# Patient Record
Sex: FEMALE | Race: WHITE | NOT HISPANIC OR LATINO | Employment: OTHER | URBAN - METROPOLITAN AREA
[De-identification: names, ages, dates, MRNs, and addresses within clinical notes are randomized per-mention and may not be internally consistent; named-entity substitution may affect disease eponyms.]

---

## 2017-04-21 ENCOUNTER — HOSPITAL ENCOUNTER (EMERGENCY)
Facility: CLINIC | Age: 77
Discharge: HOME | End: 2017-04-21
Attending: EMERGENCY MEDICINE | Admitting: EMERGENCY MEDICINE
Payer: MEDICARE

## 2017-04-21 VITALS
HEART RATE: 89 BPM | OXYGEN SATURATION: 97 % | TEMPERATURE: 98.2 F | RESPIRATION RATE: 16 BRPM | SYSTOLIC BLOOD PRESSURE: 135 MMHG | DIASTOLIC BLOOD PRESSURE: 95 MMHG

## 2017-04-21 DIAGNOSIS — S81.802A AVULSION OF SKIN OF LOWER LEG, LEFT, INITIAL ENCOUNTER: ICD-10-CM
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
## 2017-04-21 PROCEDURE — 99283 EMERGENCY DEPT VISIT LOW MDM: CPT | Mod: 25

## 2017-04-21 PROCEDURE — 12005 RPR S/N/A/GEN/TRK12.6-20.0CM: CPT

## 2017-04-21 PROCEDURE — 93005 ELECTROCARDIOGRAM TRACING: CPT

## 2017-04-21 RX ORDER — GINSENG 100 MG
CAPSULE ORAL
Status: DISCONTINUED
Start: 2017-04-21 | End: 2017-04-22 | Stop reason: HOSPADM

## 2017-04-21 RX ORDER — SULFAMETHOXAZOLE/TRIMETHOPRIM 800-160 MG
1 TABLET ORAL 2 TIMES DAILY
Qty: 14 TABLET | Refills: 0 | Status: SHIPPED | OUTPATIENT
Start: 2017-04-21 | End: 2017-04-28

## 2017-04-21 RX ORDER — LIDOCAINE HYDROCHLORIDE AND EPINEPHRINE 10; 10 MG/ML; UG/ML
INJECTION, SOLUTION INFILTRATION; PERINEURAL
Status: DISCONTINUED
Start: 2017-04-21 | End: 2017-04-22 | Stop reason: HOSPADM

## 2017-04-21 ASSESSMENT — ENCOUNTER SYMPTOMS
WOUND: 1
HEADACHES: 0
NAUSEA: 0
WEAKNESS: 0
VOMITING: 0
DIARRHEA: 0
NUMBNESS: 0
FEVER: 0
DIZZINESS: 1

## 2017-04-21 NOTE — ED AVS SNAPSHOT
[facility] Emergency Department    [address]    [facility] [address]    Phone:  [phone]    Fax:  [phone]                                       Madhavi Castellanos   MRN: [id number]    Department:  [facility] Emergency Department   Date of Visit:  4/21/2017           After Visit Summary Signature Page     I have received my discharge instructions, and my questions have been answered. I have discussed any challenges I see with this plan with the nurse or doctor.    ..........................................................................................................................................  Patient/Patient Representative Signature      ..........................................................................................................................................  Patient Representative Print Name and Relationship to Patient    ..................................................               ................................................  Date                                            Time    ..........................................................................................................................................  Reviewed by Signature/Title    ...................................................              ..............................................  Date                                                            Time

## 2017-04-21 NOTE — ED AVS SNAPSHOT
[facility] Emergency Department    [address] [place] [address]    Phone:  [phone]    Fax:  [phone]                                       Madhavi Castellanos   MRN: [id number]    Department:  [facility] Emergency Department   Date of Visit:  4/21/2017           Patient Information     Date Of Birth          1940        Your diagnoses for this visit were:     Fall, initial encounter     Avulsion of skin of lower leg, left, initial encounter        You were seen by Peewee Strickland MD.      Follow-up Information     Follow up with Cecy Edmondson MD.    Specialty:  Internal Medicine    Why:  next week for wound check.     Contact information:    [facility]  [address]  [place] [address] [phone]        Discharge References/Attachments     SKIN AVULSION (ENGLISH)      24 Hour Appointment Hotline       To make an appointment at any [facility] clinic, call [phone]). If you don't have a family doctor or clinic, we will help you find one. [facility] clinics are conveniently located to serve the needs of you and your family.             Review of your medicines      START taking        Dose / Directions Last dose taken    sulfamethoxazole-trimethoprim 800-160 MG per tablet   Commonly known as:  BACTRIM DS   Dose:  1 tablet   Quantity:  14 tablet        Take 1 tablet by mouth 2 times daily for 7 days   Refills:  0          Our records show that you are taking the medicines listed below. If these are incorrect, please call your family doctor or clinic.        Dose / Directions Last dose taken    alum & mag hydroxide-simethicone 200-200-20 MG/5ML Susp suspension   Commonly known as:  MYLANTA/MAALOX   Dose:  30 mL   Quantity:  300 mL        Take 30 mLs by mouth every 4 hours as needed   Refills:  0        BETAPACE PO   Dose:  120 mg        Take 120 mg by mouth 2 times daily   Refills:  0        docusate sodium 100 MG tablet    Commonly known as:  COLACE   Dose:  100 mg   Quantity:  60 tablet        Take 100 mg by mouth daily   Refills:  1        fentaNYL 25 mcg/hr 72 hr patch   Commonly known as:  DURAGESIC   Dose:  1 patch        Place 1 patch onto the skin every 72 hours   Refills:  0        GABAPENTIN PO   Dose:  900 mg        Take 900 mg by mouth 3 times daily   Refills:  0        HYDROCHLOROTHIAZIDE PO   Dose:  12.5 mg        Take 12.5 mg by mouth daily   Refills:  0        HYDROcodone-acetaminophen 5-500 MG per tablet   Commonly known as:  VICODIN   Dose:  1-2 tablet        Take 1-2 tablets by mouth every 4 hours as needed   Refills:  0        LIPITOR PO   Dose:  10 mg        Take 10 mg by mouth   Refills:  0        LISINOPRIL PO   Dose:  10 mg        Take 10 mg by mouth daily   Refills:  0        METHOCARBAMOL PO   Dose:  750 mg        Take 750 mg by mouth 4 times daily   Refills:  0        metoclopramide 5 MG tablet   Commonly known as:  REGLAN   Dose:  5-10 mg   Quantity:  10 tablet        Take 1-2 tablets (5-10 mg) by mouth 3 times daily as needed   Refills:  0        polyethylene glycol Packet   Commonly known as:  MIRALAX   Dose:  1 packet   Quantity:  30 packet        Take 17 g by mouth 2 times daily   Refills:  0        PRILOSEC PO   Dose:  20 mg        Take 20 mg by mouth every morning   Refills:  0        promethazine 25 MG tablet   Commonly known as:  PHENERGAN   Dose:  25 mg   Quantity:  15 tablet        Take 1 tablet (25 mg) by mouth every 6 hours as needed for nausea   Refills:  0        SIMVASTATIN PO        Take  by mouth.   Refills:  0        TAPAZOLE PO   Dose:  5 mg        Take 5 mg by mouth daily   Refills:  0        WELLBUTRIN XL PO   Dose:  150 mg        Take 150 mg by mouth daily   Refills:  0                Prescriptions were sent or printed at these locations (1 Prescription)                   Other Prescriptions                Printed at Department/Unit printer (1 of 1)          sulfamethoxazole-trimethoprim (BACTRIM DS) 800-160 MG per tablet                Procedures and tests performed during your visit     EKG 12 lead      Orders Needing Specimen Collection     None      Pending Results     Date and Time Order Name Status Description    4/21/2017 2143 EKG 12 lead Preliminary             Pending Culture Results     No orders found from 4/19/2017 to 4/22/2017.            Test Results From Your Hospital Stay               Clinical Quality Measure: Blood Pressure Screening     Your blood pressure was checked while you were in the emergency department today. The last reading we obtained was  BP: 136/84 . Please read the guidelines below about what these numbers mean and what you should do about them.  If your systolic blood pressure (the top number) is less than 120 and your diastolic blood pressure (the bottom number) is less than 80, then your blood pressure is normal. There is nothing more that you need to do about it.  If your systolic blood pressure (the top number) is 120-139 or your diastolic blood pressure (the bottom number) is 80-89, your blood pressure may be higher than it should be. You should have your blood pressure rechecked within a year by a primary care provider.  If your systolic blood pressure (the top number) is 140 or greater or your diastolic blood pressure (the bottom number) is 90 or greater, you may have high blood pressure. High blood pressure is treatable, but if left untreated over time it can put you at risk for heart attack, stroke, or kidney failure. You should have your blood pressure rechecked by a primary care provider within the next 4 weeks.  If your provider in the emergency department today gave you specific instructions to follow-up with your doctor or provider even sooner than that, you should follow that instruction and not wait for up to 4 weeks for your follow-up visit.        Thank you for choosing [facility]       Thank you for choosing [facility]  for your care. Our goal is always to provide you with excellent care. Hearing back from our patients is one way we can continue to improve our services. Please take a few minutes to complete the written survey that you may receive in the mail after you visit with us. Thank you!        [employer] Information     [employer] gives you secure access to your electronic health record. If you see a primary care provider, you can also send messages to your care team and make appointments. If you have questions, please call your primary care clinic.  If you do not have a primary care provider, please call [phone] and they will assist you.        Care EveryWhere ID     This is your Care EveryWhere ID. This could be used by other organizations to access your [facility] medical records  [id number]        After Visit Summary       This is your record. Keep this with you and show to your community pharmacist(s) and doctor(s) at your next visit.

## 2017-04-22 LAB — INTERPRETATION ECG - MUSE: NORMAL

## 2017-04-22 NOTE — ED NOTES
"Pt presents to ED c/o a fall and a laceration. Pt states she felt dizzy earlier this evening she was getting out of her bed when she \"felt dizzy for a moment\" and fell to her knees. Pt reports a laceration to left knee. Denies dizziness currently. Pt is A&O x4, ABCs intact.   "

## 2017-04-22 NOTE — ED PROVIDER NOTES
History     Chief Complaint:  Fall and Laceration    HPI:    Madhavi Castellanos is a 76 year old female with a history of atrial fibrillation, CAD, osteoarthrosis, amongst others who presents for evaluation after a fall. The patient reports that she felt dizzy earlier tonight as she was getting out of her bed. When she took a few steps to move to a chair, she fell to both knees, subsequently causing a laceration to her left knee; she did not hit her head or lose consciousness. She denies numbness, weakness, fever, nausea, vomiting, or diarrhea.    Allergies:  Codeine Sulfate  Cymbalta  Percocet [Oxycodone-Acetaminophen]      Medications:    Phenergan  Miralax  Colace  Fentanyl  Reglan  Omeprazole  Lipitor  Wellbutrin  Hydrochlorothiazide  Tapazole  Betapace  Mylanta  Gabapentin  Vicodin  Methocarbamol  Lisinopril  Simvastatin     Past Medical History:    Asthma  Atrial fibrillation  CAD  Depression  Esophageal reflux  Hypertension  Liver lesion  Lumbosacral spondylosis  Obesity  Obstructive sleep apnea  Osteoarthritis  Prolonged QT interval  Hypercholesterolemia  Spinal stenosis of lumbar region  Left bundle branch block    Past Surgical History:    Cholecystectomy    Family History:    History reviewed. No pertinent family history.      Social History:  Smoking status: Never Smoker  Alcohol use: No   Marital Status:     Presents with family at bedside.     Review of Systems   Constitutional: Negative for fever.   Gastrointestinal: Negative for diarrhea, nausea and vomiting.   Skin: Positive for wound.   Neurological: Positive for dizziness. Negative for syncope, weakness, numbness and headaches.   All other systems reviewed and are negative.      Physical Exam     Patient Vitals for the past 24 hrs:   BP Temp Temp src Pulse Resp SpO2   04/21/17 2123 136/84 98.2  F (36.8  C) Oral 89 16 97 %      Physical Exam:  Nursing note and vitals reviewed.  Constitutional: Cooperative. Ambulatory.   HENT:    Mouth/Throat: Mucous membranes are normal.   Cardiovascular: Normal rate, regular rhythm and normal heart sounds.  No murmur.  Pulmonary/Chest: Effort normal and breath sounds normal. No respiratory distress. No wheezes. No rales.   Abdominal: Soft. Normal appearance. There is no tenderness. There is no rigidity and no guarding.   Musculoskeletal: Normal range of motion of LLE, normal straight leg raise.   Neurological: Alert. GCS 15.Strength and sensation in LLE  Skin: Skin is warm and dry. No rash noted. Large skin avulsion to the anterior left knee.  Psychiatric: Normal mood and affect.      Emergency Department Course     ECG (21:49:22):  Rate 81 bpm. NM interval 196. QRS duration 150. QT/QTc 414/480. P-R-T axes 16- -36-89. Normal sinus rhythm. Left axis deviation. Left bundle branch block. Interpreted at 2200 by Peewee Strickland MD.     Procedures:    Narrative: Procedure: Skin Tear Repair        SKIN TEAR:  A large, complex, flapped 15.5 cm (border length) skin tear.      LOCATION:  Anterior left knee      FUNCTION:  Distally sensation, circulation, motor and tendon function are intact.      ANESTHESIA:  LET - Topical, injected with 1% lidocaine with epi.       PREPARATION:  Irrigation with Normal Saline and Shur Clens      DEBRIDEMENT:  no debridement      CLOSURE:  Superior and lateral borders were closed with sutures. Each of those   lengths were 3.5 cm. Each of the wounds edges were closed with One Layer.  Each    with 4 x 4.0 Ethylon using interrupted sutures (total 8). The superior lateral border had   debridement of non viable tissue and was left open.    Emergency Department Course:  An ECG was obtained.    Past medical records, nursing notes, and vitals reviewed.  2204: I performed an exam of the patient and obtained history, as documented above.    2315: A laceration repair procedure was performed as noted above.    2341: I rechecked the patient. Findings and plan explained to the Patient. Patient  discharged home with instructions regarding supportive care, medications, and reasons to return. The importance of close follow-up was reviewed.      Impression & Plan      Medical Decision Making:    Madhavi Castellanos is a 76 year old female who presents after a mechanical fall. She states that she does fall often and there is no concern clinically for an acute cardiogenic or neurologic ideology. Her vital signs are stable and reassuring. She is able to weight bear and has full range of motion of the affected knee. There is no indication for imaging at this time. Her tetanus immunization is up-to-date and her skin tear was repaired in the primary fashion per the procedure note. The majority of this will need to heal by secondary intention due to loss of skin tissue. She will follow up next week with her primary physician for wound check.    Diagnosis:    ICD-10-CM   1. Fall, initial encounter W19.XXXA   2. Avulsion of skin of lower leg, left, initial encounter S81.802A     Disposition:  Discharged to home.    Alda Cha  4/21/2017   [facility] EMERGENCY DEPARTMENT    I, Alda Cha, bharath serving as a scribe at 10:04 PM on 4/21/2017 to document services personally performed by Peewee Strickland MD based on my observations and the provider's statements to me.       Peewee Strickland MD  04/22/17 0539

## 2017-04-28 ENCOUNTER — HOSPITAL ENCOUNTER (EMERGENCY)
Facility: CLINIC | Age: 77
Discharge: HOME | End: 2017-04-28
Attending: EMERGENCY MEDICINE | Admitting: EMERGENCY MEDICINE
Payer: MEDICARE

## 2017-04-28 VITALS
HEART RATE: 71 BPM | RESPIRATION RATE: 16 BRPM | TEMPERATURE: 97.4 F | DIASTOLIC BLOOD PRESSURE: 75 MMHG | OXYGEN SATURATION: 97 % | SYSTOLIC BLOOD PRESSURE: 118 MMHG

## 2017-04-28 DIAGNOSIS — T50.901A ACCIDENTAL OVERDOSE, INITIAL ENCOUNTER: ICD-10-CM

## 2017-04-28 PROCEDURE — 99282 EMERGENCY DEPT VISIT SF MDM: CPT

## 2017-04-28 ASSESSMENT — ENCOUNTER SYMPTOMS
LIGHT-HEADEDNESS: 0
DIZZINESS: 0
BACK PAIN: 1
FATIGUE: 0

## 2017-04-28 NOTE — ED NOTES
Pt again asking when she will be reevaluated and discharged. MD aware. Pt unwilling to wait any longer. States that MD had said he would reevaluate her by 1700 and it is past that. Pt leaving. Pt states she has come up with a new plan to implement at home; will carefully read each bottle of meds, will closely examine each pill before taking to ensure it is what it is supposed to be. She feels certain with this plan she can avoid medication errors in the future. Pt left ED, ambulatory, with use of cane.

## 2017-04-28 NOTE — ED NOTES
Pt was ambulatory upon arrival with use of cane; wheelchair to room and transferred independently to bed; no apparent distress. Patient's airway, breathing and circulation are all intact without need for intervention at this time. Respiration regular and easy; lungs clear. Patient is alert and oriented x4.

## 2017-04-28 NOTE — ED AVS SNAPSHOT
[facility] Emergency Department    [address] [place] [address]    Phone:  [phone]    Fax:  [phone]                                       Madhavi Castellanos   MRN: [id number]    Department:  [facility] Emergency Department   Date of Visit:  4/28/2017           Patient Information     Date Of Birth          1940        Your diagnoses for this visit were:     Accidental overdose, initial encounter        You were seen by Herbert Soria MD.      Follow-up Information     Follow up with Cecy Edmondson MD In 3 days.    Specialty:  Internal Medicine    Contact information:    [facility]  [address]  [place] [address]  [phone]          Discharge Instructions         Accidental Ingestion: Nontoxic (Adult)  You have been evaluated and treated for accidentally taking too much of a medicine or swallowing a chemical product. There is no sign of toxic effect at this time. It is not likely that any new symptoms will appear. To be safe, watch for symptoms during the next 24 hours (see below). The symptom will depend on what was swallowed.  Home care    If liquid charcoal was given to neutralize what was swallowed, it will give a black color to the stools for 1 to 2 days. Usually, a laxative is given with charcoal. This speeds the removal of any toxins from the intestines. This may cause diarrhea for up to 24 hours.    If you have been given charcoal but no laxative, you may become constipated. If this occurs, you may take an over-the-counter laxative.  Prevention    Keep medicines, pesticides, and other household chemicals in their original containers.    Clearly cristian all harmful products if a different bottle is used.  Note: In the future, if you or someone you know takes something possibly harmful, and you are not sure what to do, call the American Association of Poison Control Centers. The phone number is 1-654.307.4795. The phone line is  staffed 24 hours a day. If you call, you will be connected to the poison control center closest to you.   Follow-up care  Follow up with your health care provider, or as advised.  Call 911  Contact your local emergency services right away if any of these occur.    Trouble breathing or swallowing, wheezing    Severe confusion    Extreme drowsiness or trouble awakening    Fainting or loss of consciousness    Rapid heart rate     Very slow heart rate    Very low or very high blood pressure    Vomiting blood, or large amounts of blood in stool    Seizure  When to seek medical advice  Call your health care provider right away if any of these occur.    Shakiness    Fast breathing (over 25 breaths per minute) or slow breathing (less than 8 breaths per minute)    Shortness of breath    Fever of 100.4 F (38 C) or higher, or as directed by your healthcare provider    Vomiting or diarrhea for more than 24 hours    Abdominal pain    Dizziness or weakness    2350-9962 [employer]. [address] [place] [address]. All rights reserved. This information is not intended as a substitute for professional medical care. Always follow your healthcare professional's instructions.          24 Hour Appointment Hotline       To make an appointment at any [facility], call [phone] ([phone]). If you don't have a family doctor or clinic, we will help you find one. [facility] clinics are conveniently located to serve the needs of you and your family.             Review of your medicines      Our records show that you are taking the medicines listed below. If these are incorrect, please call your family doctor or clinic.        Dose / Directions Last dose taken    alum & mag hydroxide-simethicone 200-200-20 MG/5ML Susp suspension   Commonly known as:  MYLANTA/MAALOX   Dose:  30 mL   Quantity:  300 mL        Take 30 mLs by mouth every 4 hours as needed   Refills:  0        BETAPACE PO   Dose:  120 mg         Take 120 mg by mouth 2 times daily   Refills:  0        docusate sodium 100 MG tablet   Commonly known as:  COLACE   Dose:  100 mg   Quantity:  60 tablet        Take 100 mg by mouth daily   Refills:  1        fentaNYL 25 mcg/hr 72 hr patch   Commonly known as:  DURAGESIC   Dose:  1 patch        Place 1 patch onto the skin every 72 hours   Refills:  0        GABAPENTIN PO   Dose:  900 mg        Take 900 mg by mouth 3 times daily   Refills:  0        HYDROCHLOROTHIAZIDE PO   Dose:  12.5 mg        Take 12.5 mg by mouth daily   Refills:  0        HYDROcodone-acetaminophen 5-500 MG per tablet   Commonly known as:  VICODIN   Dose:  1-2 tablet        Take 1-2 tablets by mouth every 4 hours as needed   Refills:  0        LIPITOR PO   Dose:  10 mg        Take 10 mg by mouth   Refills:  0        LISINOPRIL PO   Dose:  10 mg        Take 10 mg by mouth daily   Refills:  0        METHOCARBAMOL PO   Dose:  750 mg        Take 750 mg by mouth 4 times daily   Refills:  0        metoclopramide 5 MG tablet   Commonly known as:  REGLAN   Dose:  5-10 mg   Quantity:  10 tablet        Take 1-2 tablets (5-10 mg) by mouth 3 times daily as needed   Refills:  0        polyethylene glycol Packet   Commonly known as:  MIRALAX   Dose:  1 packet   Quantity:  30 packet        Take 17 g by mouth 2 times daily   Refills:  0        PRILOSEC PO   Dose:  20 mg        Take 20 mg by mouth every morning   Refills:  0        promethazine 25 MG tablet   Commonly known as:  PHENERGAN   Dose:  25 mg   Quantity:  15 tablet        Take 1 tablet (25 mg) by mouth every 6 hours as needed for nausea   Refills:  0        SIMVASTATIN PO        Take  by mouth.   Refills:  0        sulfamethoxazole-trimethoprim 800-160 MG per tablet   Commonly known as:  BACTRIM DS   Dose:  1 tablet   Quantity:  14 tablet        Take 1 tablet by mouth 2 times daily for 7 days   Refills:  0        TAPAZOLE PO   Dose:  5 mg        Take 5 mg by mouth daily   Refills:  0        WELLBUTRIN  XL PO   Dose:  150 mg        Take 150 mg by mouth daily   Refills:  0                Orders Needing Specimen Collection     None      Pending Results     No orders found from 4/26/2017 to 4/29/2017.            Pending Culture Results     No orders found from 4/26/2017 to 4/29/2017.            Test Results From Your Hospital Stay               Clinical Quality Measure: Blood Pressure Screening     Your blood pressure was checked while you were in the emergency department today. The last reading we obtained was  BP: 118/75 . Please read the guidelines below about what these numbers mean and what you should do about them.  If your systolic blood pressure (the top number) is less than 120 and your diastolic blood pressure (the bottom number) is less than 80, then your blood pressure is normal. There is nothing more that you need to do about it.  If your systolic blood pressure (the top number) is 120-139 or your diastolic blood pressure (the bottom number) is 80-89, your blood pressure may be higher than it should be. You should have your blood pressure rechecked within a year by a primary care provider.  If your systolic blood pressure (the top number) is 140 or greater or your diastolic blood pressure (the bottom number) is 90 or greater, you may have high blood pressure. High blood pressure is treatable, but if left untreated over time it can put you at risk for heart attack, stroke, or kidney failure. You should have your blood pressure rechecked by a primary care provider within the next 4 weeks.  If your provider in the emergency department today gave you specific instructions to follow-up with your doctor or provider even sooner than that, you should follow that instruction and not wait for up to 4 weeks for your follow-up visit.        Thank you for choosing [facility]       Thank you for choosing [facility] for your care. Our goal is always to provide you with excellent care. Hearing back from our patients is one  way we can continue to improve our services. Please take a few minutes to complete the written survey that you may receive in the mail after you visit with us. Thank you!        [employer] Information     [employer] gives you secure access to your electronic health record. If you see a primary care provider, you can also send messages to your care team and make appointments. If you have questions, please call your primary care clinic.  If you do not have a primary care provider, please call [phone] and they will assist you.        Care EveryWhere ID     This is your Care EveryWhere ID. This could be used by other organizations to access your [facility] medical records  [id number]        After Visit Summary       This is your record. Keep this with you and show to your community pharmacist(s) and doctor(s) at your next visit.

## 2017-04-28 NOTE — ED AVS SNAPSHOT
[facility] Emergency Department    [address]    [facility] [address]    Phone:  [phone]    Fax:  [phone]                                       Madhavi Castellanos   MRN: [id number]    Department:  [facility] Emergency Department   Date of Visit:  4/28/2017           After Visit Summary Signature Page     I have received my discharge instructions, and my questions have been answered. I have discussed any challenges I see with this plan with the nurse or doctor.    ..........................................................................................................................................  Patient/Patient Representative Signature      ..........................................................................................................................................  Patient Representative Print Name and Relationship to Patient    ..................................................               ................................................  Date                                            Time    ..........................................................................................................................................  Reviewed by Signature/Title    ...................................................              ..............................................  Date                                                            Time

## 2017-04-28 NOTE — DISCHARGE INSTRUCTIONS
Accidental Ingestion: Nontoxic (Adult)  You have been evaluated and treated for accidentally taking too much of a medicine or swallowing a chemical product. There is no sign of toxic effect at this time. It is not likely that any new symptoms will appear. To be safe, watch for symptoms during the next 24 hours (see below). The symptom will depend on what was swallowed.  Home care    If liquid charcoal was given to neutralize what was swallowed, it will give a black color to the stools for 1 to 2 days. Usually, a laxative is given with charcoal. This speeds the removal of any toxins from the intestines. This may cause diarrhea for up to 24 hours.    If you have been given charcoal but no laxative, you may become constipated. If this occurs, you may take an over-the-counter laxative.  Prevention    Keep medicines, pesticides, and other household chemicals in their original containers.    Clearly cristian all harmful products if a different bottle is used.  Note: In the future, if you or someone you know takes something possibly harmful, and you are not sure what to do, call the [employer] of Poison Control Centers. The phone number is 1-347.769.2933. The phone line is staffed 24 hours a day. If you call, you will be connected to the poison control center closest to you.   Follow-up care  Follow up with your health care provider, or as advised.  Call [phone]  Contact your local emergency services right away if any of these occur.    Trouble breathing or swallowing, wheezing    Severe confusion    Extreme drowsiness or trouble awakening    Fainting or loss of consciousness    Rapid heart rate     Very slow heart rate    Very low or very high blood pressure    Vomiting blood, or large amounts of blood in stool    Seizure  When to seek medical advice  Call your health care provider right away if any of these occur.    Shakiness    Fast breathing (over 25 breaths per minute) or slow breathing (less than 8 breaths per  minute)    Shortness of breath    Fever of 100.4 F (38 C) or higher, or as directed by your healthcare provider    Vomiting or diarrhea for more than 24 hours    Abdominal pain    Dizziness or weakness    5098-1456 [employer]. [address] [place] [address], [place] [address]. All rights reserved. This information is not intended as a substitute for professional medical care. Always follow your healthcare professional's instructions.

## 2017-04-28 NOTE — ED NOTES
"Pt has chronic back pain. Uses a fentanyl patch for pain. Took what she thought were three of her gabapentin, but then realized they were hydrocodone 7.5 mg. Worried about this possible overdose. States, \"so far my breathing seems to be unaffected.\"    Airway, breathing and circulation intact without need for intervention. Alert and interacting appropriately for age and situation.    HOME MEDICATIONS:  List in EPIC is correct.  "

## 2017-04-28 NOTE — ED NOTES
Pt resting in bed. No complaints. Oximeter sticker placed on her finger instead of clip which keeps falling off. Call light within reach.

## 2017-04-28 NOTE — ED PROVIDER NOTES
History     Chief Complaint:  Drug Overdose    HPI   Madhavi Castellanos is a 76 year old female with a history pertinent for atrial fibrillation, CAD, chronic back pain, and osteoarthrosis who presents to the emergency department today for evaluation of a drug overdose. The patient reports that she has chronic back pains for which she is on Fentanyl patches and takes Vicodin (7.5 mg Hydrocodone + 325 mg Acetaminophen) PRN. She states that she thought she was taking her Methocarbamol and may have accidentally taken three of her Vicodin tablets. She states that because she is on her Fentanyl 25 mcg patch she became worried that she took too much medication. She denies any fatigue, dizziness, lightheadedness, loss of consciousness, or other symptoms at this time. She reports that she did drive into the ER herself. She does continue to have some lumbar back pain, but reports no new injuries to the back or other concerns.      Allergies:  Codeine Sulfate  Cymbalta  Percocet [Oxycodone-Acetaminophen]       Medications:    Phenergan  Miralax  Colace  Fentanyl  Reglan  Omeprazole  Lipitor  Wellbutrin  Hydrochlorothiazide  Tapazole  Betapace  Mylanta  Gabapentin  Vicodin  Methocarbamol  Lisinopril  Simvastatin      Past Medical History:    Asthma  Atrial fibrillation  CAD  Depression  Esophageal reflux  Hypertension  Liver lesion  Lumbosacral spondylosis  Obesity  Obstructive sleep apnea  Osteoarthritis  Prolonged QT interval  Hypercholesterolemia  Spinal stenosis of lumbar region  Left bundle branch block     Past Surgical History:   Cholecystectomy     Family History:   History reviewed. No pertinent family history.      Social History:  Smoking status: Never Smoker  Alcohol use: No   Marital Status:    Presents with family at bedside.      Review of Systems   Constitutional: Negative for fatigue.   Musculoskeletal: Positive for back pain.   Neurological: Negative for dizziness, syncope and light-headedness.   All  other systems reviewed and are negative.    Physical Exam   Vitals:  Patient Vitals for the past 24 hrs:   BP Temp Temp src Pulse Resp SpO2   04/28/17 1700 - - - - - 97 %   04/28/17 1645 - - - - - 96 %   04/28/17 1630 - - - - - 94 %   04/28/17 1600 - - - - - 95 %   04/28/17 1550 - - - - - 98 %   04/28/17 1535 - - - - - 95 %   04/28/17 1526 - - - - - 96 %   04/28/17 1515 - - - - - 97 %   04/28/17 1500 - - - - - 93 %   04/28/17 1453 - - - - 16 94 %   04/28/17 1415 - - - - - 93 %   04/28/17 1400 - - - - - 96 %   04/28/17 1347 118/75 97.4  F (36.3  C) Oral 71 18 97 %      Physical Exam  General: Alert and awake  HEENT:   The scalp and head appear normal    The pupils are equal, round, and reactive to light    Extraocular muscles are intact.    The nose is normal.    The oropharynx is normal.      Uvula is in the midline.  There is no peritonsillar abscess.  Neck:  Normal range of motion.    Lungs:  Clear.      No rales, no wheezing.      There is no tachypnea.  Non-labored.  Cardiac: Regular rate.      Normal S1 and S2.      No S3 or S4.      No pathological murmur.      No pericardial rub.  Abdomen: Soft. No distension. No tympani. No rebound. Non-tender.  Lymph: No anterior or posterior cervical lymphadenopathy noted.  MS:  Normal tone.      Normal movement of all extremities.    Neuro:  Normal mentation.  No focal motor or sensory changes.      Speech normal.  Psych:  Awake.     Alert.      Normal affect.      Appropriate interactions.  Skin:  No rash.      No lesions.      Emergency Department Course     ECG  Emergency Department Course:  Nursing notes and vitals reviewed.  I performed an exam of the patient as documented above.   At 1735 the patient was rechecked and was updated.  I discussed the treatment plan with the patient. She expressed understanding of this plan and consented to discharge. She will be discharged home with instructions for care and follow up. In addition, the patient will return to the  emergency department if their symptoms persist, worsen, if new symptoms arise or if there is any concern.  All questions were answered.    Impression & Plan      Medical Decision Making:  Madhavi Castellanos is a 76 year old female who presents to the emergency department today for evaluation of an involuntary overdose. She mistook her Neurontin and took her Hydrocodone instead. She takes 7.5 mg tablets probably once a day and accidentally took three of them for a total of 22.5 mg. She also has a 25 mcg hour patch of Fentanyl that she placed this morning.    She showed no clinical signs of deterioration here and remained alert, appropriate, and interactive. She has normal vital signs and oxygenation. She has figured out a system to place her pills in the pill box from now on so she does not get them mixed up. I think the patient has decision making capacity and ability to keep this straight in the future. She states that she was in a hurry and really was not paying attention to what she was doing; hence the pill box she can fill weekly would be of great benefit.     I spoke with poison control also and they felt that 4-hour monitoring would be reasonable. She was here for 4 hours out since the time of the ingestion and remained clinically intact as per above.     Diagnosis:    ICD-10-CM    1. Accidental overdose, initial encounter T50.901A    2. Chronic pain      Scribe Disclosure:  I, Cruz Rockwell, am serving as a scribe at 2:16 PM on 4/28/2017 to document services personally performed by Herbert Soria MD, based on my observations and the provider's statements to me.    4/28/2017   [facility] EMERGENCY DEPARTMENT       Herbert Soria MD  04/28/17 181

## 2017-04-28 NOTE — ED NOTES
Pt states she is tired and would like to go home. Aware that plan of care includes staying until 1745 for observation. Asking to move that time up to around 1700 instead. MD updated and agreed with this plan: he will re-evaluate her around 1700. Pt pleased with this updated plan of care. Call light within reach.

## 2017-05-19 ENCOUNTER — TRANSFERRED RECORDS (OUTPATIENT)
Dept: HEALTH INFORMATION MANAGEMENT | Facility: CLINIC | Age: 77
End: 2017-05-19

## 2017-05-30 ENCOUNTER — HOSPITAL ENCOUNTER (OUTPATIENT)
Dept: WOUND CARE | Facility: CLINIC | Age: 77
Discharge: HOME | End: 2017-05-30
Attending: SURGERY | Admitting: SURGERY
Payer: MEDICARE

## 2017-05-30 DIAGNOSIS — S81.802A OPEN WOUND OF KNEE, LEG, AND ANKLE, LEFT, INITIAL ENCOUNTER: Primary | ICD-10-CM

## 2017-05-30 DIAGNOSIS — S81.002A OPEN WOUND OF KNEE, LEG, AND ANKLE, LEFT, INITIAL ENCOUNTER: Primary | ICD-10-CM

## 2017-05-30 DIAGNOSIS — S91.002A OPEN WOUND OF KNEE, LEG, AND ANKLE, LEFT, INITIAL ENCOUNTER: Primary | ICD-10-CM

## 2017-05-30 PROCEDURE — 99202 OFFICE O/P NEW SF 15 MIN: CPT | Mod: 25 | Performed by: SURGERY

## 2017-05-30 PROCEDURE — 97597 DBRDMT OPN WND 1ST 20 CM/<: CPT

## 2017-05-30 PROCEDURE — A6212 FOAM DRG <=16 SQ IN W/BORDER: HCPCS

## 2017-05-30 PROCEDURE — 99214 OFFICE O/P EST MOD 30 MIN: CPT | Mod: 25

## 2017-05-30 PROCEDURE — A6022 COLLAGEN DRSG>16<=48 SQ IN: HCPCS

## 2017-05-30 PROCEDURE — 97597 DBRDMT OPN WND 1ST 20 CM/<: CPT | Performed by: SURGERY

## 2017-05-30 RX ORDER — HYDROCODONE BITARTRATE AND ACETAMINOPHEN 7.5; 325 MG/1; MG/1
1-2 TABLET ORAL 2 TIMES DAILY PRN
COMMUNITY
End: 2020-03-17

## 2017-05-31 NOTE — PROGRESS NOTES
[facility]      DATE OF VISIT:  05/30/2017.        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  She recently fell and incurred a flap laceration to her left knee and subsequently the wound did not heal because the skin edges broke down and she comes here to our [facility] for ongoing therapy.  She has good distal pulses and no significant edema in her leg.  She needs to have a good program of wound care.      Today after timeout was taken and permission obtained, we sharply debrided the wound base of its bioburden out to the edge of the subcutaneous and we encountered good bleeding from the wound base itself.  The center portion of the wound is actually intact healing skin but the peripheral portion of the wound which was somewhat triangular in shape and the entire area is open and it exhibits a good granulation tissue after we debrided it.  There was bleeding from the wound surface.  This was controlled with pressure dressing.  We will institute a program of Kiki dressings to her wound on a Monday, Wednesday, Friday basis and she will cover with a Mepilex border and we will plan on seeing her again in a couple weeks for followup in clinic.  I think her main problem is proper medication for her back pain in that she is having so many falls and has a history of a number of admissions to the emergency room for this.      Her vital signs today are blood pressure 139/80, pulse is 86, respirations 16, temperature is 97.6.  She stands 5 feet 2, weight is 179.  She is mentally alert and physically seems to be quite functional, but has a history of an arrhythmia and it may be that that is playing a part in her falls as well but I  certainly am concerned about the level of pain medication she is taking and perhaps that needs to be addressed so that these falls and possible further injury are not continued.         LIZ TORO MD             D: 2017 11:44   T: 2017 21:12   MT: GITA      Name:     AC MCKOY   MRN:      [id number]        Account:      [id number]   :      1940           Visit Date:   2017      Document: [id number]       cc: Cecy Edmondson MD

## 2017-06-13 ENCOUNTER — HOSPITAL ENCOUNTER (OUTPATIENT)
Dept: WOUND CARE | Facility: CLINIC | Age: 77
Discharge: HOME | End: 2017-06-13
Attending: SURGERY | Admitting: SURGERY
Payer: MEDICARE

## 2017-06-13 PROCEDURE — 97597 DBRDMT OPN WND 1ST 20 CM/<: CPT | Performed by: SURGERY

## 2017-06-13 PROCEDURE — 97597 DBRDMT OPN WND 1ST 20 CM/<: CPT

## 2017-06-13 PROCEDURE — A6248 HYDROGEL DRSG GEL FILLER: HCPCS

## 2017-06-14 NOTE — PROGRESS NOTES
[facility]        Ac Castellanos is a 77-year-old white female with a history of trauma to her left leg from falls.  She has a residual wound on her anterior left knee which we had seen a few weeks ago.  There is a small area that measures 0.7 x 0.5 and she has a more recent wound on her anterior left tibial area measuring 1.1 x 0.7.  Neither of these has any depth.      VITAL SIGNS:  Today are blood pressure 134/83, pulse rate 77, respirations 16, temperature is 97.4.      After timeout was taken and permission obtained, we debrided both wounds, removing burden from significant portion of the lower wound and from the superficial aspect of the upper wound.  The upper leg wound was treated with some silver nitrate and the lower leg wound will be treated with Woun'Dres gel and a Band-Aid.  She will continue to use Woun'Dres gel and a Band-Aid to both wounds and will return to clinic here in a few weeks if she is not resolved.         LIZ TORO MD             D: 2017 11:03   T: 2017 21:51   MT: CD      Name:     AC CASTELLANOS   MRN:      [id number]        Account:      [id number]   :      1940           Visit Date:   2017      Document: [id number]       cc: Cecy Edmondson MD

## 2017-08-24 ENCOUNTER — TRANSFERRED RECORDS (OUTPATIENT)
Dept: HEALTH INFORMATION MANAGEMENT | Facility: CLINIC | Age: 77
End: 2017-08-24

## 2017-08-29 ENCOUNTER — APPOINTMENT (OUTPATIENT)
Dept: GENERAL RADIOLOGY | Facility: CLINIC | Age: 77
End: 2017-08-29
Attending: EMERGENCY MEDICINE
Payer: MEDICARE

## 2017-08-29 ENCOUNTER — HOSPITAL ENCOUNTER (EMERGENCY)
Facility: CLINIC | Age: 77
Discharge: HOME | End: 2017-08-29
Attending: EMERGENCY MEDICINE | Admitting: EMERGENCY MEDICINE
Payer: MEDICARE

## 2017-08-29 VITALS
DIASTOLIC BLOOD PRESSURE: 92 MMHG | HEART RATE: 86 BPM | OXYGEN SATURATION: 93 % | RESPIRATION RATE: 16 BRPM | SYSTOLIC BLOOD PRESSURE: 127 MMHG | TEMPERATURE: 97.5 F

## 2017-08-29 DIAGNOSIS — R06.00 DYSPNEA, UNSPECIFIED TYPE: ICD-10-CM

## 2017-08-29 LAB
ANION GAP SERPL CALCULATED.3IONS-SCNC: 7 MMOL/L (ref 3–14)
BASOPHILS # BLD AUTO: 0 10E9/L (ref 0–0.2)
BASOPHILS NFR BLD AUTO: 0.3 %
BUN SERPL-MCNC: 23 MG/DL (ref 7–30)
CALCIUM SERPL-MCNC: 9.1 MG/DL (ref 8.5–10.1)
CHLORIDE SERPL-SCNC: 103 MMOL/L (ref 94–109)
CO2 SERPL-SCNC: 29 MMOL/L (ref 20–32)
CREAT SERPL-MCNC: 0.68 MG/DL (ref 0.52–1.04)
DIFFERENTIAL METHOD BLD: ABNORMAL
EOSINOPHIL # BLD AUTO: 0.1 10E9/L (ref 0–0.7)
EOSINOPHIL NFR BLD AUTO: 1.1 %
ERYTHROCYTE [DISTWIDTH] IN BLOOD BY AUTOMATED COUNT: 13.2 % (ref 10–15)
GFR SERPL CREATININE-BSD FRML MDRD: 84 ML/MIN/1.7M2
GLUCOSE SERPL-MCNC: 120 MG/DL (ref 70–99)
HCT VFR BLD AUTO: 43.8 % (ref 35–47)
HGB BLD-MCNC: 14.3 G/DL (ref 11.7–15.7)
IMM GRANULOCYTES # BLD: 0.1 10E9/L (ref 0–0.4)
IMM GRANULOCYTES NFR BLD: 0.5 %
INTERPRETATION ECG - MUSE: NORMAL
LYMPHOCYTES # BLD AUTO: 2.8 10E9/L (ref 0.8–5.3)
LYMPHOCYTES NFR BLD AUTO: 24.5 %
MCH RBC QN AUTO: 30.6 PG (ref 26.5–33)
MCHC RBC AUTO-ENTMCNC: 32.6 G/DL (ref 31.5–36.5)
MCV RBC AUTO: 94 FL (ref 78–100)
MONOCYTES # BLD AUTO: 1.1 10E9/L (ref 0–1.3)
MONOCYTES NFR BLD AUTO: 9.2 %
NEUTROPHILS # BLD AUTO: 7.4 10E9/L (ref 1.6–8.3)
NEUTROPHILS NFR BLD AUTO: 64.4 %
NRBC # BLD AUTO: 0 10*3/UL
NRBC BLD AUTO-RTO: 0 /100
NT-PROBNP SERPL-MCNC: 291 PG/ML (ref 0–1800)
PLATELET # BLD AUTO: 377 10E9/L (ref 150–450)
POTASSIUM SERPL-SCNC: 3.6 MMOL/L (ref 3.4–5.3)
RBC # BLD AUTO: 4.68 10E12/L (ref 3.8–5.2)
SODIUM SERPL-SCNC: 139 MMOL/L (ref 133–144)
TROPONIN I SERPL-MCNC: <0.015 UG/L (ref 0–0.04)
WBC # BLD AUTO: 11.5 10E9/L (ref 4–11)

## 2017-08-29 PROCEDURE — 85025 COMPLETE CBC W/AUTO DIFF WBC: CPT | Performed by: EMERGENCY MEDICINE

## 2017-08-29 PROCEDURE — 71020 XR CHEST 2 VW: CPT

## 2017-08-29 PROCEDURE — 84484 ASSAY OF TROPONIN QUANT: CPT | Performed by: EMERGENCY MEDICINE

## 2017-08-29 PROCEDURE — 99284 EMERGENCY DEPT VISIT MOD MDM: CPT | Mod: 25

## 2017-08-29 PROCEDURE — 83880 ASSAY OF NATRIURETIC PEPTIDE: CPT | Performed by: EMERGENCY MEDICINE

## 2017-08-29 PROCEDURE — 80048 BASIC METABOLIC PNL TOTAL CA: CPT | Performed by: EMERGENCY MEDICINE

## 2017-08-29 ASSESSMENT — ENCOUNTER SYMPTOMS
SHORTNESS OF BREATH: 1
WHEEZING: 0
COUGH: 0
FEVER: 0

## 2017-08-29 NOTE — DISCHARGE INSTRUCTIONS
Shortness of Breath (Dyspnea)  Shortness of breath is the feeling that you can't catch your breath or get enough air. It is also known as dyspnea.  Dyspnea can be caused by many different conditions. They include:    Acute asthma attack.    Worsening of chronic lung diseases such as chronic bronchitis and emphysema.    Heart failure. This is when weak heart muscle allows extra fluid to collect in the lungs.    Panic attacks or anxiety. Fear can cause rapid breathing (hyperventilation).    Pneumonia, or an infection in the lung tissue.    Exposure to toxic substances, fumes, smoke, or certain medicines.    Blood clot in the lung (pulmonary embolism). This is often from a piece of blood clot in a deep vein of the leg (deep vein thrombosis) that breaks off and travels to the lungs.    Heart attack or heart-related chest pain (angina).    Anemia.    Collapsed lung (pneumothorax).    Dehydration.    Pregnancy.  Based on your visit today, the exact cause of your shortness of breath is not certain. Your tests don t show any of the serious causes of dyspnea. You may need other tests to find out if you have a serious problem. It s important to watch for any new symptoms or symptoms that get worse. Follow up with your healthcare provider as directed.  Home care  Follow these tips to take care of yourself at home:    When your symptoms are better, go back to your usual activities.    If you smoke, you should stop. Join a quit-smoking program or ask your healthcare provider for help.    Eat a healthy diet and get plenty of sleep.    Get regular exercise. Talk with your healthcare provider before starting to exercise, especially if you have other medical problems.    Cut down on the amount of caffeine and stimulants you consume.  Follow-up care  Follow up with your healthcare provider, or as advised.  If tests were done, you will be told if your treatment needs to be changed. You can call as directed for the results.  (Note:  If an X-ray was taken, a specialist will review it. You will be notified of any new findings that may affect your care.)  Call 911 or get immediate medical care  Shortness of breath may be a sign of a serious medical problem. For example, it may be a problem with your heart or lungs. Call 911 if you have worsening shortness of breath or trouble breathing, especially with any of the symptoms below:    You are confused or it s difficult to wake you.    You faint or lose consciousness.    You have a fast heartbeat, or your heartbeat is irregular.    You are coughing up blood.    You have pain in your chest, arm, shoulder, neck, or upper back.    You break out in a sweat.  When to seek medical advice  Call your healthcare provider right away if any of these occur:    Slight shortness of breath or wheezing    Redness, pain or swelling in your leg, arm, or other body area    Swelling in both legs or ankles    Fast weight gain    Dizziness or weakness    Fever of 100.4 F (38 C) or higher, or as directed by your healthcare provider  Date Last Reviewed: 9/13/2015 2000-2017 [employer]. [address] [place] [address]. All rights reserved. This information is not intended as a substitute for professional medical care. Always follow your healthcare professional's instructions.

## 2017-08-29 NOTE — ED PROVIDER NOTES
History     Chief Complaint:  Shortness of breath    HPI   Madhavi Castellanos is a 77 year old female with a history of atrial fibrillation and well controled asthma who presents with shortness of breath. The patient states that she started taking metoprolol today and she has had some shortness of breath subsequently. She reports taking the medication approximately 2 hours ago with the onset of the shortness of breath approximately 30 minutes ago. Here in the ED she now feels improved. Of note, she had a surgery on August 11th of this year and reports having a tachycardic heart rate since. Her doctor thus placed her on metoprolol to slow down her heart rate. She does have paroxysmal a-fib as well. The patient notes that she has asthma but very rarely uses an inhaler for this. She denies experiencing any fever, chest pain, cough, wheezing, or leg swelling. She states that she has never experienced symptoms like this before.    Allergies:  Augmentin  Codeine sulfate  Cymbalta  Percocet    Medications:    Norco  Phenergan  Miralax  Colace  Fentanyl 25 mcg/hr  Lipitor  Prilosec  Gabapentin  Hydrochlorothiazide  Tapazole  Betapace  Mylanta  Methocarbamol  Simvastatin    Past Medical History:    Asthma  Atrial fibrillation  CAD  Depression  GERD  Hypertension  LBBB  Liver lesion  Lumbosacral spondylosis  Obesity  TALI  Prolonged QT interval  Osteoarthritis  Pure hypercholesterolemia  Spinal stenosis of lumbar region    Past Surgical History:    Cholecystectomy  EGD    Family History:    The patient denies any relevant family medical history.     Social History:  Smoking Status: No  Smokeless Tobacco: No  Alcohol Use: No   Marital Status:   [4]    Review of Systems   Constitutional: Negative for fever.   Respiratory: Positive for shortness of breath. Negative for cough and wheezing.    Cardiovascular: Negative for chest pain and leg swelling.   All other systems reviewed and are negative.    Physical Exam    Vitals:  Patient Vitals for the past 24 hrs:   BP Temp Temp src Pulse Heart Rate Resp SpO2   08/29/17 1100 - - - - 86 - 93 %   08/29/17 1045 - - - - 85 - 98 %   08/29/17 1034 (!) 127/92 97.5  F (36.4  C) Oral 86 86 16 98 %         Physical Exam  Nursing note and vitals reviewed.  Constitutional: Cooperative.   HENT:   Mouth/Throat: Moist mucous membranes.   Eyes: EOMI, nonicteric sclera  Cardiovascular: Normal rate, regular rhythm, no murmurs, rubs, or gallops  Pulmonary/Chest: Effort normal and breath sounds normal. No respiratory distress. No wheezes. No rales.   Abdominal: Soft. Nontender, nondistended, no guarding or rigidity. BS present.   Musculoskeletal: Normal range of motion.   Neurological: Alert. Moves all extremities spontaneously.   Skin: Skin is warm and dry. No rash noted.   Psychiatric: Normal mood and affect.     Emergency Department Course     ECG:  ECG taken at 1040, ECG read at 1042  Normal sinus rhythm. Left axis deviation. Left bundle branch block. Abnormal ECG  Rate 85 bpm. GA interval 196. QRS duration 146. QT/QTc 422/502. P-R-T axes 3, -35, 74.     Imaging:  Radiology findings were communicated with the patient who voiced understanding of the findings.  Chest XR, PA & LAT:  IMPRESSION: Cardiac silhouette and pulmonary vasculature are within  normal limits. No focal airspace disease, pleural effusion or  pneumothorax. Implanted cardiac monitor again seen.  Reading per radiology.     Laboratory:  Laboratory findings were communicated with the patient who voiced understanding of the findings.  CBC: WBC: 11.5(H), o/w WNL (HGB 14.3, )   BMP: Glucose: 120(H), o/w WNL (Creatinine 0.68)   BNP: 291  Troponin (Collected 1047): <0.015     Emergency Department Course:  Nursing notes and vitals reviewed.  I performed an exam of the patient as documented above.   IV was inserted and blood was drawn for laboratory testing, results above.  The patient was sent for a XR while in the emergency  department, results above.      I discussed the treatment plan with the patient. They expressed understanding of this plan and consented to discharge. They will be discharged home with instructions for care and follow up. In addition, the patient will return to the emergency department if their symptoms persist, worsen, if new symptoms arise or if there is any concern.  All questions were answered.     I personally reviewed the laboratory results with the Patient and answered all related questions prior to discharge.    Impression & Plan      Medical Decision Making:  Madhavi Castellanos is a 77 year old female who presents to the emergency department today with a complaint of dyspnea. This is mostly resolved by the time of arrival. The patient associates it with taking metoprolol for the first time this morning. This is a possibility given the small beta 2 activity of metoprolol. Patient didn't have any wheezing on exam. She declined any breathing treatments. Labs, ECG, and x ray are all normal. I don't believe that the patient is suffering from any emergent medical condition such as ACS, PE, asthma exacerbation, CHF exacerbation, or any other emergent medical condition at this time. I instructed to contact her cardiologist and discuss whether she should continue the medication or consider doing something else for rate control. Patient agrees. She is in stable condition at the time of discharge, indications for return to the ED were discussed as well as follow up. All questions were answered and she is in agreement with the plan.     Diagnosis:    ICD-10-CM    1. Dyspnea, unspecified type R06.00     resolved upon arrival        Disposition:   Discharged      Scribe Disclosure:  Jeffery AVILA, am serving as a scribe at 10:42 AM on 8/29/2017 to document services personally performed by Ross Warner MD, based on my observations and the provider's statements to me.   [facility] EMERGENCY  DEPARTMENT       [place], Ross Wallace MD  08/30/17 [id number]

## 2017-08-29 NOTE — ED NOTES
"Pt came out of room looking for a bathroom. Pt directed to bathroom and stated \"I think I'm ready to head on home.\" MD advised  "

## 2017-08-29 NOTE — ED AVS SNAPSHOT
[facility] Emergency Department    [address]    [facility] [address]    Phone:  [phone]    Fax:  [phone]                                       Madhavi Castellanos   MRN: [id number]    Department:  [facility] Emergency Department   Date of Visit:  8/29/2017           After Visit Summary Signature Page     I have received my discharge instructions, and my questions have been answered. I have discussed any challenges I see with this plan with the nurse or doctor.    ..........................................................................................................................................  Patient/Patient Representative Signature      ..........................................................................................................................................  Patient Representative Print Name and Relationship to Patient    ..................................................               ................................................  Date                                            Time    ..........................................................................................................................................  Reviewed by Signature/Title    ...................................................              ..............................................  Date                                                            Time

## 2017-08-29 NOTE — ED AVS SNAPSHOT
[facility] Emergency Department    [address] [facility] [address]    Phone:  [phone]    Fax:  [phone]                                       Madhavi Castellanos   MRN: [id number]    Department:  [facility] Emergency Department   Date of Visit:  8/29/2017           Patient Information     Date Of Birth          1940        Your diagnoses for this visit were:     Dyspnea, unspecified type resolved upon arrival       You were seen by Ross Warner MD.      Follow-up Information     Follow up with Yousuf Beckwith today.    Specialty:  Cardiology    Why:  call today to discuss your medication and whether or not you need to make changes.     Contact information:    [facility]  [address]     [facility] [address]  [phone]          Follow up with [facility] Emergency Department.    Specialty:  EMERGENCY MEDICINE    Why:  As needed, If symptoms worsen    Contact information:    [address] [facility] [id number] [phone]        Discharge Instructions         Shortness of Breath (Dyspnea)  Shortness of breath is the feeling that you can't catch your breath or get enough air. It is also known as dyspnea.  Dyspnea can be caused by many different conditions. They include:    Acute asthma attack.    Worsening of chronic lung diseases such as chronic bronchitis and emphysema.    Heart failure. This is when weak heart muscle allows extra fluid to collect in the lungs.    Panic attacks or anxiety. Fear can cause rapid breathing (hyperventilation).    Pneumonia, or an infection in the lung tissue.    Exposure to toxic substances, fumes, smoke, or certain medicines.    Blood clot in the lung (pulmonary embolism). This is often from a piece of blood clot in a deep vein of the leg (deep vein thrombosis) that breaks off and travels to the lungs.    Heart attack or heart-related chest pain  (angina).    Anemia.    Collapsed lung (pneumothorax).    Dehydration.    Pregnancy.  Based on your visit today, the exact cause of your shortness of breath is not certain. Your tests don t show any of the serious causes of dyspnea. You may need other tests to find out if you have a serious problem. It s important to watch for any new symptoms or symptoms that get worse. Follow up with your healthcare provider as directed.  Home care  Follow these tips to take care of yourself at home:    When your symptoms are better, go back to your usual activities.    If you smoke, you should stop. Join a quit-smoking program or ask your healthcare provider for help.    Eat a healthy diet and get plenty of sleep.    Get regular exercise. Talk with your healthcare provider before starting to exercise, especially if you have other medical problems.    Cut down on the amount of caffeine and stimulants you consume.  Follow-up care  Follow up with your healthcare provider, or as advised.  If tests were done, you will be told if your treatment needs to be changed. You can call as directed for the results.  (Note: If an X-ray was taken, a specialist will review it. You will be notified of any new findings that may affect your care.)  Call 911 or get immediate medical care  Shortness of breath may be a sign of a serious medical problem. For example, it may be a problem with your heart or lungs. Call 911 if you have worsening shortness of breath or trouble breathing, especially with any of the symptoms below:    You are confused or it s difficult to wake you.    You faint or lose consciousness.    You have a fast heartbeat, or your heartbeat is irregular.    You are coughing up blood.    You have pain in your chest, arm, shoulder, neck, or upper back.    You break out in a sweat.  When to seek medical advice  Call your healthcare provider right away if any of these occur:    Slight shortness of breath or wheezing    Redness, pain or  swelling in your leg, arm, or other body area    Swelling in both legs or ankles    Fast weight gain    Dizziness or weakness    Fever of 100.4 F (38 C) or higher, or as directed by your healthcare provider  Date Last Reviewed: 9/13/2015 2000-2017 [employer]. [address] [place] [address]. All rights reserved. This information is not intended as a substitute for professional medical care. Always follow your healthcare professional's instructions.          24 Hour Appointment Hotline       To make an appointment at any [facility], call [phone] ([phone]). If you don't have a family doctor or clinic, we will help you find one. [facility] are conveniently located to serve the needs of you and your family.             Review of your medicines      Our records show that you are taking the medicines listed below. If these are incorrect, please call your family doctor or clinic.        Dose / Directions Last dose taken    alum & mag hydroxide-simethicone 200-200-20 MG/5ML Susp suspension   Commonly known as:  MYLANTA/MAALOX   Dose:  30 mL   Quantity:  300 mL        Take 30 mLs by mouth every 4 hours as needed   Refills:  0        ASPIRIN PO   Dose:  81 mg        Take 81 mg by mouth daily   Refills:  0        BETAPACE PO   Dose:  40 mg        Take 40 mg by mouth 2 times daily   Refills:  0        docusate sodium 100 MG tablet   Commonly known as:  COLACE   Dose:  100 mg   Quantity:  60 tablet        Take 100 mg by mouth daily   Refills:  1        fentaNYL 25 mcg/hr 72 hr patch   Commonly known as:  DURAGESIC   Dose:  1 patch        Place 1 patch onto the skin every 72 hours   Refills:  0        GABAPENTIN PO   Dose:  900 mg        Take 900 mg by mouth 3 times daily   Refills:  0        HYDROCHLOROTHIAZIDE PO   Dose:  12.5 mg        Take 12.5 mg by mouth daily   Refills:  0        HYDROcodone-acetaminophen 7.5-325 MG per tablet   Commonly known as:  NORCO   Dose:  1-2  tablet        Take 1-2 tablets by mouth every 6 hours as needed for moderate to severe pain   Refills:  0        LIPITOR PO   Dose:  10 mg        Take 10 mg by mouth daily   Refills:  0        LISINOPRIL PO   Dose:  10 mg        Take 10 mg by mouth daily   Refills:  0        METHOCARBAMOL PO   Dose:  750 mg        Take 750 mg by mouth 6 times daily   Refills:  0        polyethylene glycol Packet   Commonly known as:  MIRALAX   Dose:  1 packet   Quantity:  30 packet        Take 17 g by mouth 2 times daily   Refills:  0        PRILOSEC PO   Dose:  20 mg        Take 20 mg by mouth every morning   Refills:  0        promethazine 25 MG tablet   Commonly known as:  PHENERGAN   Dose:  25 mg   Quantity:  15 tablet        Take 1 tablet (25 mg) by mouth every 6 hours as needed for nausea   Refills:  0        SIMVASTATIN PO        Take  by mouth.   Refills:  0        TAPAZOLE PO   Dose:  5 mg        Take 5 mg by mouth daily   Refills:  0        WELLBUTRIN XL PO   Dose:  150 mg        Take 150 mg by mouth daily   Refills:  0                Procedures and tests performed during your visit     BNP    Basic metabolic panel    CBC with platelets differential    Chest XR,  PA & LAT    Peripheral IV catheter    Troponin I      Orders Needing Specimen Collection     None      Pending Results     No orders found from 8/27/2017 to 8/30/2017.            Pending Culture Results     No orders found from 8/27/2017 to 8/30/2017.            Pending Results Instructions     If you had any lab results that were not finalized at the time of your Discharge, you can call the ED Lab Result RN at [phone]. You will be contacted by this team for any positive Lab results or changes in treatment. The nurses are available 7 days a week from 10A to 6:30P.  You can leave a message 24 hours per day and they will return your call.        Test Results From Your Hospital Stay        8/29/2017 11:02 AM      Component Results     Component Value Ref Range &  Units Status    WBC 11.5 (H) 4.0 - 11.0 10e9/L Final    RBC Count 4.68 3.8 - 5.2 10e12/L Final    Hemoglobin 14.3 11.7 - 15.7 g/dL Final    Hematocrit 43.8 35.0 - 47.0 % Final    MCV 94 78 - 100 fl Final    MCH 30.6 26.5 - 33.0 pg Final    MCHC 32.6 31.5 - 36.5 g/dL Final    RDW 13.2 10.0 - 15.0 % Final    Platelet Count 377 150 - 450 10e9/L Final    Diff Method Automated Method  Final    % Neutrophils 64.4 % Final    % Lymphocytes 24.5 % Final    % Monocytes 9.2 % Final    % Eosinophils 1.1 % Final    % Basophils 0.3 % Final    % Immature Granulocytes 0.5 % Final    Nucleated RBCs 0 0 /100 Final    Absolute Neutrophil 7.4 1.6 - 8.3 10e9/L Final    Absolute Lymphocytes 2.8 0.8 - 5.3 10e9/L Final    Absolute Monocytes 1.1 0.0 - 1.3 10e9/L Final    Absolute Eosinophils 0.1 0.0 - 0.7 10e9/L Final    Absolute Basophils 0.0 0.0 - 0.2 10e9/L Final    Abs Immature Granulocytes 0.1 0 - 0.4 10e9/L Final    Absolute Nucleated RBC 0.0  Final         8/29/2017 11:21 AM      Component Results     Component Value Ref Range & Units Status    Sodium 139 133 - 144 mmol/L Final    Potassium 3.6 3.4 - 5.3 mmol/L Final    Chloride 103 94 - 109 mmol/L Final    Carbon Dioxide 29 20 - 32 mmol/L Final    Anion Gap 7 3 - 14 mmol/L Final    Glucose 120 (H) 70 - 99 mg/dL Final    Urea Nitrogen 23 7 - 30 mg/dL Final    Creatinine 0.68 0.52 - 1.04 mg/dL Final    GFR Estimate 84 >60 mL/min/1.7m2 Final    Non  GFR Calc    GFR Estimate If Black >90 >60 mL/min/1.7m2 Final    African American GFR Calc    Calcium 9.1 8.5 - 10.1 mg/dL Final         8/29/2017 11:21 AM      Component Results     Component Value Ref Range & Units Status    N-Terminal Pro BNP Inpatient 291 0 - 1800 pg/mL Final       Reference range shown and results flagged as abnormal are suggested inpatient   cut points for confirming diagnosis if CHF in an acute setting. Establishing a   baseline value for each individual patient is useful for follow-up. An   inpatient  or emergency department NT-proPBNP <300 pg/mL effectively rules out   acute CHF, with 99% negative predictive value.  The outpatient non-acute reference range for ruling out CHF is:   0-125 pg/mL (age 18 to less than 75)   0-450 pg/mL (age 75 yrs and older)           8/29/2017 11:39 AM      Narrative     XR CHEST 2 VW 8/29/2017 11:14 AM    COMPARISON: 11/8/2016    HISTORY: Dyspnea        Impression     IMPRESSION: Cardiac silhouette and pulmonary vasculature are within  normal limits. No focal airspace disease, pleural effusion or  pneumothorax. Implanted cardiac monitor again seen.    TIM COREA MD         8/29/2017 11:21 AM      Component Results     Component Value Ref Range & Units Status    Troponin I ES <0.015 0.000 - 0.045 ug/L Final    The 99th percentile for upper reference range is 0.045 ug/L.  Troponin values   in the range of 0.045 - 0.120 ug/L may be associated with risks of adverse   clinical events.                  Clinical Quality Measure: Blood Pressure Screening     Your blood pressure was checked while you were in the emergency department today. The last reading we obtained was  BP: (!) 127/92 . Please read the guidelines below about what these numbers mean and what you should do about them.  If your systolic blood pressure (the top number) is less than 120 and your diastolic blood pressure (the bottom number) is less than 80, then your blood pressure is normal. There is nothing more that you need to do about it.  If your systolic blood pressure (the top number) is 120-139 or your diastolic blood pressure (the bottom number) is 80-89, your blood pressure may be higher than it should be. You should have your blood pressure rechecked within a year by a primary care provider.  If your systolic blood pressure (the top number) is 140 or greater or your diastolic blood pressure (the bottom number) is 90 or greater, you may have high blood pressure. High blood pressure is treatable, but if left  untreated over time it can put you at risk for heart attack, stroke, or kidney failure. You should have your blood pressure rechecked by a primary care provider within the next 4 weeks.  If your provider in the emergency department today gave you specific instructions to follow-up with your doctor or provider even sooner than that, you should follow that instruction and not wait for up to 4 weeks for your follow-up visit.        Thank you for choosing [facility]       Thank you for choosing [facility] for your care. Our goal is always to provide you with excellent care. Hearing back from our patients is one way we can continue to improve our services. Please take a few minutes to complete the written survey that you may receive in the mail after you visit with us. Thank you!        [employer] Information     [employer] gives you secure access to your electronic health record. If you see a primary care provider, you can also send messages to your care team and make appointments. If you have questions, please call your primary care clinic.  If you do not have a primary care provider, please call [phone] and they will assist you.        Care EveryWhere ID     This is your Care EveryWhere ID. This could be used by other organizations to access your [facility] medical records  [id number]        Equal Access to Services     CANDY PISANO : Hadii lili Jiménez, camryn horn, kyle avalos, jerilyn alvarado. So [facility] [phone].    ATENCIÓN: Si habla español, tiene a starks disposición servicios gratuitos de asistencia lingüística. Llame al [phone].    We comply with applicable federal civil rights laws and [place] laws. We do not discriminate on the basis of race, color, national origin, age, disability sex, sexual orientation or gender identity.            After Visit Summary       This is your record. Keep this with you and show to your community pharmacist(s) and doctor(s)  at your next visit.

## 2017-08-29 NOTE — ED NOTES
Pt presents with SOB, states she just started metoprolol today and believes it is related this this. Pt denies chest pain and c/o only of SOB. Pt alert, oriented x3. ABCs intact     Pt states she recently had an ablation in the beginning of August and the metoprolol was to help lower her HR.

## 2017-11-09 ENCOUNTER — TRANSFERRED RECORDS (OUTPATIENT)
Dept: HEALTH INFORMATION MANAGEMENT | Facility: CLINIC | Age: 77
End: 2017-11-09

## 2017-11-28 RX ORDER — DILTIAZEM HYDROCHLORIDE 120 MG/1
120 CAPSULE, COATED, EXTENDED RELEASE ORAL DAILY
Status: ON HOLD | COMMUNITY
End: 2020-11-30

## 2017-11-28 RX ORDER — CLOBETASOL PROPIONATE 0.5 MG/G
OINTMENT TOPICAL 3 TIMES DAILY
Status: ON HOLD | COMMUNITY
End: 2019-08-03

## 2017-11-28 RX ORDER — ALBUTEROL SULFATE 90 UG/1
2 AEROSOL, METERED RESPIRATORY (INHALATION)
Status: ON HOLD | COMMUNITY
End: 2019-08-03

## 2017-11-28 NOTE — PLAN OF CARE
Patient does not want to be contacted by preadmitting department, History and physical, labs reviewed by RN.

## 2017-12-01 ENCOUNTER — ANESTHESIA (OUTPATIENT)
Dept: SURGERY | Facility: CLINIC | Age: 77
End: 2017-12-01
Payer: MEDICARE

## 2017-12-01 ENCOUNTER — SURGERY (OUTPATIENT)
Age: 77
End: 2017-12-01

## 2017-12-01 ENCOUNTER — ANESTHESIA EVENT (OUTPATIENT)
Dept: SURGERY | Facility: CLINIC | Age: 77
End: 2017-12-01
Payer: MEDICARE

## 2017-12-01 ENCOUNTER — HOSPITAL ENCOUNTER (OUTPATIENT)
Facility: CLINIC | Age: 77
Discharge: HOME | End: 2017-12-01
Attending: OBSTETRICS & GYNECOLOGY | Admitting: OBSTETRICS & GYNECOLOGY
Payer: MEDICARE

## 2017-12-01 VITALS
SYSTOLIC BLOOD PRESSURE: 144 MMHG | RESPIRATION RATE: 12 BRPM | BODY MASS INDEX: 34.75 KG/M2 | OXYGEN SATURATION: 96 % | DIASTOLIC BLOOD PRESSURE: 89 MMHG | WEIGHT: 190 LBS | TEMPERATURE: 98.6 F

## 2017-12-01 LAB
CREAT SERPL-MCNC: 0.55 MG/DL (ref 0.52–1.04)
GFR SERPL CREATININE-BSD FRML MDRD: >90 ML/MIN/1.7M2

## 2017-12-01 PROCEDURE — 88305 TISSUE EXAM BY PATHOLOGIST: CPT | Performed by: OBSTETRICS & GYNECOLOGY

## 2017-12-01 PROCEDURE — 36415 COLL VENOUS BLD VENIPUNCTURE: CPT | Performed by: ANESTHESIOLOGY

## 2017-12-01 PROCEDURE — 36000050 ZZH SURGERY LEVEL 2 1ST 30 MIN: Performed by: OBSTETRICS & GYNECOLOGY

## 2017-12-01 PROCEDURE — 27210794 ZZH OR GENERAL SUPPLY STERILE: Performed by: OBSTETRICS & GYNECOLOGY

## 2017-12-01 PROCEDURE — 40000306 ZZH STATISTIC PRE PROC ASSESS II: Performed by: OBSTETRICS & GYNECOLOGY

## 2017-12-01 PROCEDURE — 71000027 ZZH RECOVERY PHASE 2 EACH 15 MINS: Performed by: OBSTETRICS & GYNECOLOGY

## 2017-12-01 PROCEDURE — 25000125 ZZHC RX 250: Performed by: OBSTETRICS & GYNECOLOGY

## 2017-12-01 PROCEDURE — 88305 TISSUE EXAM BY PATHOLOGIST: CPT | Mod: 26 | Performed by: OBSTETRICS & GYNECOLOGY

## 2017-12-01 PROCEDURE — 36000052 ZZH SURGERY LEVEL 2 EA 15 ADDTL MIN: Performed by: OBSTETRICS & GYNECOLOGY

## 2017-12-01 PROCEDURE — 82565 ASSAY OF CREATININE: CPT | Performed by: ANESTHESIOLOGY

## 2017-12-01 RX ORDER — IODINE AND POTASSIUM IODIDE 50; 100 MG/ML; MG/ML
LIQUID ORAL PRN
Status: DISCONTINUED | OUTPATIENT
Start: 2017-12-01 | End: 2017-12-01 | Stop reason: HOSPADM

## 2017-12-01 RX ORDER — BUPIVACAINE HYDROCHLORIDE AND EPINEPHRINE 5; 5 MG/ML; UG/ML
INJECTION, SOLUTION PERINEURAL PRN
Status: DISCONTINUED | OUTPATIENT
Start: 2017-12-01 | End: 2017-12-01 | Stop reason: HOSPADM

## 2017-12-01 RX ORDER — SODIUM CHLORIDE, SODIUM LACTATE, POTASSIUM CHLORIDE, CALCIUM CHLORIDE 600; 310; 30; 20 MG/100ML; MG/100ML; MG/100ML; MG/100ML
INJECTION, SOLUTION INTRAVENOUS CONTINUOUS
Status: DISCONTINUED | OUTPATIENT
Start: 2017-12-01 | End: 2017-12-01 | Stop reason: HOSPADM

## 2017-12-01 RX ADMIN — IODINE SOLUTION STRONG 5% (LUGOL'S) 1 ML: 5 SOLUTION at 14:17

## 2017-12-01 RX ADMIN — BUPIVACAINE HYDROCHLORIDE AND EPINEPHRINE BITARTRATE 30 ML: 5; .005 INJECTION, SOLUTION EPIDURAL; INTRACAUDAL; PERINEURAL at 14:17

## 2017-12-01 NOTE — IP AVS SNAPSHOT
[facility] PreOP/PostOP    [address]    [facility] [address]    Phone:  [phone]    Fax:  [phone]                                       After Visit Summary   12/1/2017    Madhavi Castellanos    MRN: [id number]           After Visit Summary Signature Page     I have received my discharge instructions, and my questions have been answered. I have discussed any challenges I see with this plan with the nurse or doctor.    ..........................................................................................................................................  Patient/Patient Representative Signature      ..........................................................................................................................................  Patient Representative Print Name and Relationship to Patient    ..................................................               ................................................  Date                                            Time    ..........................................................................................................................................  Reviewed by Signature/Title    ...................................................              ..............................................  Date                                                            Time

## 2017-12-01 NOTE — DISCHARGE INSTRUCTIONS
HOME CARE FOLLOWING MINOR SURGERY        DRESSING  Keep dressing dry and in place until your doctor instructs you to remove the dressing.    NOTIFY YOUR PHYSICIAN IF YOU HAVE ANY OF THE FOLLOWING SYMPTOMS:    1. Fever  2. Excessive bleeding or drainage  3. Disruption of the skin closure  4. Swelling, redness or excessive tenderness at the site  5. Severe pain  6. Drainage that is green, yellow, thick white or has a bad odor

## 2017-12-01 NOTE — BRIEF OP NOTE
[facility] Brief Operative Note    Pre-operative diagnosis: Lesion    Post-operative diagnosis Left vulva lesion   Procedure: Procedure(s):  Wide Local Excision of Vulvar Lesion    - Wound Class: II-Clean Contaminated   Surgeon(s): Surgeon(s) and Role:     * Nazario Tirado MD - Primary   Estimated blood loss: * No values recorded between 12/1/2017  2:03 PM and 12/1/2017  2:28 PM *    Specimens:   ID Type Source Tests Collected by Time Destination   A : Vulvar Biopsy Tissue Vulva SURGICAL PATHOLOGY EXAM Nazario Tirado MD 12/1/2017  2:12 PM       Findings: Lugols applied no further abnl tissue noted. Lesion removed in entirety ..

## 2017-12-01 NOTE — IP AVS SNAPSHOT
MRN:[id number]                      After Visit Summary   12/1/2017    Madhavi Castellanos    MRN: [id number]           Thank you!     Thank you for choosing [facility] for your care. Our goal is always to provide you with excellent care. Hearing back from our patients is one way we can continue to improve our services. Please take a few minutes to complete the written survey that you may receive in the mail after you visit. If you would like to speak to someone directly about your visit please contact Patient Relations at [phone]. Thank you!          Patient Information     Date Of Birth          1940        About your hospital stay     You were admitted on:  December 1, 2017 You last received care in the:  [facility] PreOP/PostOP    You were discharged on:  December 1, 2017       Who to Call     For medical emergencies, please call 911.  For non-urgent questions about your medical care, please call your primary care provider or clinic, [phone]  For questions related to your surgery, please call your surgery clinic        Attending Provider     Provider Nazario Fong MD OB/Gyn       Primary Care Provider Office Phone # Fax #    Cecy Edmondson [phone]      After Care Instructions     Discharge Instructions       Resume pre procedure diet            Discharge Instructions       She should use estrogen cream daily for 2 weeks  Motrin and Tylenol for pain            No alcohol       NO ALCOHOL for 24 hours post procedure            No driving or operating machinery       No driving or operating machinery until day after procedure                  Further instructions from your care team       HOME CARE FOLLOWING MINOR SURGERY        DRESSING  Keep dressing dry and in place until your doctor instructs you to remove the dressing.    NOTIFY YOUR PHYSICIAN IF YOU HAVE ANY OF THE FOLLOWING SYMPTOMS:    1. Fever  2. Excessive bleeding  or drainage  3. Disruption of the skin closure  4. Swelling, redness or excessive tenderness at the site  5. Severe pain  6. Drainage that is green, yellow, thick white or has a bad odor      Pending Results     Date and Time Order Name Status Description    12/1/2017 1413 Surgical pathology exam In process             Admission Information     Date & Time Provider Department Dept. Phone    12/1/2017 Nazario Tirado MD [facility] PreOP/PostOP [phone]      Your Vitals Were     Blood Pressure Temperature Respirations Weight Pulse Oximetry BMI (Body Mass Index)    144/89 98.6  F (37  C) (Temporal) 12 86.2 kg (190 lb) 96% 34.75 kg/m2      MyChart Information     my[facility]ola gives you secure access to your electronic health record. If you see a primary care provider, you can also send messages to your care team and make appointments. If you have questions, please call your primary care clinic.  If you do not have a primary care provider, please call [phone] and they will assist you.        Care EveryWhere ID     This is your Care EveryWhere ID. This could be used by other organizations to access your [facility] medical records  [id number]        Equal Access to Services     CANDY PISANO : Hadii lili Jiménez, wajulianda luari, qaybta kaalmada robbie, jerilyn alvarado. So [facility] [phone].    ATENCIÓN: Si habla español, tiene a starks disposición servicios gratuitos de asistencia lingüística. Rowena al [phone].    We comply with applicable federal civil rights laws and [place] laws. We do not discriminate on the basis of race, color, national origin, age, disability, sex, sexual orientation, or gender identity.               Review of your medicines      CONTINUE these medicines which may have CHANGED, or have new prescriptions. If we are uncertain of the size of tablets/capsules you have at home, strength may be listed as something that might have changed.         Dose / Directions    polyethylene glycol Packet   Commonly known as:  MIRALAX   This may have changed:  when to take this        Dose:  1 packet   Take 17 g by mouth 2 times daily   Quantity:  30 packet   Refills:  0         CONTINUE these medicines which have NOT CHANGED        Dose / Directions    ADVIL PM PO        Refills:  0       albuterol 108 (90 BASE) MCG/ACT Inhaler   Commonly known as:  PROAIR HFA/PROVENTIL HFA/VENTOLIN HFA        Dose:  2 puff   Inhale 2 puffs into the lungs   Refills:  0       alum & mag hydroxide-simethicone 200-200-20 MG/5ML Susp suspension   Commonly known as:  MYLANTA/MAALOX        Dose:  30 mL   Take 30 mLs by mouth every 4 hours as needed   Quantity:  300 mL   Refills:  0       BETAPACE PO        Dose:  40 mg   Take 40 mg by mouth 2 times daily   Refills:  0       CARTIA  MG 24 hr capsule   Generic drug:  diltiazem        Dose:  120 mg   Take 120 mg by mouth daily   Refills:  0       clobetasol 0.05 % ointment   Commonly known as:  TEMOVATE        Apply topically 3 times daily   Refills:  0       COZAAR PO        Dose:  25 mg   Take 25 mg by mouth daily   Refills:  0       ELIQUIS PO        Dose:  5 mg   Take 5 mg by mouth 2 times daily   Refills:  0       fentaNYL 25 mcg/hr 72 hr patch   Commonly known as:  DURAGESIC        Dose:  1 patch   Place 1 patch onto the skin every 72 hours   Refills:  0       GABAPENTIN PO        Dose:  900 mg   Take 900 mg by mouth 3 times daily   Refills:  0       HYDROCHLOROTHIAZIDE PO        Dose:  12.5 mg   Take 12.5 mg by mouth daily   Refills:  0       HYDROcodone-acetaminophen 7.5-325 MG per tablet   Commonly known as:  NORCO        Dose:  1-2 tablet   Take 1-2 tablets by mouth 2 times daily as needed for moderate to severe pain   Refills:  0       LIPITOR PO        Dose:  10 mg   Take 10 mg by mouth daily   Refills:  0       LISINOPRIL PO        Dose:  10 mg   Take 10 mg by mouth daily   Refills:  0       METHOCARBAMOL PO        Dose:  750  mg   Take 750 mg by mouth 6 times daily   Refills:  0       PRILOSEC PO        Dose:  20 mg   Take 20 mg by mouth every morning   Refills:  0       promethazine 25 MG tablet   Commonly known as:  PHENERGAN        Dose:  25 mg   Take 1 tablet (25 mg) by mouth every 6 hours as needed for nausea   Quantity:  15 tablet   Refills:  0       SIMVASTATIN PO        Take  by mouth.   Refills:  0       TAPAZOLE PO        Dose:  5 mg   Take 5 mg by mouth daily   Refills:  0       ZANTAC PO        Dose:  150 mg   Take 150 mg by mouth   Refills:  0       ZOFRAN ODT PO        Dose:  8 mg   Take 8 mg by mouth every 8 hours as needed for nausea   Refills:  0                Protect others around you: Learn how to safely use, store and throw away your medicines at www.disposemymeds.org.             Medication List: This is a list of all your medications and when to take them. Check marks below indicate your daily home schedule. Keep this list as a reference.      Medications           Morning Afternoon Evening Bedtime As Needed    ADVIL PM PO                                albuterol 108 (90 BASE) MCG/ACT Inhaler   Commonly known as:  PROAIR HFA/PROVENTIL HFA/VENTOLIN HFA   Inhale 2 puffs into the lungs                                alum & mag hydroxide-simethicone 200-200-20 MG/5ML Susp suspension   Commonly known as:  MYLANTA/MAALOX   Take 30 mLs by mouth every 4 hours as needed                                BETAPACE PO   Take 40 mg by mouth 2 times daily                                CARTIA  MG 24 hr capsule   Take 120 mg by mouth daily   Generic drug:  diltiazem                                clobetasol 0.05 % ointment   Commonly known as:  TEMOVATE   Apply topically 3 times daily                                COZAAR PO   Take 25 mg by mouth daily                                ELIQUIS PO   Take 5 mg by mouth 2 times daily                                fentaNYL 25 mcg/hr 72 hr patch   Commonly known as:  DURAGESIC    Place 1 patch onto the skin every 72 hours                                GABAPENTIN PO   Take 900 mg by mouth 3 times daily                                HYDROCHLOROTHIAZIDE PO   Take 12.5 mg by mouth daily                                HYDROcodone-acetaminophen 7.5-325 MG per tablet   Commonly known as:  NORCO   Take 1-2 tablets by mouth 2 times daily as needed for moderate to severe pain                                LIPITOR PO   Take 10 mg by mouth daily                                LISINOPRIL PO   Take 10 mg by mouth daily                                METHOCARBAMOL PO   Take 750 mg by mouth 6 times daily                                polyethylene glycol Packet   Commonly known as:  MIRALAX   Take 17 g by mouth 2 times daily                                PRILOSEC PO   Take 20 mg by mouth every morning                                promethazine 25 MG tablet   Commonly known as:  PHENERGAN   Take 1 tablet (25 mg) by mouth every 6 hours as needed for nausea                                SIMVASTATIN PO   Take  by mouth.                                TAPAZOLE PO   Take 5 mg by mouth daily                                ZANTAC PO   Take 150 mg by mouth                                ZOFRAN ODT PO   Take 8 mg by mouth every 8 hours as needed for nausea

## 2017-12-01 NOTE — ANESTHESIA PREPROCEDURE EVALUATION
Anesthesia Evaluation     . Pt has had prior anesthetic. Type: General    No history of anesthetic complications          ROS/MED HX    ENT/Pulmonary:     (+)sleep apnea, Intermittent asthma uses CPAP , . .    Neurologic:  - neg neurologic ROS     Cardiovascular:     (+) hypertension--CAD, --. : . . . :. .       METS/Exercise Tolerance:     Hematologic: Comments: Lab Test        08/29/17 11/08/16 07/23/16      --          02/01/15      --          11/16/14      --          10/11/13                       1047          0943          0837           --           0934           --           0923           --           0837          WBC          11.5*        15.5*        11.2*          < >        12.3*          < >        13.4*          < >        9.4           HGB          14.3         13.2         14.0           < >        14.0           < >        14.7           < >        14.8          MCV          94           95           93             < >        93             < >        97             < >        94            PLT          377          384          361            < >        346            < >        364            < >        357           INR           --           --           --           --          0.97          --          0.93          --          1.00           < > = values in this interval not displayed.                  Lab Test        12/01/17 08/29/17 11/08/16 07/23/16                       1125          1047          0943          0837          NA            --          139          141          138           POTASSIUM     --          3.6          3.7          3.7           CHLORIDE      --          103          103          104           CO2           --          29           30           25            BUN           --          23           20           13            CR           0.55         0.68         0.72         0.75          ANIONGAP      --          7            8             9             LUIS ANTONIO           --          9.1          9.0          9.3           GLC           --          120*         112*         108*                  Musculoskeletal:   (+) arthritis, , , -       GI/Hepatic:     (+) GERD Asymptomatic on medication,       Renal/Genitourinary:  - ROS Renal section negative       Endo:     (+) Obesity, .      Psychiatric:  - neg psychiatric ROS       Infectious Disease:  - neg infectious disease ROS       Malignancy:         Other:    - neg other ROS                 Physical Exam  Normal systems: cardiovascular and pulmonary    Airway   Mallampati: II  TM distance: >3 FB  Neck ROM: full    Dental   (+) missing    Cardiovascular       Pulmonary                     Anesthesia Plan      History & Physical Review  History and physical reviewed and following examination; no interval change.    ASA Status:  3 .    NPO Status:  > 8 hours    Plan for MAC with Intravenous induction. Reason for MAC:  Deep or markedly invasive procedure (G8)  PONV prophylaxis:  Ondansetron (or other 5HT-3) and Dexamethasone or Solumedrol       Postoperative Care  Postoperative pain management:  IV analgesics and Oral pain medications.      Consents                          .

## 2017-12-05 LAB — COPATH REPORT: NORMAL

## 2017-12-05 NOTE — OP NOTE
Post-operative Note      Patient name: Madhavi Castellanos  Patient MRN: [id number]  Date of procedure: December 1, 2017  Pre op diagnosis: ELIESER  Post op diagnosis: same  Procedure: Wide local excision  Anaesthesia: sedation/local  Surgeon: Nazario Tirado    EBL: 5 mL     Findings: 4.3 cm lesion on the left vulva, lugol's applied to ensure margins  Complications: none   Dispo: D/C home   Specimens to pathology: Vulvar wide local excision    The pt was brought to the operating room where a sedative was given.  The pt was prepped and draped in the normal sterile fashion.  A time out was performed.  20 cc of Local anesthesia was injected.   An elliptical incision was made around the lesion after lugol's solution was applied. The entire left vulvar lesion was removed.  The defect was closed with interrupted sutures of 4-0 vicryl.  Hemostasis was assured and the procedure was complete.      Sponge lap and needle count correct times 2.         Nazario Tirado DO  December1, 2017   [phone]

## 2017-12-27 ENCOUNTER — HOSPITAL ENCOUNTER (EMERGENCY)
Facility: CLINIC | Age: 77
Discharge: HOME | End: 2017-12-27
Attending: EMERGENCY MEDICINE | Admitting: EMERGENCY MEDICINE
Payer: MEDICARE

## 2017-12-27 ENCOUNTER — APPOINTMENT (OUTPATIENT)
Dept: GENERAL RADIOLOGY | Facility: CLINIC | Age: 77
End: 2017-12-27
Attending: EMERGENCY MEDICINE
Payer: MEDICARE

## 2017-12-27 VITALS
OXYGEN SATURATION: 97 % | HEART RATE: 86 BPM | SYSTOLIC BLOOD PRESSURE: 130 MMHG | TEMPERATURE: 97.5 F | DIASTOLIC BLOOD PRESSURE: 100 MMHG | RESPIRATION RATE: 20 BRPM

## 2017-12-27 DIAGNOSIS — T14.8XXA SKIN ABRASION: ICD-10-CM

## 2017-12-27 DIAGNOSIS — S81.012A LACERATION OF LEFT KNEE, INITIAL ENCOUNTER: ICD-10-CM

## 2017-12-27 DIAGNOSIS — M25.562 ACUTE PAIN OF BOTH KNEES: ICD-10-CM
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
## 2017-12-27 DIAGNOSIS — M25.561 ACUTE PAIN OF BOTH KNEES: ICD-10-CM

## 2017-12-27 DIAGNOSIS — Z79.01 CHRONIC ANTICOAGULATION: ICD-10-CM

## 2017-12-27 PROCEDURE — 71020 XR CHEST 2 VW: CPT

## 2017-12-27 PROCEDURE — 12034 INTMD RPR S/TR/EXT 7.6-12.5: CPT

## 2017-12-27 PROCEDURE — 73562 X-RAY EXAM OF KNEE 3: CPT | Mod: 50

## 2017-12-27 PROCEDURE — 99284 EMERGENCY DEPT VISIT MOD MDM: CPT | Mod: 25

## 2017-12-27 RX ORDER — BUPIVACAINE HYDROCHLORIDE 5 MG/ML
INJECTION, SOLUTION PERINEURAL
Status: DISCONTINUED
Start: 2017-12-27 | End: 2017-12-27 | Stop reason: HOSPADM

## 2017-12-27 RX ORDER — GINSENG 100 MG
CAPSULE ORAL
Status: DISCONTINUED
Start: 2017-12-27 | End: 2017-12-27 | Stop reason: HOSPADM

## 2017-12-27 ASSESSMENT — ENCOUNTER SYMPTOMS
HEADACHES: 0
SHORTNESS OF BREATH: 0
FEVER: 0
ARTHRALGIAS: 1
COUGH: 1
WEAKNESS: 0
WOUND: 1
NUMBNESS: 0
BACK PAIN: 0

## 2017-12-27 NOTE — DISCHARGE INSTRUCTIONS
Please keep your wound dry for the first 48 hours.  After that time, you may use gentle soap and water to keep clean the surrounding area, although avoid soaking the wound in water.      Return to the ER if you develop signs of wound infection (redness, swelling, worsening pain, drainage of pus, fevers) OR if you have any other concerns regarding your health.    It is recommended to have your wound re-evaluated in 3-5 days to ensure proper wound healing.    Please make an appointment with your primary care clinic, urgent care, or return to the ER for suture removal.  Guidelines for timing of removal below:    Lower extremity: 10-14 days            Discharge Instructions  Laceration (Cut)    You were seen today for a laceration (cut).  Your doctor examined your laceration for any problems such a buried foreign body (like glass, a splinter, or gravel), or injury to blood vessels, tendons, and nerves.  Your doctor may have also rinsed and/or scrubbed your laceration to help prevent an infection.  Your laceration may have been closed with glue, staples or sutures (stitches).      It may not be possible to find all problems with your laceration on the first visit, and we can't always prevent infections.  Antibiotics are only given when the benefit is more than the risk, and don't prevent all infections. Some lacerations are too high risk to close, and are left open to heal.  All lacerations, no matter how expertly repaired, will cause scarring.    Return to the Emergency Department right away if:    You have more redness, swelling, pain, drainage (pus), a bad smell, or red streaking from your laceration.      You have a fever of 101oF or more.    You have bleeding that you can t stop at home. If your cut starts to bleed, hold pressure on the bleeding area with a clean cloth or put pressure over the bandage.  If the bleeding doesn t stop after using constant pressure for 30 minutes, you should return to the Emergency  Department for further treatment.    An area past the laceration is cool, pale, or blue compared with the other side, or has a slower return of color when squeezed.    Your dressing seems too tight or starts to get uncomfortable or painful.    You have loss of normal function or use of an area, such as being unable to straighten or bend a finger normally.    You have a numb area past the laceration.    Return to the Emergency Department or see your regular doctor if:    The laceration starts to come open.     You have something coming out of the cut or a feeling that there is something in the laceration.    Your wound will not heal, or keeps breaking open. There can always be glass, wood, dirt or other things in any wound.  They won t always show up even on x-rays.  If a wound doesn t heal, this may be why, and it is important to follow-up with your regular doctor    Home Care:    Take your dressing off in 12 hours, or as instructed by your doctor, to check your laceration. Remove the dressing sooner if it seems too tight or painful, or if it is getting numb, tingly, or pale past the dressing.    Gently wash your laceration 2 times a day with clean cloth and soap.     It is okay to shower, but do not let the laceration soak in water.      If your laceration was closed with wound adhesive or strips: pat it dry and leave it open to the air.     For all other repairs: after you wash your laceration, or at least 2 times a day, apply bacitracin or other antibiotic ointment to the laceration, then cover it with a band-aid or gauze.    Keep the laceration clean. Wear gloves or other protective clothing if you are around dirt.      Scars:  To help minimize scarring:    Wear sunscreen over the healed laceration when out in the sun.    Massage the area regularly.    You may use Vitamin E Oil.    Wait a year.  Most scars will start to fade within a year.      Remember that you can always come back to the Emergency Department  if you are not able to see your normal doctor in the amount of time listed above, if you get any new symptoms, or if there is anything that worries you.

## 2017-12-27 NOTE — ED PROVIDER NOTES
History     Chief Complaint:  Fall and Knee Injury     HPI   Madhavi Castellanos is a very pleasant 77 year old female anticoagulated on Eliquis for atrial fibrillation who presents for evaluation of bilateral knee injuries after a mechanical fall shortly before arrival. The patient reports she was walking out of [employer] this afternoon and was wearing her snow boots when she tripped on the rug and fell directly onto her bilateral knees. She had her arms out in front of her but denies any arm pain or injuries. She did not hit her head or lose consciousness. She sustained two lacerations to her bilateral knees and friend brought her to the ED for evaluation. She was able to get up and ambulate after the fall. On arrival, the patient notes a superficial abrasion to her right knee and a deeper cut to her left knee. She notes some associated discomfort as well. The patient denies any headache, numbness, weakness, or other injuries. She notes she has tripped like this multiple times before while wearing her winter boots.     Patient also states she has had a semi-productive cough for the last 3-4 days. She states it is worse when she lays down and notes postnasal drainage and congestion as well. No fevers. No shortness of breath or chest pain.     Tetanus status: 2014    Allergies:  Augmentin  Codeine   Cymbalta  Percocet     Medications:    Ibuprofen-Diphenhydramine Cit (ADVIL PM PO)  albuterol (PROAIR HFA/PROVENTIL HFA/VENTOLIN HFA) 108 (90 BASE) MCG/ACT Inhaler  diltiazem (CARTIA XT) 120 MG 24 hr capsule  Losartan Potassium (COZAAR PO)  Ondansetron (ZOFRAN ODT PO)  RaNITidine HCl (ZANTAC PO)  Apixaban (ELIQUIS PO)  HYDROcodone-acetaminophen (NORCO) 7.5-325 MG per tablet  polyethylene glycol (MIRALAX) packet  fentaNYL (DURAGESIC) 25 mcg/hr patch 72 hr  Omeprazole (PRILOSEC PO)  Atorvastatin Calcium (LIPITOR PO)  HYDROCHLOROTHIAZIDE PO  Methimazole (TAPAZOLE PO)  Sotalol HCl (BETAPACE PO)  GABAPENTIN  PO  METHOCARBAMOL PO  LISINOPRIL PO  SIMVASTATIN PO    Past Medical History:    Atrial fibrillation  Asthma  CAD  Depression  GERD  Hypertension  Left bundle branch block  Liver lesion  Lumbosacral spondylosis  TALI  Obesity  Osteoarthritis  Prolonged QT interval  Hypercholesterolemia  Lumbar spinal stenosis    Past Surgical History:    Cholecystectomy  Ablation for a fib  Excise vulvar lesion     Family History:    History reviewed. No pertinent family history.     Social History:  Smoking status: Never smoker  Alcohol use: No  Presents to the ED with her friend   Marital Status:   [4]     Review of Systems   Constitutional: Negative for fever.   Respiratory: Positive for cough. Negative for shortness of breath.    Cardiovascular: Negative for chest pain.   Musculoskeletal: Positive for arthralgias. Negative for back pain.   Skin: Positive for wound.   Neurological: Negative for syncope, weakness, numbness and headaches.   All other systems reviewed and are negative.      Physical Exam   Patient Vitals for the past 24 hrs:   BP Temp Temp src Pulse Resp SpO2   12/27/17 1500 120/65 - - 92 - 96 %   12/27/17 1445 (!) 134/96 - - 92 - 98 %   12/27/17 1429 143/66 97.5  F (36.4  C) Oral 96 20 96 %       Physical Exam  General:                        Well-nourished                        Speaking in full sentences  Eyes:                        Conjunctiva without injection or scleral icterus                        PERRL  ENT:                        Moist mucous membranes                        Posterior oropharynx clear without erythema or exudate                        Nares patent                        Pinnae normal  Neck:                        Full ROM                        No stiffness appreciated  Resp:                        Lungs CTAB                        No crackles, wheezing or audible rubs                        Good air movement  CV:                                        Irregularly irregular rate  and rhythm                        S1 and S2 present                        No murmur, gallop or rub  GI:                        BS present                        Abdomen soft without distention                        Non-tender to light and deep palpation                        No guarding or rebound tenderness  Skin:                        R knee:                        Abrasion noted over anterior aspect of R knee                        No FB noted                        Does not penetrate beneath SC tissue                        L knee:                        Avulsion injury and separation of adipose tissue over anterior aspect of R knee (10 cm in size)                        No visualized bone, nor joint capsule                        No active bleeding                        No FB noted  MSK:                        Moves all extremities                        No focal deformities or swelling  Neuro:                        Alert                        Answers questions appropriately                        Moves all extremities equally  Psych:                        Normal affect, normal mood    Emergency Department Course   Imaging:  Radiographic findings were communicated with the patient who voiced understanding of the findings.    XR Knee Bilateral 3 views  No acute osseous abnormality.  As read by Radiology.    Chest XR 2 views  No acute abnormality.  As read by Radiology.    Procedures:     Laceration Repair Procedure Note:    Performed by: Josr Tejeda MD  Consent given by: Patient who states understanding of the procedure being performed after discussing the risks, benefits and alternatives.    Preparation: Patient was prepped and draped in usual sterile fashion.  Irrigation solution: saline    Body area: Left knee  Laceration length: 10 cm  Contamination: The wound is not contaminated.  Foreign bodies: none  Tendon involvement: none  Anesthesia: Local  Local anesthetic: Bupivacaine  0.5%  Anesthetic total: 4ml    Debridement: none  Skin closure: Closed with 8 x 3.0 Ethilon  Technique: 4 x horizontal mattress and 4 x simple interrupted.   Approximation: close  Approximation difficulty: complex (debridment, scar revision or flaps)    Keep wound clean and dry for the next 24-48 hours  Sutures out in 10-14 days  Signs of infection discussed today  May return to work as long as wound is kept clean and dry  Discussed the probability of scarring  Active range of motion exercises encouraged    Patient tolerance: Patient tolerated the procedure well with no immediate complications.    Emergency Department Course:  Past medical records, nursing notes, and vitals reviewed.  1440: I performed an exam of the patient and obtained history, as documented above.  The patient was sent for a bilateral knee x-ray and chest x-ray while in the emergency department, findings above.    1620: I performed a laceration repair per the above procedure note.     I rechecked the patient.  Findings and plan explained to the Patient. Patient discharged home with instructions regarding supportive care, medications, and reasons to return. The importance of close follow-up was reviewed.     Impression & Plan      Medical Decision Making:  Madhavi Castellanos is a very pleasant 77 year old female with a history of atrial fibrillation status post ablation on Eliquis presenting to the ER for evaluation of a mechanical fall and bilateral knee pain. Vital signs on presentation reveal elevated blood pressure which improved during her ED course though otherwise are unremarkable. Examination is notable for a superficial abrasion over the anterior aspect of her right patella, as well as a complex stellate laceration over her left kneecap. Radiographs were obtained of the patient's bilateral knees which are negative for acute fracture nor evidence of gas within the joint space. She does report a 3 day history of cough for which chest x-ray  was also obtained though this reveals no findings of acute infiltrate or other acute pathology. Pulmonary exam is clear. No other focal evidence of acute upper respiratory bacterial infection. Patient denies head injury, LOC, has no objective evidence of head trauma, and denies headache.  She is with normal mental status, and at this time, I feel HCT can be deferred safely.  With regards to patient's lower extremities, these were anesthetized and thoroughly cleansed. She does have a complex laceration overlying her left knee. This does not appear to penetrate the joint space and I see no evidence of underlying tendon structures nor bony abnormalities. I do not feel this is consistent with an open joint wound. After adequate anesthesia was obtained, this was approximated using horizontal and simple interrupted sutures. There was a soft tissue defect at the lateral aspect of the laceration that was unable to be covered and I suspect was secondary to soft tissue loss. The importance of close follow up with wound care was discussed with the patient. She has followed with wound care team previously and has their contact information at home. I have otherwise recommended follow up with PCP in 2-3 days for repeat evaluation of her wound. Warning signs were discussed which would be suggestive of infection. Recommended weight bearing as tolerated, avoidance of bending to minimize suture trauma, and sutures out in 10-14 days. Patient felt comfortable with this plan of care. All questions answered prior to discharge.     Diagnosis:    ICD-10-CM   1. Fall, initial encounter W19.XXXA   2. Acute pain of both knees M25.561    M25.562   3. Skin abrasion T14.8XXA   4. Laceration of left knee, initial encounter S81.012A   5. Chronic anticoagulation Z79.01     Disposition: Discharged to home    Fabiola Gifford  12/27/2017   [facility] EMERGENCY DEPARTMENT    I, Fabiola Gifford, am serving as a scribe at 2:40 PM on 12/27/2017  to document services personally performed by Josr Tejeda MD based on my observations and the provider's statements to me.        Josr Tejeda MD  12/28/17 0043

## 2017-12-27 NOTE — PROGRESS NOTES
"SPIRITUAL HEALTH SERVICES Progress Note  [facility] ED     visit per spontaneous visit with pt's friend, Swetha, as she was trying to find her way to the ED.   Swetha shared that pt, Madhavi, had fallen and hurt her knee and was \"going to need stitches.\"   Swetha notes that they attend [facility] together.  Escorted pt's friend to the ED so she could be with her.   Oriented Swetha how to request chaplaincy support if needed. No formal plan to follow.     DARREN Olvera.  Staff    Pager #[phone]     "

## 2017-12-27 NOTE — ED NOTES
Patient arrives with injuries to bilateral knees after tripping on a rug and falling. She has skin tear to her right anterior knee, and a large laceration/avulsion injury to her right anterior knee. She is alert and oriented, ABCs intact.

## 2017-12-27 NOTE — ED AVS SNAPSHOT
[facility] Emergency Department    [address]    [facility] [address]    Phone:  [phone]    Fax:  [phone]                                       Madhavi Castellanos   MRN: [id number]    Department:  [facility] Emergency Department   Date of Visit:  12/27/2017           Patient Information     Date Of Birth          1940        Your diagnoses for this visit were:     Fall, initial encounter     Acute pain of both knees     Skin abrasion     Laceration of left knee, initial encounter     Chronic anticoagulation        You were seen by Josr Tejeda MD.      Follow-up Information     Follow up with Cecy Edmondson MD.    Specialty:  Internal Medicine    Contact information:    [facility]  [address]  [facility] [id number] [phone]          Follow up with [facility] Emergency Department.    Specialty:  EMERGENCY MEDICINE    Why:  If symptoms worsen    Contact information:    [address] [facility] [address] [phone]        Discharge Instructions       Please keep your wound dry for the first 48 hours.  After that time, you may use gentle soap and water to keep clean the surrounding area, although avoid soaking the wound in water.      Return to the ER if you develop signs of wound infection (redness, swelling, worsening pain, drainage of pus, fevers) OR if you have any other concerns regarding your health.    It is recommended to have your wound re-evaluated in 3-5 days to ensure proper wound healing.    Please make an appointment with your primary care clinic, urgent care, or return to the ER for suture removal.  Guidelines for timing of removal below:    Lower extremity: 10-14 days            Discharge Instructions  Laceration (Cut)    You were seen today for a laceration (cut).  Your doctor examined your laceration for any problems such a buried foreign body (like glass, a splinter, or gravel), or  injury to blood vessels, tendons, and nerves.  Your doctor may have also rinsed and/or scrubbed your laceration to help prevent an infection.  Your laceration may have been closed with glue, staples or sutures (stitches).      It may not be possible to find all problems with your laceration on the first visit, and we can't always prevent infections.  Antibiotics are only given when the benefit is more than the risk, and don't prevent all infections. Some lacerations are too high risk to close, and are left open to heal.  All lacerations, no matter how expertly repaired, will cause scarring.    Return to the Emergency Department right away if:    You have more redness, swelling, pain, drainage (pus), a bad smell, or red streaking from your laceration.      You have a fever of 101oF or more.    You have bleeding that you can t stop at home. If your cut starts to bleed, hold pressure on the bleeding area with a clean cloth or put pressure over the bandage.  If the bleeding doesn t stop after using constant pressure for 30 minutes, you should return to the Emergency Department for further treatment.    An area past the laceration is cool, pale, or blue compared with the other side, or has a slower return of color when squeezed.    Your dressing seems too tight or starts to get uncomfortable or painful.    You have loss of normal function or use of an area, such as being unable to straighten or bend a finger normally.    You have a numb area past the laceration.    Return to the Emergency Department or see your regular doctor if:    The laceration starts to come open.     You have something coming out of the cut or a feeling that there is something in the laceration.    Your wound will not heal, or keeps breaking open. There can always be glass, wood, dirt or other things in any wound.  They won t always show up even on x-rays.  If a wound doesn t heal, this may be why, and it is important to follow-up with your regular  doctor    Home Care:    Take your dressing off in 12 hours, or as instructed by your doctor, to check your laceration. Remove the dressing sooner if it seems too tight or painful, or if it is getting numb, tingly, or pale past the dressing.    Gently wash your laceration 2 times a day with clean cloth and soap.     It is okay to shower, but do not let the laceration soak in water.      If your laceration was closed with wound adhesive or strips: pat it dry and leave it open to the air.     For all other repairs: after you wash your laceration, or at least 2 times a day, apply bacitracin or other antibiotic ointment to the laceration, then cover it with a band-aid or gauze.    Keep the laceration clean. Wear gloves or other protective clothing if you are around dirt.      Scars:  To help minimize scarring:    Wear sunscreen over the healed laceration when out in the sun.    Massage the area regularly.    You may use Vitamin E Oil.    Wait a year.  Most scars will start to fade within a year.      Remember that you can always come back to the Emergency Department if you are not able to see your normal doctor in the amount of time listed above, if you get any new symptoms, or if there is anything that worries you.        24 Hour Appointment Hotline       To make an appointment at any [facility] clinic, call [phone] ([phone]). If you don't have a family doctor or clinic, we will help you find one. [facility] clinics are conveniently located to serve the needs of you and your family.             Review of your medicines      Our records show that you are taking the medicines listed below. If these are incorrect, please call your family doctor or clinic.        Dose / Directions Last dose taken    ADVIL PM PO        Refills:  0        albuterol 108 (90 BASE) MCG/ACT Inhaler   Commonly known as:  PROAIR HFA/PROVENTIL HFA/VENTOLIN HFA   Dose:  2 puff        Inhale 2 puffs into the lungs   Refills:  0        alum &  mag hydroxide-simethicone 200-200-20 MG/5ML Susp suspension   Commonly known as:  MYLANTA/MAALOX   Dose:  30 mL   Quantity:  300 mL        Take 30 mLs by mouth every 4 hours as needed   Refills:  0        BETAPACE PO   Dose:  40 mg        Take 40 mg by mouth 2 times daily   Refills:  0        CARTIA  MG 24 hr capsule   Dose:  120 mg   Generic drug:  diltiazem        Take 120 mg by mouth daily   Refills:  0        clobetasol 0.05 % ointment   Commonly known as:  TEMOVATE        Apply topically 3 times daily   Refills:  0        COZAAR PO   Dose:  25 mg        Take 25 mg by mouth daily   Refills:  0        ELIQUIS PO   Dose:  5 mg        Take 5 mg by mouth 2 times daily   Refills:  0        fentaNYL 25 mcg/hr 72 hr patch   Commonly known as:  DURAGESIC   Dose:  1 patch        Place 1 patch onto the skin every 72 hours   Refills:  0        GABAPENTIN PO   Dose:  900 mg        Take 900 mg by mouth 3 times daily   Refills:  0        HYDROCHLOROTHIAZIDE PO   Dose:  12.5 mg        Take 12.5 mg by mouth daily   Refills:  0        HYDROcodone-acetaminophen 7.5-325 MG per tablet   Commonly known as:  NORCO   Dose:  1-2 tablet        Take 1-2 tablets by mouth 2 times daily as needed for moderate to severe pain   Refills:  0        LIPITOR PO   Dose:  10 mg        Take 10 mg by mouth daily   Refills:  0        LISINOPRIL PO   Dose:  10 mg        Take 10 mg by mouth daily   Refills:  0        METHOCARBAMOL PO   Dose:  750 mg        Take 750 mg by mouth 6 times daily   Refills:  0        polyethylene glycol Packet   Commonly known as:  MIRALAX   Dose:  1 packet   Quantity:  30 packet        Take 17 g by mouth 2 times daily   Refills:  0        PRILOSEC PO   Dose:  20 mg        Take 20 mg by mouth every morning   Refills:  0        promethazine 25 MG tablet   Commonly known as:  PHENERGAN   Dose:  25 mg   Quantity:  15 tablet        Take 1 tablet (25 mg) by mouth every 6 hours as needed for nausea   Refills:  0         SIMVASTATIN PO        Take  by mouth.   Refills:  0        TAPAZOLE PO   Dose:  5 mg        Take 5 mg by mouth daily   Refills:  0        ZANTAC PO   Dose:  150 mg        Take 150 mg by mouth   Refills:  0        ZOFRAN ODT PO   Dose:  8 mg        Take 8 mg by mouth every 8 hours as needed for nausea   Refills:  0                Procedures and tests performed during your visit     Chest XR,  PA & LAT    XR Knee Bilateral 3 Views      Orders Needing Specimen Collection     None      Pending Results     No orders found from 12/25/2017 to 12/28/2017.            Pending Culture Results     No orders found from 12/25/2017 to 12/28/2017.            Pending Results Instructions     If you had any lab results that were not finalized at the time of your Discharge, you can call the ED Lab Result RN at [phone]. You will be contacted by this team for any positive Lab results or changes in treatment. The nurses are available 7 days a week from 10A to 6:30P.  You can leave a message 24 hours per day and they will return your call.        Test Results From Your Hospital Stay        12/27/2017  3:45 PM      Narrative     XR KNEE BILATERAL 3 VW 12/27/2017 3:17 PM    HISTORY: Knee pain after fall.    COMPARISON: 11/4/2015    FINDINGS: 3 views of the left knee and 3 views of the right knee. No  fracture or malalignment. Moderate right and mild left knee  osteoarthritis. No joint effusion at either knee.        Impression     IMPRESSION: No acute osseous abnormality.    VEDA OREILLY MD         12/27/2017  3:50 PM      Narrative     XR CHEST 2 VW 12/27/2017 3:17 PM    HISTORY: Fall.    COMPARISON: 8/29/2017    FINDINGS: No airspace consolidation, pleural effusion or pneumothorax.  Normal heart size. Thoracolumbar scoliosis. Ribs appear intact.        Impression     IMPRESSION: No acute abnormality.    VEDA OREILLY MD                Clinical Quality Measure: Blood Pressure Screening     Your blood pressure was checked while you  were in the emergency department today. The last reading we obtained was  BP: 120/65 . Please read the guidelines below about what these numbers mean and what you should do about them.  If your systolic blood pressure (the top number) is less than 120 and your diastolic blood pressure (the bottom number) is less than 80, then your blood pressure is normal. There is nothing more that you need to do about it.  If your systolic blood pressure (the top number) is 120-139 or your diastolic blood pressure (the bottom number) is 80-89, your blood pressure may be higher than it should be. You should have your blood pressure rechecked within a year by a primary care provider.  If your systolic blood pressure (the top number) is 140 or greater or your diastolic blood pressure (the bottom number) is 90 or greater, you may have high blood pressure. High blood pressure is treatable, but if left untreated over time it can put you at risk for heart attack, stroke, or kidney failure. You should have your blood pressure rechecked by a primary care provider within the next 4 weeks.  If your provider in the emergency department today gave you specific instructions to follow-up with your doctor or provider even sooner than that, you should follow that instruction and not wait for up to 4 weeks for your follow-up visit.        Thank you for choosing [facility]       Thank you for choosing [facility] for your care. Our goal is always to provide you with excellent care. Hearing back from our patients is one way we can continue to improve our services. Please take a few minutes to complete the written survey that you may receive in the mail after you visit with us. Thank you!        [employer] Information     [employer] gives you secure access to your electronic health record. If you see a primary care provider, you can also send messages to your care team and make appointments. If you have questions, please call your primary care clinic.  If you do  not have a primary care provider, please call [phone] and they will assist you.        Care EveryWhere ID     This is your Care EveryWhere ID. This could be used by other organizations to access your [facility] medical records  [id number]        Equal Access to Services     CANDY PISANO : Nisha Jiménez, camryn horn, kyle avalos, jerilyn alvarado. So [facility] [phone].    ATENCIÓN: Si habla español, tiene a starks disposición servicios gratuitos de asistencia lingüística. Llame al [phone].    We comply with applicable federal civil rights laws and [place] laws. We do not discriminate on the basis of race, color, national origin, age, disability, sex, sexual orientation, or gender identity.            After Visit Summary       This is your record. Keep this with you and show to your community pharmacist(s) and doctor(s) at your next visit.

## 2017-12-27 NOTE — ED AVS SNAPSHOT
[facility] Emergency Department    [address]    [facility] [address]    Phone:  [phone]    Fax:  [phone]                                       Madhavi Castellanos   MRN: [id number]    Department:  [facility] Emergency Department   Date of Visit:  12/27/2017           After Visit Summary Signature Page     I have received my discharge instructions, and my questions have been answered. I have discussed any challenges I see with this plan with the nurse or doctor.    ..........................................................................................................................................  Patient/Patient Representative Signature      ..........................................................................................................................................  Patient Representative Print Name and Relationship to Patient    ..................................................               ................................................  Date                                            Time    ..........................................................................................................................................  Reviewed by Signature/Title    ...................................................              ..............................................  Date                                                            Time

## 2018-01-05 ENCOUNTER — HOSPITAL ENCOUNTER (EMERGENCY)
Facility: CLINIC | Age: 78
Discharge: HOME | End: 2018-01-05
Attending: EMERGENCY MEDICINE | Admitting: EMERGENCY MEDICINE
Payer: MEDICARE

## 2018-01-05 ENCOUNTER — TRANSFERRED RECORDS (OUTPATIENT)
Dept: HEALTH INFORMATION MANAGEMENT | Facility: CLINIC | Age: 78
End: 2018-01-05

## 2018-01-05 VITALS
RESPIRATION RATE: 20 BRPM | TEMPERATURE: 97.6 F | OXYGEN SATURATION: 95 % | DIASTOLIC BLOOD PRESSURE: 106 MMHG | HEART RATE: 92 BPM | SYSTOLIC BLOOD PRESSURE: 179 MMHG

## 2018-01-05 DIAGNOSIS — R09.89 CHOKING EPISODE: ICD-10-CM

## 2018-01-05 DIAGNOSIS — R06.02 SHORTNESS OF BREATH: ICD-10-CM

## 2018-01-05 LAB
ANION GAP SERPL CALCULATED.3IONS-SCNC: 8 MMOL/L (ref 3–14)
BASOPHILS # BLD AUTO: 0.1 10E9/L (ref 0–0.2)
BASOPHILS NFR BLD AUTO: 0.7 %
BUN SERPL-MCNC: 16 MG/DL (ref 7–30)
CALCIUM SERPL-MCNC: 9.4 MG/DL (ref 8.5–10.1)
CHLORIDE SERPL-SCNC: 103 MMOL/L (ref 94–109)
CO2 SERPL-SCNC: 28 MMOL/L (ref 20–32)
CREAT SERPL-MCNC: 0.6 MG/DL (ref 0.52–1.04)
DIFFERENTIAL METHOD BLD: ABNORMAL
EOSINOPHIL # BLD AUTO: 0.3 10E9/L (ref 0–0.7)
EOSINOPHIL NFR BLD AUTO: 2.5 %
ERYTHROCYTE [DISTWIDTH] IN BLOOD BY AUTOMATED COUNT: 13.6 % (ref 10–15)
GFR SERPL CREATININE-BSD FRML MDRD: >90 ML/MIN/1.7M2
GLUCOSE SERPL-MCNC: 92 MG/DL (ref 70–99)
HCT VFR BLD AUTO: 44.6 % (ref 35–47)
HGB BLD-MCNC: 14.2 G/DL (ref 11.7–15.7)
IMM GRANULOCYTES # BLD: 0.1 10E9/L (ref 0–0.4)
IMM GRANULOCYTES NFR BLD: 0.5 %
LYMPHOCYTES # BLD AUTO: 3.3 10E9/L (ref 0.8–5.3)
LYMPHOCYTES NFR BLD AUTO: 27.6 %
MCH RBC QN AUTO: 30.5 PG (ref 26.5–33)
MCHC RBC AUTO-ENTMCNC: 31.8 G/DL (ref 31.5–36.5)
MCV RBC AUTO: 96 FL (ref 78–100)
MONOCYTES # BLD AUTO: 1.1 10E9/L (ref 0–1.3)
MONOCYTES NFR BLD AUTO: 9.6 %
NEUTROPHILS # BLD AUTO: 7.1 10E9/L (ref 1.6–8.3)
NEUTROPHILS NFR BLD AUTO: 59.1 %
NRBC # BLD AUTO: 0 10*3/UL
NRBC BLD AUTO-RTO: 0 /100
PLATELET # BLD AUTO: 397 10E9/L (ref 150–450)
POTASSIUM SERPL-SCNC: 3.6 MMOL/L (ref 3.4–5.3)
RBC # BLD AUTO: 4.66 10E12/L (ref 3.8–5.2)
SODIUM SERPL-SCNC: 139 MMOL/L (ref 133–144)
TROPONIN I SERPL-MCNC: <0.015 UG/L (ref 0–0.04)
WBC # BLD AUTO: 11.9 10E9/L (ref 4–11)

## 2018-01-05 PROCEDURE — 85025 COMPLETE CBC W/AUTO DIFF WBC: CPT | Performed by: EMERGENCY MEDICINE

## 2018-01-05 PROCEDURE — 80048 BASIC METABOLIC PNL TOTAL CA: CPT | Performed by: EMERGENCY MEDICINE

## 2018-01-05 PROCEDURE — 84484 ASSAY OF TROPONIN QUANT: CPT | Performed by: EMERGENCY MEDICINE

## 2018-01-05 PROCEDURE — 99284 EMERGENCY DEPT VISIT MOD MDM: CPT

## 2018-01-05 PROCEDURE — 93005 ELECTROCARDIOGRAM TRACING: CPT

## 2018-01-05 ASSESSMENT — ENCOUNTER SYMPTOMS
APPETITE CHANGE: 1
COUGH: 1
VOMITING: 0
RHINORRHEA: 0
DIARRHEA: 0
SHORTNESS OF BREATH: 1
CHOKING: 1
FEVER: 0
NAUSEA: 1

## 2018-01-05 NOTE — ED AVS SNAPSHOT
[facility] [place] Emergency Department    [address]    [facility] [address]    Phone:  [phone]    Fax:  [phone]                                       Madhavi Castellanos   MRN: [id number]    Department:  [facility] [place] Emergency Department   Date of Visit:  1/5/2018           Patient Information     Date Of Birth          1940        Your diagnoses for this visit were:     Choking episode     Shortness of breath        You were seen by Hamzah Ortez MD.      Follow-up Information     Please follow up.    Why:  your cardiologist next week, return to emergency room earlier if your chest pain or dyspnea returns.         Discharge Instructions       Discharge Instructions  Chest Pain, shortness of breath    You have been seen today for chest pain or discomfort, shortness of breath.  At this time, your provider has found no signs that your chest pain is due to a serious or life-threatening condition, (or you have declined more testing and/or admission to the hospital). However, sometimes there is a serious problem that does not show up right away. Your evaluation today may not be complete and you may need further testing and evaluation.     Generally, every Emergency Department visit should have a follow-up clinic visit with either a primary or a specialty clinic/provider. Please follow-up as instructed by your emergency provider today.  Return to the Emergency Department if:    Your chest pain changes, gets worse, starts to happen more often, or comes with less activity.    You are newly short of breath.    You get very weak or tired.    You pass out or faint.    You have any new symptoms, like fever, cough, numb legs, or you cough up blood.    You have anything else that worries you.    Until you follow-up with your regular provider, please do the following:    Take one aspirin daily unless you have an allergy or are told not to by your provider.    If a stress test  appointment has been made, go to the appointment.    If you have questions, contact your regular provider.    Follow-up with your regular provider/clinic as directed; this is very important.    If you were given a prescription for medicine here today, be sure to read all of the information (including the package insert) that comes with your prescription.  This will include important information about the medicine, its side effects, and any warnings that you need to know about.  The pharmacist who fills the prescription can provide more information and answer questions you may have about the medicine.  If you have questions or concerns that the pharmacist cannot address, please call or return to the Emergency Department.       Remember that you can always come back to the Emergency Department if you are not able to see your regular provider in the amount of time listed above, if you get any new symptoms, or if there is anything that worries you.      24 Hour Appointment Hotline       To make an appointment at any [facility], call [phone] ([phone]). If you don't have a family doctor or clinic, we will help you find one. [place] clinics are conveniently located to serve the needs of you and your family.             Review of your medicines      Our records show that you are taking the medicines listed below. If these are incorrect, please call your family doctor or clinic.        Dose / Directions Last dose taken    albuterol 108 (90 BASE) MCG/ACT Inhaler   Commonly known as:  PROAIR HFA/PROVENTIL HFA/VENTOLIN HFA   Dose:  2 puff        Inhale 2 puffs into the lungs   Refills:  0        alum & mag hydroxide-simethicone 200-200-20 MG/5ML Susp suspension   Commonly known as:  MYLANTA/MAALOX   Dose:  30 mL   Quantity:  300 mL        Take 30 mLs by mouth every 4 hours as needed   Refills:  0        BETAPACE PO   Dose:  40 mg        Take 40 mg by mouth 2 times daily   Refills:  0        CARTIA  MG  24 hr capsule   Dose:  120 mg   Generic drug:  diltiazem        Take 120 mg by mouth daily   Refills:  0        clobetasol 0.05 % ointment   Commonly known as:  TEMOVATE        Apply topically 3 times daily   Refills:  0        COZAAR PO   Dose:  25 mg        Take 25 mg by mouth daily   Refills:  0        ELIQUIS PO   Dose:  5 mg        Take 5 mg by mouth 2 times daily   Refills:  0        fentaNYL 25 mcg/hr 72 hr patch   Commonly known as:  DURAGESIC   Dose:  1 patch        Place 1 patch onto the skin every 72 hours   Refills:  0        GABAPENTIN PO   Dose:  900 mg        Take 900 mg by mouth 3 times daily   Refills:  0        HYDROCHLOROTHIAZIDE PO   Dose:  12.5 mg        Take 12.5 mg by mouth daily   Refills:  0        HYDROcodone-acetaminophen 7.5-325 MG per tablet   Commonly known as:  NORCO   Dose:  1-2 tablet        Take 1-2 tablets by mouth 2 times daily as needed for moderate to severe pain   Refills:  0        LIPITOR PO   Dose:  10 mg        Take 10 mg by mouth daily   Refills:  0        METHOCARBAMOL PO   Dose:  750 mg        Take 750 mg by mouth 6 times daily   Refills:  0        polyethylene glycol Packet   Commonly known as:  MIRALAX   Dose:  1 packet   Quantity:  30 packet        Take 17 g by mouth 2 times daily   Refills:  0        PRILOSEC PO   Dose:  20 mg        Take 20 mg by mouth every morning   Refills:  0        promethazine 25 MG tablet   Commonly known as:  PHENERGAN   Dose:  25 mg   Quantity:  15 tablet        Take 1 tablet (25 mg) by mouth every 6 hours as needed for nausea   Refills:  0        SIMVASTATIN PO        Take  by mouth.   Refills:  0        TAPAZOLE PO   Dose:  5 mg        Take 5 mg by mouth daily   Refills:  0        ZANTAC PO   Dose:  150 mg        Take 150 mg by mouth   Refills:  0        ZOFRAN ODT PO   Dose:  8 mg        Take 8 mg by mouth every 8 hours as needed for nausea   Refills:  0                Procedures and tests performed during your visit     Basic metabolic  panel    CBC with platelets differential    EKG 12 lead    Peripheral IV catheter    Troponin I      Orders Needing Specimen Collection     None      Pending Results     No orders found from 1/3/2018 to 1/6/2018.            Pending Culture Results     No orders found from 1/3/2018 to 1/6/2018.            Pending Results Instructions     If you had any lab results that were not finalized at the time of your Discharge, you can call the ED Lab Result RN at [phone]. You will be contacted by this team for any positive Lab results or changes in treatment. The nurses are available 7 days a week from 10A to 6:30P.  You can leave a message 24 hours per day and they will return your call.        Test Results From Your Hospital Stay        1/5/2018  9:02 PM      Component Results     Component Value Ref Range & Units Status    WBC 11.9 (H) 4.0 - 11.0 10e9/L Final    RBC Count 4.66 3.8 - 5.2 10e12/L Final    Hemoglobin 14.2 11.7 - 15.7 g/dL Final    Hematocrit 44.6 35.0 - 47.0 % Final    MCV 96 78 - 100 fl Final    MCH 30.5 26.5 - 33.0 pg Final    MCHC 31.8 31.5 - 36.5 g/dL Final    RDW 13.6 10.0 - 15.0 % Final    Platelet Count 397 150 - 450 10e9/L Final    Diff Method Automated Method  Final    % Neutrophils 59.1 % Final    % Lymphocytes 27.6 % Final    % Monocytes 9.6 % Final    % Eosinophils 2.5 % Final    % Basophils 0.7 % Final    % Immature Granulocytes 0.5 % Final    Nucleated RBCs 0 0 /100 Final    Absolute Neutrophil 7.1 1.6 - 8.3 10e9/L Final    Absolute Lymphocytes 3.3 0.8 - 5.3 10e9/L Final    Absolute Monocytes 1.1 0.0 - 1.3 10e9/L Final    Absolute Eosinophils 0.3 0.0 - 0.7 10e9/L Final    Absolute Basophils 0.1 0.0 - 0.2 10e9/L Final    Abs Immature Granulocytes 0.1 0 - 0.4 10e9/L Final    Absolute Nucleated RBC 0.0  Final         1/5/2018  9:22 PM      Component Results     Component Value Ref Range & Units Status    Sodium 139 133 - 144 mmol/L Final    Potassium 3.6 3.4 - 5.3 mmol/L Final    Chloride 103  94 - 109 mmol/L Final    Carbon Dioxide 28 20 - 32 mmol/L Final    Anion Gap 8 3 - 14 mmol/L Final    Glucose 92 70 - 99 mg/dL Final    Urea Nitrogen 16 7 - 30 mg/dL Final    Creatinine 0.60 0.52 - 1.04 mg/dL Final    GFR Estimate >90 >60 mL/min/1.7m2 Final    Non  GFR Calc    GFR Estimate If Black >90 >60 mL/min/1.7m2 Final    African American GFR Calc    Calcium 9.4 8.5 - 10.1 mg/dL Final         1/5/2018  9:22 PM      Component Results     Component Value Ref Range & Units Status    Troponin I ES <0.015 0.000 - 0.045 ug/L Final    The 99th percentile for upper reference range is 0.045 ug/L.  Troponin values   in the range of 0.045 - 0.120 ug/L may be associated with risks of adverse   clinical events.                  Clinical Quality Measure: Blood Pressure Screening     Your blood pressure was checked while you were in the emergency department today. The last reading we obtained was  BP: (!) 179/106 . Please read the guidelines below about what these numbers mean and what you should do about them.  If your systolic blood pressure (the top number) is less than 120 and your diastolic blood pressure (the bottom number) is less than 80, then your blood pressure is normal. There is nothing more that you need to do about it.  If your systolic blood pressure (the top number) is 120-139 or your diastolic blood pressure (the bottom number) is 80-89, your blood pressure may be higher than it should be. You should have your blood pressure rechecked within a year by a primary care provider.  If your systolic blood pressure (the top number) is 140 or greater or your diastolic blood pressure (the bottom number) is 90 or greater, you may have high blood pressure. High blood pressure is treatable, but if left untreated over time it can put you at risk for heart attack, stroke, or kidney failure. You should have your blood pressure rechecked by a primary care provider within the next 4 weeks.  If your provider  in the emergency department today gave you specific instructions to follow-up with your doctor or provider even sooner than that, you should follow that instruction and not wait for up to 4 weeks for your follow-up visit.        Thank you for choosing [facility]       Thank you for choosing [facility] for your care. Our goal is always to provide you with excellent care. Hearing back from our patients is one way we can continue to improve our services. Please take a few minutes to complete the written survey that you may receive in the mail after you visit with us. Thank you!        [employer] Information     [employer] gives you secure access to your electronic health record. If you see a primary care provider, you can also send messages to your care team and make appointments. If you have questions, please call your primary care clinic.  If you do not have a primary care provider, please call [phone] and they will assist you.        Care EveryWhere ID     This is your Care EveryWhere ID. This could be used by other organizations to access your [facility] medical records  [id number]        Equal Access to Services     CANDY PISANO : Hadii lili Jiménez, wajulianda kory, qaybta kaalmamissael avalos, jerilyn fernandes . So [facility] [phone].    ATENCIÓN: Si habla español, tiene a starks disposición servicios gratuitos de asistencia lingüística. Llame al [phone].    We comply with applicable federal civil rights laws and [place] laws. We do not discriminate on the basis of race, color, national origin, age, disability, sex, sexual orientation, or gender identity.            After Visit Summary       This is your record. Keep this with you and show to your community pharmacist(s) and doctor(s) at your next visit.

## 2018-01-05 NOTE — ED AVS SNAPSHOT
[facility] Emergency Department    [address]    [facility] [address]    Phone:  [phone]    Fax:  [phone]                                       Madhavi Castellanos   MRN: [id number]    Department:  [facility] Emergency Department   Date of Visit:  1/5/2018           After Visit Summary Signature Page     I have received my discharge instructions, and my questions have been answered. I have discussed any challenges I see with this plan with the nurse or doctor.    ..........................................................................................................................................  Patient/Patient Representative Signature      ..........................................................................................................................................  Patient Representative Print Name and Relationship to Patient    ..................................................               ................................................  Date                                            Time    ..........................................................................................................................................  Reviewed by Signature/Title    ...................................................              ..............................................  Date                                                            Time

## 2018-01-06 LAB — INTERPRETATION ECG - MUSE: NORMAL

## 2018-01-06 NOTE — ED NOTES
"[facility]prox 0800 pt awoke from sleep with denies pain. Pt reports \"reflux\" and had \"an obstructed airway\" Pt reports unable to breath. Denies hx. Pt had ablasion for BBB last year, and was SR after. Pt seen at urgent care and now showing BBB again. Denies CP, SOB at this time.ABC in tact. A/OX4  "

## 2018-01-06 NOTE — ED PROVIDER NOTES
History     Chief Complaint:  Choking episode    HPI   Madhavi Castellanos is a 77 year old female with a history of ablations who presents to the emergency department today for evaluation of choking episode. The patient reports she was suddenly woken up in bed because she was choking after a significant episode of reflux then noted shortness of breath that would not settle for an hour after she slowed down her breathing. She reports the event scared her and she is afraid to sleep at night. She took one Zantac to help with the reflux and is waiting until tonight to take another one. She reports she takes Zantac irregularly for occasional acid reflux. She reports she has never had this sensation before to this degree. She reports since the episode, she has felt weaker throughout the day. She hasn't felt well in the last 4 days with decreased appetite, nausea, and intermittent cough. She was going to call [facility] tomorrow about the episode, but decided to come in. The patient has a history of 2 ablations. Her second ablation was on 8/11/17 at [facility] and was uncomplicated. She had the ablation for her atrial fibrillation and she reports it also resolved her left bundle branch block. Dr. Beckwith, the head of electrical at the cath lab, did the ablation and reported to the patient that her wide rhythm resolved. The patient is on Apixaban. She denies history of heart failure, but does take hctz and losartan. She denies history of blood clot. She reports she fell last week and had stitches placed in her right leg. She denies shortness of breath now, chest pain, vomiting, diarrhea, fevers, or runny nose.    On chart review, Dr. Beckwith noted bundle branch block resolved with low dose of sotalol. The branch block was new in 2017 when they increased the sotalol to 120 mg, but did not improve when they reduced it to 80 mg. It resolved after sotalol was decreased to 40  mg.    Allergies:  Metoprolol  Augmentin  Cephalexin  Codeine Sulfate  Cymbalta  Lisinopril  Omnicef [Cefdinir]  Percocet [Oxycodone-Acetaminophen]     Medications:    Diltiazem   Cozaar  Zofran   Zantac   Apixaban   Norco   Fentanyl   Omeprazole  Hydrochlorothiazide  Methimazole  Sotalol  Mylanta  Gabapentin   Methocarbamol  Simvastatin   Albuterol   Clobetasol  Phenergan   Miralax      Past Medical History:    Asthma   Atrial fibrillation    CAD (coronary artery disease)   Depression   Esophageal reflux   Hypertension   LBBB (left bundle branch block)   Liver lesion   Lumbosacral spondylosis   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 No pertinent family history.     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 Negative for fever.   HENT: Negative for rhinorrhea.    Respiratory: Positive for cough (intermittent), choking (episode) and shortness of breath.    Cardiovascular: Negative for chest pain.   Gastrointestinal: Positive for nausea. Negative for diarrhea and vomiting.   All other systems reviewed and are negative.    Physical Exam     Patient Vitals for the past 24 hrs:   BP Temp Temp src Pulse Heart Rate Resp SpO2   01/05/18 2153 - - - 92 - - -   01/05/18 2150 - - - - - - 95 %   01/05/18 2149 - - - - - - 97 %   01/05/18 2015 - - - - - - 95 %   01/05/18 2006 - - - - - - 98 %   01/05/18 1912 (!) 179/106 - - - - - -   01/05/18 1908 - - - - 95 - 95 %   01/05/18 1905 - 97.6  F (36.4  C) Oral - - 20 -      Physical Exam  General: Patient is alert and interactive when I enter the room  Head:  The scalp, face, and head appear normal  Eyes:  The pupils are equal, round, and reactive to light    Conjunctivae and sclerae are normal  ENT:    External acoustic canals are normal    The oropharynx is normal without erythema.     Uvula is in the midline  Neck:  Normal range of motion  CV:  Regular rate. S1/S2. No murmurs.   Resp:  Lungs are clear without wheezes or rales. No distress  GI:  Abdomen is soft, no rigidity, guarding, or rebound    No distension. No tenderness to palpation in any quadrant.     MS:  Normal tone. Joints grossly normal without effusions.     No asymmetric leg swelling, calf or thigh tenderness.      Normal motor assessment of all extremities.  Skin:  No rash or lesions noted. Normal capillary refill noted  Neuro: Speech is normal and fluent. Face is symmetric.     Moving all extremities well.   Psych:  Awake. Alert.  Normal affect.  Appropriate interactions.  Lymph: No anterior cervical lymphadenopathy noted    Emergency Department Course     ECG:  ECG taken at 1926, ECG read at 1927  Normal sinus rhythm  Left bundle branch block   Abnormal ECG  Rate 93 bpm. MN interval 174 ms. QRS duration 142 ms. QT/QTc 398/494 ms. P-R-T axes 58 -29 102.    Laboratory:  Laboratory findings were communicated with the patient who voiced understanding of the findings.    CBC: WBC 11.9 (H), HGB 14.2,   BMP: WNL (Creatinine 0.60)  Troponin (Collected 2037): <0.015    Emergency Department Course:    2000 Nursing notes and vitals reviewed.    2026 I performed an exam of the patient as documented above.     2037 IV was inserted and blood was drawn for laboratory testing, results above.    2126 I personally reviewed the laboratory and ECG results with the patient and answered all related questions prior to discharge. I discussed the treatment plan with the patient. They expressed understanding of this plan and  consented to discharge. They will be discharged home with instructions for care and follow up. In addition, the patient will return to the emergency department if their symptoms persist, worsen, if new symptoms arise or if there is any concern.  All questions were answered.    Impression & Plan      Medical Decision Making:  Madhavi Castellanos is a 77 year old female who presents to the emergency department today for evaluation of a choking episode earlier in the day upon awakening, when she awakened with her normal reflux and felt to get caught in the back of her throat and then became short of breath.  This seems like a very clear history she provides a my suspicion of a PE or acute coronary syndrome is very low.  Since this episode resolved she has not had chest pain or shortness of breath, has not had any change with exercise or moving around, and otherwise feels fine.  Her workup as above is as noted and is negative.  My suspicion of unstable angina is so low that I would not hospitalize nor heparinize, and she is already on a blood thinner further making acute coronary syndrome and PE lower likelihood.    Diagnosis:    ICD-10-CM    1. Choking episode R09.89    2. Shortness of breath R06.02      Disposition:   The patient is discharged to home.    Scribe Disclosure:  I, Immanuel Keane, am serving as a scribe at 8:20 PM on 1/5/2018 to document services personally performed by Hamzah Ortez MD based on my observations and the provider's statements to me.    [facility] EMERGENCY DEPARTMENT       Hamzah Ortez MD  01/05/18 5515

## 2018-01-06 NOTE — DISCHARGE INSTRUCTIONS
Discharge Instructions  Chest Pain, shortness of breath    You have been seen today for chest pain or discomfort, shortness of breath.  At this time, your provider has found no signs that your chest pain is due to a serious or life-threatening condition, (or you have declined more testing and/or admission to the hospital). However, sometimes there is a serious problem that does not show up right away. Your evaluation today may not be complete and you may need further testing and evaluation.     Generally, every Emergency Department visit should have a follow-up clinic visit with either a primary or a specialty clinic/provider. Please follow-up as instructed by your emergency provider today.  Return to the Emergency Department if:    Your chest pain changes, gets worse, starts to happen more often, or comes with less activity.    You are newly short of breath.    You get very weak or tired.    You pass out or faint.    You have any new symptoms, like fever, cough, numb legs, or you cough up blood.    You have anything else that worries you.    Until you follow-up with your regular provider, please do the following:    Take one aspirin daily unless you have an allergy or are told not to by your provider.    If a stress test appointment has been made, go to the appointment.    If you have questions, contact your regular provider.    Follow-up with your regular provider/clinic as directed; this is very important.    If you were given a prescription for medicine here today, be sure to read all of the information (including the package insert) that comes with your prescription.  This will include important information about the medicine, its side effects, and any warnings that you need to know about.  The pharmacist who fills the prescription can provide more information and answer questions you may have about the medicine.  If you have questions or concerns that the pharmacist cannot address, please call or return to the  Emergency Department.       Remember that you can always come back to the Emergency Department if you are not able to see your regular provider in the amount of time listed above, if you get any new symptoms, or if there is anything that worries you.

## 2018-01-06 NOTE — ED NOTES
Pt c/o long wait. Pt advised of long ED times. Pt very agitated, folding arms, and c/o a child crying in triage.

## 2018-01-09 ENCOUNTER — HOSPITAL ENCOUNTER (EMERGENCY)
Facility: CLINIC | Age: 78
Discharge: HOME | End: 2018-01-09
Attending: EMERGENCY MEDICINE | Admitting: EMERGENCY MEDICINE
Payer: MEDICARE

## 2018-01-09 VITALS
WEIGHT: 185 LBS | SYSTOLIC BLOOD PRESSURE: 134 MMHG | BODY MASS INDEX: 33.84 KG/M2 | TEMPERATURE: 98.1 F | DIASTOLIC BLOOD PRESSURE: 87 MMHG | HEART RATE: 94 BPM | OXYGEN SATURATION: 97 % | RESPIRATION RATE: 22 BRPM

## 2018-01-09 DIAGNOSIS — L03.116 LEFT LEG CELLULITIS: ICD-10-CM

## 2018-01-09 LAB
ALBUMIN SERPL-MCNC: 3.6 G/DL (ref 3.4–5)
ALBUMIN UR-MCNC: NEGATIVE MG/DL
ALP SERPL-CCNC: 102 U/L (ref 40–150)
ALT SERPL W P-5'-P-CCNC: 24 U/L (ref 0–50)
ANION GAP SERPL CALCULATED.3IONS-SCNC: 10 MMOL/L (ref 3–14)
APPEARANCE UR: ABNORMAL
AST SERPL W P-5'-P-CCNC: 19 U/L (ref 0–45)
BACTERIA #/AREA URNS HPF: ABNORMAL /HPF
BASOPHILS # BLD AUTO: 0.1 10E9/L (ref 0–0.2)
BASOPHILS NFR BLD AUTO: 0.5 %
BILIRUB SERPL-MCNC: 0.7 MG/DL (ref 0.2–1.3)
BILIRUB UR QL STRIP: NEGATIVE
BUN SERPL-MCNC: 17 MG/DL (ref 7–30)
CALCIUM SERPL-MCNC: 9.3 MG/DL (ref 8.5–10.1)
CHLORIDE SERPL-SCNC: 103 MMOL/L (ref 94–109)
CO2 SERPL-SCNC: 26 MMOL/L (ref 20–32)
COLOR UR AUTO: YELLOW
CREAT SERPL-MCNC: 0.64 MG/DL (ref 0.52–1.04)
DIFFERENTIAL METHOD BLD: ABNORMAL
EOSINOPHIL # BLD AUTO: 0.2 10E9/L (ref 0–0.7)
EOSINOPHIL NFR BLD AUTO: 1.8 %
ERYTHROCYTE [DISTWIDTH] IN BLOOD BY AUTOMATED COUNT: 13.3 % (ref 10–15)
GFR SERPL CREATININE-BSD FRML MDRD: >90 ML/MIN/1.7M2
GLUCOSE SERPL-MCNC: 139 MG/DL (ref 70–99)
GLUCOSE UR STRIP-MCNC: NEGATIVE MG/DL
HCT VFR BLD AUTO: 41.1 % (ref 35–47)
HGB BLD-MCNC: 13.4 G/DL (ref 11.7–15.7)
HGB UR QL STRIP: ABNORMAL
IMM GRANULOCYTES # BLD: 0.1 10E9/L (ref 0–0.4)
IMM GRANULOCYTES NFR BLD: 0.4 %
KETONES UR STRIP-MCNC: 5 MG/DL
LACTATE BLD-SCNC: 1.3 MMOL/L (ref 0.7–2)
LEUKOCYTE ESTERASE UR QL STRIP: ABNORMAL
LYMPHOCYTES # BLD AUTO: 2.1 10E9/L (ref 0.8–5.3)
LYMPHOCYTES NFR BLD AUTO: 17.2 %
MCH RBC QN AUTO: 30.9 PG (ref 26.5–33)
MCHC RBC AUTO-ENTMCNC: 32.6 G/DL (ref 31.5–36.5)
MCV RBC AUTO: 95 FL (ref 78–100)
MONOCYTES # BLD AUTO: 1.1 10E9/L (ref 0–1.3)
MONOCYTES NFR BLD AUTO: 8.8 %
MUCOUS THREADS #/AREA URNS LPF: PRESENT /LPF
NEUTROPHILS # BLD AUTO: 8.6 10E9/L (ref 1.6–8.3)
NEUTROPHILS NFR BLD AUTO: 71.3 %
NITRATE UR QL: NEGATIVE
NRBC # BLD AUTO: 0 10*3/UL
NRBC BLD AUTO-RTO: 0 /100
PH UR STRIP: 6 PH (ref 5–7)
PLATELET # BLD AUTO: 363 10E9/L (ref 150–450)
POTASSIUM SERPL-SCNC: 3.3 MMOL/L (ref 3.4–5.3)
PROT SERPL-MCNC: 7 G/DL (ref 6.8–8.8)
RBC # BLD AUTO: 4.33 10E12/L (ref 3.8–5.2)
RBC #/AREA URNS AUTO: 82 /HPF (ref 0–2)
SODIUM SERPL-SCNC: 139 MMOL/L (ref 133–144)
SOURCE: ABNORMAL
SP GR UR STRIP: 1.01 (ref 1–1.03)
SQUAMOUS #/AREA URNS AUTO: 1 /HPF (ref 0–1)
UROBILINOGEN UR STRIP-MCNC: 0 MG/DL (ref 0–2)
WBC # BLD AUTO: 12.1 10E9/L (ref 4–11)
WBC #/AREA URNS AUTO: 4 /HPF (ref 0–2)

## 2018-01-09 PROCEDURE — 25000125 ZZHC RX 250: Performed by: EMERGENCY MEDICINE

## 2018-01-09 PROCEDURE — 80053 COMPREHEN METABOLIC PANEL: CPT | Performed by: EMERGENCY MEDICINE

## 2018-01-09 PROCEDURE — 81001 URINALYSIS AUTO W/SCOPE: CPT | Performed by: EMERGENCY MEDICINE

## 2018-01-09 PROCEDURE — 83605 ASSAY OF LACTIC ACID: CPT | Performed by: EMERGENCY MEDICINE

## 2018-01-09 PROCEDURE — 25000128 H RX IP 250 OP 636: Performed by: EMERGENCY MEDICINE

## 2018-01-09 PROCEDURE — 96361 HYDRATE IV INFUSION ADD-ON: CPT

## 2018-01-09 PROCEDURE — 85025 COMPLETE CBC W/AUTO DIFF WBC: CPT | Performed by: EMERGENCY MEDICINE

## 2018-01-09 PROCEDURE — 96365 THER/PROPH/DIAG IV INF INIT: CPT

## 2018-01-09 PROCEDURE — 99284 EMERGENCY DEPT VISIT MOD MDM: CPT | Mod: 25

## 2018-01-09 RX ORDER — DOXYCYCLINE 100 MG/1
100 CAPSULE ORAL 2 TIMES DAILY
Qty: 14 CAPSULE | Refills: 0 | Status: SHIPPED | OUTPATIENT
Start: 2018-01-09 | End: 2018-01-16

## 2018-01-09 RX ORDER — CEFTRIAXONE SODIUM 1 G/50ML
1 INJECTION, SOLUTION INTRAVENOUS ONCE
Status: COMPLETED | OUTPATIENT
Start: 2018-01-09 | End: 2018-01-09

## 2018-01-09 RX ORDER — SODIUM CHLORIDE 9 MG/ML
1000 INJECTION, SOLUTION INTRAVENOUS CONTINUOUS
Status: DISCONTINUED | OUTPATIENT
Start: 2018-01-09 | End: 2018-01-09 | Stop reason: HOSPADM

## 2018-01-09 RX ORDER — ONDANSETRON 4 MG/1
4 TABLET, ORALLY DISINTEGRATING ORAL ONCE
Status: COMPLETED | OUTPATIENT
Start: 2018-01-09 | End: 2018-01-09

## 2018-01-09 RX ADMIN — ONDANSETRON 4 MG: 4 TABLET, ORALLY DISINTEGRATING ORAL at 06:29

## 2018-01-09 RX ADMIN — SODIUM CHLORIDE 1000 ML: 9 INJECTION, SOLUTION INTRAVENOUS at 06:29

## 2018-01-09 RX ADMIN — CEFTRIAXONE SODIUM 1 G: 1 INJECTION, SOLUTION INTRAVENOUS at 09:20

## 2018-01-09 ASSESSMENT — ENCOUNTER SYMPTOMS
WOUND: 1
DIARRHEA: 1

## 2018-01-09 NOTE — ED AVS SNAPSHOT
[facility]    [address] [place] [address]    Phone:  [phone]    Fax:  [phone]                                       Madhavi Castellanos   MRN: [id number]    Department:  [facility]   Date of Visit:  1/9/2018           Patient Information     Date Of Birth          1940        Your diagnoses for this visit were:     Left leg cellulitis        You were seen by Peewee Ward MD.      Follow-up Information     Follow up with Cecy Edmondson MD In 2 days.    Specialty:  Internal Medicine    Contact information:    [facility]  [address] [facility] [address]  [place] [address]  [phone]          Discharge Instructions       Discharge Instructions  Cellulitis    Cellulitis is an infection of the skin that occurs when bacteria enter the skin.   Symptoms are generally redness, swelling, warmth and pain.  Your infection appeared to be appropriate to treat at home with antibiotics.  However, sometimes your infection may be worse than it seemed at first, or may worsen with time. If you have new or worse symptoms, you may need to be seen again in the Emergency Department or by your primary provider.    Generally, every Emergency Department visit should have a follow-up clinic visit with either a primary or a specialty clinic/provider. Please follow-up as instructed by your emergency provider today.    Return to the Emergency Department if:    The redness, pain, or swelling gets a lot worse.  If the red area was marked, return if it is red significantly beyond the marked area.    You are unable to get your antibiotics, or are vomiting (throwing up) these pills, or you cannot take them.    You are feeling more ill, weak or lightheaded.    You start to run a new fever (temperature >101 F).    Anything else about the infection worries or concerns you.  Treatment:    Start your antibiotics right away, and take them as  prescribed. Be sure to finish the whole prescription, even if you are better.    Apply a heating pad, warm packs, or warm water soaks to the infected area for 15 minutes at a time, at least 3 times a day. Do not use a heating pad on your feet or legs if you have diabetes. Do not sleep with a heating pad on, since this can cause burns or skin injury.    Rest your injured area for at least 1-2 days. After that you may start using your extremity again as long as there is not too much pain.     Raise the injured area above the level of your heart as much as possible in the first 1-2 days.    Tylenol  (acetaminophen), Motrin  (ibuprofen), or Advil  (ibuprofen) may help may help reduce pain and fever and may help you feel more comfortable. Be sure to read and follow the package directions, and ask your provider if you have questions.    If you were given a prescription for medicine here today, be sure to read all of the information (including the package insert) that comes with your prescription.  This will include important information about the medicine, its side effects, and any warnings that you need to know about.  The pharmacist who fills the prescription can provide more information and answer questions you may have about the medicine.  If you have questions or concerns that the pharmacist cannot address, please call or return to the Emergency Department.     Remember that you can always come back to the Emergency Department if you are not able to see your regular provider in the amount of time listed above, if you get any new symptoms, or if there is anything that worries you.      24 Hour Appointment Hotline       To make an appointment at any [facility], call [phone] ([phone]). If you don't have a family doctor or clinic, we will help you find one. [facility] clinics are conveniently located to serve the needs of you and your family.             Review of your medicines      START taking         Dose / Directions Last dose taken    doxycycline 100 MG capsule   Commonly known as:  VIBRAMYCIN   Dose:  100 mg   Quantity:  14 capsule        Take 1 capsule (100 mg) by mouth 2 times daily for 7 days   Refills:  0          Our records show that you are taking the medicines listed below. If these are incorrect, please call your family doctor or clinic.        Dose / Directions Last dose taken    albuterol 108 (90 BASE) MCG/ACT Inhaler   Commonly known as:  PROAIR HFA/PROVENTIL HFA/VENTOLIN HFA   Dose:  2 puff        Inhale 2 puffs into the lungs   Refills:  0        alum & mag hydroxide-simethicone 200-200-20 MG/5ML Susp suspension   Commonly known as:  MYLANTA/MAALOX   Dose:  30 mL   Quantity:  300 mL        Take 30 mLs by mouth every 4 hours as needed   Refills:  0        BETAPACE PO   Dose:  40 mg        Take 40 mg by mouth 2 times daily   Refills:  0        CARTIA  MG 24 hr capsule   Dose:  120 mg   Generic drug:  diltiazem        Take 120 mg by mouth daily   Refills:  0        clobetasol 0.05 % ointment   Commonly known as:  TEMOVATE        Apply topically 3 times daily   Refills:  0        COZAAR PO   Dose:  25 mg        Take 25 mg by mouth daily   Refills:  0        ELIQUIS PO   Dose:  5 mg        Take 5 mg by mouth 2 times daily   Refills:  0        fentaNYL 25 mcg/hr 72 hr patch   Commonly known as:  DURAGESIC   Dose:  1 patch        Place 1 patch onto the skin every 72 hours   Refills:  0        GABAPENTIN PO   Dose:  900 mg        Take 900 mg by mouth 3 times daily   Refills:  0        HYDROCHLOROTHIAZIDE PO   Dose:  12.5 mg        Take 12.5 mg by mouth daily   Refills:  0        HYDROcodone-acetaminophen 7.5-325 MG per tablet   Commonly known as:  NORCO   Dose:  1-2 tablet        Take 1-2 tablets by mouth 2 times daily as needed for moderate to severe pain   Refills:  0        LIPITOR PO   Dose:  10 mg        Take 10 mg by mouth daily   Refills:  0        METHOCARBAMOL PO   Dose:  750 mg         Take 750 mg by mouth 6 times daily   Refills:  0        polyethylene glycol Packet   Commonly known as:  MIRALAX   Dose:  1 packet   Quantity:  30 packet        Take 17 g by mouth 2 times daily   Refills:  0        PRILOSEC PO   Dose:  20 mg        Take 20 mg by mouth every morning   Refills:  0        promethazine 25 MG tablet   Commonly known as:  PHENERGAN   Dose:  25 mg   Quantity:  15 tablet        Take 1 tablet (25 mg) by mouth every 6 hours as needed for nausea   Refills:  0        SIMVASTATIN PO        Take  by mouth.   Refills:  0        TAPAZOLE PO   Dose:  5 mg        Take 5 mg by mouth daily   Refills:  0        ZANTAC PO   Dose:  150 mg        Take 150 mg by mouth   Refills:  0        ZOFRAN ODT PO   Dose:  8 mg        Take 8 mg by mouth every 8 hours as needed for nausea   Refills:  0                Prescriptions were sent or printed at these locations (1 Prescription)                   Other Prescriptions                Printed at Department/Unit printer (1 of 1)         doxycycline (VIBRAMYCIN) 100 MG capsule                Procedures and tests performed during your visit     CBC with platelets differential    Comprehensive metabolic panel    Lactic acid whole blood    UA with Microscopic      Orders Needing Specimen Collection     None      Pending Results     No orders found from 1/7/2018 to 1/10/2018.            Pending Culture Results     No orders found from 1/7/2018 to 1/10/2018.            Pending Results Instructions     If you had any lab results that were not finalized at the time of your Discharge, you can call the ED Lab Result RN at [phone]. You will be contacted by this team for any positive Lab results or changes in treatment. The nurses are available 7 days a week from 10A to 6:30P.  You can leave a message 24 hours per day and they will return your call.        Test Results From Your Hospital Stay        1/9/2018  7:25 AM      Component Results     Component Value Ref Range &  Units Status    WBC 12.1 (H) 4.0 - 11.0 10e9/L Final    RBC Count 4.33 3.8 - 5.2 10e12/L Final    Hemoglobin 13.4 11.7 - 15.7 g/dL Final    Hematocrit 41.1 35.0 - 47.0 % Final    MCV 95 78 - 100 fl Final    MCH 30.9 26.5 - 33.0 pg Final    MCHC 32.6 31.5 - 36.5 g/dL Final    RDW 13.3 10.0 - 15.0 % Final    Platelet Count 363 150 - 450 10e9/L Final    Diff Method Automated Method  Final    % Neutrophils 71.3 % Final    % Lymphocytes 17.2 % Final    % Monocytes 8.8 % Final    % Eosinophils 1.8 % Final    % Basophils 0.5 % Final    % Immature Granulocytes 0.4 % Final    Nucleated RBCs 0 0 /100 Final    Absolute Neutrophil 8.6 (H) 1.6 - 8.3 10e9/L Final    Absolute Lymphocytes 2.1 0.8 - 5.3 10e9/L Final    Absolute Monocytes 1.1 0.0 - 1.3 10e9/L Final    Absolute Eosinophils 0.2 0.0 - 0.7 10e9/L Final    Absolute Basophils 0.1 0.0 - 0.2 10e9/L Final    Abs Immature Granulocytes 0.1 0 - 0.4 10e9/L Final    Absolute Nucleated RBC 0.0  Final         1/9/2018  7:32 AM      Component Results     Component Value Ref Range & Units Status    Sodium 139 133 - 144 mmol/L Final    Potassium 3.3 (L) 3.4 - 5.3 mmol/L Final    Chloride 103 94 - 109 mmol/L Final    Carbon Dioxide 26 20 - 32 mmol/L Final    Anion Gap 10 3 - 14 mmol/L Final    Glucose 139 (H) 70 - 99 mg/dL Final    Urea Nitrogen 17 7 - 30 mg/dL Final    Creatinine 0.64 0.52 - 1.04 mg/dL Final    GFR Estimate >90 >60 mL/min/1.7m2 Final    Non  GFR Calc    GFR Estimate If Black >90 >60 mL/min/1.7m2 Final    African American GFR Calc    Calcium 9.3 8.5 - 10.1 mg/dL Final    Bilirubin Total 0.7 0.2 - 1.3 mg/dL Final    Albumin 3.6 3.4 - 5.0 g/dL Final    Protein Total 7.0 6.8 - 8.8 g/dL Final    Alkaline Phosphatase 102 40 - 150 U/L Final    ALT 24 0 - 50 U/L Final    AST 19 0 - 45 U/L Final         1/9/2018  7:26 AM      Component Results     Component Value Ref Range & Units Status    Lactic Acid 1.3 0.7 - 2.0 mmol/L Final         1/9/2018  9:16 AM       Component Results     Component Value Ref Range & Units Status    Color Urine Yellow  Final    Appearance Urine Slightly Cloudy  Final    Glucose Urine Negative NEG^Negative mg/dL Final    Bilirubin Urine Negative NEG^Negative Final    Ketones Urine 5 (A) NEG^Negative mg/dL Final    Specific Gravity Urine 1.015 1.003 - 1.035 Final    Blood Urine Moderate (A) NEG^Negative Final    pH Urine 6.0 5.0 - 7.0 pH Final    Protein Albumin Urine Negative NEG^Negative mg/dL Final    Urobilinogen mg/dL 0.0 0.0 - 2.0 mg/dL Final    Nitrite Urine Negative NEG^Negative Final    Leukocyte Esterase Urine Trace (A) NEG^Negative Final    Source Midstream Urine  Final    WBC Urine 4 (H) 0 - 2 /HPF Final    RBC Urine 82 (H) 0 - 2 /HPF Final    Bacteria Urine Few (A) NEG^Negative /HPF Final    Squamous Epithelial /HPF Urine 1 0 - 1 /HPF Final    Mucous Urine Present (A) NEG^Negative /LPF Final                Clinical Quality Measure: Blood Pressure Screening     Your blood pressure was checked while you were in the emergency department today. The last reading we obtained was  BP: (!) 155/100 . Please read the guidelines below about what these numbers mean and what you should do about them.  If your systolic blood pressure (the top number) is less than 120 and your diastolic blood pressure (the bottom number) is less than 80, then your blood pressure is normal. There is nothing more that you need to do about it.  If your systolic blood pressure (the top number) is 120-139 or your diastolic blood pressure (the bottom number) is 80-89, your blood pressure may be higher than it should be. You should have your blood pressure rechecked within a year by a primary care provider.  If your systolic blood pressure (the top number) is 140 or greater or your diastolic blood pressure (the bottom number) is 90 or greater, you may have high blood pressure. High blood pressure is treatable, but if left untreated over time it can put you at risk for  heart attack, stroke, or kidney failure. You should have your blood pressure rechecked by a primary care provider within the next 4 weeks.  If your provider in the emergency department today gave you specific instructions to follow-up with your doctor or provider even sooner than that, you should follow that instruction and not wait for up to 4 weeks for your follow-up visit.        Thank you for choosing [facility]       Thank you for choosing [facility] for your care. Our goal is always to provide you with excellent care. Hearing back from our patients is one way we can continue to improve our services. Please take a few minutes to complete the written survey that you may receive in the mail after you visit with us. Thank you!        [employer] Information     [employer] gives you secure access to your electronic health record. If you see a primary care provider, you can also send messages to your care team and make appointments. If you have questions, please call your primary care clinic.  If you do not have a primary care provider, please call [phone] and they will assist you.        Care EveryWhere ID     This is your Care EveryWhere ID. This could be used by other organizations to access your [facility] medical records  [id number]        Equal Access to Services     CANDY PISANO : Nisha Jiménez, camryn horn, jerilyn orr. So [facility] [phone].    ATENCIÓN: Si habla español, tiene a starks disposición servicios gratuitos de asistencia lingüística. [facility] [phone].    We comply with applicable federal civil rights laws and [place] laws. We do not discriminate on the basis of race, color, national origin, age, disability, sex, sexual orientation, or gender identity.            After Visit Summary       This is your record. Keep this with you and show to your community pharmacist(s) and doctor(s) at your next visit.

## 2018-01-09 NOTE — ED AVS SNAPSHOT
[facility]    [address]    [facility] [address]    Phone:  [phone]    Fax:  [phone]                                       Madhavi Castellanos   MRN: [id number]    Department:  [facility]   Date of Visit:  1/9/2018           After Visit Summary Signature Page     I have received my discharge instructions, and my questions have been answered. I have discussed any challenges I see with this plan with the nurse or doctor.    ..........................................................................................................................................  Patient/Patient Representative Signature      ..........................................................................................................................................  Patient Representative Print Name and Relationship to Patient    ..................................................               ................................................  Date                                            Time    ..........................................................................................................................................  Reviewed by Signature/Title    ...................................................              ..............................................  Date                                                            Time

## 2018-01-09 NOTE — ED PROVIDER NOTES
History     Chief Complaint:  Leg infection      HPI   Madhavi Castellanos is a 77 year old female on Eliquis for a history of atrial fibrillation who presents to the emergency department today for evaluation of a leg infection. The patient reports that she cut her left lower leg with her fingernail and has had progressively worsening redness and warmth in that area that began yesterday. She also has stitches in her left knee from a laceration 3 days ago but denies any redness in that area. She also notes recent episodes of loose stool that have been resolving. She denies any history of cellulitis.    Allergies:  Metoprolol  Augmentin  Cephalexin  Codeine Sulfate  Cymbalta  Lisinopril  Omnicef [Cefdinir]  Percocet [Oxycodone-Acetaminophen]      Medications:    albuterol (PROAIR HFA/PROVENTIL HFA/VENTOLIN HFA) 108 (90 BASE) MCG/ACT Inhaler  diltiazem (CARTIA XT) 120 MG 24 hr capsule  Losartan Potassium (COZAAR PO)  RaNITidine HCl (ZANTAC PO)  Apixaban (ELIQUIS PO)  promethazine (PHENERGAN) 25 MG tablet  polyethylene glycol (MIRALAX) packet  fentaNYL (DURAGESIC) 25 mcg/hr patch 72 hr  Omeprazole (PRILOSEC PO)  Atorvastatin Calcium (LIPITOR PO)  HYDROCHLOROTHIAZIDE PO  Methimazole (TAPAZOLE PO)  Sotalol HCl (BETAPACE PO)  alum & mag hydroxide-simethicone (MYLANTA/MAALOX) 200-200-20 MG/5ML SUSP  GABAPENTIN PO  METHOCARBAMOL PO  SIMVASTATIN PO    Past Medical History:    Asthma   Atrial fibrillation (H)   CAD (coronary artery disease)   Depression   Esophageal reflux   Hypertension   LBBB (left bundle branch block)   Liver lesion   Lumbosacral spondylosis   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 No pertinent family history.     Social History:  The patient was alone.  Smoking Status: Never Smoker  Smokeless Tobacco: Never Used  Alcohol Use: No   Marital Status:        Review of Systems   Gastrointestinal: Positive for diarrhea.   Skin: Positive for wound.   All other systems reviewed and are negative.    Physical Exam   First Vitals:  BP: (!) 155/100  Pulse: 94  Temp: 98.1  F (36.7  C)  Resp: 22  Weight: 83.9 kg (185 lb)  SpO2: 96 %     Physical Exam  Constitutional: Alert, attentive  HENT:    Nose: Nose normal.    Mouth/Throat: Oropharynx is clear, mucous membranes are moist  Eyes: EOM are normal. Pupils are equal, round, and reactive to light.   CV: regular rate and rhythm; no murmurs, rubs or gallups; 2+ DP and PT pulses, brisk distal cap refill  Chest: Effort normal and breath sounds normal.   GI:  There is no tenderness. No distension. Normal bowel sounds  MSK: Normal range of motion. Faint erythema which is warm and tender to the left lower anterior leg; no pain out of proportion to exam, no crepitus  Neurological: Alert, attentive  5/5 strength to the DF, PF, EHL and FHL motor functions; sensation intact to the DP, SP, T, S and S distributions  Skin: Skin is warm and dry.    Emergency Department Course     Laboratory:  Laboratory findings were communicated with the patient who voiced understanding of the findings.  CBC: WBC 12.1 (H) o/w WNL. (HGB 13.4, )   CMP: Potassium 3.3 (L), Glucose 139 (H) o/w WNL (Creatinine 0.64)  Lactic Acid: 1.3    UA with Microscopic: slightly cloudy yellow urine, urine ketone 5, moderate blood, trace leukocyte esterase, WBC/HPF 4 (H), RBC/HPF 82 (H), few bacteria, mucous present o/w WNL     Interventions:  0629: Zofran 4mg PO  0629: NS Bolus 1,000mL IV   0920: Ceftriaxone 1g IV     Emergency Department Course:  Nursing notes and vitals reviewed.  0836: I performed an exam of the patient as documented above.   IV was inserted and blood  was drawn for laboratory testing, results above.   The patient provided a urine sample here in the emergency department. This was sent for laboratory testing, findings above.   0947: Patient rechecked and updated.    Findings and plan explained to the Patient. Patient discharged home with instructions regarding supportive care, medications, and reasons to return. The importance of close follow-up was reviewed. The patient was prescribed doxycyline.   I personally reviewed the laboratory results with the Patient and answered all related questions prior to discharge.     Impression & Plan      Medical Decision Making:  This is a well-appearing 77-year-old female who presents for evaluation of possible infection to the left lower leg.  She was here recently and had a laceration repaired just below the left knee.  Distinct from this area and over the anterior left ankle, she does have an area consistent with cellulitis.  She has no signs of significant underlying illness.  Incidentally, the patient did notice one episode of soft stool but denies any active nausea, vomiting or diarrhea.  She would like to return home and I believe this is reasonable.  She has several allergies reportedly to various antibiotics, most of which the side effects are diarrhea or nausea.  She is certain that she has tolerated ceftriaxone in the past.  She will be given a dose of this here and discharged on doxycycline.  She should follow-up with primary care in 2-3 days and return immediately for spreading redness, worse pain, fever, or any other concerns.    Diagnosis:     ICD-10-CM    1. Left leg cellulitis L03.116      Disposition:  discharged to home with below prescription  Discharge Medications:   New Prescriptions    DOXYCYCLINE (VIBRAMYCIN) 100 MG CAPSULE    Take 1 capsule (100 mg) by mouth 2 times daily for 7 days     Scribe Disclosure:  Kaley AVILA, am serving as a scribe at 8:22 AM on 1/9/2018 to document services personally  performed by Peewee Ward MD based on my observations and the provider's statements to me.    1/9/2018   [facility] EMERGENCY DEPARTMENT       Peewee Ward MD  01/09/18 3797

## 2018-03-12 ENCOUNTER — TRANSFERRED RECORDS (OUTPATIENT)
Dept: HEALTH INFORMATION MANAGEMENT | Facility: CLINIC | Age: 78
End: 2018-03-12

## 2018-03-28 ENCOUNTER — HOSPITAL ENCOUNTER (EMERGENCY)
Facility: CLINIC | Age: 78
Discharge: HOME | End: 2018-03-28
Attending: EMERGENCY MEDICINE | Admitting: EMERGENCY MEDICINE
Payer: MEDICARE

## 2018-03-28 VITALS
TEMPERATURE: 98.3 F | SYSTOLIC BLOOD PRESSURE: 146 MMHG | DIASTOLIC BLOOD PRESSURE: 88 MMHG | OXYGEN SATURATION: 95 % | RESPIRATION RATE: 18 BRPM

## 2018-03-28 DIAGNOSIS — K62.5 RECTAL BLEEDING: ICD-10-CM

## 2018-03-28 LAB
ABO + RH BLD: NORMAL
ABO + RH BLD: NORMAL
ALBUMIN SERPL-MCNC: 3.4 G/DL (ref 3.4–5)
ALP SERPL-CCNC: 120 U/L (ref 40–150)
ALT SERPL W P-5'-P-CCNC: 37 U/L (ref 0–50)
ANION GAP SERPL CALCULATED.3IONS-SCNC: 9 MMOL/L (ref 3–14)
AST SERPL W P-5'-P-CCNC: 33 U/L (ref 0–45)
BASOPHILS # BLD AUTO: 0.1 10E9/L (ref 0–0.2)
BASOPHILS NFR BLD AUTO: 0.5 %
BILIRUB SERPL-MCNC: 0.4 MG/DL (ref 0.2–1.3)
BLD GP AB SCN SERPL QL: NORMAL
BLOOD BANK CMNT PATIENT-IMP: NORMAL
BUN SERPL-MCNC: 16 MG/DL (ref 7–30)
CALCIUM SERPL-MCNC: 9.1 MG/DL (ref 8.5–10.1)
CHLORIDE SERPL-SCNC: 105 MMOL/L (ref 94–109)
CO2 SERPL-SCNC: 26 MMOL/L (ref 20–32)
CREAT SERPL-MCNC: 0.7 MG/DL (ref 0.52–1.04)
DIFFERENTIAL METHOD BLD: NORMAL
EOSINOPHIL # BLD AUTO: 0.3 10E9/L (ref 0–0.7)
EOSINOPHIL NFR BLD AUTO: 2.4 %
ERYTHROCYTE [DISTWIDTH] IN BLOOD BY AUTOMATED COUNT: 13.1 % (ref 10–15)
GFR SERPL CREATININE-BSD FRML MDRD: 81 ML/MIN/1.7M2
GLUCOSE SERPL-MCNC: 107 MG/DL (ref 70–99)
HCT VFR BLD AUTO: 44 % (ref 35–47)
HEMOCCULT STL QL: POSITIVE
HGB BLD-MCNC: 14.2 G/DL (ref 11.7–15.7)
IMM GRANULOCYTES # BLD: 0.1 10E9/L (ref 0–0.4)
IMM GRANULOCYTES NFR BLD: 0.5 %
LYMPHOCYTES # BLD AUTO: 2.4 10E9/L (ref 0.8–5.3)
LYMPHOCYTES NFR BLD AUTO: 22.2 %
MCH RBC QN AUTO: 30.9 PG (ref 26.5–33)
MCHC RBC AUTO-ENTMCNC: 32.3 G/DL (ref 31.5–36.5)
MCV RBC AUTO: 96 FL (ref 78–100)
MONOCYTES # BLD AUTO: 0.9 10E9/L (ref 0–1.3)
MONOCYTES NFR BLD AUTO: 7.8 %
NEUTROPHILS # BLD AUTO: 7.3 10E9/L (ref 1.6–8.3)
NEUTROPHILS NFR BLD AUTO: 66.6 %
NRBC # BLD AUTO: 0 10*3/UL
NRBC BLD AUTO-RTO: 0 /100
PLATELET # BLD AUTO: 361 10E9/L (ref 150–450)
POTASSIUM SERPL-SCNC: 3.8 MMOL/L (ref 3.4–5.3)
PROT SERPL-MCNC: 7.3 G/DL (ref 6.8–8.8)
RBC # BLD AUTO: 4.6 10E12/L (ref 3.8–5.2)
SODIUM SERPL-SCNC: 140 MMOL/L (ref 133–144)
SPECIMEN EXP DATE BLD: NORMAL
WBC # BLD AUTO: 10.9 10E9/L (ref 4–11)

## 2018-03-28 PROCEDURE — 86850 RBC ANTIBODY SCREEN: CPT | Performed by: EMERGENCY MEDICINE

## 2018-03-28 PROCEDURE — 86900 BLOOD TYPING SEROLOGIC ABO: CPT | Performed by: EMERGENCY MEDICINE

## 2018-03-28 PROCEDURE — 82272 OCCULT BLD FECES 1-3 TESTS: CPT | Performed by: EMERGENCY MEDICINE

## 2018-03-28 PROCEDURE — 99283 EMERGENCY DEPT VISIT LOW MDM: CPT

## 2018-03-28 PROCEDURE — 85025 COMPLETE CBC W/AUTO DIFF WBC: CPT | Performed by: EMERGENCY MEDICINE

## 2018-03-28 PROCEDURE — 80053 COMPREHEN METABOLIC PANEL: CPT | Performed by: EMERGENCY MEDICINE

## 2018-03-28 PROCEDURE — 86901 BLOOD TYPING SEROLOGIC RH(D): CPT | Performed by: EMERGENCY MEDICINE

## 2018-03-28 ASSESSMENT — ENCOUNTER SYMPTOMS
DIZZINESS: 0
LIGHT-HEADEDNESS: 0
WEAKNESS: 0
DIARRHEA: 1
VOMITING: 0
SHORTNESS OF BREATH: 0
FEVER: 0
ABDOMINAL PAIN: 1
RECTAL PAIN: 0
BLOOD IN STOOL: 1

## 2018-03-28 NOTE — ED AVS SNAPSHOT
[facility] Emergency Department    [address]    [facility] [address]    Phone:  [phone]    Fax:  [phone]                                       Madhavi Castellanos   MRN: [id number]    Department:  [facility] Emergency Department   Date of Visit:  3/28/2018           Patient Information     Date Of Birth          1940        Your diagnoses for this visit were:     Rectal bleeding        You were seen by Taryn Rodney MD.      Follow-up Information     Follow up with Cecy Edmondson MD In 2 days.    Specialty:  Internal Medicine    Why:  for recheck     Contact information:    [facility]  [address]  [place] [address] [phone]          Go to [facility] Emergency Department.    Specialty:  EMERGENCY MEDICINE    Why:  for recurring bleeding, feeling fatigued or lightheaded, fainting, severe abdominal pain or other concerning symptoms    Contact information:    [address]  [facility] [address] [phone]        Discharge Instructions         Understanding Rectal Bleeding    Rectal bleeding is when blood passes through your rectum and anus. It can happen with or without a bowel movement. Rectal bleeding may be a sign of a serious problem in your rectum, colon, or upper GI tract. Call your healthcare provider right away if you have any rectal bleeding.  The GI Tract  The gastrointestinal (GI) tract includes the mouth, esophagus, stomach, small intestine, large intestine (colon), rectum, and anus. The food you eat is digested as it passes through the GI tract. Solid waste leaves the body through the rectum.   Rectal bleeding and GI problems  The cause of rectal bleeding may be found in any region of the GI tract. The colon or rectum may be the site of your bleeding problem. Or, bleeding may be due to problems farther up the GI tract, such as in the small intestine, duodenum, or stomach.  Causes of  rectal bleeding  Rectal bleeding causes include the following:    Hemorrhoids (swollen veins in the rectum and anus)    Fissures (tears in or near the anus)    Diverticulosis (inflamed pockets in the colon wall)    Infection    Ischemia (low blood flow)    Radiation damage    Inflammatory bowel disease (Crohn's disease or ulcerative colitis)    Ulcers in the upper GI tract and inflammation of the large intestine    Abnormal tissue growths (tumors or polyps) in the GI tract    A bulging rectum (also called a rectal prolapse)    Abnormal blood vessels in the small intestine or in the colon  Common symptoms  Common symptoms include the following:    Rectal pain, itching, or soreness    Belly pain or epigastric pain    Minor occasional drops of blood that appear on the stool or toilet paper, to greater amounts of stool that appear black or tarry   Rectal bleeding can also happen without pain.  Date Last Reviewed: 7/1/2016 2000-2017 [employer]. [address] [place] [address]. All rights reserved. This information is not intended as a substitute for professional medical care. Always follow your healthcare professional's instructions.          24 Hour Appointment Hotline       To make an appointment at any [facility], call [phone] ([phone]). If you don't have a family doctor or clinic, we will help you find one. [place] clinics are conveniently located to serve the needs of you and your family.             Review of your medicines      Our records show that you are taking the medicines listed below. If these are incorrect, please call your family doctor or clinic.        Dose / Directions Last dose taken    albuterol 108 (90 BASE) MCG/ACT Inhaler   Commonly known as:  PROAIR HFA/PROVENTIL HFA/VENTOLIN HFA   Dose:  2 puff        Inhale 2 puffs into the lungs   Refills:  0        alum & mag hydroxide-simethicone 200-200-20 MG/5ML Susp suspension   Commonly known as:   MYLANTA/MAALOX   Dose:  30 mL   Quantity:  300 mL        Take 30 mLs by mouth every 4 hours as needed   Refills:  0        BETAPACE PO   Dose:  40 mg        Take 40 mg by mouth 2 times daily   Refills:  0        CARTIA  MG 24 hr capsule   Dose:  120 mg   Generic drug:  diltiazem        Take 120 mg by mouth daily   Refills:  0        clobetasol 0.05 % ointment   Commonly known as:  TEMOVATE        Apply topically 3 times daily   Refills:  0        COZAAR PO   Dose:  25 mg        Take 25 mg by mouth daily   Refills:  0        ELIQUIS PO   Dose:  5 mg        Take 5 mg by mouth 2 times daily   Refills:  0        fentaNYL 25 mcg/hr 72 hr patch   Commonly known as:  DURAGESIC   Dose:  1 patch        Place 1 patch onto the skin every 72 hours   Refills:  0        GABAPENTIN PO   Dose:  900 mg        Take 900 mg by mouth 3 times daily   Refills:  0        HYDROCHLOROTHIAZIDE PO   Dose:  12.5 mg        Take 12.5 mg by mouth daily   Refills:  0        HYDROcodone-acetaminophen 7.5-325 MG per tablet   Commonly known as:  NORCO   Dose:  1-2 tablet        Take 1-2 tablets by mouth 2 times daily as needed for moderate to severe pain   Refills:  0        LIPITOR PO   Dose:  10 mg        Take 10 mg by mouth daily   Refills:  0        METHOCARBAMOL PO   Dose:  750 mg        Take 750 mg by mouth 6 times daily   Refills:  0        polyethylene glycol Packet   Commonly known as:  MIRALAX   Dose:  1 packet   Quantity:  30 packet        Take 17 g by mouth 2 times daily   Refills:  0        PRILOSEC PO   Dose:  20 mg        Take 20 mg by mouth every morning   Refills:  0        promethazine 25 MG tablet   Commonly known as:  PHENERGAN   Dose:  25 mg   Quantity:  15 tablet        Take 1 tablet (25 mg) by mouth every 6 hours as needed for nausea   Refills:  0        SIMVASTATIN PO        Take  by mouth.   Refills:  0        TAPAZOLE PO   Dose:  5 mg        Take 5 mg by mouth daily   Refills:  0        WELLBUTRIN PO        Refills:  0         ZANTAC PO   Dose:  150 mg        Take 150 mg by mouth   Refills:  0        ZOFRAN ODT PO   Dose:  8 mg        Take 8 mg by mouth every 8 hours as needed for nausea   Refills:  0                Procedures and tests performed during your visit     ABO/Rh type and screen    CBC with platelets differential    Comprehensive metabolic panel    Orthostatic blood pressure and pulse    Stool: occult blood      Orders Needing Specimen Collection     None      Pending Results     No orders found from 3/26/2018 to 3/29/2018.            Pending Culture Results     No orders found from 3/26/2018 to 3/29/2018.            Pending Results Instructions     If you had any lab results that were not finalized at the time of your Discharge, you can call the ED Lab Result RN at [phone]. You will be contacted by this team for any positive Lab results or changes in treatment. The nurses are available 7 days a week from 10A to 6:30P.  You can leave a message 24 hours per day and they will return your call.        Test Results From Your Hospital Stay        3/28/2018 10:06 AM      Component Results     Component Value Ref Range & Units Status    WBC 10.9 4.0 - 11.0 10e9/L Final    RBC Count 4.60 3.8 - 5.2 10e12/L Final    Hemoglobin 14.2 11.7 - 15.7 g/dL Final    Hematocrit 44.0 35.0 - 47.0 % Final    MCV 96 78 - 100 fl Final    MCH 30.9 26.5 - 33.0 pg Final    MCHC 32.3 31.5 - 36.5 g/dL Final    RDW 13.1 10.0 - 15.0 % Final    Platelet Count 361 150 - 450 10e9/L Final    Diff Method Automated Method  Final    % Neutrophils 66.6 % Final    % Lymphocytes 22.2 % Final    % Monocytes 7.8 % Final    % Eosinophils 2.4 % Final    % Basophils 0.5 % Final    % Immature Granulocytes 0.5 % Final    Nucleated RBCs 0 0 /100 Final    Absolute Neutrophil 7.3 1.6 - 8.3 10e9/L Final    Absolute Lymphocytes 2.4 0.8 - 5.3 10e9/L Final    Absolute Monocytes 0.9 0.0 - 1.3 10e9/L Final    Absolute Eosinophils 0.3 0.0 - 0.7 10e9/L Final    Absolute  Basophils 0.1 0.0 - 0.2 10e9/L Final    Abs Immature Granulocytes 0.1 0 - 0.4 10e9/L Final    Absolute Nucleated RBC 0.0  Final         3/28/2018 10:36 AM      Component Results     Component Value Ref Range & Units Status    Sodium 140 133 - 144 mmol/L Final    Potassium 3.8 3.4 - 5.3 mmol/L Final    Chloride 105 94 - 109 mmol/L Final    Carbon Dioxide 26 20 - 32 mmol/L Final    Anion Gap 9 3 - 14 mmol/L Final    Glucose 107 (H) 70 - 99 mg/dL Final    Urea Nitrogen 16 7 - 30 mg/dL Final    Creatinine 0.70 0.52 - 1.04 mg/dL Final    GFR Estimate 81 >60 mL/min/1.7m2 Final    Non  GFR Calc    GFR Estimate If Black >90 >60 mL/min/1.7m2 Final    African American GFR Calc    Calcium 9.1 8.5 - 10.1 mg/dL Final    Bilirubin Total 0.4 0.2 - 1.3 mg/dL Final    Albumin 3.4 3.4 - 5.0 g/dL Final    Protein Total 7.3 6.8 - 8.8 g/dL Final    Alkaline Phosphatase 120 40 - 150 U/L Final    ALT 37 0 - 50 U/L Final    AST 33 0 - 45 U/L Final         3/28/2018 10:40 AM      Component Results     Component Value Ref Range & Units Status    ABO O  Final    RH(D) Pos  Final    Antibody Screen Neg  Final    Test Valid Only At [facility]     Final    Specimen Expires 03/31/2018  Final         3/28/2018 10:02 AM      Component Results     Component Value Ref Range & Units Status    Occult Blood Positive (A) NEG^Negative Final                Clinical Quality Measure: Blood Pressure Screening     Your blood pressure was checked while you were in the emergency department today. The last reading we obtained was  BP: 146/88 . Please read the guidelines below about what these numbers mean and what you should do about them.  If your systolic blood pressure (the top number) is less than 120 and your diastolic blood pressure (the bottom number) is less than 80, then your blood pressure is normal. There is nothing more that you need to do about it.  If your systolic blood pressure (the top number) is 120-139 or your  diastolic blood pressure (the bottom number) is 80-89, your blood pressure may be higher than it should be. You should have your blood pressure rechecked within a year by a primary care provider.  If your systolic blood pressure (the top number) is 140 or greater or your diastolic blood pressure (the bottom number) is 90 or greater, you may have high blood pressure. High blood pressure is treatable, but if left untreated over time it can put you at risk for heart attack, stroke, or kidney failure. You should have your blood pressure rechecked by a primary care provider within the next 4 weeks.  If your provider in the emergency department today gave you specific instructions to follow-up with your doctor or provider even sooner than that, you should follow that instruction and not wait for up to 4 weeks for your follow-up visit.        Thank you for choosing [facility]       Thank you for choosing [facility] for your care. Our goal is always to provide you with excellent care. Hearing back from our patients is one way we can continue to improve our services. Please take a few minutes to complete the written survey that you may receive in the mail after you visit with us. Thank you!        [employer] Information     [employer] gives you secure access to your electronic health record. If you see a primary care provider, you can also send messages to your care team and make appointments. If you have questions, please call your primary care clinic.  If you do not have a primary care provider, please call [phone] and they will assist you.        Care EveryWhere ID     This is your Care EveryWhere ID. This could be used by other organizations to access your [facility] medical records  [id number]        Equal Access to Services     CANDY PISANO : Hadii lili arevaloo Sobeverly, waaxda luqadaha, qaybta kaalmada robbie, jerilyn alvarado. So [facility] [phone].    ATENCIÓN: black Kee  disposición servicios gratuitos de asistencia lingüística. Rowena al [phone].    We comply with applicable federal civil rights laws and [place] laws. We do not discriminate on the basis of race, color, national origin, age, disability, sex, sexual orientation, or gender identity.            After Visit Summary       This is your record. Keep this with you and show to your community pharmacist(s) and doctor(s) at your next visit.

## 2018-03-28 NOTE — ED NOTES
Pt c/o of bright red blood coming from rectum this morning, pt had gas pain at 2am. Pt had diarrhea no blood present    ABC intact, pt alert.

## 2018-03-28 NOTE — ED PROVIDER NOTES
"  History     Chief Complaint:  Rectal Bleeding    HPI   Madhavi Castellanos is a 77 year old female, currently on Eliquis with history of atrial fibrillation, hemorrhoids, CAD, hypertension amongst others as noted below, who presents with a friend to the emergency department for evaluation of rectal bleeding that began this morning at 0800. Patient reports that she woke up at 0200 with gas pain in her lower abdomen, had an episode of diarrhea with no blood present and took 2 GasX before going to bed. Patient reports she woke up this morning and \"felt it run out of me\" and noted she was wearing a sanitary napkin at the time and noticed bright red blood mixed with stool. The patient endorses that it is not vaginal bleeding and denies any black stools, lightheadedness or dizziness.  She has not had any nausea, vomiting, or hematemesis.  There was one isolated episode of blood stool this morning, and otherwise has not noticed any over the last week.      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     Social History:  The " patient was accompanied to the ED by friend.  Smoking Status: No  Smokeless Tobacco: No  Alcohol Use: No   Marital Status:   [4]     Review of Systems   Constitutional: Negative for fever.   Respiratory: Negative for shortness of breath.    Cardiovascular: Negative for chest pain.   Gastrointestinal: Positive for abdominal pain, blood in stool and diarrhea. Negative for rectal pain and vomiting.   Neurological: Negative for dizziness, syncope, weakness and light-headedness.   All other systems reviewed and are negative.    Physical Exam   Vitals:  Patient Vitals for the past 24 hrs:   BP Temp Temp src Heart Rate Resp SpO2   03/28/18 1145 146/88 - - - - 94 %   03/28/18 1130 137/84 - - - - 97 %   03/28/18 1115 138/65 - - - - 97 %   03/28/18 1100 (!) 121/106 - - - - 96 %   03/28/18 1045 (!) 141/116 - - - - 96 %   03/28/18 1044 - - - - - 97 %   03/28/18 1030 141/79 - - - - 97 %   03/28/18 1015 (!) 136/104 - - - - 96 %   03/28/18 1011 - - - - - 96 %   03/28/18 0945 139/89 - - - - 97 %   03/28/18 0936 (!) 119/94 98.3  F (36.8  C) Oral 73 18 98 %     Physical Exam  Constitutional: Alert, attentive, GCS 15, middle aged woman laying in bed in no acute distress  HENT: normocephalic  Eyes: Normal conjunctiva. Pupils are equal, round, and reactive to light.   CV: regular rate and rhythm; no murmurs, rubs or gallups  Chest: Effort normal and breath sounds normal.   GI:  There is no tenderness to deep palpation, no rebound or guarding. No distension. Normal bowel sounds  Rectal exam: 3 external hemorrhoids (nonthrombosed, non bleeding), light brown stool with thin area of blood streak on hemoccult card, no bright red blood, no anal fissure  MSK: Normal range of motion.   Neurological: Alert, attentive, oriented x4  Skin: Skin is warm and dry.   Emergency Department Course   Laboratory:  Laboratory findings were communicated with the patient and friend who voiced understanding of the findings.  CBC: AWNL (WBC 10.9, HGB  14.2, )  CMP: Glucose 107 (H) o/w WNL (Creatinine 0.70)  ABO/Rh Type and Screen: ABO O, RhD Pos Antibody Screen Neg     Stool: Occult Blood: Positive (A)    Emergency Department Course:  Nursing notes and vitals reviewed.  A stool sample was collected and underwent laboratory testing, findings above.  IV was inserted and blood was drawn for laboratory testing, results above.     9:34 AM: I performed an exam of the patient as documented above. History was obtained from patient.  9:54 AM: Lying Orthostatic BP - Lying Orthostatic BP: 128/112 ; Lying Orthostatic Pulse: 89 bpm   Sitting Orthostatic BP - Sitting Orthostatic BP: 126/98 ; Sitting Orthostatic Pulse: 92 bpm   Standing Orthostatic BP - Standing Orthostatic BP: 125/88 ; Standing Orthostatic Pulse: 102 bpm  10:48 AM: Patient denies experiencing any bloody stools while being in the ED.  11:07 AM: Was updated by a nurse that patient admits she has been taking 1000 mg of Tylenol daily and began to take this around the same time the rectal bleeding began.   12:00 PM: Updated patient and discussed plan of care. Treatment plan was discussed and patient is agreeable with this plan. Patient will be discharged home.     I discussed the treatment plan with the patient. They expressed understanding of this plan and consented to discharge. They will be discharged home with instructions for care and follow up. In addition, the patient will return to the emergency department if their symptoms persist, worsen, if new symptoms arise or if there is any concern.  All questions were answered.     I personally reviewed the laboratory results with the Patient and friend and answered all related questions prior to discharge.    Impression & Plan      Medical Decision Making:  Madhavi Castellanos is a 77 year old female who presents with blood in stool.  I considered a broad differential diagnosis for this patient including upper GIB (ulcer, gastritis, avm, tumor, etc) vs lower  GIB (tumor, diverticulosis, avm, meckels, etc), colitis, aortoenteric fistula, hemorrhoids, fissures,  Ischemic colitis, bacterial stool infection (salmonella, shigella, e. Coli, campylobacter). The workup in the ED is consistent with small amount of rectal bleeding.  There are external hemorrhoids on physical exam and small streak of blood in loose stool, that is hemoccult positive.  No bleeding hemorhoids were seen. She is at higher risk for more severe bleeding as she is on Eliquis. Her hemoglobin is normal, and vital signs stable.  I discussed possibility of overnight admission for observation, but patient declined.  This appears reasonable as bleeding is small and vitals/hgb are stable, outpatient management is indicated. Will have see primary ASAP and get colonoscopy as first study.  Return if massive bleeding, more than 2 more bloody stools, lightheaded when stands, worsening or new abdominal pain.       Diagnosis:    ICD-10-CM    1. Rectal bleeding K62.5         Disposition:   Discharged home.     Scribe Disclosure:  I, Trudy Flower, am serving as a scribe at 9:34 AM on 3/28/2018 to document services personally performed by Taryn Rodney MD, based on my observations and the provider's statements to me.  3/28/2018   [facility] EMERGENCY DEPARTMENT       Taryn Rodney MD  03/28/18 1123       Taryn Rodney MD  03/28/18 9491

## 2018-03-28 NOTE — DISCHARGE INSTRUCTIONS
Understanding Rectal Bleeding    Rectal bleeding is when blood passes through your rectum and anus. It can happen with or without a bowel movement. Rectal bleeding may be a sign of a serious problem in your rectum, colon, or upper GI tract. Call your healthcare provider right away if you have any rectal bleeding.  The GI Tract  The gastrointestinal (GI) tract includes the mouth, esophagus, stomach, small intestine, large intestine (colon), rectum, and anus. The food you eat is digested as it passes through the GI tract. Solid waste leaves the body through the rectum.   Rectal bleeding and GI problems  The cause of rectal bleeding may be found in any region of the GI tract. The colon or rectum may be the site of your bleeding problem. Or, bleeding may be due to problems farther up the GI tract, such as in the small intestine, duodenum, or stomach.  Causes of rectal bleeding  Rectal bleeding causes include the following:    Hemorrhoids (swollen veins in the rectum and anus)    Fissures (tears in or near the anus)    Diverticulosis (inflamed pockets in the colon wall)    Infection    Ischemia (low blood flow)    Radiation damage    Inflammatory bowel disease (Crohn's disease or ulcerative colitis)    Ulcers in the upper GI tract and inflammation of the large intestine    Abnormal tissue growths (tumors or polyps) in the GI tract    A bulging rectum (also called a rectal prolapse)    Abnormal blood vessels in the small intestine or in the colon  Common symptoms  Common symptoms include the following:    Rectal pain, itching, or soreness    Belly pain or epigastric pain    Minor occasional drops of blood that appear on the stool or toilet paper, to greater amounts of stool that appear black or tarry   Rectal bleeding can also happen without pain.  Date Last Reviewed: 7/1/2016 2000-2017 [employer] [address] [place] [address]. All rights reserved. This information is not intended as a  substitute for professional medical care. Always follow your healthcare professional's instructions.

## 2018-03-28 NOTE — ED AVS SNAPSHOT
[facility] Emergency Department    [address]    [facility] [address]    Phone:  [phone]    Fax:  [phone]                                       Madhavi Castellanos   MRN: [id number]    Department:  [facility] Emergency Department   Date of Visit:  3/28/2018           After Visit Summary Signature Page     I have received my discharge instructions, and my questions have been answered. I have discussed any challenges I see with this plan with the nurse or doctor.    ..........................................................................................................................................  Patient/Patient Representative Signature      ..........................................................................................................................................  Patient Representative Print Name and Relationship to Patient    ..................................................               ................................................  Date                                            Time    ..........................................................................................................................................  Reviewed by Signature/Title    ...................................................              ..............................................  Date                                                            Time

## 2018-05-29 ENCOUNTER — OFFICE VISIT (OUTPATIENT)
Dept: UROLOGY | Facility: CLINIC | Age: 78
End: 2018-05-29
Payer: COMMERCIAL

## 2018-05-29 DIAGNOSIS — N20.0 CALCULUS OF KIDNEY: ICD-10-CM

## 2018-05-29 DIAGNOSIS — R31.0 GROSS HEMATURIA: Primary | ICD-10-CM

## 2018-05-29 LAB
ALBUMIN UR-MCNC: 30 MG/DL
APPEARANCE UR: CLEAR
BILIRUB UR QL STRIP: ABNORMAL
COLOR UR AUTO: YELLOW
GLUCOSE UR STRIP-MCNC: NEGATIVE MG/DL
HGB UR QL STRIP: ABNORMAL
KETONES UR STRIP-MCNC: NEGATIVE MG/DL
LEUKOCYTE ESTERASE UR QL STRIP: NEGATIVE
NITRATE UR QL: NEGATIVE
PH UR STRIP: 5 PH (ref 5–7)
SOURCE: ABNORMAL
SP GR UR STRIP: >1.03 (ref 1–1.03)
UROBILINOGEN UR STRIP-ACNC: 0.2 EU/DL (ref 0.2–1)

## 2018-05-29 PROCEDURE — 99203 OFFICE O/P NEW LOW 30 MIN: CPT | Performed by: PHYSICIAN ASSISTANT

## 2018-05-29 PROCEDURE — 81003 URINALYSIS AUTO W/O SCOPE: CPT | Performed by: PHYSICIAN ASSISTANT

## 2018-05-29 PROCEDURE — 88112 CYTOPATH CELL ENHANCE TECH: CPT | Performed by: PHYSICIAN ASSISTANT

## 2018-05-29 NOTE — MR AVS SNAPSHOT
After Visit Summary   5/29/2018    Madhavi Castellanos    MRN: [id number]           Patient Information     Date Of Birth          1940        Visit Information        Provider Department      5/29/2018 3:00 PM Patricia Pena PA-C [facility]        Today's Diagnoses     Gross hematuria    -  1    Calculus of kidney          Care Instructions     -Urinalysis and micro analysis.   - Urine cytology to look for abnormal cells.  -Xray on return to evaluate for passage of stones  - Cystoscopy with the  urologist to evaluate the interior of the bladder. Follow up as recommended by the urologist.                Follow-ups after your visit        Your next 10 appointments already scheduled     May 29, 2018  3:00 PM CDT   New Patient Visit with Patricia Pena PA-C   [facility] ([facility])    [address]  [address]  [facility] [place] [id number] [phone]            Jun 25, 2018 12:30 PM CDT   XR KUB with [id number]   [facility] ([facility])    [address]  [facility] [address] [phone]           Please bring a list of your current medicines to your exam. (Include vitamins, minerals and over-thecounter medicines.) Leave your valuables at home.  Tell your doctor if there is a chance you may be pregnant.  You do not need to do anything special for this exam.            Jun 25, 2018  1:30 PM CDT   Cystoscopy with Zoltan Marcelo MD   [facility] ([facility])    [address]  [address]  [facility] [address] [phone]              Future tests that were ordered for you today     Open Future Orders        Priority Expected Expires Ordered    XR KUB Routine 5/29/2018 5/29/2019 5/29/2018            Who to contact     If you have questions or need follow up  information about today's clinic visit or your schedule please contact [facility] [place] directly at [phone].  Normal or non-critical lab and imaging results will be communicated to you by StyleJamhart, letter or phone within 4 business days after the clinic has received the results. If you do not hear from us within 7 days, please contact the clinic through StyleJamhart or phone. If you have a critical or abnormal lab result, we will notify you by phone as soon as possible.  Submit refill requests through BeneStream or call your pharmacy and they will forward the refill request to us. Please allow 3 business days for your refill to be completed.          Additional Information About Your Visit        StyleJamharCap That Information     BeneStream gives you secure access to your electronic health record. If you see a primary care provider, you can also send messages to your care team and make appointments. If you have questions, please call your primary care clinic.  If you do not have a primary care provider, please call [phone] and they will assist you.        Care EveryWhere ID     This is your Care EveryWhere ID. This could be used by other organizations to access your [place] medical records  [id number]         Blood Pressure from Last 3 Encounters:   03/28/18 146/88   01/09/18 134/87   01/05/18 (!) 179/106    Weight from Last 3 Encounters:   01/09/18 83.9 kg (185 lb)   12/01/17 86.2 kg (190 lb)   11/08/16 78.5 kg (173 lb)              We Performed the Following     Cytology non gyn     UA without Microscopic          Today's Medication Changes          These changes are accurate as of 5/29/18  2:41 PM.  If you have any questions, ask your nurse or doctor.               These medicines have changed or have updated prescriptions.        Dose/Directions    polyethylene glycol Packet   Commonly known as:  MIRALAX   This may have changed:  when to take this        Dose:  1 packet   Take 17 g by  mouth 2 times daily   Quantity:  30 packet   Refills:  0                Primary Care Provider Office Phone # Fax #    Cecy Edmondson [phone]       KISHOR COLBERT [address] [facility] [address]  [facility] [address]        Equal Access to Services     ADRIANROYCE ALIYA AH: Hadii aad ku hadasho Soomaali, waaxda luqadaha, qaybta kaalmada adeegyada, waxay idiin hayaan adeeg khscoutsh laafian ah. So [facility] [phone].    ATENCIÓN: Si habla español, tiene a starks disposición servicios gratuitos de asistencia lingüística. Llame al [phone].    We comply with applicable federal civil rights laws and [place] laws. We do not discriminate on the basis of race, color, national origin, age, disability, sex, sexual orientation, or gender identity.            Thank you!     Thank you for choosing [facility] [place]  for your care. Our goal is always to provide you with excellent care. Hearing back from our patients is one way we can continue to improve our services. Please take a few minutes to complete the written survey that you may receive in the mail after your visit with us. Thank you!             Your Updated Medication List - Protect others around you: Learn how to safely use, store and throw away your medicines at www.disposemymeds.org.          This list is accurate as of 5/29/18  2:41 PM.  Always use your most recent med list.                   Brand Name Dispense Instructions for use Diagnosis    albuterol 108 (90 Base) MCG/ACT Inhaler    PROAIR HFA/PROVENTIL HFA/VENTOLIN HFA     Inhale 2 puffs into the lungs        alum & mag hydroxide-simethicone 200-200-20 MG/5ML Susp suspension    MYLANTA/MAALOX    300 mL    Take 30 mLs by mouth every 4 hours as needed        BETAPACE PO      Take 40 mg by mouth 2 times daily        CARTIA  MG 24 hr capsule   Generic drug:  diltiazem      Take 120 mg by mouth daily        clobetasol 0.05 % ointment    TEMOVATE     Apply topically 3  times daily        COZAAR PO      Take 25 mg by mouth daily        ELIQUIS PO      Take 5 mg by mouth 2 times daily        fentaNYL 25 mcg/hr 72 hr patch    DURAGESIC     Place 1 patch onto the skin every 72 hours        GABAPENTIN PO      Take 900 mg by mouth 3 times daily        HYDROCHLOROTHIAZIDE PO      Take 12.5 mg by mouth daily        HYDROcodone-acetaminophen 7.5-325 MG per tablet    NORCO     Take 1-2 tablets by mouth 2 times daily as needed for moderate to severe pain        LIPITOR PO      Take 10 mg by mouth daily        METHOCARBAMOL PO      Take 750 mg by mouth 6 times daily        polyethylene glycol Packet    MIRALAX    30 packet    Take 17 g by mouth 2 times daily        PRILOSEC PO      Take 20 mg by mouth every morning        promethazine 25 MG tablet    PHENERGAN    15 tablet    Take 1 tablet (25 mg) by mouth every 6 hours as needed for nausea        SIMVASTATIN PO      Take  by mouth.        TAPAZOLE PO      Take 5 mg by mouth daily        WELLBUTRIN PO           ZANTAC PO      Take 150 mg by mouth        ZOFRAN ODT PO      Take 8 mg by mouth every 8 hours as needed for nausea

## 2018-05-29 NOTE — LETTER
"5/29/2018     RE: Madhavi Castellanos  [address] [facility] [address]     Dear Colleague,    Thank you for referring your patient, Madhavi Castellanos, to the [facility] [place] [facility]. Please see a copy of my visit note below.    CC: Hematuria.    HPI: It is a pleasure to see Ms. Madhavi Castellanos, a 77 year old female, asked to be seen in consultation by Dr. Pires for evaluation of gross hematuria. This was first detected 2 weeks ago and lasted a few days.  Former smoker.     The patient denies any further gross hematuria.  Ms. Castellanos voids without difficulty, and reports nocturia of 0-1.  She currently denies any dysuria, pyuria, hesitancy, intermittency, feelings of incomplete emptying, or any recent history of urinary tract infections. Hx chronic back pain on narcotics.    CT notes two small ureteral stones.     Hematuria Risk Factors:  Age >40: Yes     Smoking history: Former smoker  Occupational exposure to chemicals or dyes (ie, benzenes, aromatic amines): no  History of urologic disorder or disease: no  History of irritative voiding symptoms: no  History of urinary tract infection: no  Analgesic abuse: no  History of pelvic irradiation: no    Past Medical History:   Diagnosis Date     Asthma      Atrial fibrillation (H)     had EP ablation 8/11/2017, pt reports being \"afib free\" now     CAD (coronary artery disease)      Depression      Esophageal reflux      Hypertension      LBBB (left bundle branch block)      Liver lesion     stable, seen on multiple CT scans     Lumbosacral spondylosis      Nonspecific reaction to 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 Pylori negative ([facility])     EXCISE VULVA WIDE LOCAL N/A 12/1/2017    Procedure: EXCISE VULVA WIDE LOCAL;  Wide Local Excision of Vulvar Lesion   ;  Surgeon: Nazario Tirado MD;  Location: [facility] OR     Current Outpatient Prescriptions   Medication Sig Dispense Refill     albuterol (PROAIR HFA/PROVENTIL HFA/VENTOLIN HFA) 108 (90 BASE) MCG/ACT Inhaler Inhale 2 puffs into the lungs       alum & mag hydroxide-simethicone (MYLANTA/MAALOX) 200-200-20 MG/5ML SUSP Take 30 mLs by mouth every 4 hours as needed 300 mL 0     Apixaban (ELIQUIS PO) Take 5 mg by mouth 2 times daily       Atorvastatin Calcium (LIPITOR PO) Take 10 mg by mouth daily        BuPROPion HCl (WELLBUTRIN PO)        clobetasol (TEMOVATE) 0.05 % ointment Apply topically 3 times daily       diltiazem (CARTIA XT) 120 MG 24 hr capsule Take 120 mg by mouth daily       fentaNYL (DURAGESIC) 25 mcg/hr patch 72 hr Place 1 patch onto the skin every 72 hours       GABAPENTIN PO Take 900 mg by mouth 3 times daily        HYDROCHLOROTHIAZIDE PO Take 12.5 mg by mouth daily        HYDROcodone-acetaminophen (NORCO) 7.5-325 MG per tablet Take 1-2 tablets by mouth 2 times daily as needed for moderate to severe pain        Losartan Potassium (COZAAR PO) Take 25 mg by mouth daily       Methimazole (TAPAZOLE PO) Take 5 mg by mouth daily        METHOCARBAMOL PO Take 750 mg by mouth 6 times daily        Omeprazole (PRILOSEC PO) Take 20 mg by mouth every morning        Ondansetron (ZOFRAN ODT PO) Take 8 mg by mouth every 8 hours as needed for nausea       polyethylene glycol (MIRALAX) packet Take 17 g by mouth 2 times daily (Patient taking differently: Take 1 packet by mouth daily ) 30 packet 0     promethazine (PHENERGAN) 25 MG tablet Take 1 tablet (25 mg) by mouth every 6 hours as needed for nausea 15 tablet 0     RaNITidine HCl  "(ZANTAC PO) Take 150 mg by mouth       SIMVASTATIN PO Take  by mouth.         Sotalol HCl (BETAPACE PO) Take 40 mg by mouth 2 times daily        Allergies   Allergen Reactions     Metoprolol Shortness Of Breath     2 hours after tasking for the first time SOB, Pt evaluated and pt WDL       Augmentin Nausea     Cephalexin      Codeine Sulfate      \"i dont't know, nothing, maybe sick to my stomach\"     Cymbalta      agitated     Lisinopril Cough     Omnicef [Cefdinir] Diarrhea     Percocet [Oxycodone-Acetaminophen]      nauseated     FAMILY HISTORY: There is no reported history of genitourinary carcinoma.  There is no history of urolithiasis.      SOCIAL HISTORY: The patient does not smoke cigarettes. Denies EtOH and illicit drug abuse.      ROS: A comprehensive 14 point ROS was obtained and negative except for that outlined above in the HPI.    PHYSICAL EXAM:   RR: 16, HR 92  GENERAL: Well groomed, well developed, well nourished female in NAD.  HEENT: AT, NC, EOMI bilaterally.  SKIN: Warm to touch, dry.  No visible rashes or lesions on examined areas.  RESP: No increased respiratory effort.  MS: Full ROM in extremities.  PELVIC: Deferred for now.   NEURO: Alert and oriented x 3.  PSYCH: Normal mood and affect, pleasant and agreeable during interview and exam.      Admission on 03/28/2018, Discharged on 03/28/2018   Component Date Value Ref Range Status     WBC 03/28/2018 10.9  4.0 - 11.0 10e9/L Final     RBC Count 03/28/2018 4.60  3.8 - 5.2 10e12/L Final     Hemoglobin 03/28/2018 14.2  11.7 - 15.7 g/dL Final     Hematocrit 03/28/2018 44.0  35.0 - 47.0 % Final     MCV 03/28/2018 96  78 - 100 fl Final     MCH 03/28/2018 30.9  26.5 - 33.0 pg Final     MCHC 03/28/2018 32.3  31.5 - 36.5 g/dL Final     RDW 03/28/2018 13.1  10.0 - 15.0 % Final     Platelet Count 03/28/2018 361  150 - 450 10e9/L Final     Diff Method 03/28/2018 Automated Method   Final     % Neutrophils 03/28/2018 66.6  % Final     % Lymphocytes 03/28/2018 " 22.2  % Final     % Monocytes 03/28/2018 7.8  % Final     % Eosinophils 03/28/2018 2.4  % Final     % Basophils 03/28/2018 0.5  % Final     % Immature Granulocytes 03/28/2018 0.5  % Final     Nucleated RBCs 03/28/2018 0  0 /100 Final     Absolute Neutrophil 03/28/2018 7.3  1.6 - 8.3 10e9/L Final     Absolute Lymphocytes 03/28/2018 2.4  0.8 - 5.3 10e9/L Final     Absolute Monocytes 03/28/2018 0.9  0.0 - 1.3 10e9/L Final     Absolute Eosinophils 03/28/2018 0.3  0.0 - 0.7 10e9/L Final     Absolute Basophils 03/28/2018 0.1  0.0 - 0.2 10e9/L Final     Abs Immature Granulocytes 03/28/2018 0.1  0 - 0.4 10e9/L Final     Absolute Nucleated RBC 03/28/2018 0.0   Final     Sodium 03/28/2018 140  133 - 144 mmol/L Final     Potassium 03/28/2018 3.8  3.4 - 5.3 mmol/L Final     Chloride 03/28/2018 105  94 - 109 mmol/L Final     Carbon Dioxide 03/28/2018 26  20 - 32 mmol/L Final     Anion Gap 03/28/2018 9  3 - 14 mmol/L Final     Glucose 03/28/2018 107* 70 - 99 mg/dL Final     Urea Nitrogen 03/28/2018 16  7 - 30 mg/dL Final     Creatinine 03/28/2018 0.70  0.52 - 1.04 mg/dL Final     GFR Estimate 03/28/2018 81  >60 mL/min/1.7m2 Final    Non  GFR Calc     GFR Estimate If Black 03/28/2018 >90  >60 mL/min/1.7m2 Final    African American GFR Calc     Calcium 03/28/2018 9.1  8.5 - 10.1 mg/dL Final     Bilirubin Total 03/28/2018 0.4  0.2 - 1.3 mg/dL Final     Albumin 03/28/2018 3.4  3.4 - 5.0 g/dL Final     Protein Total 03/28/2018 7.3  6.8 - 8.8 g/dL Final     Alkaline Phosphatase 03/28/2018 120  40 - 150 U/L Final     ALT 03/28/2018 37  0 - 50 U/L Final     AST 03/28/2018 33  0 - 45 U/L Final     ABO 03/28/2018 O   Final     RH(D) 03/28/2018 Pos   Final     Antibody Screen 03/28/2018 Neg   Final     Test Valid Only At 03/28/2018 [facility]      Final     Specimen Expires 03/28/2018 03/31/2018   Final     Occult Blood 03/28/2018 Positive* NEG^Negative Final       IMAGING: CT A/P w/ and w/o at SI  5/23/18  IMPRESSION:   1.  Nonobstructing right and left renal collecting system stones.  Two nonobstructing right ureteral stones.  These measure up to 0.3 cm in size in the proximal and mid right ureter.  No left ureteral calculi or bladder calculi.  2.  Prior cholecystectomy changes.      ASSESSMENT and PLAN:    77 year old female with gross  Hematuria, ureteral stones, former smoker.  The differential diagnosis at this point includes stone disease, infection, vaginal contaminant, urothelial malignancy, renal disorder versus another yet unknown diagnosis.     At this time, recommend proceeding with comprehensive hematuria evaluation to include:  - Urinalysis, Micro   - Urine cytology to look for cells concerning for malignancy.  - Cystoscopy with the first available urologist to evaluate the interior of the bladder. Follow up for hematuria as recommended by urologist performing cystoscopic evaluation.  -Not straining her urine, strainer provided. KUB on return visit.     Thank you for allowing me to participate in Ms. Castellanos's care. I will keep you updated of her progress, but please do not hesitate to contact me with any questions.     Patricia Pena PA-C  [facility]  30 minutes were spent with the patient today, > 50% in counseling and coordination of care of hematuria.

## 2018-05-29 NOTE — PATIENT INSTRUCTIONS
-Urinalysis and micro analysis.   - Urine cytology to look for abnormal cells.  -Xray on return to evaluate for passage of stones  - Cystoscopy with the  urologist to evaluate the interior of the bladder. Follow up as recommended by the urologist.

## 2018-05-30 NOTE — PROGRESS NOTES
"CC: Hematuria.    HPI: It is a pleasure to see Ms. Madhavi Castellanos, a 77 year old female, asked to be seen in consultation by Dr. Pires for evaluation of gross hematuria. This was first detected 2 weeks ago and lasted a few days.  Former smoker.     The patient denies any further gross hematuria.  Ms. Castellanos voids without difficulty, and reports nocturia of 0-1.  She currently denies any dysuria, pyuria, hesitancy, intermittency, feelings of incomplete emptying, or any recent history of urinary tract infections. Hx chronic back pain on narcotics.    CT notes two small ureteral stones.     Hematuria Risk Factors:  Age >40: Yes     Smoking history: Former smoker  Occupational exposure to chemicals or dyes (ie, benzenes, aromatic amines): no  History of urologic disorder or disease: no  History of irritative voiding symptoms: no  History of urinary tract infection: no  Analgesic abuse: no  History of pelvic irradiation: no    Past Medical History:   Diagnosis Date     Asthma      Atrial fibrillation (H)     had EP ablation 8/11/2017, pt reports being \"afib free\" now     CAD (coronary artery disease)      Depression      Esophageal reflux      Hypertension      LBBB (left bundle branch block)      Liver lesion     stable, seen on multiple CT scans     Lumbosacral spondylosis      Nonspecific reaction to tuberculin skin test without active tuberculosis(795.51)      Obesity      TALI (obstructive sleep apnea)      Osteoarthritis      Prolonged QT interval      Pure hypercholesterolemia      Spinal stenosis of lumbar region      Past Surgical History:   Procedure Laterality Date     CHOLECYSTECTOMY       EP ABLATION / EP STUDIES  08/11/2017    for hx a fib     ESOPHAGOSCOPY, GASTROSCOPY, DUODENOSCOPY (EGD), COMBINED  7/2014    reactive gastropathy, H. Pylori negative ([facility])     EXCISE VULVA WIDE LOCAL N/A 12/1/2017    Procedure: EXCISE VULVA WIDE LOCAL;  Wide Local Excision of Vulvar Lesion   ;  Surgeon: Nazario Tirado " MD [place];  Location:  OR     Current Outpatient Prescriptions   Medication Sig Dispense Refill     albuterol (PROAIR HFA/PROVENTIL HFA/VENTOLIN HFA) 108 (90 BASE) MCG/ACT Inhaler Inhale 2 puffs into the lungs       alum & mag hydroxide-simethicone (MYLANTA/MAALOX) 200-200-20 MG/5ML SUSP Take 30 mLs by mouth every 4 hours as needed 300 mL 0     Apixaban (ELIQUIS PO) Take 5 mg by mouth 2 times daily       Atorvastatin Calcium (LIPITOR PO) Take 10 mg by mouth daily        BuPROPion HCl (WELLBUTRIN PO)        clobetasol (TEMOVATE) 0.05 % ointment Apply topically 3 times daily       diltiazem (CARTIA XT) 120 MG 24 hr capsule Take 120 mg by mouth daily       fentaNYL (DURAGESIC) 25 mcg/hr patch 72 hr Place 1 patch onto the skin every 72 hours       GABAPENTIN PO Take 900 mg by mouth 3 times daily        HYDROCHLOROTHIAZIDE PO Take 12.5 mg by mouth daily        HYDROcodone-acetaminophen (NORCO) 7.5-325 MG per tablet Take 1-2 tablets by mouth 2 times daily as needed for moderate to severe pain        Losartan Potassium (COZAAR PO) Take 25 mg by mouth daily       Methimazole (TAPAZOLE PO) Take 5 mg by mouth daily        METHOCARBAMOL PO Take 750 mg by mouth 6 times daily        Omeprazole (PRILOSEC PO) Take 20 mg by mouth every morning        Ondansetron (ZOFRAN ODT PO) Take 8 mg by mouth every 8 hours as needed for nausea       polyethylene glycol (MIRALAX) packet Take 17 g by mouth 2 times daily (Patient taking differently: Take 1 packet by mouth daily ) 30 packet 0     promethazine (PHENERGAN) 25 MG tablet Take 1 tablet (25 mg) by mouth every 6 hours as needed for nausea 15 tablet 0     RaNITidine HCl (ZANTAC PO) Take 150 mg by mouth       SIMVASTATIN PO Take  by mouth.         Sotalol HCl (BETAPACE PO) Take 40 mg by mouth 2 times daily        Allergies   Allergen Reactions     Metoprolol Shortness Of Breath     2 hours after tasking for the first time SOB, Pt evaluated and pt WDL       Augmentin Nausea     Cephalexin   "    Codeine Sulfate      \"i dont't know, nothing, maybe sick to my stomach\"     Cymbalta      agitated     Lisinopril Cough     Omnicef [Cefdinir] Diarrhea     Percocet [Oxycodone-Acetaminophen]      nauseated     FAMILY HISTORY: There is no reported history of genitourinary carcinoma.  There is no history of urolithiasis.      SOCIAL HISTORY: The patient does not smoke cigarettes. Denies EtOH and illicit drug abuse.      ROS: A comprehensive 14 point ROS was obtained and negative except for that outlined above in the HPI.    PHYSICAL EXAM:   RR: 16, HR 92  GENERAL: Well groomed, well developed, well nourished female in NAD.  HEENT: AT, NC, EOMI bilaterally.  SKIN: Warm to touch, dry.  No visible rashes or lesions on examined areas.  RESP: No increased respiratory effort.  MS: Full ROM in extremities.  PELVIC: Deferred for now.   NEURO: Alert and oriented x 3.  PSYCH: Normal mood and affect, pleasant and agreeable during interview and exam.      Admission on 03/28/2018, Discharged on 03/28/2018   Component Date Value Ref Range Status     WBC 03/28/2018 10.9  4.0 - 11.0 10e9/L Final     RBC Count 03/28/2018 4.60  3.8 - 5.2 10e12/L Final     Hemoglobin 03/28/2018 14.2  11.7 - 15.7 g/dL Final     Hematocrit 03/28/2018 44.0  35.0 - 47.0 % Final     MCV 03/28/2018 96  78 - 100 fl Final     MCH 03/28/2018 30.9  26.5 - 33.0 pg Final     MCHC 03/28/2018 32.3  31.5 - 36.5 g/dL Final     RDW 03/28/2018 13.1  10.0 - 15.0 % Final     Platelet Count 03/28/2018 361  150 - 450 10e9/L Final     Diff Method 03/28/2018 Automated Method   Final     % Neutrophils 03/28/2018 66.6  % Final     % Lymphocytes 03/28/2018 22.2  % Final     % Monocytes 03/28/2018 7.8  % Final     % Eosinophils 03/28/2018 2.4  % Final     % Basophils 03/28/2018 0.5  % Final     % Immature Granulocytes 03/28/2018 0.5  % Final     Nucleated RBCs 03/28/2018 0  0 /100 Final     Absolute Neutrophil 03/28/2018 7.3  1.6 - 8.3 10e9/L Final     Absolute Lymphocytes " 03/28/2018 2.4  0.8 - 5.3 10e9/L Final     Absolute Monocytes 03/28/2018 0.9  0.0 - 1.3 10e9/L Final     Absolute Eosinophils 03/28/2018 0.3  0.0 - 0.7 10e9/L Final     Absolute Basophils 03/28/2018 0.1  0.0 - 0.2 10e9/L Final     Abs Immature Granulocytes 03/28/2018 0.1  0 - 0.4 10e9/L Final     Absolute Nucleated RBC 03/28/2018 0.0   Final     Sodium 03/28/2018 140  133 - 144 mmol/L Final     Potassium 03/28/2018 3.8  3.4 - 5.3 mmol/L Final     Chloride 03/28/2018 105  94 - 109 mmol/L Final     Carbon Dioxide 03/28/2018 26  20 - 32 mmol/L Final     Anion Gap 03/28/2018 9  3 - 14 mmol/L Final     Glucose 03/28/2018 107* 70 - 99 mg/dL Final     Urea Nitrogen 03/28/2018 16  7 - 30 mg/dL Final     Creatinine 03/28/2018 0.70  0.52 - 1.04 mg/dL Final     GFR Estimate 03/28/2018 81  >60 mL/min/1.7m2 Final    Non  GFR Calc     GFR Estimate If Black 03/28/2018 >90  >60 mL/min/1.7m2 Final    African American GFR Calc     Calcium 03/28/2018 9.1  8.5 - 10.1 mg/dL Final     Bilirubin Total 03/28/2018 0.4  0.2 - 1.3 mg/dL Final     Albumin 03/28/2018 3.4  3.4 - 5.0 g/dL Final     Protein Total 03/28/2018 7.3  6.8 - 8.8 g/dL Final     Alkaline Phosphatase 03/28/2018 120  40 - 150 U/L Final     ALT 03/28/2018 37  0 - 50 U/L Final     AST 03/28/2018 33  0 - 45 U/L Final     ABO 03/28/2018 O   Final     RH(D) 03/28/2018 Pos   Final     Antibody Screen 03/28/2018 Neg   Final     Test Valid Only At 03/28/2018 [facility]      Final     Specimen Expires 03/28/2018 03/31/2018   Final     Occult Blood 03/28/2018 Positive* NEG^Negative Final       IMAGING: CT A/P w/ and w/o at [facility] 5/23/18  IMPRESSION:   1.  Nonobstructing right and left renal collecting system stones.  Two nonobstructing right ureteral stones.  These measure up to 0.3 cm in size in the proximal and mid right ureter.  No left ureteral calculi or bladder calculi.  2.  Prior cholecystectomy changes.      ASSESSMENT and PLAN:    77 year old female  with gross  Hematuria, ureteral stones, former smoker.  The differential diagnosis at this point includes stone disease, infection, vaginal contaminant, urothelial malignancy, renal disorder versus another yet unknown diagnosis.     At this time, recommend proceeding with comprehensive hematuria evaluation to include:  - Urinalysis, Micro   - Urine cytology to look for cells concerning for malignancy.  - Cystoscopy with the first available urologist to evaluate the interior of the bladder. Follow up for hematuria as recommended by urologist performing cystoscopic evaluation.  -Not straining her urine, strainer provided. KUB on return visit.     Thank you for allowing me to participate in Ms. Castellanos's care. I will keep you updated of her progress, but please do not hesitate to contact me with any questions.     Patricia Pena PA-C  [facility]  30 minutes were spent with the patient today, > 50% in counseling and coordination of care of hematuria.

## 2018-05-31 LAB — COPATH REPORT: NORMAL

## 2018-06-25 ENCOUNTER — OFFICE VISIT (OUTPATIENT)
Dept: UROLOGY | Facility: CLINIC | Age: 78
End: 2018-06-25
Payer: COMMERCIAL

## 2018-06-25 ENCOUNTER — HOSPITAL ENCOUNTER (OUTPATIENT)
Dept: GENERAL RADIOLOGY | Facility: CLINIC | Age: 78
Discharge: HOME | End: 2018-06-25
Attending: PHYSICIAN ASSISTANT | Admitting: PHYSICIAN ASSISTANT
Payer: MEDICARE

## 2018-06-25 VITALS — BODY MASS INDEX: 32.78 KG/M2 | HEIGHT: 63 IN | WEIGHT: 185 LBS | HEART RATE: 86 BPM | OXYGEN SATURATION: 96 %

## 2018-06-25 DIAGNOSIS — N20.0 CALCULUS OF KIDNEY: ICD-10-CM

## 2018-06-25 DIAGNOSIS — N20.1 CALCULUS OF URETER: ICD-10-CM

## 2018-06-25 DIAGNOSIS — R31.0 GROSS HEMATURIA: Primary | ICD-10-CM

## 2018-06-25 LAB
ALBUMIN UR-MCNC: NEGATIVE MG/DL
APPEARANCE UR: CLEAR
BILIRUB UR QL STRIP: NEGATIVE
COLOR UR AUTO: YELLOW
GLUCOSE UR STRIP-MCNC: NEGATIVE MG/DL
HGB UR QL STRIP: ABNORMAL
KETONES UR STRIP-MCNC: NEGATIVE MG/DL
LEUKOCYTE ESTERASE UR QL STRIP: NEGATIVE
NITRATE UR QL: NEGATIVE
PH UR STRIP: 5.5 PH (ref 5–7)
SOURCE: ABNORMAL
SP GR UR STRIP: 1.02 (ref 1–1.03)
UROBILINOGEN UR STRIP-ACNC: 0.2 EU/DL (ref 0.2–1)

## 2018-06-25 PROCEDURE — 74019 RADEX ABDOMEN 2 VIEWS: CPT

## 2018-06-25 PROCEDURE — 81003 URINALYSIS AUTO W/O SCOPE: CPT | Mod: QW | Performed by: UROLOGY

## 2018-06-25 PROCEDURE — 99213 OFFICE O/P EST LOW 20 MIN: CPT | Performed by: UROLOGY

## 2018-06-25 ASSESSMENT — PAIN SCALES - GENERAL: PAINLEVEL: SEVERE PAIN (7)

## 2018-06-25 NOTE — MR AVS SNAPSHOT
After Visit Summary   6/25/2018    Madhavi Castellanos    MRN: [id number]           Patient Information     Date Of Birth          1940        Visit Information        Provider Department      6/25/2018 1:30 PM Zoltan Marcelo MD [facility] [place]        Today's Diagnoses     Gross hematuria    -  1    Calculus of ureter           Follow-ups after your visit        Follow-up notes from your care team     Return for 4-6 weeks CT scan same day, see me for UA.      Your next 10 appointments already scheduled     Jul 27, 2018  2:00 PM CDT   CT ABDOMEN PELVIS W/O CONTRAST with [id number] ([facility])    [address]  [place] [address] [phone]           Please bring any scans or X-rays taken at other hospitals, if similar tests were done. Also bring a list of your medicines, including vitamins, minerals and over-the-counter drugs. It is safest to leave personal items at home.  Be sure to tell your doctor:   If you have any allergies.   If there s any chance you are pregnant.   If you are breastfeeding.  How to prepare:   Do not eat or drink for 2 hours before your exam. If you need to take medicine, you may take it with small sips of water. (We may ask you to take liquid medicine as well.)   Please wear loose clothing, such as a sweat suit or jogging clothes. Avoid snaps, zippers and other metal. We may ask you to undress and put on a hospital gown.  Please arrive 30 minutes early for your CT. Once in the department you might be asked to drink water 15-20 minutes prior to your exam.  If indicated you may be asked to drink an oral contrast in advance of your CT.  If this is the case, the imaging team will let you know or be in contact with you prior to your appointment  Patients over 70 or patients with diabetes or kidney problems:   If you haven t had a blood test  (creatinine test) within the last 30 days, the Cardiologist/Radiologist may require you to get this test prior to your exam.  If you have diabetes:   Continue to take your metformin medication on the day of your exam  If you have any questions, please call the Imaging Department where you will have your exam.            Jul 27, 2018  2:45 PM CDT   Return Visit with Zoltan Marcelo MD   [facility] [place] ([facility])    [address]  [address]  [place] [address] [phone]              Future tests that were ordered for you today     Open Future Orders        Priority Expected Expires Ordered    CT Abdomen Pelvis w/o Contrast Routine  6/25/2019 6/25/2018            Who to contact     If you have questions or need follow up information about today's clinic visit or your schedule please contact [facility] [place] directly at [phone].  Normal or non-critical lab and imaging results will be communicated to you by KeTechhart, letter or phone within 4 business days after the clinic has received the results. If you do not hear from us within 7 days, please contact the clinic through KeTechhart or phone. If you have a critical or abnormal lab result, we will notify you by phone as soon as possible.  Submit refill requests through Kaggle or call your pharmacy and they will forward the refill request to us. Please allow 3 business days for your refill to be completed.          Additional Information About Your Visit        Kaggle Information     K[employer]gle gives you secure access to your electronic health record. If you see a primary care provider, you can also send messages to your care team and make appointments. If you have questions, please call your primary care clinic.  If you do not have a primary care provider, please call [phone] and they will assist you.        Care EveryWhere ID     This is  "your Care EveryWhere ID. This could be used by other organizations to access your [place] medical records  [id number]        Your Vitals Were     Pulse Height Pulse Oximetry BMI (Body Mass Index)          86 1.588 m (5' 2.5\") 96% 33.3 kg/m2         Blood Pressure from Last 3 Encounters:   03/28/18 146/88   01/09/18 134/87   01/05/18 (!) 179/106    Weight from Last 3 Encounters:   06/25/18 83.9 kg (185 lb)   01/09/18 83.9 kg (185 lb)   12/01/17 86.2 kg (190 lb)              We Performed the Following     UA without Microscopic          Today's Medication Changes          These changes are accurate as of 6/25/18  1:43 PM.  If you have any questions, ask your nurse or doctor.               These medicines have changed or have updated prescriptions.        Dose/Directions    polyethylene glycol Packet   Commonly known as:  MIRALAX   This may have changed:  when to take this        Dose:  1 packet   Take 17 g by mouth 2 times daily   Quantity:  30 packet   Refills:  0                Primary Care Provider Office Phone # Fax #    Cecy Edmondson [phone]       KISHOR COLBERT [address]  [place] [address]        Equal Access to Services     CANDY PISANO AH: Hadii lili arevaloo Sobeverly, waaxda luqadaha, qaybta kaalmada adeegyada, jerilyn alvarado. So [facility] [phone].    ATENCIÓN: Si habla español, tiene a starks disposición servicios gratuitos de asistencia lingüística. Llame al [phone].    We comply with applicable federal civil rights laws and [place] laws. We do not discriminate on the basis of race, color, national origin, age, disability, sex, sexual orientation, or gender identity.            Thank you!     Thank you for choosing [facility] [place]  for your care. Our goal is always to provide you with excellent care. Hearing back from our patients is one way we can continue to improve our services. Please take a " few minutes to complete the written survey that you may receive in the mail after your visit with us. Thank you!             Your Updated Medication List - Protect others around you: Learn how to safely use, store and throw away your medicines at www.disposemymeds.org.          This list is accurate as of 6/25/18  1:43 PM.  Always use your most recent med list.                   Brand Name Dispense Instructions for use Diagnosis    albuterol 108 (90 Base) MCG/ACT Inhaler    PROAIR HFA/PROVENTIL HFA/VENTOLIN HFA     Inhale 2 puffs into the lungs        alum & mag hydroxide-simethicone 200-200-20 MG/5ML Susp suspension    MYLANTA/MAALOX    300 mL    Take 30 mLs by mouth every 4 hours as needed        BETAPACE PO      Take 40 mg by mouth 2 times daily        CARTIA  MG 24 hr capsule   Generic drug:  diltiazem      Take 120 mg by mouth daily        clobetasol 0.05 % ointment    TEMOVATE     Apply topically 3 times daily        COZAAR PO      Take 25 mg by mouth daily        ELIQUIS PO      Take 5 mg by mouth 2 times daily        fentaNYL 25 mcg/hr 72 hr patch    DURAGESIC     Place 1 patch onto the skin every 72 hours        GABAPENTIN PO      Take 900 mg by mouth 3 times daily        HYDROCHLOROTHIAZIDE PO      Take 12.5 mg by mouth daily        HYDROcodone-acetaminophen 7.5-325 MG per tablet    NORCO     Take 1-2 tablets by mouth 2 times daily as needed for moderate to severe pain        LIPITOR PO      Take 10 mg by mouth daily        METHOCARBAMOL PO      Take 750 mg by mouth 6 times daily        polyethylene glycol Packet    MIRALAX    30 packet    Take 17 g by mouth 2 times daily        PRILOSEC PO      Take 20 mg by mouth every morning        promethazine 25 MG tablet    PHENERGAN    15 tablet    Take 1 tablet (25 mg) by mouth every 6 hours as needed for nausea        SIMVASTATIN PO      Take  by mouth.        TAPAZOLE PO      Take 5 mg by mouth daily        WELLBUTRIN PO           ZANTAC PO      Take 150  mg by mouth        ZOFRAN ODT PO      Take 8 mg by mouth every 8 hours as needed for nausea

## 2018-06-25 NOTE — PROGRESS NOTES
Office Visit Note  [facility]  [phone]    Jun 25, 2018    [unfilled]    1940    UROLOGIC DIAGNOSES:    Hematuria, kidney and ureteral stones    CURRENT INTERVENTIONS:        History:    This is a 78-year-old woman who had a CT scan performed at [facility] in May that demonstrated 2 stones in the right ureter. I reviewed the images and she had a small 3 mm stone in the proximal right ureter and a 2 mm stone in the mid right ureter. Each kidney had one tiny submillimeter stone as well. She had agreed to a pain when she was first diagnosed with the stones but now her pain is resolved. She has had no recurrence of her pain since her CT scan in May. She also has had no recurrence of her gross hematuria but does appear to have persistent microscopic hematuria.      Imaging:      Labs:      MEDICATIONS:    Current Outpatient Prescriptions:      albuterol (PROAIR HFA/PROVENTIL HFA/VENTOLIN HFA) 108 (90 BASE) MCG/ACT Inhaler, Inhale 2 puffs into the lungs, Disp: , Rfl:      alum & mag hydroxide-simethicone (MYLANTA/MAALOX) 200-200-20 MG/5ML SUSP, Take 30 mLs by mouth every 4 hours as needed, Disp: 300 mL, Rfl: 0     Atorvastatin Calcium (LIPITOR PO), Take 10 mg by mouth daily , Disp: , Rfl:      fentaNYL (DURAGESIC) 25 mcg/hr patch 72 hr, Place 1 patch onto the skin every 72 hours, Disp: , Rfl:      Apixaban (ELIQUIS PO), Take 5 mg by mouth 2 times daily, Disp: , Rfl:      BuPROPion HCl (WELLBUTRIN PO), , Disp: , Rfl:      clobetasol (TEMOVATE) 0.05 % ointment, Apply topically 3 times daily, Disp: , Rfl:      diltiazem (CARTIA XT) 120 MG 24 hr capsule, Take 120 mg by mouth daily, Disp: , Rfl:      GABAPENTIN PO, Take 900 mg by mouth 3 times daily , Disp: , Rfl:      HYDROCHLOROTHIAZIDE PO, Take 12.5 mg by mouth daily , Disp: , Rfl:      HYDROcodone-acetaminophen (NORCO) 7.5-325 MG per tablet, Take 1-2 tablets by mouth 2 times daily as needed for moderate to severe pain , Disp: , Rfl:       "Losartan Potassium (COZAAR PO), Take 25 mg by mouth daily, Disp: , Rfl:      Methimazole (TAPAZOLE PO), Take 5 mg by mouth daily , Disp: , Rfl:      METHOCARBAMOL PO, Take 750 mg by mouth 6 times daily , Disp: , Rfl:      Omeprazole (PRILOSEC PO), Take 20 mg by mouth every morning , Disp: , Rfl:      Ondansetron (ZOFRAN ODT PO), Take 8 mg by mouth every 8 hours as needed for nausea, Disp: , Rfl:      polyethylene glycol (MIRALAX) packet, Take 17 g by mouth 2 times daily (Patient taking differently: Take 1 packet by mouth daily ), Disp: 30 packet, Rfl: 0     promethazine (PHENERGAN) 25 MG tablet, Take 1 tablet (25 mg) by mouth every 6 hours as needed for nausea, Disp: 15 tablet, Rfl: 0     RaNITidine HCl (ZANTAC PO), Take 150 mg by mouth, Disp: , Rfl:      SIMVASTATIN PO, Take  by mouth.  , Disp: , Rfl:      Sotalol HCl (BETAPACE PO), Take 40 mg by mouth 2 times daily , Disp: , Rfl:     ALLERGIES:     Allergies   Allergen Reactions     Metoprolol Shortness Of Breath     2 hours after tasking for the first time SOB, Pt evaluated and pt WDL       Augmentin Nausea     Cephalexin      Codeine Sulfate      \"i dont't know, nothing, maybe sick to my stomach\"     Cymbalta      agitated     Lisinopril Cough     Omnicef [Cefdinir] Diarrhea     Percocet [Oxycodone-Acetaminophen]      nauseated       REVIEW OF SYSTEMS: Ten point review of systems without change as outlined in HPI    SURGICAL HISTORY:    Past Surgical History:   Procedure Laterality Date     CHOLECYSTECTOMY       EP ABLATION / EP STUDIES  08/11/2017    for hx a fib     ESOPHAGOSCOPY, GASTROSCOPY, DUODENOSCOPY (EGD), COMBINED  7/2014    reactive gastropathy, H. Pylori negative ([facility])     EXCISE VULVA WIDE LOCAL N/A 12/1/2017    Procedure: EXCISE VULVA WIDE LOCAL;  Wide Local Excision of Vulvar Lesion   ;  Surgeon: Nazario Tirado MD;  Location:  OR         PHYSICAL EXAM:    Pulse 86  Ht 1.588 m (5' 2.5\")  Wt 83.9 kg (185 lb)  SpO2 96%  BMI 33.3 " kg/m2    HEENT: Normocephalic and atraumatic    Cardiac: Not done    Back/Flank: Not done    CNS/PNS: Alert and oriented    Respiratory: Normal nonlabored breathing    Abdomen: Soft nontender and nondistended    Peripheral Vascular: No peripheral edema    Mental Status: Normal    External Genitalia: Not done    Bladder: Not done    Urethra: Not done     Vagina: Not done    Cystoscopy:  Not Done      Urinalysis:  UA RESULTS:  Recent Labs   Lab Test  05/29/18   1449  01/09/18   0832   COLOR  Yellow  Yellow   APPEARANCE  Clear  Slightly Cloudy   URINEGLC  Negative  Negative   URINEBILI  Small*  Negative   URINEKETONE  Negative  5*   SG  >1.030  1.015   UBLD  Large*  Moderate*   URINEPH  5.0  6.0   PROTEIN  30*  Negative   UROBILINOGEN  0.2   --    NITRITE  Negative  Negative   LEUKEST  Negative  Trace*   RBCU   --   82*   WBCU   --   4*         IMPRESSION:    Right ureteral stones, hematuria    PLAN:    She has 2 right-sided ureteral stones that may not have yet passed. I do not see anything on her KUB today, but the stones are too small that I would not expect to be able to see them on a KUB. We discussed her options. We discussed treating the stones with cystoscopy and ureteroscopy versus continued trial of passage. She would like to continue to try to pass the stones. I will see her back in 4-6 weeks for another CT scan. We need to make certain she passes her stones and that her hematuria resolves.    Total Time:  15 min  Time in Consultation: 15 min      Zoltan Marcelo M.D.

## 2018-06-25 NOTE — LETTER
6/25/2018       RE: Madhavi Castellanos  [address] [facility] [address]     Dear Colleague,    Thank you for referring your patient, Madhavi Castellanos, to the [facility]OLOGY [facility] [place] at [facility]. Please see a copy of my visit note below.    Office Visit Note  [facility]  [phone]    Jun 25, 2018    [unfilled]    1940    UROLOGIC DIAGNOSES:    Hematuria, kidney and ureteral stones    CURRENT INTERVENTIONS:        History:    This is a 78-year-old woman who had a CT scan performed at [facility] in May that demonstrated 2 stones in the right ureter. I reviewed the images and she had a small 3 mm stone in the proximal right ureter and a 2 mm stone in the mid right ureter. Each kidney had one tiny submillimeter stone as well. She had agreed to a pain when she was first diagnosed with the stones but now her pain is resolved. She has had no recurrence of her pain since her CT scan in May. She also has had no recurrence of her gross hematuria but does appear to have persistent microscopic hematuria.      Imaging:      Labs:      MEDICATIONS:    Current Outpatient Prescriptions:      albuterol (PROAIR HFA/PROVENTIL HFA/VENTOLIN HFA) 108 (90 BASE) MCG/ACT Inhaler, Inhale 2 puffs into the lungs, Disp: , Rfl:      alum & mag hydroxide-simethicone (MYLANTA/MAALOX) 200-200-20 MG/5ML SUSP, Take 30 mLs by mouth every 4 hours as needed, Disp: 300 mL, Rfl: 0     Atorvastatin Calcium (LIPITOR PO), Take 10 mg by mouth daily , Disp: , Rfl:      fentaNYL (DURAGESIC) 25 mcg/hr patch 72 hr, Place 1 patch onto the skin every 72 hours, Disp: , Rfl:      Apixaban (ELIQUIS PO), Take 5 mg by mouth 2 times daily, Disp: , Rfl:      BuPROPion HCl (WELLBUTRIN PO), , Disp: , Rfl:      clobetasol (TEMOVATE) 0.05 % ointment, Apply topically 3 times daily, Disp: , Rfl:      diltiazem (CARTIA XT) 120 MG 24 hr capsule, Take 120 mg by mouth daily,  "Disp: , Rfl:      GABAPENTIN PO, Take 900 mg by mouth 3 times daily , Disp: , Rfl:      HYDROCHLOROTHIAZIDE PO, Take 12.5 mg by mouth daily , Disp: , Rfl:      HYDROcodone-acetaminophen (NORCO) 7.5-325 MG per tablet, Take 1-2 tablets by mouth 2 times daily as needed for moderate to severe pain , Disp: , Rfl:      Losartan Potassium (COZAAR PO), Take 25 mg by mouth daily, Disp: , Rfl:      Methimazole (TAPAZOLE PO), Take 5 mg by mouth daily , Disp: , Rfl:      METHOCARBAMOL PO, Take 750 mg by mouth 6 times daily , Disp: , Rfl:      Omeprazole (PRILOSEC PO), Take 20 mg by mouth every morning , Disp: , Rfl:      Ondansetron (ZOFRAN ODT PO), Take 8 mg by mouth every 8 hours as needed for nausea, Disp: , Rfl:      polyethylene glycol (MIRALAX) packet, Take 17 g by mouth 2 times daily (Patient taking differently: Take 1 packet by mouth daily ), Disp: 30 packet, Rfl: 0     promethazine (PHENERGAN) 25 MG tablet, Take 1 tablet (25 mg) by mouth every 6 hours as needed for nausea, Disp: 15 tablet, Rfl: 0     RaNITidine HCl (ZANTAC PO), Take 150 mg by mouth, Disp: , Rfl:      SIMVASTATIN PO, Take  by mouth.  , Disp: , Rfl:      Sotalol HCl (BETAPACE PO), Take 40 mg by mouth 2 times daily , Disp: , Rfl:     ALLERGIES:     Allergies   Allergen Reactions     Metoprolol Shortness Of Breath     2 hours after tasking for the first time SOB, Pt evaluated and pt WDL       Augmentin Nausea     Cephalexin      Codeine Sulfate      \"i dont't know, nothing, maybe sick to my stomach\"     Cymbalta      agitated     Lisinopril Cough     Omnicef [Cefdinir] Diarrhea     Percocet [Oxycodone-Acetaminophen]      nauseated       REVIEW OF SYSTEMS: Ten point review of systems without change as outlined in HPI    SURGICAL HISTORY:    Past Surgical History:   Procedure Laterality Date     CHOLECYSTECTOMY       EP ABLATION / EP STUDIES  08/11/2017    for hx a fib     ESOPHAGOSCOPY, GASTROSCOPY, DUODENOSCOPY (EGD), COMBINED  7/2014    reactive " "gastropathy, H. Pylori negative ([facility])     EXCISE VULVA WIDE LOCAL N/A 12/1/2017    Procedure: EXCISE VULVA WIDE LOCAL;  Wide Local Excision of Vulvar Lesion   ;  Surgeon: Nazario Tirado MD;  Location:  OR         PHYSICAL EXAM:    Pulse 86  Ht 1.588 m (5' 2.5\")  Wt 83.9 kg (185 lb)  SpO2 96%  BMI 33.3 kg/m2    HEENT: Normocephalic and atraumatic    Cardiac: Not done    Back/Flank: Not done    CNS/PNS: Alert and oriented    Respiratory: Normal nonlabored breathing    Abdomen: Soft nontender and nondistended    Peripheral Vascular: No peripheral edema    Mental Status: Normal    External Genitalia: Not done    Bladder: Not done    Urethra: Not done     Vagina: Not done    Cystoscopy:  Not Done      Urinalysis:  UA RESULTS:  Recent Labs   Lab Test  05/29/18   1449  01/09/18   0832   COLOR  Yellow  Yellow   APPEARANCE  Clear  Slightly Cloudy   URINEGLC  Negative  Negative   URINEBILI  Small*  Negative   URINEKETONE  Negative  5*   SG  >1.030  1.015   UBLD  Large*  Moderate*   URINEPH  5.0  6.0   PROTEIN  30*  Negative   UROBILINOGEN  0.2   --    NITRITE  Negative  Negative   LEUKEST  Negative  Trace*   RBCU   --   82*   WBCU   --   4*         IMPRESSION:    Right ureteral stones, hematuria    PLAN:    She has 2 right-sided ureteral stones that may not have yet passed. I do not see anything on her KUB today, but the stones are too small that I would not expect to be able to see them on a KUB. We discussed her options. We discussed treating the stones with cystoscopy and ureteroscopy versus continued trial of passage. She would like to continue to try to pass the stones. I will see her back in 4-6 weeks for another CT scan. We need to make certain she passes her stones and that her hematuria resolves.    Total Time:  15 min  Time in Consultation: 15 min      Zoltan Marcelo M.D.        "

## 2018-07-27 ENCOUNTER — OFFICE VISIT (OUTPATIENT)
Dept: UROLOGY | Facility: CLINIC | Age: 78
End: 2018-07-27
Payer: COMMERCIAL

## 2018-07-27 ENCOUNTER — HOSPITAL ENCOUNTER (OUTPATIENT)
Dept: CT IMAGING | Facility: CLINIC | Age: 78
Discharge: HOME | End: 2018-07-27
Attending: UROLOGY | Admitting: UROLOGY
Payer: MEDICARE

## 2018-07-27 VITALS — HEIGHT: 63 IN | WEIGHT: 185 LBS | OXYGEN SATURATION: 97 % | HEART RATE: 76 BPM | BODY MASS INDEX: 32.78 KG/M2

## 2018-07-27 DIAGNOSIS — R31.0 GROSS HEMATURIA: Primary | ICD-10-CM

## 2018-07-27 DIAGNOSIS — N20.1 CALCULUS OF URETER: ICD-10-CM

## 2018-07-27 LAB
ALBUMIN UR-MCNC: NEGATIVE MG/DL
APPEARANCE UR: CLEAR
BILIRUB UR QL STRIP: NEGATIVE
COLOR UR AUTO: YELLOW
GLUCOSE UR STRIP-MCNC: NEGATIVE MG/DL
HGB UR QL STRIP: NEGATIVE
KETONES UR STRIP-MCNC: NEGATIVE MG/DL
LEUKOCYTE ESTERASE UR QL STRIP: ABNORMAL
NITRATE UR QL: NEGATIVE
PH UR STRIP: 5.5 PH (ref 5–7)
SOURCE: ABNORMAL
SP GR UR STRIP: 1.02 (ref 1–1.03)
UROBILINOGEN UR STRIP-ACNC: 0.2 EU/DL (ref 0.2–1)

## 2018-07-27 PROCEDURE — 99213 OFFICE O/P EST LOW 20 MIN: CPT | Performed by: UROLOGY

## 2018-07-27 PROCEDURE — 81003 URINALYSIS AUTO W/O SCOPE: CPT | Mod: QW | Performed by: UROLOGY

## 2018-07-27 PROCEDURE — 74176 CT ABD & PELVIS W/O CONTRAST: CPT

## 2018-07-27 ASSESSMENT — PAIN SCALES - GENERAL: PAINLEVEL: EXTREME PAIN (8)

## 2018-07-27 NOTE — MR AVS SNAPSHOT
"              After Visit Summary   7/27/2018    Madhavi Castellanos    MRN: [id number]           Patient Information     Date Of Birth          1940        Visit Information        Provider Department      7/27/2018 12:30 PM Zoltan Marcelo MD [facility] U[facility]        Today's Diagnoses     Gross hematuria    -  1    Calculus of ureter           Follow-ups after your visit        Follow-up notes from your care team     Return if symptoms worsen or fail to improve.      Who to contact     If you have questions or need follow up information about today's clinic visit or your schedule please contact [facility] [place] directly at [phone].  Normal or non-critical lab and imaging results will be communicated to you by MyChart, letter or phone within 4 business days after the clinic has received the results. If you do not hear from us within 7 days, please contact the clinic through [employer]br[employer] or phone. If you have a critical or abnormal lab result, we will notify you by phone as soon as possible.  Submit refill requests through [employer] or call your pharmacy and they will forward the refill request to us. Please allow 3 business days for your refill to be completed.          Additional Information About Your Visit        MyChart Information     [employer]io gives you secure access to your electronic health record. If you see a primary care provider, you can also send messages to your care team and make appointments. If you have questions, please call your primary care clinic.  If you do not have a primary care provider, please call [phone] and they will assist you.        Care EveryWhere ID     This is your Care EveryWhere ID. This could be used by other organizations to access your [facility] medical records  [id number]        Your Vitals Were     Pulse Height Pulse Oximetry BMI (Body Mass Index)          76 1.6 m (5' 3\") 97% 32.77 " kg/m2         Blood Pressure from Last 3 Encounters:   03/28/18 146/88   01/09/18 134/87   01/05/18 (!) 179/106    Weight from Last 3 Encounters:   07/27/18 83.9 kg (185 lb)   06/25/18 83.9 kg (185 lb)   01/09/18 83.9 kg (185 lb)              We Performed the Following     UA without Microscopic          Today's Medication Changes          These changes are accurate as of 7/27/18  1:07 PM.  If you have any questions, ask your nurse or doctor.               These medicines have changed or have updated prescriptions.        Dose/Directions    polyethylene glycol Packet   Commonly known as:  MIRALAX   This may have changed:  when to take this        Dose:  1 packet   Take 17 g by mouth 2 times daily   Quantity:  30 packet   Refills:  0                Primary Care Provider Office Phone # Fax #    Cecy Edmondson [phone]       KISHOR COLBERT [address]  [place] [address]        Equal Access to Services     [facility]: Hadii lili ku hadasho Soomaali, waaxda luqadaha, qaybta kaalmada adeegyada, waxay vasquez fernandes . So [facility] [phone].    ATENCIÓN: Si habla español, tiene a starks disposición servicios gratuitos de asistencia lingüística. Llame al [phone].    We comply with applicable federal civil rights laws and [place] laws. We do not discriminate on the basis of race, color, national origin, age, disability, sex, sexual orientation, or gender identity.            Thank you!     Thank you for choosing [facility] [place]  for your care. Our goal is always to provide you with excellent care. Hearing back from our patients is one way we can continue to improve our services. Please take a few minutes to complete the written survey that you may receive in the mail after your visit with us. Thank you!             Your Updated Medication List - Protect others around you: Learn how to safely use, store and throw away your  medicines at www.disposemymeds.org.          This list is accurate as of 7/27/18  1:07 PM.  Always use your most recent med list.                   Brand Name Dispense Instructions for use Diagnosis    albuterol 108 (90 Base) MCG/ACT Inhaler    PROAIR HFA/PROVENTIL HFA/VENTOLIN HFA     Inhale 2 puffs into the lungs        alum & mag hydroxide-simethicone 200-200-20 MG/5ML Susp suspension    MYLANTA/MAALOX    300 mL    Take 30 mLs by mouth every 4 hours as needed        BETAPACE PO      Take 40 mg by mouth 2 times daily        CARTIA  MG 24 hr capsule   Generic drug:  diltiazem      Take 120 mg by mouth daily        clobetasol 0.05 % ointment    TEMOVATE     Apply topically 3 times daily        COZAAR PO      Take 25 mg by mouth daily        ELIQUIS PO      Take 5 mg by mouth 2 times daily        fentaNYL 25 mcg/hr 72 hr patch    DURAGESIC     Place 1 patch onto the skin every 72 hours        GABAPENTIN PO      Take 900 mg by mouth 3 times daily        HYDROCHLOROTHIAZIDE PO      Take 12.5 mg by mouth daily        HYDROcodone-acetaminophen 7.5-325 MG per tablet    NORCO     Take 1-2 tablets by mouth 2 times daily as needed for moderate to severe pain        LIPITOR PO      Take 10 mg by mouth daily        METHOCARBAMOL PO      Take 750 mg by mouth 6 times daily        polyethylene glycol Packet    MIRALAX    30 packet    Take 17 g by mouth 2 times daily        PRILOSEC PO      Take 20 mg by mouth every morning        promethazine 25 MG tablet    PHENERGAN    15 tablet    Take 1 tablet (25 mg) by mouth every 6 hours as needed for nausea        SIMVASTATIN PO      Take  by mouth.        TAPAZOLE PO      Take 5 mg by mouth daily        WELLBUTRIN PO           ZANTAC PO      Take 150 mg by mouth        ZOFRAN ODT PO      Take 8 mg by mouth every 8 hours as needed for nausea

## 2018-07-27 NOTE — NURSING NOTE
Pt having lower back pain and had injections yesterday.  Pt says she saw blood 2 times since she was here last.  MARILYN Armando CMA

## 2018-07-27 NOTE — LETTER
7/27/2018       RE: Madhavi Castellanos  [address] [facility] [address]     Dear Colleague,    Thank you for referring your patient, Madhavi Castellanos, to the [facility] [place] at [facility]. Please see a copy of my visit note below.    Office Visit Note  M [facility]  [phone]    Jul 27, 2018    [unfilled]    1940    UROLOGIC DIAGNOSES:    Ureteral stones and microscopic hematuria    CURRENT INTERVENTIONS:        History:    Madhavi returns to clinic today for follow-up. She reports no symptoms.      Imaging:  I reviewed her CT scan images from today. No stones identified.    Labs:      MEDICATIONS:    Current Outpatient Prescriptions:      albuterol (PROAIR HFA/PROVENTIL HFA/VENTOLIN HFA) 108 (90 BASE) MCG/ACT Inhaler, Inhale 2 puffs into the lungs, Disp: , Rfl:      alum & mag hydroxide-simethicone (MYLANTA/MAALOX) 200-200-20 MG/5ML SUSP, Take 30 mLs by mouth every 4 hours as needed, Disp: 300 mL, Rfl: 0     Apixaban (ELIQUIS PO), Take 5 mg by mouth 2 times daily, Disp: , Rfl:      Atorvastatin Calcium (LIPITOR PO), Take 10 mg by mouth daily , Disp: , Rfl:      BuPROPion HCl (WELLBUTRIN PO), , Disp: , Rfl:      clobetasol (TEMOVATE) 0.05 % ointment, Apply topically 3 times daily, Disp: , Rfl:      diltiazem (CARTIA XT) 120 MG 24 hr capsule, Take 120 mg by mouth daily, Disp: , Rfl:      fentaNYL (DURAGESIC) 25 mcg/hr patch 72 hr, Place 1 patch onto the skin every 72 hours, Disp: , Rfl:      GABAPENTIN PO, Take 900 mg by mouth 3 times daily , Disp: , Rfl:      HYDROCHLOROTHIAZIDE PO, Take 12.5 mg by mouth daily , Disp: , Rfl:      HYDROcodone-acetaminophen (NORCO) 7.5-325 MG per tablet, Take 1-2 tablets by mouth 2 times daily as needed for moderate to severe pain , Disp: , Rfl:      Losartan Potassium (COZAAR PO), Take 25 mg by mouth daily, Disp: , Rfl:      Methimazole (TAPAZOLE PO), Take 5 mg by mouth daily ,  "Disp: , Rfl:      METHOCARBAMOL PO, Take 750 mg by mouth 6 times daily , Disp: , Rfl:      Omeprazole (PRILOSEC PO), Take 20 mg by mouth every morning , Disp: , Rfl:      Ondansetron (ZOFRAN ODT PO), Take 8 mg by mouth every 8 hours as needed for nausea, Disp: , Rfl:      polyethylene glycol (MIRALAX) packet, Take 17 g by mouth 2 times daily (Patient taking differently: Take 1 packet by mouth daily ), Disp: 30 packet, Rfl: 0     promethazine (PHENERGAN) 25 MG tablet, Take 1 tablet (25 mg) by mouth every 6 hours as needed for nausea, Disp: 15 tablet, Rfl: 0     RaNITidine HCl (ZANTAC PO), Take 150 mg by mouth, Disp: , Rfl:      SIMVASTATIN PO, Take  by mouth.  , Disp: , Rfl:      Sotalol HCl (BETAPACE PO), Take 40 mg by mouth 2 times daily , Disp: , Rfl:     ALLERGIES:     Allergies   Allergen Reactions     Metoprolol Shortness Of Breath     2 hours after tasking for the first time SOB, Pt evaluated and pt WDL       Augmentin Nausea     Cephalexin      Codeine Sulfate      \"i dont't know, nothing, maybe sick to my stomach\"     Cymbalta      agitated     Lisinopril Cough     Omnicef [Cefdinir] Diarrhea     Percocet [Oxycodone-Acetaminophen]      nauseated       REVIEW OF SYSTEMS: Ten point review of systems without change as outlined in HPI    SURGICAL HISTORY:    Past Surgical History:   Procedure Laterality Date     CHOLECYSTECTOMY       EP ABLATION / EP STUDIES  08/11/2017    for hx a fib     ESOPHAGOSCOPY, GASTROSCOPY, DUODENOSCOPY (EGD), COMBINED  7/2014    reactive gastropathy, H. Pylori negative ([facility])     EXCISE VULVA WIDE LOCAL N/A 12/1/2017    Procedure: EXCISE VULVA WIDE LOCAL;  Wide Local Excision of Vulvar Lesion   ;  Surgeon: Nazario Tirado MD;  Location:  OR         PHYSICAL EXAM:    Pulse 76  Ht 1.6 m (5' 3\")  Wt 83.9 kg (185 lb)  SpO2 97%  BMI 32.77 kg/m2    HEENT: Normocephalic and atraumatic    Cardiac: Not done    Back/Flank: Not done    CNS/PNS: Alert and oriented    Respiratory: " Normal nonlabored breathing    Abdomen: Soft nontender and nondistended    Peripheral Vascular: No peripheral edema    Mental Status: Normal    External Genitalia: Not done    Bladder: Not done    Urethra: Not done     Vagina: Not done    Cystoscopy:  Not Done      Urinalysis:  UA RESULTS:  Recent Labs   Lab Test  06/25/18   1312   01/09/18   0832   COLOR  Yellow   < >  Yellow   APPEARANCE  Clear   < >  Slightly Cloudy   URINEGLC  Negative   < >  Negative   URINEBILI  Negative   < >  Negative   URINEKETONE  Negative   < >  5*   SG  1.025   < >  1.015   UBLD  Large*   < >  Moderate*   URINEPH  5.5   < >  6.0   PROTEIN  Negative   < >  Negative   UROBILINOGEN  0.2   < >   --    NITRITE  Negative   < >  Negative   LEUKEST  Negative   < >  Trace*   RBCU   --    --   82*   WBCU   --    --   4*    < > = values in this interval not displayed.         IMPRESSION:    Doing well, stones have passed and no hematuria    PLAN:    We went over her CT scan results. Her hematuria has resolved as well. We discussed prevention methods for future stones. She will follow-up with me as needed in the future.    Total Time:  15 minutes  Time in Consultation: 15 minutes      Zoltan Marcelo M.D.

## 2018-12-19 ENCOUNTER — OFFICE VISIT (OUTPATIENT)
Dept: UROLOGY | Facility: CLINIC | Age: 78
End: 2018-12-19
Payer: COMMERCIAL

## 2018-12-19 VITALS — HEART RATE: 88 BPM | OXYGEN SATURATION: 97 % | HEIGHT: 63 IN | WEIGHT: 185 LBS | BODY MASS INDEX: 32.78 KG/M2

## 2018-12-19 DIAGNOSIS — R31.0 GROSS HEMATURIA: Primary | ICD-10-CM

## 2018-12-19 DIAGNOSIS — Z87.442 HISTORY OF NEPHROLITHIASIS: ICD-10-CM

## 2018-12-19 LAB
ALBUMIN UR-MCNC: NEGATIVE MG/DL
APPEARANCE UR: CLEAR
BILIRUB UR QL STRIP: NEGATIVE
COLOR UR AUTO: YELLOW
GLUCOSE UR STRIP-MCNC: NEGATIVE MG/DL
HGB UR QL STRIP: ABNORMAL
KETONES UR STRIP-MCNC: NEGATIVE MG/DL
LEUKOCYTE ESTERASE UR QL STRIP: ABNORMAL
NITRATE UR QL: NEGATIVE
PH UR STRIP: 7 PH (ref 5–7)
SOURCE: ABNORMAL
SP GR UR STRIP: 1.02 (ref 1–1.03)
UROBILINOGEN UR STRIP-ACNC: 0.2 EU/DL (ref 0.2–1)

## 2018-12-19 PROCEDURE — 52000 CYSTOURETHROSCOPY: CPT | Performed by: UROLOGY

## 2018-12-19 PROCEDURE — 88112 CYTOPATH CELL ENHANCE TECH: CPT | Performed by: UROLOGY

## 2018-12-19 PROCEDURE — 81003 URINALYSIS AUTO W/O SCOPE: CPT | Mod: QW | Performed by: UROLOGY

## 2018-12-19 RX ORDER — NITROFURANTOIN 25; 75 MG/1; MG/1
100 CAPSULE ORAL ONCE
Qty: 1 CAPSULE | Refills: 0 | Status: SHIPPED | OUTPATIENT
Start: 2018-12-19 | End: 2018-12-19

## 2018-12-19 RX ORDER — FUROSEMIDE 20 MG
20 TABLET ORAL DAILY
Status: ON HOLD | COMMUNITY
Start: 2018-09-13 | End: 2019-08-03

## 2018-12-19 RX ORDER — ASPIRIN 81 MG/1
81 TABLET ORAL DAILY
Status: ON HOLD | COMMUNITY
Start: 2018-05-24 | End: 2019-08-03

## 2018-12-19 ASSESSMENT — PAIN SCALES - GENERAL: PAINLEVEL: EXTREME PAIN (8)

## 2018-12-19 ASSESSMENT — MIFFLIN-ST. JEOR: SCORE: 1298.28

## 2018-12-19 NOTE — LETTER
12/19/2018       RE: Madhavi Castellanos  [address] [facility] [address]     Dear Colleague,    Thank you for referring your patient, Madhavi Castellanos, to the [facility]OLOG[facility] [place] at [facility]. Please see a copy of my visit note below.    Office Visit Note  M [facility]  [phone]    Dec 19, 2018    [unfilled]    1940    UROLOGIC DIAGNOSES:    Gross hematuria, history of stones    CURRENT INTERVENTIONS:        History:    Madhavi returns to clinic today because she has experienced painless gross hematuria beginning earlier this month. She has had no other symptoms. No flank pain consistent with any stones. He has no other lower urinary tract complaints either. In light of this history are recommended a cystoscopy today.      Imaging:  I reviewed her last CT scan from July, which was a noncontrast CT scan,no stones present.    Labs:      MEDICATIONS:    Current Outpatient Medications:      albuterol (PROAIR HFA/PROVENTIL HFA/VENTOLIN HFA) 108 (90 BASE) MCG/ACT Inhaler, Inhale 2 puffs into the lungs, Disp: , Rfl:      alum & mag hydroxide-simethicone (MYLANTA/MAALOX) 200-200-20 MG/5ML SUSP, Take 30 mLs by mouth every 4 hours as needed, Disp: 300 mL, Rfl: 0     Apixaban (ELIQUIS PO), Take 5 mg by mouth 2 times daily, Disp: , Rfl:      Atorvastatin Calcium (LIPITOR PO), Take 10 mg by mouth daily , Disp: , Rfl:      BuPROPion HCl (WELLBUTRIN PO), , Disp: , Rfl:      clobetasol (TEMOVATE) 0.05 % ointment, Apply topically 3 times daily, Disp: , Rfl:      diltiazem (CARTIA XT) 120 MG 24 hr capsule, Take 120 mg by mouth daily, Disp: , Rfl:      fentaNYL (DURAGESIC) 25 mcg/hr patch 72 hr, Place 1 patch onto the skin every 72 hours, Disp: , Rfl:      GABAPENTIN PO, Take 900 mg by mouth 3 times daily , Disp: , Rfl:      HYDROCHLOROTHIAZIDE PO, Take 12.5 mg by mouth daily , Disp: , Rfl:      HYDROcodone-acetaminophen (NORCO)  "7.5-325 MG per tablet, Take 1-2 tablets by mouth 2 times daily as needed for moderate to severe pain , Disp: , Rfl:      Losartan Potassium (COZAAR PO), Take 25 mg by mouth daily, Disp: , Rfl:      Methimazole (TAPAZOLE PO), Take 5 mg by mouth daily , Disp: , Rfl:      METHOCARBAMOL PO, Take 750 mg by mouth 6 times daily , Disp: , Rfl:      Omeprazole (PRILOSEC PO), Take 20 mg by mouth every morning , Disp: , Rfl:      Ondansetron (ZOFRAN ODT PO), Take 8 mg by mouth every 8 hours as needed for nausea, Disp: , Rfl:      polyethylene glycol (MIRALAX) packet, Take 17 g by mouth 2 times daily (Patient taking differently: Take 1 packet by mouth daily ), Disp: 30 packet, Rfl: 0     promethazine (PHENERGAN) 25 MG tablet, Take 1 tablet (25 mg) by mouth every 6 hours as needed for nausea, Disp: 15 tablet, Rfl: 0     RaNITidine HCl (ZANTAC PO), Take 150 mg by mouth, Disp: , Rfl:      SIMVASTATIN PO, Take  by mouth.  , Disp: , Rfl:      Sotalol HCl (BETAPACE PO), Take 40 mg by mouth 2 times daily , Disp: , Rfl:     ALLERGIES:     Allergies   Allergen Reactions     Metoprolol Shortness Of Breath     2 hours after tasking for the first time SOB, Pt evaluated and pt WDL       Augmentin Nausea     Cephalexin      Codeine Sulfate      \"i dont't know, nothing, maybe sick to my stomach\"     Cymbalta      agitated     Lisinopril Cough     Omnicef [Cefdinir] Diarrhea     Percocet [Oxycodone-Acetaminophen]      nauseated     SURGICAL HISTORY:    Past Surgical History:   Procedure Laterality Date     CHOLECYSTECTOMY       EP ABLATION / EP STUDIES  08/11/2017    for hx a fib     ESOPHAGOSCOPY, GASTROSCOPY, DUODENOSCOPY (EGD), COMBINED  7/2014    reactive gastropathy, H. Pylori negative ([facility])     EXCISE VULVA WIDE LOCAL N/A 12/1/2017    Procedure: EXCISE VULVA WIDE LOCAL;  Wide Local Excision of Vulvar Lesion   ;  Surgeon: Nazario Tirado MD;  Location: [facility] OR         PHYSICAL EXAM:    There were no vitals taken for this " visit.    Constitutional: Well developed. Conversant and in no acute distress  Eyes: Anicteric sclera, conjunctiva clear, normal extraocular movements  ENT: Normocephalic and atraumatic,   Skin: Warm and dry. No rashes or lesions  Cardiac: No peripheral edema  Back/Flank: Not done  CNS/PNS: Normal musculature and movements, moves all extremities normally  Respiratory: Normal non-labored breathing  Abdomen: Soft nontender and nondistended  Peripheral Vascular: No peripheral edema  Mental Status/Psych: Alert and Oriented x 3. Normal mood and affect      External Genitalia: normal  Bladder: normal  Urethra: normal  Vagina: normal vaginal mucosa    Cystoscopy:  I performed flexible cystoscopy today and the bladder was normal throughout. No tumor identified throughout the bladder.      Urinalysis:  UA RESULTS:  Recent Labs   Lab Test 07/27/18  1252  01/09/18  0832   COLOR Yellow   < > Yellow   APPEARANCE Clear   < > Slightly Cloudy   URINEGLC Negative   < > Negative   URINEBILI Negative   < > Negative   URINEKETONE Negative   < > 5*   SG 1.025   < > 1.015   UBLD Negative   < > Moderate*   URINEPH 5.5   < > 6.0   PROTEIN Negative   < > Negative   UROBILINOGEN 0.2   < >  --    NITRITE Negative   < > Negative   LEUKEST Trace*   < > Trace*   RBCU  --   --  82*   WBCU  --   --  4*    < > = values in this interval not displayed.         IMPRESSION:    Ross hematuria    PLAN:    She has developed painless gross hematuria.A noncontrast CT scan in July was negative. Cystoscopy today was negative. She does need a contrasted CT scan in order to complete er hematuria workup. This was ordered for her. I will contact her with those results. Urine sample sent for cytology today as well.    Total Time:  20 min  Time in Consultation: 15 min      Zoltan Marcelo M.D.

## 2018-12-19 NOTE — NURSING NOTE
Pt states she is tired and weak.  Pt states she had gross hem.  Pt was at urgent care on 12-14-18... Notes in care everywhere.  Pt denies dysuria.  MARILYN Arzola CMA

## 2018-12-19 NOTE — NURSING NOTE
Prior to the start of the procedure and with procedural staff participation, I verbally confirmed the patient s identity using two indicators, relevant allergies, that the procedure was appropriate and matched the consent or emergent situation, and that the correct equipment/implants were available. Immediately prior to starting the procedure I conducted the Time Out with the procedural staff and re-confirmed the patient s name, procedure, and site/side. (The Joint Commission universal protocol was followed.)  Yes    Sedation (Moderate or Deep): None  Pt has signed the consent form stating that we will be doing a CYSTOSCOPY (with or without stent removal) today, and that it is the correct procedure. I verbally confirmed the patient s identity using two indicators, relevant allergies, and that the correct equipment was available. Post-op information given to the pt as needed at check-out. I have sent an appropriate antibiotic to the pharmacy in our building as recommended by the MD. MARILYN Arzola CMA

## 2018-12-19 NOTE — PATIENT INSTRUCTIONS
"     AFTER YOUR CYSTOSCOPY         You have just completed a cystoscopy, or \"cysto\", which allowed your physician to learn more about your bladder (or to remove a stent placed after surgery). We suggest that you continue to avoid caffeine, fruit juice, and alcohol for the next 24 hours, however, you are encouraged to return to your normal activities.       A few things that are considered normal after your cystoscopy:    * small amount of bleeding (or spotting) that clears within the next 24 hours    * slight burning sensation with urination    * sensation to of needing to avoid more frequently    * the feeling of \"air\" in your urine    * mild discomfort that is relieved with Tylonol        Please contact our office promptly if you:    * develop a fever above 101 degrees    * are unable to urinate    * develop bright red blood that does not stop    * severe pain or swelling        And of course, please contact our office with any concerns or questions [phone]        "

## 2018-12-19 NOTE — PROGRESS NOTES
Office Visit Note  [facility]  [phone]    Dec 19, 2018    [unfilled]    1940    UROLOGIC DIAGNOSES:    Gross hematuria, history of stones    CURRENT INTERVENTIONS:        History:    Madhavi returns to clinic today because she has experienced painless gross hematuria beginning earlier this month. She has had no other symptoms. No flank pain consistent with any stones. He has no other lower urinary tract complaints either. In light of this history are recommended a cystoscopy today.      Imaging:  I reviewed her last CT scan from July, which was a noncontrast CT scan,no stones present.    Labs:      MEDICATIONS:    Current Outpatient Medications:      albuterol (PROAIR HFA/PROVENTIL HFA/VENTOLIN HFA) 108 (90 BASE) MCG/ACT Inhaler, Inhale 2 puffs into the lungs, Disp: , Rfl:      alum & mag hydroxide-simethicone (MYLANTA/MAALOX) 200-200-20 MG/5ML SUSP, Take 30 mLs by mouth every 4 hours as needed, Disp: 300 mL, Rfl: 0     Apixaban (ELIQUIS PO), Take 5 mg by mouth 2 times daily, Disp: , Rfl:      Atorvastatin Calcium (LIPITOR PO), Take 10 mg by mouth daily , Disp: , Rfl:      BuPROPion HCl (WELLBUTRIN PO), , Disp: , Rfl:      clobetasol (TEMOVATE) 0.05 % ointment, Apply topically 3 times daily, Disp: , Rfl:      diltiazem (CARTIA XT) 120 MG 24 hr capsule, Take 120 mg by mouth daily, Disp: , Rfl:      fentaNYL (DURAGESIC) 25 mcg/hr patch 72 hr, Place 1 patch onto the skin every 72 hours, Disp: , Rfl:      GABAPENTIN PO, Take 900 mg by mouth 3 times daily , Disp: , Rfl:      HYDROCHLOROTHIAZIDE PO, Take 12.5 mg by mouth daily , Disp: , Rfl:      HYDROcodone-acetaminophen (NORCO) 7.5-325 MG per tablet, Take 1-2 tablets by mouth 2 times daily as needed for moderate to severe pain , Disp: , Rfl:      Losartan Potassium (COZAAR PO), Take 25 mg by mouth daily, Disp: , Rfl:      Methimazole (TAPAZOLE PO), Take 5 mg by mouth daily , Disp: , Rfl:      METHOCARBAMOL PO, Take 750 mg by mouth 6 times daily ,  "Disp: , Rfl:      Omeprazole (PRILOSEC PO), Take 20 mg by mouth every morning , Disp: , Rfl:      Ondansetron (ZOFRAN ODT PO), Take 8 mg by mouth every 8 hours as needed for nausea, Disp: , Rfl:      polyethylene glycol (MIRALAX) packet, Take 17 g by mouth 2 times daily (Patient taking differently: Take 1 packet by mouth daily ), Disp: 30 packet, Rfl: 0     promethazine (PHENERGAN) 25 MG tablet, Take 1 tablet (25 mg) by mouth every 6 hours as needed for nausea, Disp: 15 tablet, Rfl: 0     RaNITidine HCl (ZANTAC PO), Take 150 mg by mouth, Disp: , Rfl:      SIMVASTATIN PO, Take  by mouth.  , Disp: , Rfl:      Sotalol HCl (BETAPACE PO), Take 40 mg by mouth 2 times daily , Disp: , Rfl:     ALLERGIES:     Allergies   Allergen Reactions     Metoprolol Shortness Of Breath     2 hours after tasking for the first time SOB, Pt evaluated and pt WDL       Augmentin Nausea     Cephalexin      Codeine Sulfate      \"i dont't know, nothing, maybe sick to my stomach\"     Cymbalta      agitated     Lisinopril Cough     Omnicef [Cefdinir] Diarrhea     Percocet [Oxycodone-Acetaminophen]      nauseated       REVIEW OF SYSTEMS: Ten point review of systems without change as outlined in HPI    SURGICAL HISTORY:    Past Surgical History:   Procedure Laterality Date     CHOLECYSTECTOMY       EP ABLATION / EP STUDIES  08/11/2017    for hx a fib     ESOPHAGOSCOPY, GASTROSCOPY, DUODENOSCOPY (EGD), COMBINED  7/2014    reactive gastropathy, H. Pylori negative ([facility])     EXCISE VULVA WIDE LOCAL N/A 12/1/2017    Procedure: EXCISE VULVA WIDE LOCAL;  Wide Local Excision of Vulvar Lesion   ;  Surgeon: Nazario Tirado MD;  Location: [facility] OR         PHYSICAL EXAM:    There were no vitals taken for this visit.    Constitutional: Well developed. Conversant and in no acute distress  Eyes: Anicteric sclera, conjunctiva clear, normal extraocular movements  ENT: Normocephalic and atraumatic,   Skin: Warm and dry. No rashes or lesions  Cardiac: No " peripheral edema  Back/Flank: Not done  CNS/PNS: Normal musculature and movements, moves all extremities normally  Respiratory: Normal non-labored breathing  Abdomen: Soft nontender and nondistended  Peripheral Vascular: No peripheral edema  Mental Status/Psych: Alert and Oriented x 3. Normal mood and affect      External Genitalia: normal  Bladder: normal  Urethra: normal  Vagina: normal vaginal mucosa    Cystoscopy:  I performed flexible cystoscopy today and the bladder was normal throughout. No tumor identified throughout the bladder.      Urinalysis:  UA RESULTS:  Recent Labs   Lab Test 07/27/18  1252  01/09/18  0832   COLOR Yellow   < > Yellow   APPEARANCE Clear   < > Slightly Cloudy   URINEGLC Negative   < > Negative   URINEBILI Negative   < > Negative   URINEKETONE Negative   < > 5*   SG 1.025   < > 1.015   UBLD Negative   < > Moderate*   URINEPH 5.5   < > 6.0   PROTEIN Negative   < > Negative   UROBILINOGEN 0.2   < >  --    NITRITE Negative   < > Negative   LEUKEST Trace*   < > Trace*   RBCU  --   --  82*   WBCU  --   --  4*    < > = values in this interval not displayed.         IMPRESSION:    Ross hematuria    PLAN:    She has developed painless gross hematuria.A noncontrast CT scan in July was negative. Cystoscopy today was negative. She does need a contrasted CT scan in order to complete er hematuria workup. This was ordered for her. I will contact her with those results. Urine sample sent for cytology today as well.    Total Time:  20 min  Time in Consultation: 15 min      Zoltan Marcelo M.D.

## 2018-12-21 LAB — COPATH REPORT: NORMAL

## 2018-12-26 ENCOUNTER — HOSPITAL ENCOUNTER (OUTPATIENT)
Dept: CT IMAGING | Facility: CLINIC | Age: 78
Discharge: HOME | End: 2018-12-26
Attending: UROLOGY | Admitting: UROLOGY
Payer: MEDICARE

## 2018-12-26 DIAGNOSIS — R31.0 GROSS HEMATURIA: ICD-10-CM

## 2018-12-26 LAB
CREAT BLD-MCNC: 0.6 MG/DL (ref 0.52–1.04)
GFR SERPL CREATININE-BSD FRML MDRD: >90 ML/MIN/{1.73_M2}

## 2018-12-26 PROCEDURE — 74178 CT ABD&PLV WO CNTR FLWD CNTR: CPT

## 2018-12-26 PROCEDURE — 25000128 H RX IP 250 OP 636: Performed by: UROLOGY

## 2018-12-26 PROCEDURE — 82565 ASSAY OF CREATININE: CPT

## 2018-12-26 RX ORDER — IOPAMIDOL 755 MG/ML
100 INJECTION, SOLUTION INTRAVASCULAR ONCE
Status: COMPLETED | OUTPATIENT
Start: 2018-12-26 | End: 2018-12-26

## 2018-12-26 RX ADMIN — IOPAMIDOL 100 ML: 755 INJECTION, SOLUTION INTRAVENOUS at 14:46

## 2018-12-26 RX ADMIN — SODIUM CHLORIDE, PRESERVATIVE FREE 65 ML: 5 INJECTION INTRAVENOUS at 14:47

## 2019-08-03 ENCOUNTER — HOSPITAL ENCOUNTER (OUTPATIENT)
Facility: CLINIC | Age: 79
Setting detail: OBSERVATION
Discharge: HOME | End: 2019-08-04
Attending: EMERGENCY MEDICINE | Admitting: INTERNAL MEDICINE
Payer: COMMERCIAL

## 2019-08-03 DIAGNOSIS — K62.5 BRBPR (BRIGHT RED BLOOD PER RECTUM): Primary | ICD-10-CM

## 2019-08-03 DIAGNOSIS — K62.5 RECTAL BLEEDING: ICD-10-CM

## 2019-08-03 LAB
ABO + RH BLD: NORMAL
ABO + RH BLD: NORMAL
ALBUMIN UR-MCNC: NEGATIVE MG/DL
ANION GAP SERPL CALCULATED.3IONS-SCNC: 5 MMOL/L (ref 3–14)
APPEARANCE UR: CLEAR
BASOPHILS # BLD AUTO: 0 10E9/L (ref 0–0.2)
BASOPHILS NFR BLD AUTO: 0.4 %
BILIRUB UR QL STRIP: NEGATIVE
BLD GP AB SCN SERPL QL: NORMAL
BLOOD BANK CMNT PATIENT-IMP: NORMAL
BUN SERPL-MCNC: 21 MG/DL (ref 7–30)
CALCIUM SERPL-MCNC: 9 MG/DL (ref 8.5–10.1)
CHLORIDE SERPL-SCNC: 104 MMOL/L (ref 94–109)
CO2 SERPL-SCNC: 29 MMOL/L (ref 20–32)
COLOR UR AUTO: YELLOW
CREAT SERPL-MCNC: 0.49 MG/DL (ref 0.52–1.04)
DIFFERENTIAL METHOD BLD: NORMAL
EOSINOPHIL # BLD AUTO: 0.2 10E9/L (ref 0–0.7)
EOSINOPHIL NFR BLD AUTO: 2 %
ERYTHROCYTE [DISTWIDTH] IN BLOOD BY AUTOMATED COUNT: 13.2 % (ref 10–15)
GFR SERPL CREATININE-BSD FRML MDRD: >90 ML/MIN/{1.73_M2}
GLUCOSE SERPL-MCNC: 114 MG/DL (ref 70–99)
GLUCOSE UR STRIP-MCNC: NEGATIVE MG/DL
HCT VFR BLD AUTO: 41.1 % (ref 35–47)
HGB BLD-MCNC: 13 G/DL (ref 11.7–15.7)
HGB BLD-MCNC: 13.3 G/DL (ref 11.7–15.7)
HGB BLD-MCNC: 13.3 G/DL (ref 11.7–15.7)
HGB UR QL STRIP: NEGATIVE
IMM GRANULOCYTES # BLD: 0.1 10E9/L (ref 0–0.4)
IMM GRANULOCYTES NFR BLD: 0.5 %
KETONES UR STRIP-MCNC: NEGATIVE MG/DL
LEUKOCYTE ESTERASE UR QL STRIP: NEGATIVE
LYMPHOCYTES # BLD AUTO: 3 10E9/L (ref 0.8–5.3)
LYMPHOCYTES NFR BLD AUTO: 29.3 %
MCH RBC QN AUTO: 31 PG (ref 26.5–33)
MCHC RBC AUTO-ENTMCNC: 32.4 G/DL (ref 31.5–36.5)
MCV RBC AUTO: 96 FL (ref 78–100)
MONOCYTES # BLD AUTO: 1.1 10E9/L (ref 0–1.3)
MONOCYTES NFR BLD AUTO: 10.6 %
MUCOUS THREADS #/AREA URNS LPF: PRESENT /LPF
NEUTROPHILS # BLD AUTO: 5.9 10E9/L (ref 1.6–8.3)
NEUTROPHILS NFR BLD AUTO: 57.2 %
NITRATE UR QL: NEGATIVE
NRBC # BLD AUTO: 0 10*3/UL
NRBC BLD AUTO-RTO: 0 /100
PH UR STRIP: 5.5 PH (ref 5–7)
PLATELET # BLD AUTO: 301 10E9/L (ref 150–450)
POTASSIUM SERPL-SCNC: 3.1 MMOL/L (ref 3.4–5.3)
RBC # BLD AUTO: 4.29 10E12/L (ref 3.8–5.2)
RBC #/AREA URNS AUTO: 1 /HPF (ref 0–2)
SODIUM SERPL-SCNC: 138 MMOL/L (ref 133–144)
SOURCE: ABNORMAL
SP GR UR STRIP: 1.02 (ref 1–1.03)
SPECIMEN EXP DATE BLD: NORMAL
UROBILINOGEN UR STRIP-MCNC: NORMAL MG/DL (ref 0–2)
WBC # BLD AUTO: 10.4 10E9/L (ref 4–11)
WBC #/AREA URNS AUTO: <1 /HPF (ref 0–5)

## 2019-08-03 PROCEDURE — 96360 HYDRATION IV INFUSION INIT: CPT

## 2019-08-03 PROCEDURE — 96361 HYDRATE IV INFUSION ADD-ON: CPT

## 2019-08-03 PROCEDURE — 86850 RBC ANTIBODY SCREEN: CPT | Performed by: EMERGENCY MEDICINE

## 2019-08-03 PROCEDURE — 85018 HEMOGLOBIN: CPT | Mod: 91 | Performed by: INTERNAL MEDICINE

## 2019-08-03 PROCEDURE — 25000132 ZZH RX MED GY IP 250 OP 250 PS 637: Performed by: INTERNAL MEDICINE

## 2019-08-03 PROCEDURE — 86900 BLOOD TYPING SEROLOGIC ABO: CPT | Performed by: EMERGENCY MEDICINE

## 2019-08-03 PROCEDURE — 36415 COLL VENOUS BLD VENIPUNCTURE: CPT | Performed by: INTERNAL MEDICINE

## 2019-08-03 PROCEDURE — 99285 EMERGENCY DEPT VISIT HI MDM: CPT | Mod: 25

## 2019-08-03 PROCEDURE — G0378 HOSPITAL OBSERVATION PER HR: HCPCS

## 2019-08-03 PROCEDURE — 86901 BLOOD TYPING SEROLOGIC RH(D): CPT | Performed by: EMERGENCY MEDICINE

## 2019-08-03 PROCEDURE — 25000132 ZZH RX MED GY IP 250 OP 250 PS 637: Performed by: HOSPITALIST

## 2019-08-03 PROCEDURE — 25000132 ZZH RX MED GY IP 250 OP 250 PS 637: Performed by: COLON & RECTAL SURGERY

## 2019-08-03 PROCEDURE — 25000128 H RX IP 250 OP 636: Performed by: EMERGENCY MEDICINE

## 2019-08-03 PROCEDURE — 81001 URINALYSIS AUTO W/SCOPE: CPT | Performed by: EMERGENCY MEDICINE

## 2019-08-03 PROCEDURE — 25000132 ZZH RX MED GY IP 250 OP 250 PS 637: Performed by: EMERGENCY MEDICINE

## 2019-08-03 PROCEDURE — 25000132 ZZH RX MED GY IP 250 OP 250 PS 637: Performed by: PHYSICIAN ASSISTANT

## 2019-08-03 PROCEDURE — 80048 BASIC METABOLIC PNL TOTAL CA: CPT | Performed by: EMERGENCY MEDICINE

## 2019-08-03 PROCEDURE — 85025 COMPLETE CBC W/AUTO DIFF WBC: CPT | Performed by: EMERGENCY MEDICINE

## 2019-08-03 PROCEDURE — 25800030 ZZH RX IP 258 OP 636: Performed by: INTERNAL MEDICINE

## 2019-08-03 PROCEDURE — 99220 ZZC INITIAL OBSERVATION CARE,LEVL III: CPT | Performed by: INTERNAL MEDICINE

## 2019-08-03 RX ORDER — GABAPENTIN 300 MG/1
900 CAPSULE ORAL 3 TIMES DAILY
Status: DISCONTINUED | OUTPATIENT
Start: 2019-08-03 | End: 2019-08-03

## 2019-08-03 RX ORDER — ONDANSETRON 2 MG/ML
4 INJECTION INTRAMUSCULAR; INTRAVENOUS EVERY 6 HOURS PRN
Status: DISCONTINUED | OUTPATIENT
Start: 2019-08-03 | End: 2019-08-04 | Stop reason: HOSPADM

## 2019-08-03 RX ORDER — FENTANYL 25 UG/1
25 PATCH TRANSDERMAL
Status: DISCONTINUED | OUTPATIENT
Start: 2019-08-03 | End: 2019-08-04 | Stop reason: HOSPADM

## 2019-08-03 RX ORDER — PROCHLORPERAZINE 25 MG
12.5 SUPPOSITORY, RECTAL RECTAL EVERY 12 HOURS PRN
Status: DISCONTINUED | OUTPATIENT
Start: 2019-08-03 | End: 2019-08-04 | Stop reason: HOSPADM

## 2019-08-03 RX ORDER — ONDANSETRON 4 MG/1
4 TABLET, ORALLY DISINTEGRATING ORAL EVERY 6 HOURS PRN
Status: DISCONTINUED | OUTPATIENT
Start: 2019-08-03 | End: 2019-08-04 | Stop reason: HOSPADM

## 2019-08-03 RX ORDER — HYDROCODONE BITARTRATE AND ACETAMINOPHEN 7.5; 325 MG/1; MG/1
1 TABLET ORAL 2 TIMES DAILY PRN
Status: DISCONTINUED | OUTPATIENT
Start: 2019-08-03 | End: 2019-08-04 | Stop reason: HOSPADM

## 2019-08-03 RX ORDER — NALOXONE HYDROCHLORIDE 0.4 MG/ML
.1-.4 INJECTION, SOLUTION INTRAMUSCULAR; INTRAVENOUS; SUBCUTANEOUS
Status: DISCONTINUED | OUTPATIENT
Start: 2019-08-03 | End: 2019-08-04 | Stop reason: HOSPADM

## 2019-08-03 RX ORDER — ACETAMINOPHEN 325 MG/1
650 TABLET ORAL EVERY 4 HOURS PRN
Status: DISCONTINUED | OUTPATIENT
Start: 2019-08-03 | End: 2019-08-04 | Stop reason: HOSPADM

## 2019-08-03 RX ORDER — PROCHLORPERAZINE MALEATE 5 MG
5 TABLET ORAL EVERY 6 HOURS PRN
Status: DISCONTINUED | OUTPATIENT
Start: 2019-08-03 | End: 2019-08-04 | Stop reason: HOSPADM

## 2019-08-03 RX ORDER — ACETAMINOPHEN 650 MG/1
650 SUPPOSITORY RECTAL EVERY 4 HOURS PRN
Status: DISCONTINUED | OUTPATIENT
Start: 2019-08-03 | End: 2019-08-04 | Stop reason: HOSPADM

## 2019-08-03 RX ORDER — SODIUM CHLORIDE 9 MG/ML
1000 INJECTION, SOLUTION INTRAVENOUS CONTINUOUS
Status: DISCONTINUED | OUTPATIENT
Start: 2019-08-03 | End: 2019-08-03

## 2019-08-03 RX ORDER — SODIUM CHLORIDE, SODIUM LACTATE, POTASSIUM CHLORIDE, CALCIUM CHLORIDE 600; 310; 30; 20 MG/100ML; MG/100ML; MG/100ML; MG/100ML
INJECTION, SOLUTION INTRAVENOUS CONTINUOUS
Status: DISCONTINUED | OUTPATIENT
Start: 2019-08-03 | End: 2019-08-04 | Stop reason: HOSPADM

## 2019-08-03 RX ORDER — METHOCARBAMOL 750 MG/1
750 TABLET, FILM COATED ORAL EVERY 4 HOURS PRN
Status: DISCONTINUED | OUTPATIENT
Start: 2019-08-03 | End: 2019-08-04 | Stop reason: HOSPADM

## 2019-08-03 RX ORDER — LOSARTAN POTASSIUM 25 MG/1
25 TABLET ORAL DAILY
Status: DISCONTINUED | OUTPATIENT
Start: 2019-08-03 | End: 2019-08-04 | Stop reason: HOSPADM

## 2019-08-03 RX ORDER — BUPROPION HYDROCHLORIDE 150 MG/1
150 TABLET ORAL EVERY MORNING
Status: ON HOLD | COMMUNITY
End: 2020-11-30

## 2019-08-03 RX ORDER — DILTIAZEM HYDROCHLORIDE 120 MG/1
120 CAPSULE, COATED, EXTENDED RELEASE ORAL DAILY
Status: DISCONTINUED | OUTPATIENT
Start: 2019-08-03 | End: 2019-08-04 | Stop reason: HOSPADM

## 2019-08-03 RX ORDER — HYDROCHLOROTHIAZIDE 12.5 MG/1
12.5 CAPSULE ORAL DAILY
Status: DISCONTINUED | OUTPATIENT
Start: 2019-08-03 | End: 2019-08-04 | Stop reason: HOSPADM

## 2019-08-03 RX ORDER — POTASSIUM CHLORIDE 1.5 G/1.58G
40 POWDER, FOR SOLUTION ORAL ONCE
Status: COMPLETED | OUTPATIENT
Start: 2019-08-03 | End: 2019-08-03

## 2019-08-03 RX ORDER — METHIMAZOLE 5 MG/1
5 TABLET ORAL
Status: DISCONTINUED | OUTPATIENT
Start: 2019-08-05 | End: 2019-08-04 | Stop reason: HOSPADM

## 2019-08-03 RX ADMIN — SOTALOL HYDROCHLORIDE 40 MG: 80 TABLET ORAL at 20:52

## 2019-08-03 RX ADMIN — POTASSIUM CHLORIDE 40 MEQ: 1.5 POWDER, FOR SOLUTION ORAL at 03:50

## 2019-08-03 RX ADMIN — SOTALOL HYDROCHLORIDE 40 MG: 80 TABLET ORAL at 08:54

## 2019-08-03 RX ADMIN — LOSARTAN POTASSIUM 25 MG: 25 TABLET, FILM COATED ORAL at 17:38

## 2019-08-03 RX ADMIN — DILTIAZEM HYDROCHLORIDE 120 MG: 120 CAPSULE, COATED, EXTENDED RELEASE ORAL at 08:54

## 2019-08-03 RX ADMIN — SODIUM CHLORIDE 1000 ML: 9 INJECTION, SOLUTION INTRAVENOUS at 02:49

## 2019-08-03 RX ADMIN — SODIUM CHLORIDE, POTASSIUM CHLORIDE, SODIUM LACTATE AND CALCIUM CHLORIDE: 600; 310; 30; 20 INJECTION, SOLUTION INTRAVENOUS at 06:19

## 2019-08-03 RX ADMIN — RANITIDINE 150 MG: 150 TABLET ORAL at 20:51

## 2019-08-03 RX ADMIN — ACETAMINOPHEN 650 MG: 325 TABLET, FILM COATED ORAL at 14:13

## 2019-08-03 RX ADMIN — HYDROCODONE BITARTRATE AND ACETAMINOPHEN 1 TABLET: 7.5; 325 TABLET ORAL at 22:01

## 2019-08-03 RX ADMIN — POLYETHYLENE GLYCOL-3350 AND ELECTROLYTES 4000 ML: 236; 6.74; 5.86; 2.97; 22.74 POWDER, FOR SOLUTION ORAL at 16:04

## 2019-08-03 ASSESSMENT — MIFFLIN-ST. JEOR: SCORE: 1270.61

## 2019-08-03 NOTE — PROGRESS NOTES
Progress Note    Patient admitted this am. Chart reviewed. Patient examined.    No BM or blood since admission. No chest pain, sob, abdo pain, n/v/d.  s1s2 rrr  Lungs clear  abdo soft NT +BS    Already seen by colorectal this am with plan for colonoscopy in am. I have a call out Pharmacy to have med rec done. Will order meds once this is done. Patient has been in sinus for about 5 years. She knows immediately when she is in a fib. Has a monitor at home. Likely can come off anticoagulation. Has follow-up with her cardiologist in 2 weeks.      Yang Gusman MD

## 2019-08-03 NOTE — PLAN OF CARE
"PRIMARY DIAGNOSIS: Rectal Bleed     OUTPATIENT/OBSERVATION GOALS TO BE MET BEFORE DISCHARGE  1. Orthostatic performed: N/A    2. Stable Hgb Yes.   Recent Labs   Lab Test 08/03/19  1151 08/03/19  0600 08/03/19  0247   HGB 13.3 13.0 13.3         3. Resolved or declined bleeding episodes:   No.  Patient passing blood with urination at 16:00    4. Appropriate testing complete: No. Colonoscopy planned in am    5. Cleared for discharge by consultants (if involved): No    6. Safe discharge environment identified: Yes    Blood pressure 138/70, pulse 82, temperature 97.6  F (36.4  C), temperature source Oral, resp. rate 16, height 1.549 m (5' 1\"), weight 84.8 kg (187 lb), SpO2 94 %.    VSS.  LS clear, diminished in bases.  Adequate sats on room air.   Patient beginning Go-lytely prep at 15:30  Tolerating thus far.  Patient is up independently in room; using her cane to ambulate to bathroom.  Patient alerts nsg staff for bright red blood passed during urination.  Will continue to monitor.  Patient currently denies any pain or discomfort.  Maintenance IV continues of LR at 75 ccs/hour.  PLAN:  Continue to provide supportive cares.      Discharge Planner Nurse   Safe discharge environment identified: Yes  Barriers to discharge: Yes       Entered by: Karen M. Lesch 08/03/2019 16:00 PM     Please review provider order for any additional goals.   Nurse to notify provider when observation goals have been met and patient is ready for discharge.  "

## 2019-08-03 NOTE — H&P
Admitted:     08/03/2019      PRIMARY CARE PHYSICIAN:  Cecy Edmondson MD      CHIEF COMPLAINT:  Bright red blood per rectum.      HISTORY OF PRESENT ILLNESS:  Madhavi Castellanos is a 79-year-old  female with a very long medical history and lots of medications, which include fibrillation for which she is on chronic anticoagulation with Eliquis, who has had prior colonoscopy with polyp removal, presents to [facility] with bright red blood per rectum.      The patient has had a colonoscopy in the past.  She has had a few polyps removed.  A couple were benign, and one was precancerous.  The patient has chronic GI problems.  She has about 2 bowel movements per week.  This past week she has had increased gas and distention.  Her last bowel movement was on Thursday, which was green in color.  Since then, she has not had any blood, but woke up at 2:00 a.m. with 1 episode of bloody stool.  The patient initially thought this was maybe coming from her bladder.  The patient came into [facility].      In the Emergency Department, the patient was seen by Dr. Froy Begum.  The patient was afebrile with stable vital signs.  The patient underwent both a vaginal exam, which was negative, and a rectal exam which did show some loose blood and some solid yellow stool.  There were some external hemorrhoids, and the patient also confirmed she has had history of hemorrhoids in the past.  The patient's blood work revealed potassium 3.1, normal kidney function, elevated BUN to creatinine ratio, hemoglobin 13.3, white count of 10.4, glucose 114.  Urinalysis also had less than 1 white cell and 1 red cell.  The patient received potassium chloride 40 mEq as well as a liter of normal saline.  The patient is being admitted for bright red blood per rectum under observation status.      PAST MEDICAL HISTORY:   1.  Atrial fibrillation.  The patient is on anticoagulation with Eliquis.   2.  Asthma.   3.  ASCVD.   4.   Depression.   5.  GERD.   6.  Hypertension with chronic left bundle branch block.   7.  Lumbosacral spondylosis.   8.  Obesity.   9.  Obstructive sleep apnea.   10.  Osteoarthritis.   11.  History of prolonged QT.   12.  Hypercholesterolemia.   13.  Spinal stenosis of the lumbar region.      PAST SURGICAL HISTORY:   1.  Cholecystectomy.   2.  EP ablation for atrial fibrillation.   3.  History of EGD.   4.  History of vulvar biopsy.   5.  Carpal tunnel release.   6.  Knee arthroscopies.   7.  Vaginal hysterectomy.   8.  Septoplasty.      FAMILY HISTORY:  Father with lung and kidney problems.  Mother had multiple sclerosis.      SOCIAL HISTORY:  The patient had remote smoking history for about 4 years in the late 1950s.  No alcohol.  She is .      ALLERGIES:  METOPROLOL, ALBUTEROL, AUGMENTIN, CEPHALEXIN, CODEINE, CYMBALTA, LISINOPRIL, OMNICEF, PERCOCET.      CURRENT MEDICATIONS:   1.  Methocarbamol 750 mg up to 6 times a day.   2.  Lasix 20 mg once a day.   3.  Losartan 25 mg once a day.   4.  Methimazole 5 mg once a day.   5.  Neurontin 600 mg b.i.d.   6.  Ranitidine 150 mg twice a day.   7.  Lipitor 10 mg once a day.   8.  Wellbutrin- mg daily.   9.  Hydrochlorothiazide 12.5 mg once a day.   10.  MiraLax 17 grams once a day.   11.  Diltiazem  mg once a day.   12.  Sotalol 80 mg, take half tablet or 40 mg every 12 hours.   13.  Apixaban 5 mg twice a day.   14.  Phenergan 25 mg every 6 hours.   15.  Fentanyl 25 mcg patch per hour.   16.  Norco 1 tablet b.i.d. as needed.      REVIEW OF SYSTEMS:  Ten-point systems reviewed.  Denies any weight loss, denies any fevers, chills, sweats, denies abdominal pain.  Otherwise, 10-point review of systems was reviewed and otherwise unremarkable.      PHYSICAL EXAMINATION:   VITAL SIGNS:  Temperature 97.2, heart rate 97, respiratory rate 18, blood pressure 155/73, saturations 97% on room air.   GENERAL:  The patient is alert, oriented x 3, resting comfortably.    HEENT:  Pupils are equal.  Sclerae are anicteric.  Oropharynx without lesions.  Mucous membranes are moist.   PULMONARY:  Lungs are clear to auscultation.   CARDIOVASCULAR:  S1, S2, regular rate and rhythm.   ABDOMEN:  Soft, nontender, obese.   MUSCULOSKELETAL:  No edema.   GENITOURINARY AND RECTAL:  Deferred, as the patient already had an evaluation by the ER doctor.  She has external hemorrhoids.   NEUROLOGIC:  She is oriented x 3.   SKIN:  Warm, dry, well perfused.      LABORATORY DATA:  As dictated in the History of Present Illness.      ASSESSMENT:  Margaret Castellanos is 79 and is on Eliquis for atrial fibrillation and a prior history of colon polyps as well as suspected internal and external hemorrhoids, admitted with bright red blood per rectum and hypokalemia.      PLAN:   1.  Bright red blood per rectum in the setting of Eliquis for atrial fibrillation.  The patient will be admitted under observation status.  Suspect this may be a very distal bleed, as the patient has not had any significant bloody diarrhea other than her couple of episodes here.  She had rectal blood positive.  She has external hemorrhoids, and suspect this could be primarily a bleeding polyp versus an internal or external hemorrhoid.  We will obtain a Colorectal Surgery consultation.  We are obviously going to hold the patient's anticoagulation and monitor serial hemoglobins.  She also has had some irregular bowel habits and perhaps would benefit from some fiber and antispasmodic agents.  We will defer to Colorectal Surgery or GI.   2.  Hypokalemia.  The patient will be on potassium replacement protocol.  Her potassium was 3.1.  She was replaced.   3.  Paroxysmal atrial fibrillation with history of ablation.  The patient's Eliquis will be on hold in the setting of bloody stools.  We will await verification of all of her medications and likely continue on all of her cardiac medications, especially rate-controlling medications such as  diltiazem and sotalol.   4.  Hypertension.  We are going to hold the patient's Lasix.  Continue patient on Cozaar.  We will hold the hydrochlorothiazide.   5.  Depression.  We will continue the patient on Wellbutrin.   6.  History of reflux disease.  We will continue the patient on her PPI.   7.  Chronic pain.  We will await medication verification and resume her on her home pain medications.   8.  Deep venous thrombosis prophylaxis.  The patient is already anticoagulated.   9.  Code status full.   10.  Observation status.         ARUN CASTELLON MD             D: 2019   T: 2019   MT: ANDREI      Name:     AC MCKOY   MRN:      [id number]        Account:      [id number]   :      1940        Admitted:     2019                   Document: [id number]       cc: Cecy Edmondson MD

## 2019-08-03 NOTE — CONSULTS
"[facility]  Colon and Rectal Surgery Consult Note  Name: Madhavi Castellanos    MRN: [id number]  YOB: 1940    Age: 79 year old  Date of admission: 8/3/2019  Primary care provider: Cecy Edmondson     Requesting Physician:  Dr. Jensen  Reason for consult:  Rectal bleeding           History of Present Illness:   Madhavi Castellanos is a 79 year old female, seen at the request of Dr. Jensen, presents with hematochezia.  Has baseline constipation with 2 bm's per day.  She is on a fentanyl patch for chronic back pain.  She usually takes Miralax daily for constipation.  Needs to usually strain.      Had episode of diarrhea Thursday with some abdominal cramping.  No BM Friday.  Had episode of blood from the anus at 2 am this morning. Reports it was BRB dripping in to toilet bowl. Presented to the hospital where she was admitted for observation. Urinated this AM recently with no further bleeding.  She is on eliquis for a fib.            Colonoscopy History:  > 5 years ago with [facility].  Reports three polyps, one of which was pre cancerous.            Past Medical History:     Past Medical History:   Diagnosis Date     Asthma      Atrial fibrillation (H)     had EP ablation 8/11/2017, pt reports being \"afib free\" now     CAD (coronary artery disease)      Depression      Esophageal reflux      Hypertension      LBBB (left bundle branch block)      Liver lesion     stable, seen on multiple CT scans     Lumbosacral spondylosis      Mumps      Nonspecific reaction to tuberculin skin test without active tuberculosis(795.51)      Obesity      TALI (obstructive sleep apnea)      Osteoarthritis      Palpitations      Prolonged QT interval      Pure hypercholesterolemia      Spinal stenosis of lumbar region      STD (sexually transmitted disease)      Tuberculosis              Past Surgical History:     Past Surgical History:   Procedure Laterality Date     CHOLECYSTECTOMY       EP ABLATION / EP STUDIES  08/11/2017    for hx a " "fib     ESOPHAGOSCOPY, GASTROSCOPY, DUODENOSCOPY (EGD), COMBINED  7/2014    reactive gastropathy, H. Pylori negative ([facility])     EXCISE VULVA WIDE LOCAL N/A 12/1/2017    Procedure: EXCISE VULVA WIDE LOCAL;  Wide Local Excision of Vulvar Lesion   ;  Surgeon: Nazario Tirado MD;  Location:  OR               Social History:     Social History     Tobacco Use     Smoking status: Never Smoker     Smokeless tobacco: Never Used   Substance Use Topics     Alcohol use: No             Family History:   No family history on file.          Allergies:     Allergies   Allergen Reactions     Metoprolol Shortness Of Breath     2 hours after tasking for the first time SOB, Pt evaluated and pt WDL       Albuterol Unknown     Augmentin Nausea     Cephalexin      Codeine Sulfate      \"i dont't know, nothing, maybe sick to my stomach\"     Cymbalta      agitated     Lisinopril Cough     Omnicef [Cefdinir] Diarrhea     Percocet [Oxycodone-Acetaminophen]      nauseated             Medications:       diltiazem ER COATED BEADS  120 mg Oral Daily     hydrochlorothiazide  12.5 mg Oral Daily     polyethylene glycol  4,000 mL Oral or NG Tube Once     sotalol  40 mg Oral BID             Review of Systems:   A comprehensive greater than 10 system review of systems was carried out.  Pertinent positives and negatives are noted above.  Otherwise negative for contributory info.            Physical Exam:     Blood pressure (!) 174/97, pulse 97, temperature 95.7  F (35.4  C), temperature source Oral, resp. rate 14, height 1.549 m (5' 1\"), weight 84.8 kg (187 lb), SpO2 96 %.  No intake or output data in the 24 hours ending 08/03/19 1043  Exam:  General - Awake alert and oriented, appears older than stated age  Head, Eyes, ENT: normocephalic, atraumatic, pupils equal, round and sclera anicteric  Pulm - Non-labored breathing with normal respiratory effort  CVS - irregular rate and rhythm, no peripheral edema  Abd - obese, abdominal wall veins " seen.  Non tender, Rectal exam was performed. Some blood noted near anus and blood in the diaper.  DAVID shows no mass,  Some blood noted on the glove  Neuro - CN II-XII grossly intact  Musculoskeletal - extremities with no clubbing, cyanosis or edema; able to ambulate  Psych - responsive, alert, cooperative; oriented x3; appropriate mood and affect  External/skin - inspection reveals no rashes, lesions or ulcers, normal coloring           Data Reviewed:   No results found for this or any previous visit (from the past 24 hour(s)).    Recent Labs   Lab 08/03/19  0600 08/03/19  0247   WBC  --  10.4   HGB 13.0 13.3   HCT  --  41.1   MCV  --  96   PLT  --  301          Lab Results   Component Value Date     08/03/2019     03/28/2018     01/09/2018    Lab Results   Component Value Date    CHLORIDE 104 08/03/2019    CHLORIDE 105 03/28/2018    CHLORIDE 103 01/09/2018    Lab Results   Component Value Date    BUN 21 08/03/2019    BUN 16 03/28/2018    BUN 17 01/09/2018      Lab Results   Component Value Date    POTASSIUM 3.1 08/03/2019    POTASSIUM 3.8 03/28/2018    POTASSIUM 3.3 01/09/2018    Lab Results   Component Value Date    CO2 29 08/03/2019    CO2 26 03/28/2018    CO2 26 01/09/2018    Lab Results   Component Value Date    CR 0.49 08/03/2019    CR 0.70 03/28/2018    CR 0.64 01/09/2018            Assessment and Plan:   78 yo F with episode of hematochezia. Description of blood and exam consistent with hemorrhoidal bleeding.  She has not had a colonoscopy for > 5 years and did, per report have a precancerous polyp.  I recommend a colonoscopy with possible banding of hemorrhoids.  She wishes to proceed. Plan for colonoscopy tomorrow at 8:30.  Hold eliquis.      Plan:  1. Admit to hospitalists  2. Surgery: non operative management  3. Diet: Clear liquid  4. IV Fluids: per hospitalists  5. Antibiotics:  per hospitalists  6. Medications:  Hold eliquis.  Will start golytely prep.  7. I&O s:  strict  I&O s  8. Labs:   - Reviewed: cbc, bmp  - Ordered: none   9. Imaging:   - Ordered: none  10. Activity:  up ad sebas  11. DVT prophylaxis: SCD s,     Patient specific identified risk factors considered as part of today s evaluation include: obesity, blood thinners      Additional history obtained from chart.    Total time spent on consultation: 30    Time spent on counseling and coordinating care activities: 25

## 2019-08-03 NOTE — ED TRIAGE NOTES
Two episodes tonight of elisabeth blood w/urine.  Pt states mostly blood, not much urine.  Denies pain, denies dizziness/lightheadedness.  Pt is on eliquis for cardiac/stroke.

## 2019-08-03 NOTE — PLAN OF CARE
PRIMARY DIAGNOSIS: Rectal Bleeding     OUTPATIENT/OBSERVATION GOALS TO BE MET BEFORE DISCHARGE  Orthostatic performed: No    Stable Hgb Yes.   Recent Labs   Lab Test 08/03/19  0600 08/03/19  0247 03/28/18  0917   HGB 13.0 13.3 14.2         Resolved or declined bleeding episodes: No,  Per patient. RN has not seen the sample.   Last episode: this am.     Appropriate testing complete: Yes    Cleared for discharge by consultants (if involved): No    Safe discharge environment identified: Yes    Discharge Planner Nurse   Safe discharge environment identified: Yes  Barriers to discharge: Yes       Entered by: Tabby Mobley 08/03/2019 9:33 AM   Pt alert and orientated. VSS. BP meds given. Hgb stable at 13.0. Pt voiced she had 1 bright red episode but flushed. Pt educated on leaving the sample and calling. Colorectal surgery consulted. NPO. IVF running. Potassium replaced in ED. Will continue to monitor.   Please review provider order for any additional goals.   Nurse to notify provider when observation goals have been met and patient is ready for discharge.

## 2019-08-03 NOTE — PLAN OF CARE
PRIMARY DIAGNOSIS: Rectal Bleed     OUTPATIENT/OBSERVATION GOALS TO BE MET BEFORE DISCHARGE  Orthostatic performed: N/A    Stable Hgb Yes.   Recent Labs   Lab Test 08/03/19  1151 08/03/19  0600 08/03/19  0247   HGB 13.3 13.0 13.3         Resolved or declined bleeding episodes: Yes Last episode: 8/3 am     Appropriate testing complete: No    Cleared for discharge by consultants (if involved): No    Safe discharge environment identified: Yes    Discharge Planner Nurse   Safe discharge environment identified: Yes  Barriers to discharge: Yes       Entered by: Tabby Mobley 08/03/2019 1:41 PM   Pt alert and orientated. VSS. No more episodes of bleeding. Tolerating PO meds. On clears. Will start colonoscopy prep at 1500. Scheduled for 830 am. Fluids running per orders. Hgb stable. Holding eliquis. Will continue to monitor.   Please review provider order for any additional goals.   Nurse to notify provider when observation goals have been met and patient is ready for discharge.

## 2019-08-03 NOTE — ED PROVIDER NOTES
Visit Date:   08/03/2019      CHIEF COMPLAINT:  Hematuria.      HISTORY OF PRESENT ILLNESS:  This is a 79-year-old female on Eliquis for atrial fibrillation, who is here for hematuria.  She states that she went to use the bathroom and noticed significant blood in the toilet bowl after trying to urinate.  Denies having a bowel movement, has not had a bowel movement since Thursday, at which point she was having diarrhea of loose green bowel movements without any blood or black stool.  The patient has any vaginal bleeding, denies any vomiting, does not feel lightheaded, no syncope, no chest pain or shortness of breath, is otherwise here to be further evaluated.  Denies any dysuria.      ALLERGIES:  METOPROLOL, AUGMENTIN, KEFLEX, CODEINE, CYMBALTA, LISINOPRIL, OMNICEF, PERCOCET.      MEDICATIONS:  Wellbutrin, albuterol, diltiazem, losartan, Zofran, ranitidine, Eliquis, Norco, Phenergan, MiraLax, Fentanyl patch, Prilosec, Lipitor, hydrochlorothiazide, methimazole, sotalol, gabapentin, methocarbamol, simvastatin.      PAST MEDICAL HISTORY:  Asthma, atrial fibrillation, CAD, depression, GERD, hypertension, LBBV, liver lesion, obesity, TALI, osteoarthritis, prolonged QT interval, hypercholesterolemia, spinal stenosis in the lumbar region.      SOCIAL HISTORY:  The patient does not smoke cigarettes, does not use alcohol.      REVIEW OF SYSTEMS:   CONSTITUTIONAL:  Negative for fever.   GASTROINTESTINAL:  Positive for rectal bleeding.     All other review of systems is negative.      PHYSICAL EXAMINATION:   VITAL SIGNS:  Blood pressure 151/95, temperature 97.2, pulse is 97, respiratory 18, pulse oximetry 97% on room air.   GENERAL:  The patient is well-appearing.   ORAL:  Moist mucous membrane.   NECK:  Supple.   HEART:  S1, S2, regular rate and rhythm, no murmurs or gallops.   LUNGS:  Clear to auscultation bilaterally.  No wheezes, rales or rhonchi.   ABDOMEN:  Bowel sounds are positive.  Soft, nontender, nondistended.  No  organomegaly.   GENITOURINARY:  She has good rectal tone with yellow stool, with blood per rectum.  External hemorrhoids are noted.  Unable palpate obvious internal hemorrhoids.  She has no vaginal blood per digital exam.   MUSCULOSKELETAL:  2+ distal pulses.   NEUROLOGIC:  The patient is alert and oriented x 3, moves all 4 extremities spontaneously.   DERMATOLOGIC:  Non-pallor.      CBC is normal limits.  BMP within normal limits except for a potassium of 3.1, low.      UA is within normal limits, 1 RBC.      EMERGENCY DEPARTMENT COURSE:  The patient was seen by ED physician, ED nurse.  All findings were reviewed.  All questions were answered.  The patient was escorted to the bathroom and did have further bowel movement and/or urination and was noted to be actively bleeding with a trickle of blood dripping from rectum.  The case was discussed with hospitalist, Dr. Jensen, who graciously accepted care for further admission and consultation.      INTERVENTIONS:  Normal saline 1 liter IV.      MEDICAL DECISION MAKING:  A 79-year-old female with history of atrial fibrillation on Eliquis, who is here with rectal bleeding.  Differential includes internal hemorrhoids, diverticulosis, angiodysplasia upper gastrointestinal bleed or other causes.  Workup thus far is otherwise reassuring, although the patient is having continued rectal bleeding.  I am suspicious of internal hemorrhoids, but on Eliquis this will need to be further monitored with continued symptoms.  Straight-catheterization for urine is clean, and she has no true hematuria.  This is almost certainly rectal in source.  The patient will be admitted for further monitoring and GI versus Colorectal consultation.      DISPOSITION:  Admission to observation.      DIAGNOSIS:  Rectal bleeding.         LENARD MENENDEZ MD             D: 2019   T: 2019   MT: ANDREI      Name:     AC MCKOY   MRN:      [id number]        Account:      [id number]   :       1940           Visit Date:   08/03/2019      Document: [id number]

## 2019-08-03 NOTE — ED NOTES
"[facility]  ED Nurse Handoff Report    Madhavi Castellanos is a 79 year old female   ED Chief complaint: Hematuria  . ED Diagnosis:   Final diagnoses:   Rectal bleeding     Allergies:   Allergies   Allergen Reactions     Metoprolol Shortness Of Breath     2 hours after tasking for the first time SOB, Pt evaluated and pt WDL       Albuterol Unknown     Augmentin Nausea     Cephalexin      Codeine Sulfate      \"i dont't know, nothing, maybe sick to my stomach\"     Cymbalta      agitated     Lisinopril Cough     Omnicef [Cefdinir] Diarrhea     Percocet [Oxycodone-Acetaminophen]      nauseated       Code Status: Full Code  Activity level - Baseline/Home:  Stand by Assist. Activity Level - Current:   Assist X 1. Lift room needed: No. Bariatric: No   Needed: No   Isolation: No. Infection: Not Applicable.     Vital Signs:   Vitals:    08/03/19 0151 08/03/19 0407 08/03/19 0408 08/03/19 0409   BP: (!) 153/89 (!) 157/90     Pulse:  95     Resp: 18      Temp: 97.2  F (36.2  C)      TempSrc: Temporal      SpO2: 95%  98% 97%   Weight: 81.6 kg (180 lb)          Cardiac Rhythm:  ,      Pain level:    Patient confused: No. Patient Falls Risk: Yes.   Elimination Status: Has voided   Patient Report - Initial Complaint: hematuria . Focused Assessment:  Gastrointestinal - GI WDL: -WDL except; GI symptoms; stool  All Quadrants Bowel Sounds: audible and normoactive  Last Bowel Movement: 08/03/19  Stool Consistency: liquid  Stool Color: red, bright  Gastrointestinal Comment: Pt presents with having increased blood from stool started just today.   Tests Performed:   No orders to display     Labs Ordered and Resulted from Time of ED Arrival Up to the Time of Departure from the ED   BASIC METABOLIC PANEL - Abnormal; Notable for the following components:       Result Value    Potassium 3.1 (*)     Glucose 114 (*)     Creatinine 0.49 (*)     All other components within normal limits   ROUTINE UA WITH MICROSCOPIC - " Abnormal; Notable for the following components:    Mucous Urine Present (*)     All other components within normal limits   CBC WITH PLATELETS DIFFERENTIAL   PERIPHERAL IV CATHETER   ED NON INDWELLING BLADDER CATHETER   ABO/RH TYPE AND SCREEN   .   Treatments provided:   Medications   0.9% sodium chloride BOLUS (1,000 mLs Intravenous New Bag 8/3/19 0249)     Followed by   sodium chloride 0.9% infusion (has no administration in time range)   potassium chloride (KLOR-CON) Packet 40 mEq (40 mEq Oral Given 8/3/19 0350)       Family Comments: n/a cat is at home.   OBS brochure/video discussed/provided to patient:  Yes  ED Medications:   Medications   0.9% sodium chloride BOLUS (1,000 mLs Intravenous New Bag 8/3/19 0249)     Followed by   sodium chloride 0.9% infusion (has no administration in time range)   potassium chloride (KLOR-CON) Packet 40 mEq (40 mEq Oral Given 8/3/19 0350)     Drips infusing:  No  For the majority of the shift, the patient's behavior Green. Interventions performed were monitor  .     Severe Sepsis OR Septic Shock Diagnosis Present: No      ED Nurse Name/Phone Number: Jarod Rita,   4:23 AM  RECEIVING UNIT ED HANDOFF REVIEW    Above ED Nurse Handoff Report was reviewed: Yes  Reviewed by: Herbert Francois on August 3, 2019 at 5:10 AM

## 2019-08-03 NOTE — PHARMACY-ADMISSION MEDICATION HISTORY
Admission medication history interview status for this patient is complete. See [facility] admission navigator for allergy information, prior to admission medications and immunization status.     Medication history interview source(s):Patient  Medication history resources (including written lists, pill bottles, clinic record):[facility] med list  Primary pharmacy:[facility]    Changes made to [facility] medication list:  Added: none  Deleted: albuterol, ASA, clobetetasol, furosemide, gabapentin, omeprazole, Zofran, Zocor  Changed: methimazole    Actions taken by pharmacist (provider contacted, etc):None     Additional medication history information:None    Medication reconciliation/reorder completed by provider prior to medication history? No    Do you take OTC medications (eg tylenol, ibuprofen, fish oil, eye/ear drops, etc)? NO(Y/N)    For patients on insulin therapy: NO (Y/N)    ________    Prior to Admission medications    Medication Sig Last Dose Taking? Auth Provider   Apixaban (ELIQUIS PO) Take 5 mg by mouth 2 times daily 8/2/2019 at pm Yes Reported, Patient   Atorvastatin Calcium (LIPITOR PO) Take 10 mg by mouth every evening  8/2/2019 at pm Yes Reported, Patient   buPROPion (WELLBUTRIN XL) 150 MG 24 hr tablet Take 150 mg by mouth every morning 8/2/2019 at am Yes Unknown, Entered By History   HYDROcodone-acetaminophen (NORCO) 7.5-325 MG per tablet Take 1-2 tablets by mouth 2 times daily as needed for moderate to severe pain  8/2/2019 at Unknown time Yes Reported, Patient   METHOCARBAMOL PO Take 750 mg by mouth 6 times daily As needed 8/2/2019 at 3 doses Yes Reported, Patient   polyethylene glycol (MIRALAX) packet Take 17 g by mouth 2 times daily  Patient taking differently: Take 1 packet by mouth 2 times daily as needed  Past Week at Unknown time Yes Josr Perez MD   promethazine (PHENERGAN) 25 MG tablet Take 1 tablet (25 mg) by mouth every 6 hours as needed for nausea 8/2/2019 at Unknown time Yes Popeye Kaur  MD LILIAN   Sotalol HCl (BETAPACE PO) Take 40 mg by mouth 2 times daily  8/2/2019 at pm Yes Reported, Patient   alum & mag hydroxide-simethicone (MYLANTA/MAALOX) 200-200-20 MG/5ML SUSP Take 30 mLs by mouth every 4 hours as needed   Carole Lay MD   diltiazem (CARTIA XT) 120 MG 24 hr capsule Take 120 mg by mouth daily 8/2/2019 at am  Reported, Patient   fentaNYL (DURAGESIC) 25 mcg/hr patch 72 hr Place 1 patch onto the skin every 72 hours 7/31/2019 at am  Reported, Patient   HYDROCHLOROTHIAZIDE PO Take 12.5 mg by mouth daily  8/2/2019 at am  Reported, Patient   Losartan Potassium (COZAAR PO) Take 25 mg by mouth daily 8/2/2019 at am  Reported, Patient   Methimazole (TAPAZOLE PO) Take 5 mg by mouth daily  8/2/2019 at am  Reported, Patient   RaNITidine HCl (ZANTAC PO) Take 150 mg by mouth 8/2/2019 at pm  Reported, Patient

## 2019-08-03 NOTE — PLAN OF CARE
ROOM # 227    Living Situation (if not independent, order SW consult): Ind in community   Facility name:  : [facility]   [phone]    Activity level at baseline: Ind w/ cane  Activity level on admit: SBA w/ cane      Patient registered to observation; given Patient Bill of Rights; given the opportunity to ask questions about observation status and their plan of care.  Patient has been oriented to the observation room, bathroom and call light is in place.    Discussed discharge goals and expectations with patient/family.

## 2019-08-04 VITALS
DIASTOLIC BLOOD PRESSURE: 87 MMHG | BODY MASS INDEX: 35.3 KG/M2 | RESPIRATION RATE: 16 BRPM | TEMPERATURE: 96.5 F | HEART RATE: 89 BPM | OXYGEN SATURATION: 96 % | SYSTOLIC BLOOD PRESSURE: 147 MMHG | HEIGHT: 61 IN | WEIGHT: 187 LBS

## 2019-08-04 LAB
ANION GAP SERPL CALCULATED.3IONS-SCNC: 4 MMOL/L (ref 3–14)
BUN SERPL-MCNC: 12 MG/DL (ref 7–30)
CALCIUM SERPL-MCNC: 8.9 MG/DL (ref 8.5–10.1)
CHLORIDE SERPL-SCNC: 108 MMOL/L (ref 94–109)
CO2 SERPL-SCNC: 29 MMOL/L (ref 20–32)
COLONOSCOPY: NORMAL
CREAT SERPL-MCNC: 0.48 MG/DL (ref 0.52–1.04)
ERYTHROCYTE [DISTWIDTH] IN BLOOD BY AUTOMATED COUNT: 13.2 % (ref 10–15)
GFR SERPL CREATININE-BSD FRML MDRD: >90 ML/MIN/{1.73_M2}
GLUCOSE SERPL-MCNC: 113 MG/DL (ref 70–99)
HCT VFR BLD AUTO: 38.7 % (ref 35–47)
HGB BLD-MCNC: 12.3 G/DL (ref 11.7–15.7)
MCH RBC QN AUTO: 30.8 PG (ref 26.5–33)
MCHC RBC AUTO-ENTMCNC: 31.8 G/DL (ref 31.5–36.5)
MCV RBC AUTO: 97 FL (ref 78–100)
PLATELET # BLD AUTO: 296 10E9/L (ref 150–450)
POTASSIUM SERPL-SCNC: 3.8 MMOL/L (ref 3.4–5.3)
RBC # BLD AUTO: 3.99 10E12/L (ref 3.8–5.2)
SODIUM SERPL-SCNC: 141 MMOL/L (ref 133–144)
WBC # BLD AUTO: 10.2 10E9/L (ref 4–11)

## 2019-08-04 PROCEDURE — 25000132 ZZH RX MED GY IP 250 OP 250 PS 637: Performed by: INTERNAL MEDICINE

## 2019-08-04 PROCEDURE — G0378 HOSPITAL OBSERVATION PER HR: HCPCS

## 2019-08-04 PROCEDURE — 99217 ZZC OBSERVATION CARE DISCHARGE: CPT | Performed by: HOSPITALIST

## 2019-08-04 PROCEDURE — 25000132 ZZH RX MED GY IP 250 OP 250 PS 637: Performed by: HOSPITALIST

## 2019-08-04 PROCEDURE — 80048 BASIC METABOLIC PNL TOTAL CA: CPT | Performed by: INTERNAL MEDICINE

## 2019-08-04 PROCEDURE — 45378 DIAGNOSTIC COLONOSCOPY: CPT | Performed by: COLON & RECTAL SURGERY

## 2019-08-04 PROCEDURE — 36415 COLL VENOUS BLD VENIPUNCTURE: CPT | Performed by: INTERNAL MEDICINE

## 2019-08-04 PROCEDURE — 25000131 ZZH RX MED GY IP 250 OP 636 PS 637: Performed by: INTERNAL MEDICINE

## 2019-08-04 PROCEDURE — 25000128 H RX IP 250 OP 636: Performed by: COLON & RECTAL SURGERY

## 2019-08-04 PROCEDURE — G0500 MOD SEDAT ENDO SERVICE >5YRS: HCPCS | Performed by: COLON & RECTAL SURGERY

## 2019-08-04 PROCEDURE — 85027 COMPLETE CBC AUTOMATED: CPT | Performed by: INTERNAL MEDICINE

## 2019-08-04 RX ORDER — FENTANYL CITRATE 50 UG/ML
INJECTION, SOLUTION INTRAMUSCULAR; INTRAVENOUS PRN
Status: DISCONTINUED | OUTPATIENT
Start: 2019-08-04 | End: 2019-08-04 | Stop reason: HOSPADM

## 2019-08-04 RX ORDER — LIDOCAINE 40 MG/G
CREAM TOPICAL
Status: DISCONTINUED | OUTPATIENT
Start: 2019-08-04 | End: 2019-08-04 | Stop reason: HOSPADM

## 2019-08-04 RX ORDER — FLUMAZENIL 0.1 MG/ML
0.2 INJECTION, SOLUTION INTRAVENOUS
Status: DISCONTINUED | OUTPATIENT
Start: 2019-08-04 | End: 2019-08-04 | Stop reason: HOSPADM

## 2019-08-04 RX ORDER — DIPHENHYDRAMINE HYDROCHLORIDE 50 MG/ML
INJECTION INTRAMUSCULAR; INTRAVENOUS PRN
Status: DISCONTINUED | OUTPATIENT
Start: 2019-08-04 | End: 2019-08-04 | Stop reason: HOSPADM

## 2019-08-04 RX ORDER — NALOXONE HYDROCHLORIDE 0.4 MG/ML
.1-.4 INJECTION, SOLUTION INTRAMUSCULAR; INTRAVENOUS; SUBCUTANEOUS
Status: DISCONTINUED | OUTPATIENT
Start: 2019-08-04 | End: 2019-08-04 | Stop reason: HOSPADM

## 2019-08-04 RX ADMIN — HYDROCHLOROTHIAZIDE 12.5 MG: 12.5 CAPSULE ORAL at 10:04

## 2019-08-04 RX ADMIN — SOTALOL HYDROCHLORIDE 40 MG: 80 TABLET ORAL at 10:04

## 2019-08-04 RX ADMIN — METHOCARBAMOL TABLETS 750 MG: 750 TABLET, COATED ORAL at 02:37

## 2019-08-04 RX ADMIN — ONDANSETRON 4 MG: 4 TABLET, ORALLY DISINTEGRATING ORAL at 05:58

## 2019-08-04 RX ADMIN — DILTIAZEM HYDROCHLORIDE 120 MG: 120 CAPSULE, COATED, EXTENDED RELEASE ORAL at 10:04

## 2019-08-04 RX ADMIN — LOSARTAN POTASSIUM 25 MG: 25 TABLET, FILM COATED ORAL at 10:04

## 2019-08-04 RX ADMIN — ACETAMINOPHEN 650 MG: 325 TABLET, FILM COATED ORAL at 01:16

## 2019-08-04 NOTE — OR NURSING
Patient tolerated the procedure poor. C/O of lots of abdominal pain,given Fentanyl 150 mcg and versed 2 mg, Benadryl 25 mg iv. Changed the adult colonoscope to pediatric colonoscope.Report called to patient primary RN. Patient in recovery in a stable condition.

## 2019-08-04 NOTE — PLAN OF CARE
PRIMARY DIAGNOSIS: GI BLEED    OUTPATIENT/OBSERVATION GOALS TO BE MET BEFORE DISCHARGE  1. Orthostatic performed: N/A    2. Stable Hgb Yes.   Recent Labs   Lab Test 08/03/19  1151 08/03/19  0600 08/03/19  0247   HGB 13.3 13.0 13.3       3. Resolved or declined bleeding episodes: No,  with a moderate amount of bleeding Last episode: 8/419 @ 0130    4. Appropriate testing complete: No, Colonoscopy in AM    5. Cleared for discharge by consultants (if involved): No    6. Safe discharge environment identified: Yes    Discharge Planner Nurse   Safe discharge environment identified: Yes  Barriers to discharge: Yes       Entered by: Herbert Francois 08/04/2019 2:57 AM     Please review provider order for any additional goals.   Nurse to notify provider when observation goals have been met and patient is ready for discharge.    Temp: 97.6  F (36.4  C) Temp src: Oral BP: (!) 148/78 Pulse: 80 Heart Rate: 92 Resp: 16 SpO2: 96 % O2 Device: None (Room air)    Pt A&Ox4. Pt rates pain at 7-8/10, tylenol requested and given. Pt denies N/V/D, numbness, or tingling. LS clear, however diminished. Bowel sounds present. Pt is refusing IV fluids, and has stated that she will not follow NPO diet order. RN talked with Pt about NPO importance especially regarding her refusal to take anymore Go-lytely. Pt agreed to NPO, however requested ice chips. One cup of ice chips given. Pt called nurse to look at bloody stool, elisabeth blood noted, moderate amount. Pt is Ind in room. Plan: colonoscopy in AM, will continue to monitor for bloody stools

## 2019-08-04 NOTE — PLAN OF CARE
PRIMARY DIAGNOSIS: GI BLEED    OUTPATIENT/OBSERVATION GOALS TO BE MET BEFORE DISCHARGE  1. Orthostatic performed: N/A    2. Stable Hgb Yes.   Recent Labs   Lab Test 08/03/19  1151 08/03/19  0600 08/03/19  0247   HGB 13.3 13.0 13.3       3. Resolved or declined bleeding episodes: No,  with a moderate amount of bleeding Last episode: 8/4/19 @ 0555    4. Appropriate testing complete: No, Colonoscopy in AM    5. Cleared for discharge by consultants (if involved): No    6. Safe discharge environment identified: Yes    Discharge Planner Nurse   Safe discharge environment identified: Yes  Barriers to discharge: Yes       Entered by: Herbert Francois 08/04/2019 5:06 AM     Please review provider order for any additional goals.   Nurse to notify provider when observation goals have been met and patient is ready for discharge.    Temp: 97.6  F (36.4  C) Temp src: Oral BP: 124/78 Pulse: 80 Heart Rate: 92 Resp: 16 SpO2: 96 % O2 Device: None (Room air)    Pt A&Ox4. Pt rates pain at 7-8/10, tylenol requested and given. Pt states her pain is getting worse, Robaxin given per Pt request. Pt denies N/V/D, numbness, or tingling. LS clear, however diminished. Bowel sounds present. Pt is refusing IV fluids, and has stated that she will not follow NPO diet order. RN talked with Pt about NPO importance especially regarding her refusal to take anymore Go-lytely. Pt agreed to NPO, however requested ice chips. One cup of ice chips given. Pt called nurse to look at bloody stool, elisabeth blood noted, moderate amount. No other episodes this night. Pt is Ind in room. Plan: colonoscopy in AM, will continue to monitor for bloody stools. Pt reports not being able to sleep tonight. Pt had another episode of elisabeth blood BM @ 0555, moderate amount of blood.

## 2019-08-04 NOTE — BRIEF OP NOTE
[facility]    Brief Operative Note    Pre-operative diagnosis: Rectal bleeding  Post-operative diagnosis Rectal bleeding  Procedure: Procedure(s):  COLONOSCOPY  Surgeon: Surgeon(s) and Role:     * Darron Cunha MD - Primary  Anesthesia: Conscious Sedation   Estimated blood loss: None  Drains: None  Specimens: * No specimens in log *  Findings:   See note in provation system.  No blood seen.  Bleeding likely secondary to hemorrhoids.  These are not large enough to band.  Bleeding will likely stop with cessation of anticoagulation as noted in Dr. Gusman's note yesterday.  She should to take one capful of miralax daily and 1 teaspoon of Metamucil in water day.  OK to discharge home today.    Complications: None.  Implants:  * No implants in log *

## 2019-08-04 NOTE — PLAN OF CARE
"PRIMARY DIAGNOSIS: Rectal BLEED    OUTPATIENT/OBSERVATION GOALS TO BE MET BEFORE DISCHARGE  1. Orthostatic performed: No    2. Stable Hgb Yes.   Recent Labs   Lab Test 08/04/19  0615 08/03/19  1151 08/03/19  0600   HGB 12.3 13.3 13.0         3. Resolved or declined bleeding episodes: No,  with a moderate amount of bleeding Last episode: This AM    4. Appropriate testing complete: Yes    5. Cleared for discharge by consultants (if involved): Yes    6. Safe discharge environment identified: Yes    Discharge Planner Nurse   Safe discharge environment identified: Yes  Barriers to discharge: No       Entered by: Harish Prabhakar 08/04/2019     Pt alert and oriented x4. She has small amount of pain in her back. Fentanyl patch on. Colonoscopy this AM. Regular diet. Moderate amount of bleeding in stool this AM. Up indep w/ walker. Will hopefully go home today.      BP (!) 170/132 (BP Location: Left arm)   Pulse 89   Temp 97.6  F (36.4  C) (Oral)   Resp 16   Ht 1.549 m (5' 1\")   Wt 84.8 kg (187 lb)   SpO2 96%   BMI 35.33 kg/m         Please review provider order for any additional goals.   Nurse to notify provider when observation goals have been met and patient is ready for discharge.  "

## 2019-08-04 NOTE — PLAN OF CARE
Patient's After Visit Summary was reviewed with patient and/or family.   Patient verbalized understanding of After Visit Summary, recommended follow up and was given an opportunity to ask questions.   Discharge medications sent home with patient/family: Not applicable   Discharged with other:family    AVS reviewed w/ patient and all questions answered. She was wheel chaired off the unit and left via private transportation.     OBSERVATION patient END time: 1100

## 2019-08-04 NOTE — PLAN OF CARE
"PRIMARY DIAGNOSIS: Rectal Bleed     OUTPATIENT/OBSERVATION GOALS TO BE MET BEFORE DISCHARGE  1. Orthostatic performed: N/A    2. Stable Hgb Yes.   Recent Labs   Lab Test 08/03/19  1151 08/03/19  0600 08/03/19  0247   HGB 13.3 13.0 13.3         3. Resolved or declined bleeding episodes:   No.  Patient passing blood with urination at 16:00    4. Appropriate testing complete: No. Colonoscopy planned in am    5. Cleared for discharge by consultants (if involved): No    6. Safe discharge environment identified: Yes    Blood pressure 128/66, pulse 80, temperature 97.8  F (36.6  C), temperature source Oral, resp. rate 16, height 1.549 m (5' 1\"), weight 84.8 kg (187 lb), SpO2 95 %.    VSS.  Patient refusing any further amount of Go-lytely prep; only consuming approximately 1/2 of container.   Further patient is refusing any further IV fluid.  Patient complaining of back pain, Norco given per order.  Fentanyl patch remains on.  Patient is up independently in room, ambulating to the bathroom prn.  No further passage of blood during urination since 16:00 pm.   PLAN:  Continue to provide supportive cares.  Planned colonoscopy in am.  NPO after midnight.        Discharge Planner Nurse   Safe discharge environment identified: Yes  Barriers to discharge: Yes       Entered by: Karen M. Lesch 08/03/2019 21:00  PM     Please review provider order for any additional goals.   Nurse to notify provider when observation goals have been met and patient is ready for discharge.  "

## 2019-08-04 NOTE — DISCHARGE SUMMARY
[facility]  Hospitalist Discharge Summary       Date of Admission:  8/3/2019  Date of Discharge:  8/4/2019  Discharging Provider: Yang Gusman MD      Discharge Diagnoses   Bright red blood per rectum    Follow-ups Needed After Discharge   Follow-up Appointments     Follow-up and recommended labs and tests       Follow up with primary care provider, Cecy Edmondson, within 7 days for   hospital follow- up.  No follow up labs or test are needed.             Unresulted Labs Ordered in the Past 30 Days of this Admission     No orders found for last 31 day(s).      These results will be followed up by NA    Discharge Disposition   Discharged to home  Condition at discharge: Stable    Hospital Course   Madhavi Castellanos is a 79-year-old  female with a very long medical history and lots of medications, which include fibrillation for which she is on chronic anticoagulation with Eliquis, who has had prior colonoscopy with polyp removal, presents to [facility] with bright red blood per rectum.      The patient has had a colonoscopy in the past.  She has had a few polyps removed.  A couple were benign, and one was precancerous.  The patient has chronic GI problems.  She has about 2 bowel movements per week.  This past week she has had increased gas and distention.  Her last bowel movement was on Thursday, which was green in color.  Since then, she has not had any blood, but woke up at 2:00 a.m. with 1 episode of bloody stool.  The patient initially thought this was maybe coming from her bladder.  The patient came into [facility].      In the Emergency Department, the patient was seen by Dr. Froy Begum.  The patient was afebrile with stable vital signs.  The patient underwent both a vaginal exam, which was negative, and a rectal exam which did show some loose blood and some solid yellow stool.  There were some external hemorrhoids, and the patient also confirmed she has had  "history of hemorrhoids in the past.  The patient's blood work revealed potassium 3.1, normal kidney function, elevated BUN to creatinine ratio, hemoglobin 13.3, white count of 10.4, glucose 114.  Urinalysis also had less than 1 white cell and 1 red cell.  The patient received potassium chloride 40 mEq as well as a liter of normal saline.  The patient is being admitted for bright red blood per rectum under observation status.      Patient was admitted to the Obs Unit. She was seen by colorectal who offered her a colonoscopy while here. She had no bleeding until the bowel prep and then had some BRB with the prep. Colonoscopy was done. No active bleeding was seen. She had internal hemorrhoids and diverticulosis seen on exam. Hb was stable while here. When I went to see her today after colonoscopy, she was fully dressed and in the chair with friends in the room. She stated she was leaving. She proceeded to tell me how [facility] has a bad reputation (told me a story about an EMS friend of hers who said he would jump from the ambulance if he was being taken to [facility]). I asked her what happened here that made her unhappy. She states that her experience in the endoscopy suite was \"terrible\". She said she had a lot of pain with the colonosocpy. She stated she asked the Provider \"who was in charge\" and he said he was and she said she questioned that. She also stated that she was upset that she was allowed ice chips all night and then this am was told by the nurse she could not have ice chips. I indicated that her order was NPO except for ice chips and meds and I indicated that likely different nurses have different interpretations of the \"NPO\" order. I indicated that likely a conversation could have been had and perhaps the nurse could have looked and seen the \"except for ice chips\" part of the order. She again started to tell me \"do you know about the bad reputation [facility] have?...\". I indicated that likely every facility gets " good and bad reviews and that it is the right of every patient to go to a facility where they are most comfortable. She continued to try to tell me how bad [facility] is. At that point I indicated to her that I enjoyed working here and felt like the providers and especially nurses that I work directly with all do a good job and that I would be confident in the care they gave to patients. I indicated we should leave the conversation at that.    Consultations This Hospital Stay   COLORECTAL SURGERY IP CONSULT    Code Status   Full Code    Time Spent on this Encounter   I, Yang Gusman, personally saw the patient today and spent greater than 30 minutes discharging this patient.       Yang Gusman MD  [facility]  ______________________________________________________________________    Physical Exam   Vital Signs: Temp: 97.6  F (36.4  C) Temp src: Oral BP: (!) 170/132 Pulse: 89 Heart Rate: 94 Resp: 16 SpO2: 96 % O2 Device: None (Room air) Oxygen Delivery: 3 LPM  Weight: 187 lbs 0 oz  Patient fully dressed and stated she was leaving. Exam deferred       Primary Care Physician   Cecy Edmondson    Discharge Orders      Reason for your hospital stay    Rectal bleeding. No source of active bleeding on colonoscopy. Diverticulosis and internal hemorrhoids seen on colonoscopy.     Follow-up and recommended labs and tests     Follow up with primary care provider, Cecy Edmondson, within 7 days for hospital follow- up.  No follow up labs or test are needed.     Activity    Your activity upon discharge: activity as tolerated     Diet    Follow this diet upon discharge: Regular       Significant Results and Procedures   Most Recent 3 CBC's:  Recent Labs   Lab Test 08/04/19  0615 08/03/19  1151 08/03/19  0600 08/03/19  0247 03/28/18  0917   WBC 10.2  --   --  10.4 10.9   HGB 12.3 13.3 13.0 13.3 14.2   MCV 97  --   --  96 96     --   --  301 361     Most Recent 3 BMP's:  Recent Labs   Lab Test 08/04/19  0615  08/03/19  0247 03/28/18  0917    138 140   POTASSIUM 3.8 3.1* 3.8   CHLORIDE 108 104 105   CO2 29 29 26   BUN 12 21 16   CR 0.48* 0.49* 0.70   ANIONGAP 4 5 9   LUIS ANTONIO 8.9 9.0 9.1   * 114* 107*     Most Recent 2 LFT's:  Recent Labs   Lab Test 03/28/18  0917 01/09/18  0701   AST 33 19   ALT 37 24   ALKPHOS 120 102   BILITOTAL 0.4 0.7   ,   Results for orders placed or performed during the hospital encounter of 12/26/18   CT Urogram wo & w Contrast    Narrative    CT UROGRAM WITHOUT AND WITH CONTRAST 12/26/2018 3:05 PM     HISTORY: Upper genitourinary tract tumors, monitor, 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  Gross hematuria.    TECHNIQUE: Volumetric acquisition through chest, abdomen and pelvis  with IV contrast.   100mL Isovue-370  Radiation dose for this scan was  reduced using automated exposure control, adjustment of the mA and/or  kV according to patient size, or iterative reconstruction technique.    COMPARISON: 7/27/2018.    FINDINGS:  Precontrast images demonstrate a few tiny punctate renal  stones bilaterally, at least two on the right and four on the left  measuring up to 0.2 cm. These are slightly more numerous than on the  previous study. No ureteral stones, bladder calculi or hydronephrosis.    Following contrast the kidneys enhance symmetrically. No suspicious  renal masses.  There is a very tiny fat attenuation lesion in the  right mid kidney measuring less than 0.5 cm consistent with a small  benign angiomyolipoma. No abnormality of the intrarenal collecting  systems or ureters. Small amount of air in the bladder presumably due  to recent catheterization. Bladder is otherwise negative.    Gallbladder is absent. The liver, spleen and pancreas demonstrate no  worrisome findings. Mild prominence of the common bile duct and  central intrahepatic bile ducts, likely relating to prior  cholecystectomy, stable. Correlate with clinical and  laboratory  findings. Negative adrenal glands.    Sigmoid colon diverticula without diverticulitis. Negative appendix.  Uterus is absent. No suspicious pelvic masses. No bowel obstruction,  ascites or other acute findings. Small esophageal hiatal hernia.  Scoliosis convex to the left and multilevel degenerative disc disease  in the lumbar spine.      Impression    IMPRESSION:  1. Few tiny nonobstructing renal stones. No ureteral stones or  hydronephrosis.  2. No other cause of hematuria or other acute findings.  3. Tiny benign angiomyolipoma right kidney.  4. Sigmoid diverticulosis.    BENEDICT RAMIREZ MD       Discharge Medications   Current Discharge Medication List      START taking these medications    Details   psyllium (METAMUCIL/KONSYL) Packet Take 1 packet by mouth daily    Associated Diagnoses: BRBPR (bright red blood per rectum)         CONTINUE these medications which have NOT CHANGED    Details   Apixaban (ELIQUIS PO) Take 5 mg by mouth 2 times daily      Atorvastatin Calcium (LIPITOR PO) Take 10 mg by mouth every evening       buPROPion (WELLBUTRIN XL) 150 MG 24 hr tablet Take 150 mg by mouth every morning      HYDROcodone-acetaminophen (NORCO) 7.5-325 MG per tablet Take 1-2 tablets by mouth 2 times daily as needed for moderate to severe pain       METHOCARBAMOL PO Take 750 mg by mouth 6 times daily As needed      polyethylene glycol (MIRALAX) packet Take 17 g by mouth 2 times daily  Qty: 30 packet, Refills: 0      promethazine (PHENERGAN) 25 MG tablet Take 1 tablet (25 mg) by mouth every 6 hours as needed for nausea  Qty: 15 tablet, Refills: 0      Sotalol HCl (BETAPACE PO) Take 40 mg by mouth 2 times daily       alum & mag hydroxide-simethicone (MYLANTA/MAALOX) 200-200-20 MG/5ML SUSP Take 30 mLs by mouth every 4 hours as needed  Qty: 300 mL, Refills: 0      diltiazem (CARTIA XT) 120 MG 24 hr capsule Take 120 mg by mouth daily      fentaNYL (DURAGESIC) 25 mcg/hr patch 72 hr Place 1 patch onto the skin  "every 72 hours      HYDROCHLOROTHIAZIDE PO Take 12.5 mg by mouth daily       Losartan Potassium (COZAAR PO) Take 25 mg by mouth daily      Methimazole (TAPAZOLE PO) Take 5 mg by mouth five times a week Monday thru Friday      RaNITidine HCl (ZANTAC PO) Take 150 mg by mouth           Allergies   Allergies   Allergen Reactions     Metoprolol Shortness Of Breath     2 hours after tasking for the first time SOB, Pt evaluated and pt WDL       Albuterol Unknown     Augmentin Nausea     Cephalexin      Codeine Sulfate      \"i dont't know, nothing, maybe sick to my stomach\"     Cymbalta      agitated     Lisinopril Cough     Omnicef [Cefdinir] Diarrhea     Percocet [Oxycodone-Acetaminophen]      nauseated     "

## 2019-12-09 ENCOUNTER — HEALTH MAINTENANCE LETTER (OUTPATIENT)
Age: 79
End: 2019-12-09

## 2020-01-12 ENCOUNTER — HOSPITAL ENCOUNTER (EMERGENCY)
Facility: CLINIC | Age: 80
Discharge: HOME | End: 2020-01-12
Attending: EMERGENCY MEDICINE | Admitting: EMERGENCY MEDICINE
Payer: COMMERCIAL

## 2020-01-12 VITALS
HEART RATE: 96 BPM | OXYGEN SATURATION: 97 % | RESPIRATION RATE: 18 BRPM | DIASTOLIC BLOOD PRESSURE: 92 MMHG | SYSTOLIC BLOOD PRESSURE: 127 MMHG | TEMPERATURE: 98.1 F

## 2020-01-12 DIAGNOSIS — Z79.02 ANTIPLATELET OR ANTITHROMBOTIC LONG-TERM USE: ICD-10-CM

## 2020-01-12 DIAGNOSIS — S40.029A CONTUSION OF UPPER EXTREMITY, UNSPECIFIED LATERALITY, INITIAL ENCOUNTER: ICD-10-CM

## 2020-01-12 DIAGNOSIS — R23.3 SPONTANEOUS BRUISING: Primary | ICD-10-CM

## 2020-01-12 LAB
BASOPHILS # BLD AUTO: 0 10E9/L (ref 0–0.2)
BASOPHILS NFR BLD AUTO: 0.2 %
DIFFERENTIAL METHOD BLD: ABNORMAL
EOSINOPHIL # BLD AUTO: 0.1 10E9/L (ref 0–0.7)
EOSINOPHIL NFR BLD AUTO: 0.8 %
ERYTHROCYTE [DISTWIDTH] IN BLOOD BY AUTOMATED COUNT: 15.1 % (ref 10–15)
HCT VFR BLD AUTO: 43.4 % (ref 35–47)
HGB BLD-MCNC: 13.4 G/DL (ref 11.7–15.7)
IMM GRANULOCYTES # BLD: 0.5 10E9/L (ref 0–0.4)
IMM GRANULOCYTES NFR BLD: 2.6 %
INR PPP: 0.96 (ref 0.86–1.14)
LYMPHOCYTES # BLD AUTO: 3.5 10E9/L (ref 0.8–5.3)
LYMPHOCYTES NFR BLD AUTO: 18.9 %
MCH RBC QN AUTO: 28.6 PG (ref 26.5–33)
MCHC RBC AUTO-ENTMCNC: 30.9 G/DL (ref 31.5–36.5)
MCV RBC AUTO: 93 FL (ref 78–100)
MONOCYTES # BLD AUTO: 1.5 10E9/L (ref 0–1.3)
MONOCYTES NFR BLD AUTO: 8.1 %
NEUTROPHILS # BLD AUTO: 12.7 10E9/L (ref 1.6–8.3)
NEUTROPHILS NFR BLD AUTO: 69.4 %
NRBC # BLD AUTO: 0 10*3/UL
NRBC BLD AUTO-RTO: 0 /100
PLATELET # BLD AUTO: 404 10E9/L (ref 150–450)
RBC # BLD AUTO: 4.69 10E12/L (ref 3.8–5.2)
WBC # BLD AUTO: 18.3 10E9/L (ref 4–11)

## 2020-01-12 PROCEDURE — 99283 EMERGENCY DEPT VISIT LOW MDM: CPT

## 2020-01-12 PROCEDURE — 85025 COMPLETE CBC W/AUTO DIFF WBC: CPT | Performed by: EMERGENCY MEDICINE

## 2020-01-12 PROCEDURE — 85610 PROTHROMBIN TIME: CPT | Performed by: EMERGENCY MEDICINE

## 2020-01-12 ASSESSMENT — ENCOUNTER SYMPTOMS
BLOOD IN STOOL: 0
FEVER: 0
BRUISES/BLEEDS EASILY: 1

## 2020-01-12 NOTE — ED NOTES
Care Coordinator - Discharge Planning    Admission Date/Time:  1/12/2020  Attending MD:  Hamzah Castañeda MD     Data  Date of initial CC assessment:  1/12/20  Chart reviewed, discussed with interdisciplinary team.   Patient was admitted for: No diagnosis found.     Assessment   Concerns with insurance coverage for discharge needs: None.  Current Living Situation: Patient lives alone.  Support System: not assessed in ED  Services Involved: none  Transportation at Discharge: self/car  Transportation to Medical Appointments:   Self/car  Barriers to Discharge: none      Coordination of Care and Referrals: Provided patient/family with options for [facility] providers.     Pt requesting information re: making a change in her care team providers.  She has been receiving care in the [facility] and would like to be closer to home.    Provided education re: providers and clinic locations in the [facility].  Written info given to patient upon discharge from ED.  She wishes to contact on her own.  I encouraged her to make her insurance provider aware of any changes.       Plan  Anticipated Discharge Date:  1/12/20   Anticipated Discharge Plan:  home    Yany Wilson RN, LICSW, CCM  RN Care Coordinator  [facility], Emergency Department  Phone  [phone]

## 2020-01-12 NOTE — ED TRIAGE NOTES
Patient presents to the ED reporting new bruising to bilateral arms. States 4 days ago was taken off warfarin and started on plavix. States that since then has had new bruising in the arms without injuries.

## 2020-01-12 NOTE — ED AVS SNAPSHOT
[facility] Emergency Department  [address]  [facility] [address]  Phone:  [phone]  Fax:  [phone]                                    Madhavi Castellanos   MRN: [id number]    Department:  [facility] Emergency Department   Date of Visit:  1/12/2020           After Visit Summary Signature Page    I have received my discharge instructions, and my questions have been answered. I have discussed any challenges I see with this plan with the nurse or doctor.    ..........................................................................................................................................  Patient/Patient Representative Signature      ..........................................................................................................................................  Patient Representative Print Name and Relationship to Patient    ..................................................               ................................................  Date                                   Time    ..........................................................................................................................................  Reviewed by Signature/Title    ...................................................              ..............................................  Date                                               Time          22EPIC Rev 08/18

## 2020-01-12 NOTE — ED PROVIDER NOTES
History     Chief Complaint:  Bleeding/Bruising    HPI   Madhavi Castellanos is a 79 year old female on Plavix who presents with bruising. The patient has a history of atrial fibrillation, status post ablation, who presented in the past with a lower GI bleed when on Eliquis. She was then switched to warfarin. The patient had a Watchman surgery at [facility] on 12/30/19 in order to avoid long term anticoagulation use. She was put on Plavix for 6 months following this surgery and presents today with arm bruising despite lack of injury. The patient denies bloody stools, fever, or history of stroke. Of note, the patient has a follow-up appointment with her cardiologist, Dr. Rex Beckwith, in just over a week. She is unsatisfied with her recent care at [facility] and wishes to switch all of her care.     Allergies:  Metoprolol  Albuterol  Augmentin  Cephalexin  Codeine Sulfate  Cymbalta  Lisinopril  Omnicef [Cefdinir]  Percocet [Oxycodone-Acetaminophen]     Medications:    Atorvastatin  bupropion  diltiazem   Fentanyl patches  Hydrochlorothiazide   Norco  Losartan   Methimazole   methocarbamol  promethazine  Ranitidine  Sotalol  Doxycycline (current right leg skin infection)  Furosemide  Plavix    Past Medical History:    Asthma   Atrial fibrillation (H)   CAD (coronary artery disease)   Depression   Esophageal reflux   Hypertension   LBBB (left bundle branch block)   Liver lesion   Lumbosacral spondylosis   Mumps   Obesity   TALI (obstructive sleep apnea)   Osteoarthritis   Palpitations   Prolonged QT interval   Pure hypercholesterolemia   Spinal stenosis of lumbar region   STD (sexually transmitted disease)   Tuberculosis     Past Surgical History:    Cholecystectomy  Colonoscopy  Ablation   EGD  Excision of vulvar lesion  Septoplasty  Hysterectomy  carpal tunnel release  bilateral knee arthroscopy    Family History:    Father - lung and kidney cancer    Social History:  The patient was accompanied to the ED by her  boyfriend.  Tobacco use: negative   Alcohol use: negative   PCP: Cecy Edmondson     Review of Systems   Constitutional: Negative for fever.   Gastrointestinal: Negative for blood in stool.   Hematological: Bruises/bleeds easily.   All other systems reviewed and are negative.    Physical Exam     Patient Vitals for the past 24 hrs:   BP Temp Pulse Resp SpO2   01/12/20 1222 -- -- -- -- 97 %   01/12/20 1200 -- -- -- -- 96 %   01/12/20 1122 -- -- -- -- 96 %   01/12/20 1121 (!) 127/92 -- 96 -- --   01/12/20 1025 (!) 170/117 98.1  F (36.7  C) 88 18 97 %      Physical Exam  General: Alert, appears elderly, otherwise well-developed and well-nourished. Cooperative.     In no distress  HEENT:  Head:  Atraumatic  Ears:  External ears are normal  Mouth/Throat:  Oropharynx is without erythema or exudate and mucous membranes are moist.   Eyes:   Conjunctivae normal and EOM are normal. No scleral icterus.  CV:  Normal rate, regular rhythm, normal heart sounds and radial pulses are 2+ and symmetric.  No murmur.  Resp:  Breath sounds are clear bilaterally    Non-labored, no retractions or accessory muscle use  GI:  Abdomen is soft, no distension, no tenderness. No rebound or guarding.  No CVA tenderness bilaterally  MS:  Normal range of motion. No edema.    Normal strength in all 4 extremities.     Back atraumatic.    No midline cervical, thoracic, or lumbar tenderness  Skin:  Bruising to upper extremities.  No tenderness to palpation to bruised areas.  Neuro:  Alert. Normal strength.  GCS: 15  Psych:  Normal mood and affect.    Emergency Department Course     Laboratory:  Laboratory findings were communicated with the patient who voiced understanding of the findings.    CBC: WBC 18.3 (H), HGB 13.4,      INR: 0.96     Emergency Department Course:  Past medical records, nursing notes, and vitals reviewed.    1129 I performed an exam of the patient as documented above.     IV was inserted and blood was drawn for laboratory  testing, results above.    1325 Patient rechecked and updated.      I personally reviewed the laboratory results with the Patient and answered all related questions prior to discharge.      Impression & Plan     Medical Decision Making:  Patient is a 79-year-old female with complex past medical history pertinent for atrial fibrillation, CAD status post recent watchman placement at outside hospital and transition from Coumadin to Plavix who presents with bilateral bruising to upper extremities.  CBC reassuringly shows normal platelets.  There is a nonspecific leukocytosis of unclear significance.  Patient has not recently been on steroids nor does she have any infectious symptoms.  Reassuringly with no fever and no cough shortness of breath abdominal pain or urinary complaints I would not further search for potential causes of her nonspecific leukocytosis at this time.  We rechecked her INR as the patient was quite concerned of whether this may still be elevated even though she has been off Coumadin for some period of time.  Reassuringly her INR is within normal limits.  I do suspect that she is likely having some spontaneous bruising to the upper extremities likely secondary to being on Plavix from her recent procedure.  Patient is interested in discontinuing her Plavix but I stated that this conversation and discussion of discontinuing her Plavix would certainly need to happen under the guidance of her cardiologist.  Patient understands this and understands the importance of close outpatient follow-up with both primary care provider and cardiologist.  Return precautions and all questions answered before discharge.  Discharged home.      Discharge Diagnosis:    ICD-10-CM    1. Spontaneous bruising R23.3    2. Contusion of upper extremity, unspecified laterality, initial encounter S40.029A    3. Antiplatelet or antithrombotic long-term use Z79.02      Disposition:  Discharged to home     Scribe Disclosure:  Santa AVILA  Mandy, am serving as a scribe at 11:17 AM on 1/12/2020 to document services personally performed by Hamzah Castañeda MD based on my observations and the provider's statements to me.       Hamzah Castañeda MD  01/12/20 190

## 2020-03-12 ENCOUNTER — APPOINTMENT (OUTPATIENT)
Dept: CT IMAGING | Facility: CLINIC | Age: 80
DRG: 566 | End: 2020-03-12
Attending: EMERGENCY MEDICINE
Payer: COMMERCIAL

## 2020-03-12 ENCOUNTER — APPOINTMENT (OUTPATIENT)
Dept: ULTRASOUND IMAGING | Facility: CLINIC | Age: 80
DRG: 566 | End: 2020-03-12
Attending: PHYSICIAN ASSISTANT
Payer: COMMERCIAL

## 2020-03-12 ENCOUNTER — APPOINTMENT (OUTPATIENT)
Dept: GENERAL RADIOLOGY | Facility: CLINIC | Age: 80
DRG: 566 | End: 2020-03-12
Attending: EMERGENCY MEDICINE
Payer: COMMERCIAL

## 2020-03-12 ENCOUNTER — APPOINTMENT (OUTPATIENT)
Dept: GENERAL RADIOLOGY | Facility: CLINIC | Age: 80
DRG: 566 | End: 2020-03-12
Attending: PHYSICIAN ASSISTANT
Payer: COMMERCIAL

## 2020-03-12 ENCOUNTER — HOSPITAL ENCOUNTER (INPATIENT)
Facility: CLINIC | Age: 80
LOS: 5 days | Discharge: NURSING, REHAB OR LONG-TERM CARE | DRG: 566 | End: 2020-03-17
Attending: EMERGENCY MEDICINE | Admitting: INTERNAL MEDICINE
Payer: COMMERCIAL

## 2020-03-12 DIAGNOSIS — R11.0 NAUSEA: ICD-10-CM

## 2020-03-12 DIAGNOSIS — D72.829 LEUKOCYTOSIS, UNSPECIFIED TYPE: ICD-10-CM

## 2020-03-12 DIAGNOSIS — S80.02XA CONTUSION OF LEFT KNEE AND LOWER LEG, INITIAL ENCOUNTER: ICD-10-CM

## 2020-03-12 DIAGNOSIS — S80.01XA CONTUSION OF RIGHT KNEE, INITIAL ENCOUNTER: ICD-10-CM

## 2020-03-12 DIAGNOSIS — R53.1 GENERALIZED WEAKNESS: ICD-10-CM
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
## 2020-03-12 DIAGNOSIS — Z79.891 CHRONIC USE OF OPIATE FOR THERAPEUTIC PURPOSE: Primary | ICD-10-CM

## 2020-03-12 DIAGNOSIS — S40.012A CONTUSION OF LEFT SHOULDER, INITIAL ENCOUNTER: ICD-10-CM

## 2020-03-12 DIAGNOSIS — S80.12XA CONTUSION OF LEFT KNEE AND LOWER LEG, INITIAL ENCOUNTER: ICD-10-CM

## 2020-03-12 PROBLEM — M62.82 RHABDOMYOLYSIS: Status: ACTIVE | Noted: 2020-03-12

## 2020-03-12 LAB
ALBUMIN SERPL-MCNC: 3.1 G/DL (ref 3.4–5)
ALBUMIN UR-MCNC: 10 MG/DL
ALP SERPL-CCNC: 104 U/L (ref 40–150)
ALT SERPL W P-5'-P-CCNC: 55 U/L (ref 0–50)
ANION GAP SERPL CALCULATED.3IONS-SCNC: 10 MMOL/L (ref 3–14)
APPEARANCE UR: CLEAR
APTT PPP: 26 SEC (ref 22–37)
AST SERPL W P-5'-P-CCNC: 53 U/L (ref 0–45)
BASOPHILS # BLD AUTO: 0 10E9/L (ref 0–0.2)
BASOPHILS NFR BLD AUTO: 0 %
BILIRUB SERPL-MCNC: 0.8 MG/DL (ref 0.2–1.3)
BILIRUB UR QL STRIP: NEGATIVE
BUN SERPL-MCNC: 21 MG/DL (ref 7–30)
CALCIUM SERPL-MCNC: 9 MG/DL (ref 8.5–10.1)
CHLORIDE SERPL-SCNC: 106 MMOL/L (ref 94–109)
CK SERPL-CCNC: 801 U/L (ref 30–225)
CO2 SERPL-SCNC: 25 MMOL/L (ref 20–32)
COLOR UR AUTO: YELLOW
CREAT SERPL-MCNC: 0.54 MG/DL (ref 0.52–1.04)
DIFFERENTIAL METHOD BLD: ABNORMAL
EOSINOPHIL # BLD AUTO: 0 10E9/L (ref 0–0.7)
EOSINOPHIL NFR BLD AUTO: 0 %
ERYTHROCYTE [DISTWIDTH] IN BLOOD BY AUTOMATED COUNT: 14.4 % (ref 10–15)
GFR SERPL CREATININE-BSD FRML MDRD: >90 ML/MIN/{1.73_M2}
GLUCOSE SERPL-MCNC: 112 MG/DL (ref 70–99)
GLUCOSE UR STRIP-MCNC: NEGATIVE MG/DL
HCT VFR BLD AUTO: 44.2 % (ref 35–47)
HGB BLD-MCNC: 13.9 G/DL (ref 11.7–15.7)
HGB UR QL STRIP: NEGATIVE
INR PPP: 1.03 (ref 0.86–1.14)
INTERPRETATION ECG - MUSE: NORMAL
KETONES UR STRIP-MCNC: 40 MG/DL
LACTATE BLD-SCNC: 1 MMOL/L (ref 0.7–2)
LACTATE BLD-SCNC: 2 MMOL/L (ref 0.7–2)
LEUKOCYTE ESTERASE UR QL STRIP: NEGATIVE
LYMPHOCYTES # BLD AUTO: 2 10E9/L (ref 0.8–5.3)
LYMPHOCYTES NFR BLD AUTO: 11 %
MCH RBC QN AUTO: 29.3 PG (ref 26.5–33)
MCHC RBC AUTO-ENTMCNC: 31.4 G/DL (ref 31.5–36.5)
MCV RBC AUTO: 93 FL (ref 78–100)
MONOCYTES # BLD AUTO: 0.9 10E9/L (ref 0–1.3)
MONOCYTES NFR BLD AUTO: 5 %
NEUTROPHILS # BLD AUTO: 15.1 10E9/L (ref 1.6–8.3)
NEUTROPHILS NFR BLD AUTO: 84 %
NITRATE UR QL: NEGATIVE
PH UR STRIP: 6 PH (ref 5–7)
PLATELET # BLD AUTO: 390 10E9/L (ref 150–450)
PLATELET # BLD EST: ABNORMAL 10*3/UL
POTASSIUM SERPL-SCNC: 3.5 MMOL/L (ref 3.4–5.3)
PROCALCITONIN SERPL-MCNC: 0.13 NG/ML
PROT SERPL-MCNC: 6.1 G/DL (ref 6.8–8.8)
RBC # BLD AUTO: 4.75 10E12/L (ref 3.8–5.2)
RBC #/AREA URNS AUTO: 1 /HPF (ref 0–2)
RBC MORPH BLD: ABNORMAL
SODIUM SERPL-SCNC: 141 MMOL/L (ref 133–144)
SOURCE: ABNORMAL
SP GR UR STRIP: 1.02 (ref 1–1.03)
TROPONIN I SERPL-MCNC: 0.02 UG/L (ref 0–0.04)
TSH SERPL DL<=0.005 MIU/L-ACNC: 1.24 MU/L (ref 0.4–4)
UROBILINOGEN UR STRIP-MCNC: NORMAL MG/DL (ref 0–2)
WBC # BLD AUTO: 18 10E9/L (ref 4–11)
WBC #/AREA URNS AUTO: 0 /HPF (ref 0–5)

## 2020-03-12 PROCEDURE — 25800030 ZZH RX IP 258 OP 636: Performed by: PHYSICIAN ASSISTANT

## 2020-03-12 PROCEDURE — 12002 RPR S/N/AX/GEN/TRNK2.6-7.5CM: CPT

## 2020-03-12 PROCEDURE — 99223 1ST HOSP IP/OBS HIGH 75: CPT | Mod: AI | Performed by: INTERNAL MEDICINE

## 2020-03-12 PROCEDURE — 25800030 ZZH RX IP 258 OP 636: Performed by: EMERGENCY MEDICINE

## 2020-03-12 PROCEDURE — 74019 RADEX ABDOMEN 2 VIEWS: CPT

## 2020-03-12 PROCEDURE — 83605 ASSAY OF LACTIC ACID: CPT | Performed by: INTERNAL MEDICINE

## 2020-03-12 PROCEDURE — 25000128 H RX IP 250 OP 636: Performed by: PHYSICIAN ASSISTANT

## 2020-03-12 PROCEDURE — 0HQLXZZ REPAIR LEFT LOWER LEG SKIN, EXTERNAL APPROACH: ICD-10-PCS | Performed by: EMERGENCY MEDICINE

## 2020-03-12 PROCEDURE — 84145 PROCALCITONIN (PCT): CPT | Performed by: PHYSICIAN ASSISTANT

## 2020-03-12 PROCEDURE — 70450 CT HEAD/BRAIN W/O DYE: CPT

## 2020-03-12 PROCEDURE — 83605 ASSAY OF LACTIC ACID: CPT | Performed by: EMERGENCY MEDICINE

## 2020-03-12 PROCEDURE — 87040 BLOOD CULTURE FOR BACTERIA: CPT | Performed by: EMERGENCY MEDICINE

## 2020-03-12 PROCEDURE — 0HQNXZZ REPAIR LEFT FOOT SKIN, EXTERNAL APPROACH: ICD-10-PCS | Performed by: EMERGENCY MEDICINE

## 2020-03-12 PROCEDURE — 73590 X-RAY EXAM OF LOWER LEG: CPT | Mod: LT

## 2020-03-12 PROCEDURE — 25000128 H RX IP 250 OP 636: Performed by: EMERGENCY MEDICINE

## 2020-03-12 PROCEDURE — 82550 ASSAY OF CK (CPK): CPT | Performed by: EMERGENCY MEDICINE

## 2020-03-12 PROCEDURE — 36415 COLL VENOUS BLD VENIPUNCTURE: CPT | Performed by: EMERGENCY MEDICINE

## 2020-03-12 PROCEDURE — 73562 X-RAY EXAM OF KNEE 3: CPT | Mod: 50

## 2020-03-12 PROCEDURE — 36415 COLL VENOUS BLD VENIPUNCTURE: CPT | Performed by: INTERNAL MEDICINE

## 2020-03-12 PROCEDURE — 99285 EMERGENCY DEPT VISIT HI MDM: CPT | Mod: 25

## 2020-03-12 PROCEDURE — 96374 THER/PROPH/DIAG INJ IV PUSH: CPT

## 2020-03-12 PROCEDURE — 71046 X-RAY EXAM CHEST 2 VIEWS: CPT

## 2020-03-12 PROCEDURE — 87086 URINE CULTURE/COLONY COUNT: CPT | Performed by: EMERGENCY MEDICINE

## 2020-03-12 PROCEDURE — 84443 ASSAY THYROID STIM HORMONE: CPT | Performed by: EMERGENCY MEDICINE

## 2020-03-12 PROCEDURE — 93970 EXTREMITY STUDY: CPT

## 2020-03-12 PROCEDURE — 84484 ASSAY OF TROPONIN QUANT: CPT | Performed by: EMERGENCY MEDICINE

## 2020-03-12 PROCEDURE — 72170 X-RAY EXAM OF PELVIS: CPT

## 2020-03-12 PROCEDURE — 12000000 ZZH R&B MED SURG/OB

## 2020-03-12 PROCEDURE — 85025 COMPLETE CBC W/AUTO DIFF WBC: CPT | Performed by: EMERGENCY MEDICINE

## 2020-03-12 PROCEDURE — 80053 COMPREHEN METABOLIC PANEL: CPT | Performed by: EMERGENCY MEDICINE

## 2020-03-12 PROCEDURE — 73030 X-RAY EXAM OF SHOULDER: CPT | Mod: LT

## 2020-03-12 PROCEDURE — 81001 URINALYSIS AUTO W/SCOPE: CPT | Performed by: EMERGENCY MEDICINE

## 2020-03-12 PROCEDURE — 96361 HYDRATE IV INFUSION ADD-ON: CPT

## 2020-03-12 PROCEDURE — 96375 TX/PRO/DX INJ NEW DRUG ADDON: CPT

## 2020-03-12 PROCEDURE — 25000132 ZZH RX MED GY IP 250 OP 250 PS 637: Performed by: PHYSICIAN ASSISTANT

## 2020-03-12 PROCEDURE — 85610 PROTHROMBIN TIME: CPT | Performed by: EMERGENCY MEDICINE

## 2020-03-12 PROCEDURE — 85730 THROMBOPLASTIN TIME PARTIAL: CPT | Performed by: EMERGENCY MEDICINE

## 2020-03-12 RX ORDER — ONDANSETRON 2 MG/ML
4 INJECTION INTRAMUSCULAR; INTRAVENOUS ONCE
Status: COMPLETED | OUTPATIENT
Start: 2020-03-12 | End: 2020-03-12

## 2020-03-12 RX ORDER — HYDROMORPHONE HYDROCHLORIDE 1 MG/ML
0.2 INJECTION, SOLUTION INTRAMUSCULAR; INTRAVENOUS; SUBCUTANEOUS
Status: DISCONTINUED | OUTPATIENT
Start: 2020-03-12 | End: 2020-03-17 | Stop reason: HOSPADM

## 2020-03-12 RX ORDER — FENTANYL 25 UG/1
25 PATCH TRANSDERMAL
Status: DISCONTINUED | OUTPATIENT
Start: 2020-03-13 | End: 2020-03-13

## 2020-03-12 RX ORDER — AMOXICILLIN 250 MG
1 CAPSULE ORAL 2 TIMES DAILY
Status: DISCONTINUED | OUTPATIENT
Start: 2020-03-12 | End: 2020-03-17 | Stop reason: HOSPADM

## 2020-03-12 RX ORDER — ALUMINA, MAGNESIA, AND SIMETHICONE 2400; 2400; 240 MG/30ML; MG/30ML; MG/30ML
30 SUSPENSION ORAL EVERY 4 HOURS PRN
Status: DISCONTINUED | OUTPATIENT
Start: 2020-03-12 | End: 2020-03-17 | Stop reason: HOSPADM

## 2020-03-12 RX ORDER — ONDANSETRON 4 MG/1
4 TABLET, ORALLY DISINTEGRATING ORAL EVERY 6 HOURS PRN
Status: DISCONTINUED | OUTPATIENT
Start: 2020-03-12 | End: 2020-03-17 | Stop reason: HOSPADM

## 2020-03-12 RX ORDER — BUPROPION HYDROCHLORIDE 150 MG/1
150 TABLET ORAL EVERY MORNING
Status: DISCONTINUED | OUTPATIENT
Start: 2020-03-13 | End: 2020-03-17 | Stop reason: HOSPADM

## 2020-03-12 RX ORDER — METHIMAZOLE 5 MG/1
5 TABLET ORAL
Status: DISCONTINUED | OUTPATIENT
Start: 2020-03-12 | End: 2020-03-17 | Stop reason: HOSPADM

## 2020-03-12 RX ORDER — LIDOCAINE 40 MG/G
CREAM TOPICAL
Status: DISCONTINUED | OUTPATIENT
Start: 2020-03-12 | End: 2020-03-17 | Stop reason: HOSPADM

## 2020-03-12 RX ORDER — PROCHLORPERAZINE 25 MG
12.5 SUPPOSITORY, RECTAL RECTAL EVERY 12 HOURS PRN
Status: DISCONTINUED | OUTPATIENT
Start: 2020-03-12 | End: 2020-03-17 | Stop reason: HOSPADM

## 2020-03-12 RX ORDER — NALOXONE HYDROCHLORIDE 0.4 MG/ML
.1-.4 INJECTION, SOLUTION INTRAMUSCULAR; INTRAVENOUS; SUBCUTANEOUS
Status: DISCONTINUED | OUTPATIENT
Start: 2020-03-12 | End: 2020-03-17 | Stop reason: HOSPADM

## 2020-03-12 RX ORDER — DILTIAZEM HYDROCHLORIDE 120 MG/1
120 CAPSULE, COATED, EXTENDED RELEASE ORAL DAILY
Status: DISCONTINUED | OUTPATIENT
Start: 2020-03-12 | End: 2020-03-17 | Stop reason: HOSPADM

## 2020-03-12 RX ORDER — METHOCARBAMOL 750 MG/1
750 TABLET, FILM COATED ORAL 4 TIMES DAILY
Status: DISCONTINUED | OUTPATIENT
Start: 2020-03-12 | End: 2020-03-13

## 2020-03-12 RX ORDER — ATORVASTATIN CALCIUM 10 MG/1
10 TABLET, FILM COATED ORAL EVERY EVENING
Status: DISCONTINUED | OUTPATIENT
Start: 2020-03-12 | End: 2020-03-17 | Stop reason: HOSPADM

## 2020-03-12 RX ORDER — POLYETHYLENE GLYCOL 3350 17 G/17G
1 POWDER, FOR SOLUTION ORAL DAILY PRN
COMMUNITY

## 2020-03-12 RX ORDER — HYDROCODONE BITARTRATE AND ACETAMINOPHEN 7.5; 325 MG/1; MG/1
1-2 TABLET ORAL 2 TIMES DAILY PRN
Status: DISCONTINUED | OUTPATIENT
Start: 2020-03-12 | End: 2020-03-17 | Stop reason: HOSPADM

## 2020-03-12 RX ORDER — AMOXICILLIN 250 MG
2 CAPSULE ORAL 2 TIMES DAILY
Status: DISCONTINUED | OUTPATIENT
Start: 2020-03-12 | End: 2020-03-17 | Stop reason: HOSPADM

## 2020-03-12 RX ORDER — LOSARTAN POTASSIUM 25 MG/1
25 TABLET ORAL DAILY
Status: DISCONTINUED | OUTPATIENT
Start: 2020-03-12 | End: 2020-03-17 | Stop reason: HOSPADM

## 2020-03-12 RX ORDER — LORAZEPAM 2 MG/ML
0.5 INJECTION INTRAMUSCULAR ONCE
Status: COMPLETED | OUTPATIENT
Start: 2020-03-12 | End: 2020-03-12

## 2020-03-12 RX ORDER — SODIUM CHLORIDE 9 MG/ML
1000 INJECTION, SOLUTION INTRAVENOUS CONTINUOUS
Status: DISCONTINUED | OUTPATIENT
Start: 2020-03-12 | End: 2020-03-12

## 2020-03-12 RX ORDER — PROCHLORPERAZINE MALEATE 5 MG
5 TABLET ORAL EVERY 6 HOURS PRN
Status: DISCONTINUED | OUTPATIENT
Start: 2020-03-12 | End: 2020-03-17 | Stop reason: HOSPADM

## 2020-03-12 RX ORDER — ACETAMINOPHEN 325 MG/1
975 TABLET ORAL 3 TIMES DAILY
Status: DISCONTINUED | OUTPATIENT
Start: 2020-03-12 | End: 2020-03-17 | Stop reason: HOSPADM

## 2020-03-12 RX ORDER — ONDANSETRON 2 MG/ML
INJECTION INTRAMUSCULAR; INTRAVENOUS
Status: DISCONTINUED
Start: 2020-03-12 | End: 2020-03-12 | Stop reason: HOSPADM

## 2020-03-12 RX ORDER — ONDANSETRON 2 MG/ML
4 INJECTION INTRAMUSCULAR; INTRAVENOUS EVERY 6 HOURS PRN
Status: DISCONTINUED | OUTPATIENT
Start: 2020-03-12 | End: 2020-03-17 | Stop reason: HOSPADM

## 2020-03-12 RX ORDER — LIDOCAINE HYDROCHLORIDE 10 MG/ML
INJECTION, SOLUTION INFILTRATION; PERINEURAL
Status: DISCONTINUED
Start: 2020-03-12 | End: 2020-03-12 | Stop reason: HOSPADM

## 2020-03-12 RX ORDER — LIDOCAINE HYDROCHLORIDE AND EPINEPHRINE 10; 10 MG/ML; UG/ML
INJECTION, SOLUTION INFILTRATION; PERINEURAL
Status: DISCONTINUED
Start: 2020-03-12 | End: 2020-03-12 | Stop reason: HOSPADM

## 2020-03-12 RX ORDER — SODIUM CHLORIDE 9 MG/ML
INJECTION, SOLUTION INTRAVENOUS CONTINUOUS
Status: DISCONTINUED | OUTPATIENT
Start: 2020-03-12 | End: 2020-03-13

## 2020-03-12 RX ADMIN — ACETAMINOPHEN 975 MG: 325 TABLET, FILM COATED ORAL at 18:23

## 2020-03-12 RX ADMIN — ASPIRIN 325 MG: 325 TABLET, COATED ORAL at 18:25

## 2020-03-12 RX ADMIN — ATORVASTATIN CALCIUM 10 MG: 10 TABLET, FILM COATED ORAL at 19:54

## 2020-03-12 RX ADMIN — METHOCARBAMOL TABLETS 750 MG: 750 TABLET, COATED ORAL at 23:36

## 2020-03-12 RX ADMIN — PROCHLORPERAZINE EDISYLATE 5 MG: 5 INJECTION INTRAMUSCULAR; INTRAVENOUS at 17:02

## 2020-03-12 RX ADMIN — SODIUM CHLORIDE: 9 INJECTION, SOLUTION INTRAVENOUS at 18:28

## 2020-03-12 RX ADMIN — Medication 5 MG: at 19:59

## 2020-03-12 RX ADMIN — Medication 40 MG: at 19:59

## 2020-03-12 RX ADMIN — LOSARTAN POTASSIUM 25 MG: 25 TABLET, FILM COATED ORAL at 18:27

## 2020-03-12 RX ADMIN — DILTIAZEM HYDROCHLORIDE 120 MG: 120 CAPSULE, COATED, EXTENDED RELEASE ORAL at 18:24

## 2020-03-12 RX ADMIN — LORAZEPAM 0.5 MG: 2 INJECTION INTRAMUSCULAR; INTRAVENOUS at 15:29

## 2020-03-12 RX ADMIN — ONDANSETRON 4 MG: 2 INJECTION INTRAMUSCULAR; INTRAVENOUS at 15:07

## 2020-03-12 RX ADMIN — HYDROCODONE BITARTRATE AND ACETAMINOPHEN 1 TABLET: 7.5; 325 TABLET ORAL at 18:24

## 2020-03-12 RX ADMIN — ACETAMINOPHEN 975 MG: 325 TABLET, FILM COATED ORAL at 23:36

## 2020-03-12 RX ADMIN — METHOCARBAMOL TABLETS 750 MG: 750 TABLET, COATED ORAL at 19:54

## 2020-03-12 RX ADMIN — SENNOSIDES AND DOCUSATE SODIUM 1 TABLET: 8.6; 5 TABLET ORAL at 19:59

## 2020-03-12 RX ADMIN — SODIUM CHLORIDE 1000 ML: 9 INJECTION, SOLUTION INTRAVENOUS at 10:55

## 2020-03-12 ASSESSMENT — ENCOUNTER SYMPTOMS
HEADACHES: 0
ARTHRALGIAS: 1
ABDOMINAL PAIN: 0
WOUND: 1
DIARRHEA: 1
SHORTNESS OF BREATH: 0
DYSURIA: 0
WEAKNESS: 1

## 2020-03-12 ASSESSMENT — ACTIVITIES OF DAILY LIVING (ADL): ADLS_ACUITY_SCORE: 15

## 2020-03-12 NOTE — PHARMACY-ADMISSION MEDICATION HISTORY
Admission medication history interview status for this patient is complete. See [facility] admission navigator for allergy information, prior to admission medications and immunization status.     Medication history interview source(s):Patient  Medication history resources (including written lists, pill bottles, clinic record):None    Changes made to PTA medication list:  Added: asa  Deleted: eliquis, zantac  Changed: metamucil and miralax    Actions taken by pharmacist (provider contacted, etc):None     Additional medication history information:None    Medication reconciliation/reorder completed by provider prior to medication history? No    For patients on insulin therapy: no (Yes/No)   Lantus/levemir/NPH/Mix 70/30 dose: ___ in AM/PM or twice daily   Sliding scale Novolog Y/N   If Yes, do you have a baseline novolog pre-meal dose: ______units with meals   Patients eat three meals a day: Y/N ---  How many episodes of hypoglycemia (low blood glucose) do you have weekly: ---   How many missed doses do you have a week: ---  How many times do you check your blood glucose per day: ---  Any Barriers to therapy: cost of medications/comfortable with giving injections (if applicable)/ comfortable and confident with current diabetes regimen ---      Prior to Admission medications    Medication Sig Last Dose Taking? Auth Provider   alum & mag hydroxide-simethicone (MYLANTA/MAALOX) 200-200-20 MG/5ML SUSP Take 30 mLs by mouth every 4 hours as needed  Yes Carole Lay MD   aspirin (ASA) 325 MG EC tablet Take 325 mg by mouth daily Past Week at Unknown time Yes Unknown, Entered By History   Atorvastatin Calcium (LIPITOR PO) Take 10 mg by mouth every evening  Past Week at Unknown time Yes Reported, Patient   buPROPion (WELLBUTRIN XL) 150 MG 24 hr tablet Take 150 mg by mouth every morning Past Week at Unknown time Yes Unknown, Entered By History   diltiazem (CARTIA XT) 120 MG 24 hr capsule Take 120 mg by mouth daily Past Week at  Unknown time Yes Reported, Patient   fentaNYL (DURAGESIC) 25 mcg/hr patch 72 hr Place 1 patch onto the skin every 72 hours 3/10/2020 at Unknown time Yes Reported, Patient   HYDROCHLOROTHIAZIDE PO Take 12.5 mg by mouth daily  Past Week at Unknown time Yes Reported, Patient   HYDROcodone-acetaminophen (NORCO) 7.5-325 MG per tablet Take 1-2 tablets by mouth 2 times daily as needed for moderate to severe pain   Yes Reported, Patient   Losartan Potassium (COZAAR PO) Take 25 mg by mouth daily Past Week at Unknown time Yes Reported, Patient   Methimazole (TAPAZOLE PO) Take 5 mg by mouth five times a week Monday thru Friday Past Week at Unknown time Yes Reported, Patient   METHOCARBAMOL PO Take 750 mg by mouth 6 times daily As needed  Yes Reported, Patient   polyethylene glycol (MIRALAX) packet Take 1 packet by mouth daily as needed for constipation  Yes Unknown, Entered By History   promethazine (PHENERGAN) 25 MG tablet Take 1 tablet (25 mg) by mouth every 6 hours as needed for nausea  Yes Popeye Kaur MD   psyllium (METAMUCIL/KONSYL) capsule Take 1 capsule by mouth daily as needed for constipation  Yes Unknown, Entered By History   Sotalol HCl (BETAPACE PO) Take 40 mg by mouth 2 times daily  Past Week at Unknown time Yes Reported, Patient

## 2020-03-12 NOTE — PROGRESS NOTES
Physician Attestation   I, Isaiah Reyes, saw and evaluated Madhavi Castellanos as part of a shared visit.  I have reviewed and discussed with the advanced practice provider their history, physical and plan.    I personally reviewed the vital signs, medications, labs and imaging.    My key history or physical exam findings: 79-year-old woman with atrial fibrillation, chronic pain on chronic opiate therapy, depression, anxiety presenting with a fall at home, evidence of significant bruising and trauma over her extremities, elevated creatinine kinase concerning for rhabdo, was on the floor for possibly 2 days, intractable nausea in the emergency room acute on chronic back pain.    Key management decisions made by me: Very complex patient, multiple bruising all over her body, might need repeat imaging or CT scan of her knees if she is unable to bear weight, she needs to be more comfortable, when I saw her she was having severe nausea, she needs close monitoring of kidney function and electrolyte and IV hydration for rhabdomyolysis, pain consultation when she is on multiple high-risk medications, most likely will also require placement.  Isaiah Reyes  Date of Service (when I saw the patient): 03/12/20

## 2020-03-12 NOTE — ED TRIAGE NOTES
"Pt presents to ED via EMS from home. Pt states that she fell at home 4 days ago and has been crawling around herself since. Pt states that she hasnt been able to take any of her meds except she was able to reach her fentanyl patch on her bathroom counter 2 days ago. Pt states \"I have been hitting my head lots of times on the furniture.\" Pt reports that she has been hallucinating. Pt reports that today she was finally able to get to the phone to call for help. ABC intact.   "

## 2020-03-12 NOTE — H&P
"History and Physical     Madhavi Castellanos MRN# [id number]   YOB: 1940 Age: 79 year old      Date of Admission:  3/12/2020    Primary care provider: Cecy Edmondson          Assessment and Plan:   Madhaiv Castellanos is a 79 year old female with a PMH significant for Afib on Sotalol, Diltiazem, s/p Watchmen, hyperthyroidism, hx of chronic back pain on Fentanyl patch, oral opiates, depression and anxiety who presents with concerns of falls at home (apparently had been on the floor for 2 days), CK elevation and leukocytosis but no obvious infectious etiology. Here in ED, she was found to also have intractable dry heaving. Belly was soft and ABD xray showed non-obstructive pattern.   She was xray'd (chest, shoulder, pelvis, bl knees, tibia/fibula) with no obvious fractures. She was still c/o significant pain and unable to wear weight and dry heaving despite pain meds and anti-emetics so I have been asked to admit her for further work up.     1. S/p fall and apparently laying on the floor for 2 days--on exam she loots, rough, she has multiple ecchymosis of various stages that definitely supercedes just 2 days. She is disheveled with poor hygiene so I suspect she has not been doing well at home.   --Labs shows mild CK elevation but not to the level of \"rhabdo\"  --Plan for IVF hydration  --Cont Fentanyl patch,  Resume oral Norco for chronic pain  --place on Tele as not 100% sure if her fall was mechanical or syncope vs other  -- Pt/OT and SW eval to better assess her home situation and mobility, she has no family x for a brother that she is not close with     2. Elevated CK--again likely from being on the floor, but does not qualify for true Rhabdo (normal Cr).   --Cont IVF hydration  --Follow CK    3. Intractable nausea --belly is soft and no acute findings.   --Apparently she has had chronic nausea that she went to see her PCP for 2 days ago.   --Cont anti-emetics  --Check TSH  --Doubt cardiac related as this is " "chronic and trops negative.     4. Leukocytosis--suspect reactive. No recent fevers, had cough but CXR negative.   --UA and influenza all negative  --No indication for abx, check procalcitonin  --Follow labs    5. Acute on chronic back, knee \"generalized pain\"   --Xrays all negative for acute fracture  --C/o inability to bear weight due to knee pain (but has not really tried since she's been dry heaving)  --Knee/Tib/Fix Xray has \"Possible widening of the lateral tibiotalar joint of uncertain significance. This could  represent ligament insufficiency.\"  --See how she does after getting her pain meds back on board, if still having considerable pain and unable to bear weight, consider getting MRI of the knees    6. Afib: s/p ablation. In Sinus tach. (likely due to vomiting). Cont on ASA, Diltiazem.   --place on short term tele given fall and unclear hx    7. Significant ecchymosis -- looks like it's more than 2 days and some appears to be old. She has a golf size bruise on the left anterior shin, possibly a hematoma. Given her fall and immobility for the last few days will get doppler US to r/o DVT.   --Wound RN to see regarding skin abrasion to the left shin    Admit to inpatient status  Code: Full d/w pt  DVT prophy: scd  Dispo: unclear I suspect may need TCU         Chief Complaint:   Fall and inability to get up       History of Present Illness:     Madhavi Castellanos is a 79 year old female with a PMH significant for Afib on Sotalol, Diltiazem, s/p Watchmen, on ASA, hyperthyroidism, hx of chronic back pain on Fentanyl patch, oral opiates, depression and anxiety who presents with concerns of falls at home (apparently had been on the floor for 2 days), CK elevation and leukocytosis but no obvious infectious etiology.   She apparently went ot PCP on 3/9 for wellness eval. She had c/o venous insufficiency,  Chronic daily nausea and back pain. Also c/o dec memory. She's had a cough for a few months. O/w nothing " "significant. She did go to the clinic and had a subsequent CXR the next day on 3/10 that was negative.  She then apparently had a fall (details unclear) and she was not able to get up so was on the floor for a couple of \"days.\" She was crawling around the floor to reach for items but apparently wa finally able to find her phone today and call for 911     Here in ED, she was found to also have intractable dry heaving. Belly was soft and ABD xray showed non-obstructive pattern. Labs ok x elevated WBC and mild CK elevation with normal Cr.   She was xray'd (chest, shoulder, pelvis, bl knees, tibia/fibula) with no obvious fractures. She was still c/o significant pain and unable to wear weight and dry heaving despite pain meds and anti-emetics so I have been asked to admit her for further work up.          Past Medical History:     Past Medical History:   Diagnosis Date     Asthma      Atrial fibrillation (H)     had EP ablation 8/11/2017, pt reports being \"afib free\" now     CAD (coronary artery disease)      Depression      Esophageal reflux      Hypertension      LBBB (left bundle branch block)      Liver lesion     stable, seen on multiple CT scans     Lumbosacral spondylosis      Mumps      Nonspecific reaction to tuberculin skin test without active tuberculosis(795.51)      Obesity      TALI (obstructive sleep apnea)      Osteoarthritis      Palpitations      Prolonged QT interval      Pure hypercholesterolemia      Spinal stenosis of lumbar region      STD (sexually transmitted disease)      Tuberculosis                Past Surgical History:     Past Surgical History:   Procedure Laterality Date     CHOLECYSTECTOMY       COLONOSCOPY N/A 8/4/2019    Procedure: COLONOSCOPY;  Surgeon: Darron Cunha MD;  Location:  GI     EP ABLATION / EP STUDIES  08/11/2017    for hx a fib     ESOPHAGOSCOPY, GASTROSCOPY, DUODENOSCOPY (EGD), COMBINED  7/2014    reactive gastropathy, H. Pylori negative ([facility])     EXCISE VULVA WIDE " LOCAL N/A 12/1/2017    Procedure: EXCISE VULVA WIDE LOCAL;  Wide Local Excision of Vulvar Lesion   ;  Surgeon: Nazario Tirado MD;  Location:  OR               Social History:     Social History     Socioeconomic History     Marital status:      Spouse name: Not on file     Number of children: Not on file     Years of education: Not on file     Highest education level: Not on file   Occupational History     Not on file   Social Needs     Financial resource strain: Not on file     Food insecurity     Worry: Not on file     Inability: Not on file     Transportation needs     Medical: Not on file     Non-medical: Not on file   Tobacco Use     Smoking status: Never Smoker     Smokeless tobacco: Never Used   Substance and Sexual Activity     Alcohol use: No     Drug use: No     Sexual activity: Not Currently   Lifestyle     Physical activity     Days per week: Not on file     Minutes per session: Not on file     Stress: Not on file   Relationships     Social connections     Talks on phone: Not on file     Gets together: Not on file     Attends Restorationist service: Not on file     Active member of club or organization: Not on file     Attends meetings of clubs or organizations: Not on file     Relationship status: Not on file     Intimate partner violence     Fear of current or ex partner: Not on file     Emotionally abused: Not on file     Physically abused: Not on file     Forced sexual activity: Not on file   Other Topics Concern     Parent/sibling w/ CABG, MI or angioplasty before 65F 55M? Not Asked   Social History Narrative     Not on file               Family History:     Family History   Problem Relation Age of Onset     Colon Cancer No family hx of               Allergies:      Allergies   Allergen Reactions     Metoprolol Shortness Of Breath     2 hours after tasking for the first time SOB, Pt evaluated and pt WDL       Albuterol Unknown     Augmentin Nausea     Cephalexin      Codeine Sulfate      " \"i dont't know, nothing, maybe sick to my stomach\"     Cymbalta      agitated     Lisinopril Cough     Omnicef [Cefdinir] Diarrhea     Percocet [Oxycodone-Acetaminophen]      nauseated               Medications:     Prior to Admission medications    Medication Sig Last Dose Taking? Auth Provider   alum & mag hydroxide-simethicone (MYLANTA/MAALOX) 200-200-20 MG/5ML SUSP Take 30 mLs by mouth every 4 hours as needed  Yes Carole Lay MD   aspirin (ASA) 325 MG EC tablet Take 325 mg by mouth daily Past Week at Unknown time Yes Unknown, Entered By History   Atorvastatin Calcium (LIPITOR PO) Take 10 mg by mouth every evening  Past Week at Unknown time Yes Reported, Patient   buPROPion (WELLBUTRIN XL) 150 MG 24 hr tablet Take 150 mg by mouth every morning Past Week at Unknown time Yes Unknown, Entered By History   diltiazem (CARTIA XT) 120 MG 24 hr capsule Take 120 mg by mouth daily Past Week at Unknown time Yes Reported, Patient   fentaNYL (DURAGESIC) 25 mcg/hr patch 72 hr Place 1 patch onto the skin every 72 hours 3/10/2020 at Unknown time Yes Reported, Patient   HYDROCHLOROTHIAZIDE PO Take 12.5 mg by mouth daily  Past Week at Unknown time Yes Reported, Patient   HYDROcodone-acetaminophen (NORCO) 7.5-325 MG per tablet Take 1-2 tablets by mouth 2 times daily as needed for moderate to severe pain   Yes Reported, Patient   Losartan Potassium (COZAAR PO) Take 25 mg by mouth daily Past Week at Unknown time Yes Reported, Patient   Methimazole (TAPAZOLE PO) Take 5 mg by mouth five times a week Monday thru Friday Past Week at Unknown time Yes Reported, Patient   METHOCARBAMOL PO Take 750 mg by mouth 6 times daily As needed  Yes Reported, Patient   polyethylene glycol (MIRALAX) packet Take 1 packet by mouth daily as needed for constipation  Yes Unknown, Entered By History   promethazine (PHENERGAN) 25 MG tablet Take 1 tablet (25 mg) by mouth every 6 hours as needed for nausea  Yes Popeye Kaur MD   psyllium " (METAMUCIL/KONSYL) capsule Take 1 capsule by mouth daily as needed for constipation  Yes Unknown, Entered By History   Sotalol HCl (BETAPACE PO) Take 40 mg by mouth 2 times daily  Past Week at Unknown time Yes Reported, Patient              Review of Systems:     A Comprehensive greater than 10 system review of systems was carried out.  Pertinent positives and negatives are noted above.  Otherwise negative for contributory information.            Physical Exam:   Blood pressure 118/88, pulse 118, temperature 99.6  F (37.6  C), temperature source Rectal, resp. rate 16, SpO2 95 %.  Exam:  GENERAL:  Writhing, intractable dry heaving, somewhat dramatic, able to answer questions  PSYCH: oriented, mild distress.  HEENT:  PERRLA. Normal conjunctiva, normal hearing, nasal mucosa and Oropharynx are normal.  NECK:  Supple, no neck vein distention, adenopathy or bruits, normal thyroid.  HEART:  tachy with no murmur, no pericardial rub, gallops or S3 or S4.  LUNGS:  Clear to auscultation, normal Respiratory effort. No wheezing, rales or ronchi.  ABDOMEN:  Rounded and obese but soft, normal bowel sounds. .   EXTREMITIES:  No pedal edema, +2 pulses bilateral and equal.  SKIN:  Dry to touch, scattered areas of ecchymosis over the BL legs and chest, arms that looks erwin least several days old, poor hygiene, dirt filled feet  NEUROLOGIC:  CN 2-12 intact, BL 5/5 symmetric upper and lower extremity strength, sensation is intact with no focal deficits.           Data:     Recent Labs   Lab 03/12/20  1151   WBC 18.0*   HGB 13.9   HCT 44.2   MCV 93        Recent Labs   Lab 03/12/20  1151      POTASSIUM 3.5   CHLORIDE 106   CO2 25   ANIONGAP 10   *   BUN 21   CR 0.54   GFRESTIMATED >90   GFRESTBLACK >90   LUIS ANTONIO 9.0       Recent Labs   Lab 03/12/20  1151   *       Recent Labs   Lab 03/12/20  1151   AST 53*   ALT 55*   ALKPHOS 104   BILITOTAL 0.8     Recent Labs   Lab 03/12/20  1429   LACT 2.0       Recent Labs   Lab  03/12/20  1151   TROPI 0.018     Recent Labs   Lab 03/12/20  1056   COLOR Yellow   APPEARANCE Clear   URINEGLC Negative   URINEBILI Negative   URINEKETONE 40*   SG 1.018   UBLD Negative   URINEPH 6.0   PROTEIN 10*   NITRITE Negative   LEUKEST Negative   RBCU 1   WBCU 0         Results for orders placed or performed during the hospital encounter of 03/12/20   Head CT w/o contrast    Narrative    CT SCAN OF THE HEAD WITHOUT CONTRAST   3/12/2020 12:22 PM     HISTORY: trauma, head injury, anticoagulated    TECHNIQUE:  Axial images of the head and coronal reformations without  IV contrast material. Radiation dose for this scan was reduced using  automated exposure control, adjustment of the mA and/or kV according  to patient size, or iterative reconstruction technique.    COMPARISON: Scan dated 7/30/2016.    FINDINGS:     Intracranial contents: There is diffuse parenchymal volume loss.   White matter changes are present in the cerebral hemispheres that are  consistent with small vessel ischemic disease in this age patient.  There is a calcification projected over the left convexity. This is  unchanged. This could be due to a small chronic calcified subdural.  There is no evidence of intracranial hemorrhage, mass, acute infarct  or anomaly.    Visualized orbits/sinuses/mastoids:  The visualized portions of the  sinuses and mastoids appear normal.    Osseous structures/soft tissues:  No intracranial bleed or skull  fractures are identified. No significant change.      Impression    IMPRESSION: No intracranial hemorrhage or skull fractures.   No change  compared to 7/30/2016.      FORD CARRIZALES MD   XR Shoulder Left G/E 3 Views    Narrative    XR SHOULDER LT G/E 3 VW 3/12/2020 12:56 PM     HISTORY: trauma, pain      Impression    IMPRESSION: No fracture or dislocation. Acromioclavicular degenerative  arthrosis. Possible narrowing of the subacromial space which could  indicate chronic rotator cuff atrophy or tear.    TO  MD JUDY   XR Knee Bilateral 3 Views    Narrative    XR KNEE BILATERAL 3 VW 3/12/2020 1:09 PM     HISTORY: trauma, pain      Impression    IMPRESSION: No apparent fracture. Left medial and right lateral  compartment joint space narrowing. Right medial meniscal  chondrocalcinosis.    TO KIM MD   XR Tibia & Fibula Left 2 Views    Narrative    LEFT TIBIA AND FIBULA TWO VIEWS  3/12/2020 12:56 PM     HISTORY:  Trauma, pain.      Impression    IMPRESSION: The tibia and fibula appear intact. Possible widening of  the lateral tibiotalar joint of uncertain significance. This could  represent ligament insufficiency.    TO KIM MD   XR Pelvis 1/2 Views    Narrative    PELVIS ONE TO TWO VIEWS  3/12/2020 12:55 PM     HISTORY:  Trauma, pain.      Impression    IMPRESSION: Lumbar scoliosis and degenerative disc disease. The  osseous pelvis appears intact. Hip joint space widths appear within  normal limits. No apparent pelvic fracture.    TO KIM MD   Chest XR,  PA & LAT    Narrative    CHEST TWO VIEWS  3/12/2020 12:58 PM     HISTORY:  Left lower rib pain, trauma.    COMPARISON: 12/27/2017.      Impression    IMPRESSION: No acute cardiopulmonary disease.    ARCENIO WU MD   XR Abdomen 2 Views    Narrative    ABDOMEN TWO VIEWS 3/12/2020 4:12 PM     HISTORY: Intractable nausea.    COMPARISON: None.      Impression    IMPRESSION: Nonobstructive gas pattern. No evidence of free air,  organomegaly, or abnormal calculus. Degenerative changes noted of the  spine with a left apex scoliosis.    MD Carole DOW PA-C

## 2020-03-12 NOTE — ED PROVIDER NOTES
"  History     Chief Complaint:    Fall    The history is provided by the patient.      Madhavi Castellanos is a 79 year old female with a history of hypertension and atrial fibrillation on Eliquis who arrived via EMS from home and presents after a fall. The patient reports that she fell \"a few days\" ago, but is not exactly sure when or why she fell. She has been crawling to get around and has not talat able to get up to eat, drink, or use the toilet. Today she was able to reach a phone and call for help. The patient has not talat able to take any medications besides the fentanyl patch for chronic back pain that she was able to reach 2 days ago and put on her abdomen. She endorses bilateral knee pain, left shoulder pain, pelvic pain, generalized weakness, and diarrhea. She denies any abdominal pain, chest pain, shortness of breath, dysuria, or headaches despite hitting her head on furniture multiple times. The patient has several open wounds/skin tears on her legs and skin breakdown on her rear. Her most recent Tdap was in 2014.    Allergies:  Metoprolol  Albuterol  Augmentin  Cephalexin  Codeine Sulfate  Duloxetine  Prednisone  Cymbalta  Lisinopril  Omnicef [Cefdinir]  Percocet [Oxycodone-Acetaminophen]     Medications:    Gabapentin  Mylanta  Lipitor  Wellbutrin XL  Cartia XT  Fentanyl  Hydrochlorothiazide  Norco  Cozaar  Tapazole  Methocarbamol  Miralax  Phenergan  Metamucil  Zantac  Betapace   Tessalon  Aspirin 325 mg  Eliquis     Past Medical History:    Asthma  Atrial fibrillation  Coronary artery disease  Depression  Esophageal reflux  Hypertension  LBBB  Liver lesion  Mumps  Lumbosacral spondylosis  Obstructive sleep apnea  Obesity  Pure hypercholesterolemia  STD  Lower GI bleed  ELIESER III (vulvar intraepithelial neoplasia III)  Kidney stones  Osteoarthritis  Grave's disease  GERD  Bilateral chronic knee pain  PVC's  Osteopenia  Tuberculosis  Spinal stenosis of lumbar region    Past Surgical History:  "   Cholecystectomy   Colonoscopy  EP ablation/EP studies for history of a fib  EGD, combined  Excise vulva wide local  Septoplasty, nasal  Knee arthroscopy, bilateral  Blepharoplasty  RF ablation lumbar 3 lev RT, L2-5  Insertion linq heart monitor  Vaginal hysterectomy    Family History:    Lung cancer - father  Kidney cancer - father  Multiple sclerosis - mother    Social History:  Negative for tobacco use.  Negative for alcohol use.  Negative for drug use.  Marital Status:   [4]     Review of Systems   Respiratory: Negative for shortness of breath.    Cardiovascular: Negative for chest pain.   Gastrointestinal: Positive for diarrhea. Negative for abdominal pain.   Genitourinary: Positive for pelvic pain. Negative for dysuria.   Musculoskeletal: Positive for arthralgias (left shoulder, bilateral knees).   Skin: Positive for wound (leg wounds/skin tears).   Neurological: Positive for weakness. Negative for headaches.   All other systems reviewed and are negative.      Physical Exam     Patient Vitals for the past 24 hrs:   BP Temp Temp src Pulse Heart Rate Resp SpO2   03/12/20 1340 -- 99.6  F (37.6  C) Rectal -- -- -- --   03/12/20 1145 -- -- -- 118 142 -- 95 %   03/12/20 1100 118/88 -- -- 108 109 -- --   03/12/20 1011 (!) 140/111 -- -- -- -- -- --   03/12/20 1008 -- 98.8  F (37.1  C) Oral -- 117 16 98 %     Physical Exam  General: Elderly female laying on stretcher.  Eyes: PERRL, EOMI, Conjunctive within normal limits. No scleral icterus.  HENT: Scalp nontender. No palpable hematoma. Dry mucous membranes, oropharynx clear.   Neck: Nontender. Normal AROM.  CV: Normal S1S2, no murmur, rub or gallop. Tachycardic, regular.  Resp: Clear to auscultation bilaterally, no wheezes, rales or rhonchi. Normal respiratory effort.  GI: Abdomen is soft, nontender and nondistended. No palpable masses. No rebound or guarding.  MSK: Tender over the left shoulder, bilateral anterior knees, left proximal and mid tib/fib with  associated soft tissue swelling. Mild tenderness on palpation of the bilateral pelvis. Nontender over the hips. Mild tenderness left lower rib margin, anteriorly. No crepitus or palpable bony deformity. Normal AROM of the bilateral upper and lower extremities.   Skin: Warm and dry. Scattered ecchymosis, most prominent over the left shoulder and left knee/left proximal tib/fib. Deep skin tear over the left medial mid calf region. Linear 2.5cm laceration over anterior left ankle. Superficial skin breakdown over sacral region.   Neuro: Alert and oriented. GCS 15. Responds appropriately to all questions and commands. No focal findings appreciated. Normal muscle tone.  Psych: Anxious appearing.     Emergency Department Course     ECG (10:13:45):  Rate 120 bpm. DE interval 216. QRS duration 132. QT/QTc 326/460. P-R-T axes 0 -25 129. Sinus tachycardia with 1st degree AV block. Left bundle branch block. Abnormal ECG. Interpreted at 1015 by Shellie Peñaloza MD.    Imaging:  Radiology findings were communicated with the patient who voiced understanding of the findings.    CT Head w/o IV contrast:   No intracranial hemorrhage or skull fractures. No change compared to 7/30/2016, as per radiology.    XR  Knee Bilateral 3 Views:   No apparent fracture. Left medial and right lateral compartment joint space narrowing. Right medial meniscal chondrocalcinosis, as per radiology.     XR  Chest, PA & LAT:   No acute cardiopulmonary disease, as per radiology.     XR  Tibia & Fibula Left 2 Views:   The tibia and fibula appear intact. Possible widening of the lateral tibiotalar joint of uncertain significance. This could represent ligament insufficiency, as per radiology.     XR  Shoulder Left G/E 3 Views:   No fracture or dislocation. Acromioclavicular degenerative arthrosis. Possible narrowing of the subacromial space which could indicate chronic rotator cuff atrophy or tear, as per radiology.     XR  Pelvis 1/2 Views:   Lumbar  scoliosis and degenerative disc disease. The osseous pelvis appears intact. Hip joint space widths appear within normal limits. No apparent pelvic fracture, as per radiology.     Laboratory:  Laboratory findings were communicated with the patient who voiced understanding of the findings.    UA: clear, yellow urine, ketones 40, protein albumin 10 o/w negative   Urine culture Aerobic bacterial: Pending    CBC: WBC 18.0 H o/w WNL (HGB 13.9, )  CK total: 801 H  CMP: Glucose 112 H, Albumin 3.1 L, Protein total 6.1 L, ALT 55 H, AST 53 H o/w WNL  INR: 1.03  Partial thromboplastin time: 26  Troponin I (1226): 0.018  Lactic acid whole blood: pending  Blood culture ONE site: pending    Procedures:    Laceration Repair        LACERATION:  A subcutaneous flap clean 4.0 cm laceration.      LOCATION:  Left medial calf      FUNCTION:  Distally sensation, circulation and motor are intact.      ANESTHESIA:  Local using 1% lidocaine total of 4 mLs      PREPARATION:  Irrigation with Normal Saline      DEBRIDEMENT:  no debridement      CLOSURE:  Wound was closed with One Layer.  Skin closed with 6 x 4.0 Ethylon using interrupted sutures.      Laceration Repair        LACERATION:  A simple clean 2.5 cm linear laceration.      LOCATION:  Left anterior ankle      FUNCTION:  Distally sensation, circulation and motor are intact.      ANESTHESIA:  Digital block using 1% lidacaine total of 2 mLs      PREPARATION:  Irrigation with Normal Saline      DEBRIDEMENT:  no debridement      CLOSURE:  Wound was closed with One Layer.  Skin closed with 5 x 4.0 Ethylon using interrupted sutures.    Interventions:  1055: 0.9% sodium chloride BOLUS 1 L IV    Emergency Department Course:  Past medical records, nursing notes, and vitals reviewed.    1029: I performed an exam of the patient as documented above.     1130: Patient numbed.    EKG obtained in the ED, see results above.     1141: I performed a laceration repair on the patient as documented  above.    IV was inserted and blood was drawn for laboratory testing, results above.    The patient provided a urine sample here in the emergency department. This was sent for laboratory testing, findings above.    The patient was sent for a head CT, a pelvis x-ray, a left shoulder x-ray, a tibia and fibula x-ray, a chest x-ray, and bilateral knee x-rays while in the emergency department, results above.     1233: I rechecked the patient and discussed the results of her workup thus far. I performed another laceration repair on the patient as documented above. She continues to feel nauseous, but denies any other symptoms.    I personally reviewed the laboratory, EKG, and imaging results with the Patient and answered all related questions prior to admission.     Findings and plan explained to the Patient who consents to admission.     1402: Discussed the patient with Dr. Reyes, who will admit the patient to an adult med/surg bed for further monitoring, evaluation, and treatment.     Impression & Plan     Medical Decision Making:  Madhavi Castellanos is a 79 year old female who lives at home alone and presents to the emergency department with concerns for inability to get off the floor for multiple days after falling. She has multiple contusions and reports that she hit her head. She is on Eliquis and therefore a CT head was obtained despite her lack of headache. She had no findings of intracranial hemorrhage. There was no bony abnormalities on imaging of what appeared to be her affected contusion areas. She has a leukocytosis of unclear clinical cause without fever. She was tachycardic on arrival and I suspect dehydration. She was noting nausea, but was denying abdominal pain. She had chest wall tenderness without evidence of fracture on x-ray or pneumonia or other acute lung pathology. My suspicion is that with time, IV fluids, and pain control, she will improve. Blood cultures and urine culture sent and pending. I do  not feel strongly about administering broad spectrum antibiotics in this clinical setting with possible alternative explanation for her vital sign abnormalities. I suspect leukocytosis may be reactive. Lacerations were closed as per procedure notes. She was neurologically intact. All questions answered prior to admission.    Diagnosis:    ICD-10-CM   1. Generalized weakness  R53.1   2. Contusion of left knee and lower leg, initial encounter  S80.02XA    S80.12XA   3. Contusion of right knee, initial encounter  S80.01XA     Disposition:  Admitted to med/surg.    Scribe Disclosure:  I, Bessie Cinseros, am serving as a scribe at 10:31 AM on 3/12/2020 to document services personally performed by Shellie Peñaloza MD based on my observations and the provider's statements to me.     Bessie Cisneros  3/12/2020   [facility] EMERGENCY DEPARTMENT       Shellie Peñaloza MD  03/12/20 7781

## 2020-03-12 NOTE — ED NOTES
"[facility]  ED Nurse Handoff Report    Madhavi Castellanos is a 79 year old female   ED Chief complaint: Fall  . ED Diagnosis:   Final diagnoses:   Generalized weakness   Contusion of left knee and lower leg, initial encounter   Contusion of right knee, initial encounter   Contusion of left shoulder, initial encounter   Leukocytosis, unspecified type   Nausea   Fall, initial encounter     Allergies:   Allergies   Allergen Reactions     Metoprolol Shortness Of Breath     2 hours after tasking for the first time SOB, Pt evaluated and pt WDL       Albuterol Unknown     Augmentin Nausea     Cephalexin      Codeine Sulfate      \"i dont't know, nothing, maybe sick to my stomach\"     Cymbalta      agitated     Lisinopril Cough     Omnicef [Cefdinir] Diarrhea     Percocet [Oxycodone-Acetaminophen]      nauseated       Code Status: Full Code  Activity level - Baseline/Home:  Independent. Activity Level - Current:   Total Care. Lift room needed: Yes. Bariatric: No   Needed: Yes   Isolation: No. Infection: Not Applicable.     Vital Signs:   Vitals:    03/12/20 1011 03/12/20 1100 03/12/20 1145 03/12/20 1340   BP: (!) 140/111 118/88     Pulse:  108 118    Resp:       Temp:    99.6  F (37.6  C)   TempSrc:    Rectal   SpO2:   95%        Cardiac Rhythm:  ,      Pain level:    Patient confused: Yes. Patient Falls Risk: Yes.   Elimination Status: Has voided   Patient Report - Initial Complaint: fall. Focused Assessment:    Pt presents to ED via EMS from home. Pt states that she fell at home 4 days ago and has been crawling around herself since. Pt states that she hasnt been able to take any of her meds except she was able to reach her fentanyl patch on her bathroom counter 2 days ago. Pt states \"I have been hitting my head lots of times on the furniture.\" Pt reports that she has been hallucinating. Pt reports that today she was finally able to get to the phone to call for help. ABC intact.     Musculoskeletal " Musculoskeletal - Musculoskeletal Comment: Pt c/o L knee and L arm pain. Pt states that L knee is painful to touch.   LL     11:03 Skin Color/Condition Skin - Skin WDL: -WDL except; characteristics  Skin Moisture: dry  Skin Elasticity: slow return to original state  Skin Turgor: slow return to original state  Skin Comment: PT has various bruises, skin tears throughout skin. Pt has particularly large bruise to L knee. Pt has skin tear to L calf. Pt has skin breakdown to buttocks        Tests Performed:   XR Knee Bilateral 3 Views   Final Result   IMPRESSION: No apparent fracture. Left medial and right lateral   compartment joint space narrowing. Right medial meniscal   chondrocalcinosis.      TO KIM MD      Chest XR,  PA & LAT   Preliminary Result   IMPRESSION: No acute cardiopulmonary disease.      XR Tibia & Fibula Left 2 Views   Preliminary Result   IMPRESSION: The tibia and fibula appear intact. Possible widening of   the lateral tibiotalar joint of uncertain significance. This could   represent ligament insufficiency.      XR Shoulder Left G/E 3 Views   Final Result   IMPRESSION: No fracture or dislocation. Acromioclavicular degenerative   arthrosis. Possible narrowing of the subacromial space which could   indicate chronic rotator cuff atrophy or tear.      TO KIM MD      XR Pelvis 1/2 Views   Preliminary Result   IMPRESSION: Lumbar scoliosis and degenerative disc disease. The   osseous pelvis appears intact. Hip joint space widths appear within   normal limits. No apparent pelvic fracture.      Head CT w/o contrast   Final Result   IMPRESSION: No intracranial hemorrhage or skull fractures.   No change   compared to 7/30/2016.         FORD CARRIZALES MD        Labs Ordered and Resulted from Time of ED Arrival Up to the Time of Departure from the ED   ROUTINE UA WITH MICROSCOPIC - Abnormal; Notable for the following components:       Result Value    Ketones Urine 40 (*)     Protein Albumin Urine 10  (*)     All other components within normal limits   CBC WITH PLATELETS DIFFERENTIAL - Abnormal; Notable for the following components:    WBC 18.0 (*)     MCHC 31.4 (*)     Absolute Neutrophil 15.1 (*)     All other components within normal limits   CK TOTAL - Abnormal; Notable for the following components:    CK Total 801 (*)     All other components within normal limits   COMPREHENSIVE METABOLIC PANEL - Abnormal; Notable for the following components:    Glucose 112 (*)     Albumin 3.1 (*)     Protein Total 6.1 (*)     ALT 55 (*)     AST 53 (*)     All other components within normal limits   INR   PARTIAL THROMBOPLASTIN TIME   TROPONIN I   LACTIC ACID WHOLE BLOOD   PERIPHERAL IV CATHETER   WOUND CARE   URINE CULTURE AEROBIC BACTERIAL   BLOOD CULTURE   BLOOD CULTURE     Treatments provided: monitoring, fluids, sutured up wounds  Family Comments: no family present  OBS brochure/video discussed/provided to patient:  Yes  ED Medications:   Medications   0.9% sodium chloride BOLUS (0 mLs Intravenous Stopped 3/12/20 1155)     Followed by   sodium chloride 0.9% infusion (has no administration in time range)   lidocaine 1 % injection (has no administration in time range)   ondansetron (ZOFRAN) 2 MG/ML injection (has no administration in time range)   lidocaine 1% with EPINEPHrine 1:100,000 1 %-[id number] injection (has no administration in time range)     Drips infusing:  No  For the majority of the shift, the patient's behavior Green. Interventions performed were rounding.    Sepsis treatment initiated: No       ED Nurse Name/Phone Number: Melanie Alejandro RN,   2:28 PM    RECEIVING UNIT ED HANDOFF REVIEW    Above ED Nurse Handoff Report was reviewed: Yes  Reviewed by: Catherine Mcarthur RN on March 12, 2020 at 3:59 PM

## 2020-03-12 NOTE — PLAN OF CARE
BP (!) 166/86 (BP Location: Left arm)   Pulse 118   Temp 98.4  F (36.9  C) (Axillary)   Resp 16   Wt 84 kg (185 lb 3.2 oz)   SpO2 100%   BMI 34.99 kg/m    Admitted pt. Pt nausea, gave prn compazine, she had Zofran down in ED. A/O x4. US called and wanted pt down there. Brought pt down, returned, gave meds and IVF. Pain 8/10- gave prn Sapello at 1825 w/ scheduled meds. Pt has been incontinent, and might be so wanted external catheter. Has not voided up here yet. Pt ate light dinner. Denies n/v now, wants to rest. US - DVT. Did profile, order cardiac montoring.

## 2020-03-12 NOTE — ED NOTES
Straight catheterized pt for UA. UA collected and sent. Catheter removed. Cleaned patient up of bm.

## 2020-03-13 ENCOUNTER — APPOINTMENT (OUTPATIENT)
Dept: CARDIOLOGY | Facility: CLINIC | Age: 80
DRG: 566 | End: 2020-03-13
Attending: HOSPITALIST
Payer: COMMERCIAL

## 2020-03-13 ENCOUNTER — APPOINTMENT (OUTPATIENT)
Dept: OCCUPATIONAL THERAPY | Facility: CLINIC | Age: 80
DRG: 566 | End: 2020-03-13
Attending: PHYSICIAN ASSISTANT
Payer: COMMERCIAL

## 2020-03-13 LAB
ALBUMIN SERPL-MCNC: 2.6 G/DL (ref 3.4–5)
ALP SERPL-CCNC: 75 U/L (ref 40–150)
ALT SERPL W P-5'-P-CCNC: 44 U/L (ref 0–50)
ANION GAP SERPL CALCULATED.3IONS-SCNC: 5 MMOL/L (ref 3–14)
AST SERPL W P-5'-P-CCNC: 28 U/L (ref 0–45)
BACTERIA SPEC CULT: NO GROWTH
BILIRUB SERPL-MCNC: 0.6 MG/DL (ref 0.2–1.3)
BUN SERPL-MCNC: 21 MG/DL (ref 7–30)
CALCIUM SERPL-MCNC: 8.4 MG/DL (ref 8.5–10.1)
CHLORIDE SERPL-SCNC: 109 MMOL/L (ref 94–109)
CK SERPL-CCNC: 273 U/L (ref 30–225)
CO2 SERPL-SCNC: 27 MMOL/L (ref 20–32)
CREAT SERPL-MCNC: 0.63 MG/DL (ref 0.52–1.04)
ERYTHROCYTE [DISTWIDTH] IN BLOOD BY AUTOMATED COUNT: 14.6 % (ref 10–15)
GFR SERPL CREATININE-BSD FRML MDRD: 86 ML/MIN/{1.73_M2}
GLUCOSE SERPL-MCNC: 95 MG/DL (ref 70–99)
HCT VFR BLD AUTO: 36.9 % (ref 35–47)
HGB BLD-MCNC: 11.4 G/DL (ref 11.7–15.7)
Lab: NORMAL
MCH RBC QN AUTO: 28.7 PG (ref 26.5–33)
MCHC RBC AUTO-ENTMCNC: 30.9 G/DL (ref 31.5–36.5)
MCV RBC AUTO: 93 FL (ref 78–100)
PLATELET # BLD AUTO: 324 10E9/L (ref 150–450)
POTASSIUM SERPL-SCNC: 3.5 MMOL/L (ref 3.4–5.3)
PROT SERPL-MCNC: 5.3 G/DL (ref 6.8–8.8)
RBC # BLD AUTO: 3.97 10E12/L (ref 3.8–5.2)
SODIUM SERPL-SCNC: 141 MMOL/L (ref 133–144)
SPECIMEN SOURCE: NORMAL
WBC # BLD AUTO: 13.5 10E9/L (ref 4–11)

## 2020-03-13 PROCEDURE — 97161 PT EVAL LOW COMPLEX 20 MIN: CPT | Mod: GP | Performed by: PHYSICAL THERAPIST

## 2020-03-13 PROCEDURE — 99232 SBSQ HOSP IP/OBS MODERATE 35: CPT | Performed by: HOSPITALIST

## 2020-03-13 PROCEDURE — 93005 ELECTROCARDIOGRAM TRACING: CPT

## 2020-03-13 PROCEDURE — 40000275 ZZH STATISTIC RCP TIME EA 10 MIN

## 2020-03-13 PROCEDURE — 97166 OT EVAL MOD COMPLEX 45 MIN: CPT | Mod: GO

## 2020-03-13 PROCEDURE — 36415 COLL VENOUS BLD VENIPUNCTURE: CPT | Performed by: PHYSICIAN ASSISTANT

## 2020-03-13 PROCEDURE — 40000264 ECHOCARDIOGRAM COMPLETE

## 2020-03-13 PROCEDURE — 25000132 ZZH RX MED GY IP 250 OP 250 PS 637: Performed by: PHYSICIAN ASSISTANT

## 2020-03-13 PROCEDURE — 99223 1ST HOSP IP/OBS HIGH 75: CPT | Performed by: NURSE PRACTITIONER

## 2020-03-13 PROCEDURE — 80053 COMPREHEN METABOLIC PANEL: CPT | Performed by: PHYSICIAN ASSISTANT

## 2020-03-13 PROCEDURE — 82550 ASSAY OF CK (CPK): CPT | Performed by: PHYSICIAN ASSISTANT

## 2020-03-13 PROCEDURE — 85027 COMPLETE CBC AUTOMATED: CPT | Performed by: PHYSICIAN ASSISTANT

## 2020-03-13 PROCEDURE — 25500064 ZZH RX 255 OP 636: Performed by: INTERNAL MEDICINE

## 2020-03-13 PROCEDURE — 97535 SELF CARE MNGMENT TRAINING: CPT | Mod: GO

## 2020-03-13 PROCEDURE — 97530 THERAPEUTIC ACTIVITIES: CPT | Mod: GP | Performed by: PHYSICAL THERAPIST

## 2020-03-13 PROCEDURE — 93306 TTE W/DOPPLER COMPLETE: CPT | Mod: 26 | Performed by: INTERNAL MEDICINE

## 2020-03-13 PROCEDURE — 12000000 ZZH R&B MED SURG/OB

## 2020-03-13 RX ORDER — GABAPENTIN 100 MG/1
200 CAPSULE ORAL AT BEDTIME
Status: DISCONTINUED | OUTPATIENT
Start: 2020-03-13 | End: 2020-03-17 | Stop reason: HOSPADM

## 2020-03-13 RX ORDER — FENTANYL 12.5 UG/1
12 PATCH TRANSDERMAL
Status: DISCONTINUED | OUTPATIENT
Start: 2020-03-14 | End: 2020-03-14

## 2020-03-13 RX ORDER — FENTANYL 25 UG/1
25 PATCH TRANSDERMAL
Status: DISCONTINUED | OUTPATIENT
Start: 2020-03-13 | End: 2020-03-14

## 2020-03-13 RX ORDER — METHOCARBAMOL 750 MG/1
750 TABLET, FILM COATED ORAL EVERY 4 HOURS PRN
Status: DISCONTINUED | OUTPATIENT
Start: 2020-03-13 | End: 2020-03-17 | Stop reason: HOSPADM

## 2020-03-13 RX ADMIN — FENTANYL 1 PATCH: 25 PATCH, EXTENDED RELEASE TRANSDERMAL at 08:46

## 2020-03-13 RX ADMIN — ACETAMINOPHEN 325 MG: 325 TABLET, FILM COATED ORAL at 08:46

## 2020-03-13 RX ADMIN — Medication 5 MG: at 10:39

## 2020-03-13 RX ADMIN — Medication 40 MG: at 08:46

## 2020-03-13 RX ADMIN — ASPIRIN 325 MG: 325 TABLET, COATED ORAL at 08:46

## 2020-03-13 RX ADMIN — ATORVASTATIN CALCIUM 10 MG: 10 TABLET, FILM COATED ORAL at 21:06

## 2020-03-13 RX ADMIN — METHOCARBAMOL TABLETS 750 MG: 750 TABLET, COATED ORAL at 08:46

## 2020-03-13 RX ADMIN — DILTIAZEM HYDROCHLORIDE 120 MG: 120 CAPSULE, COATED, EXTENDED RELEASE ORAL at 08:46

## 2020-03-13 RX ADMIN — Medication 40 MG: at 21:06

## 2020-03-13 RX ADMIN — ACETAMINOPHEN 975 MG: 325 TABLET, FILM COATED ORAL at 16:31

## 2020-03-13 RX ADMIN — HUMAN ALBUMIN MICROSPHERES AND PERFLUTREN 3 ML: 10; .22 INJECTION, SOLUTION INTRAVENOUS at 14:08

## 2020-03-13 RX ADMIN — SENNOSIDES AND DOCUSATE SODIUM 1 TABLET: 8.6; 5 TABLET ORAL at 21:07

## 2020-03-13 RX ADMIN — BUPROPION HYDROCHLORIDE 150 MG: 150 TABLET, FILM COATED, EXTENDED RELEASE ORAL at 08:46

## 2020-03-13 RX ADMIN — LOSARTAN POTASSIUM 25 MG: 25 TABLET, FILM COATED ORAL at 08:46

## 2020-03-13 ASSESSMENT — ACTIVITIES OF DAILY LIVING (ADL)
ADLS_ACUITY_SCORE: 19
ADLS_ACUITY_SCORE: 19
ADLS_ACUITY_SCORE: 21
ADLS_ACUITY_SCORE: 21
ADLS_ACUITY_SCORE: 22
PREVIOUS_RESPONSIBILITIES: MEAL PREP;LAUNDRY;SHOPPING;DRIVING
ADLS_ACUITY_SCORE: 15

## 2020-03-13 NOTE — PLAN OF CARE
End of Shift Summary  For vital signs and complete assessments, please see documentation flowsheets.     Pertinent assessments: intermittent nausea with PRNs and aromatherapy utilized, intermittent confusion, pain with scheduled meds effective, tele in place, skin very bruised/fab/scabs/LLE stitches  Major Shift Events: slept well most of night, dressing changed to LLE  Treatment Plan: IV hydration, consults for care planning    Discharge Readiness: Medically active  Expected Discharge Date: TBD  Discharge Disposition: TBD  Barriers/Criteria for discharge: placement? Ability to care for self.

## 2020-03-13 NOTE — PROGRESS NOTES
03/13/20 1400   Quick Adds   Type of Visit Initial PT Evaluation   Living Environment   Lives With alone;other (see comments)   Living Arrangements other (see comments)  ([facility])   Home Accessibility stairs within home  (split level, laundry in basement)   Transportation Anticipated car, drives self   Self-Care   Usual Activity Tolerance moderate   Current Activity Tolerance poor   Equipment Currently Used at Home walker, rolling;raised toilet;grab bar, tub/shower  (intermittently uses 4WW)   Functional Level Prior   Ambulation 0-->independent   Transferring 0-->independent   Toileting 1-->assistive equipment   Bathing 2-->assistive person   Fall history within last six months yes   Number of times patient has fallen within last six months 1   General Information   Onset of Illness/Injury or Date of Surgery - Date 03/12/20   Referring Physician Anthonydanish GALARZA   Patient/Family Goals Statement Pt would like to return home   Pertinent History of Current Problem (include personal factors and/or comorbidities that impact the POC) Madhavi Castellanos is a 79 year old female with a PMH significant for Afib on Sotalol, Diltiazem, s/p Watchmen, hyperthyroidism, hx of chronic back pain on Fentanyl patch, oral opiates, depression and anxiety who presents with concerns of falls at home (apparently had been on the floor for 2 days), CK elevation and leukocytosis but no obvious infectious etiology.    Precautions/Limitations fall precautions   Cognitive Status Examination   Orientation person;place   Level of Consciousness alert;other (see comments)  (tangential, difficult to maintain on task)   Follows Commands and Answers Questions 75% of the time;able to follow single-step instructions   Personal Safety and Judgment impaired  (poor insight)   Pain Assessment   Patient Currently in Pain Yes, see Vital Sign flowsheet  (B knee pain)   Integumentary/Edema   Integumentary/Edema Comments pt has skin color changes B LEs from knees down,  dryness, purple in color, pt reports not knew and has been to a vascular clinic which has essential told her she has poor veins. Was unable to locate an PATO in chart. Pt US was negative for DVTs   Posture    Posture Forward head position;Protracted shoulders   Range of Motion (ROM)   ROM Comment limited functional ROM in knees, lacking full knee extension, limited DF B with static testing   Strength   Strength Comments limited UE and LE strength functionally- esteban with eccentric control with transfers, bed mob. 3+/5 knee flex/ext, 3-/5 hips flexion   Bed Mobility   Bed Mobility Comments min A, back pain with transition   Transfer Skills   Transfer Comments Pt CGA/SBA, but poor eccentric control with performance, FWW   Gait   Gait Comments Poor tolerance due to knee pain, shoulder/arm pain. Pt lacking full knee extension on stance phase, lacking good toe off, demos more for foot flat than true heel/toe pattern   Balance   Balance Comments No LOB but utilized FWW with ambulation   Modality Interventions   Planned Modality Interventions Cryotherapy   General Therapy Interventions   Planned Therapy Interventions balance training;bed mobility training;gait training;strengthening;transfer training;risk factor education;home program guidelines;progressive activity/exercise   Clinical Impression   Criteria for Skilled Therapeutic Intervention yes, treatment indicated   PT Diagnosis decreased functional mobility   Influenced by the following impairments decreased strength, decreased functional ROM   Functional limitations due to impairments decreased bed mob, decreased transfers, limited ambulation, stairs   Clinical Presentation Evolving/Changing   Clinical Presentation Rationale still being worked up for cause of fall   Clinical Decision Making (Complexity) Low complexity   Therapy Frequency 5x/week  (please reconsider freq if continues to refuse TCU)   Predicted Duration of Therapy Intervention (days/wks) 5 days  "  Anticipated Discharge Disposition Transitional Care Facility   Risk & Benefits of therapy have been explained Yes   Patient, Family & other staff in agreement with plan of care Yes   [facility]-PAC TM \"6 Clicks\"   2016, Trustees of [facility], under license to [employer].  All rights reserved.   6 Clicks Short Forms Basic Mobility Inpatient Short Form   [facility] AM-PAC  \"6 Clicks\" V.2 Basic Mobility Inpatient Short Form   1. Turning from your back to your side while in a flat bed without using bedrails? 3 - A Little   2. Moving from lying on your back to sitting on the side of a flat bed without using bedrails? 3 - A Little   3. Moving to and from a bed to a chair (including a wheelchair)? 3 - A Little   4. Standing up from a chair using your arms (e.g., wheelchair, or bedside chair)? 3 - A Little   5. To walk in hospital room? 3 - A Little   6. Climbing 3-5 steps with a railing? 2 - A Lot   Basic Mobility Raw Score (Score out of 24.Lower scores equate to lower levels of function) 17   Total Evaluation Time   Total Evaluation Time (Minutes) 10     "

## 2020-03-13 NOTE — CONSULTS
Care Transition Initial Assessment - SW     Met with: Patient    Active Problems:    Rhabdomyolysis       DATA  Lives With: alone, other (see comments)   Living Arrangements: other (see comments)([facility] level [facility] home. Has 14 stairs   Quality of Family Relationships: unable to assess  Description of Support System: Involved  Who is your support system?: Sibling(s)  Support Assessment: Adequate family and caregiver support, Adequate social supports. PT has 2 friends she is close with and her brother   Identified issues/concerns regarding health management: Admitted for Rhabdo  Fell and laid on the floor  For 2 days.  @      Quality of Family Relationships: unable to assess  Transportation Anticipated: car, drives self  Friend/ Family   ASSESSMENT  Cognitive Status:  awake, alert and oriented  Concerns to be addressed: Met with pt who is not overly concerns about her admission. Pt is hoping that she will return home. Pt still drives. Has an OPPT Hank Martinez [phone] through [employer] she sees once per week. She has been going to him for the past 5 years.  Pt also sees a Md at [facility] pain clinic [facility] [phone] .  Pt Generally does not use DME at home although she has access to it  Pt tends to keep busy daily buy running errands. Is working to get a stair lift in place for her home but still consider 2 options  Pt likes to talk about her past rather than focus on her future and possible need for TCU.. SHe has not been to TCU  Spoke with her about options and provided packet.       PLAN  SW will need to follow up over the weekend for further discuss about TCU vs Home care  PT MUST be able to do 14 stairs to return home safely.    Corinne White [facility]  Inpatient Care Coordination   [phone]  [facility]

## 2020-03-13 NOTE — PROGRESS NOTES
[facility]    Hospitalist Progress Note      Assessment & Plan   Madhavi Castellanos is a 79 year old female with a PMH significant for Afib on Sotalol, Diltiazem, s/p Watchmen, hyperthyroidism, hx of chronic back pain on Fentanyl patch, oral opiates, depression and anxiety who presents with concerns of falls at home (apparently had been on the floor for 2 days), CK elevation and leukocytosis but no obvious infectious etiology.     Here in ED, she was found to also have intractable dry heaving. Belly was soft and ABD xray showed non-obstructive pattern. She was xray'd (chest, shoulder, pelvis, bl knees, tibia/fibula) with no obvious fractures.      #S/p fall and apparently laying on the floor for 2 days--patient has bruises in multiple stages of healing.  She is likely had multiple falls at home.  She does admit to this.  I suspect this is probably secondary to generalized weakness in the setting of narcotic medications utilized for chronic pain.  There is no evidence of infection with negative x-ray, UA, procalcitonin.  Lower extremity ultrasound was negative for DVT.  CK was elevated over 800, kidney function okay.  She is significantly improved and this a.m.  --CK improved with IV fluids.  We will continue  --Cont Fentanyl patch,  Resume oral Norco for chronic pain.  Will work with pain management on possible wean.   --Telemetry evaluate for arrhythmia, will also order TTE to r/o any valvular cause.   -- Pt/OT and SW eval to better assess her home situation and mobility, she has no family x for a brother that she is not close with.  If patient is unable to bear weight then will likely need to consider further imaging.  --Wound RN consulted  --Pain management consulted for complex pain situation.     #Elevated CK--secondary to fall.  Improved with IV fluids.  Kidney function is stable.     #Intractable nausea --belly is soft and no acute findings.  Significantly improved this  a.m.     #Leukocytosis--suspect reactive.   No fevers.  Chest x-ray is negative.  UA is negative.  Procalcitonin is negative.  No indication for antibiotics.     #Acute on chronic back, knee pain: Apparently follows an outpatient pain clinic who prescribes her fentanyl patch and oxycodone.  X-rays completed negative for acute fracture.  They do seem to show osteoarthritis.  -Continue home pain medications.  PT and OT as above.     #Afib: s/p ablation. In Sinus tach. (likely due to vomiting). Cont on ASA, Diltiazem.  Telemetry as above    Hamzah Laura MD  Text Page    Interval History   Assumed care at this a.m.  Patient states that her symptoms are significantly improved this morning.  She states that pain is at baseline.  She does recall having multiple falls at home.  Does not have any preceding cardiac symptoms.  It seems most of them have been mechanical in nature.  Her last fall she did hit her left knee.  She denies any fevers or chills, chest pain, shortness of breath, abdominal pain, nausea or vomiting overnight.    -Data reviewed today: I reviewed all new labs and imaging results over the last 24 hours.    Physical Exam   Temp: 97.3  F (36.3  C) Temp src: Oral BP: 119/67 Pulse: 98 Heart Rate: 97 Resp: 18 SpO2: 96 % O2 Device: None (Room air)    Vitals:    03/12/20 1647   Weight: 84 kg (185 lb 3.2 oz)     Vital Signs with Ranges  Temp:  [97  F (36.1  C)-99.6  F (37.6  C)] 97.3  F (36.3  C)  Pulse:  [] 98  Heart Rate:  [] 97  Resp:  [16-18] 18  BP: (118-166)/(53-88) 119/67  SpO2:  [95 %-100 %] 96 %  I/O last 3 completed shifts:  In: 1146.53 [I.V.:1146.53]  Out: -     GENERAL:   Lying in bed, no acute distress.  Answers appropriately.  HEENT:  PERRLA. Normal conjunctiva, normal hearing, nasal mucosa and Oropharynx are normal.  NECK:  Supple, no neck vein distention, adenopathy or bruits, normal thyroid.  HEART:   Regular.  LUNGS:  Clear to auscultation, normal Respiratory effort. No wheezing, rales  or roman.  ABDOMEN:   B's, bowel sounds present, nontender nondistended  EXTREMITIES:   Patient with significant bruising of the left lower extremity.  She also does seem to have some chronic venous stasis changes noted.  Left foot is wrapped.  SKIN: Dry.  Scattered ecchymoses that are in various stages of healing.  NEUROLOGIC:  CN 2-12 intact, BL 5/5 symmetric upper and lower extremity strength, sensation is intact with no focal deficits.     Medications     sodium chloride 100 mL/hr at 03/13/20 0844       acetaminophen  975 mg Oral TID     aspirin  325 mg Oral Daily     atorvastatin  10 mg Oral QPM     buPROPion  150 mg Oral QAM     diltiazem ER COATED BEADS  120 mg Oral Daily     fentaNYL  25 mcg Transdermal Q72H     fentaNYL   Transdermal Q8H     losartan  25 mg Oral Daily     methimazole  5 mg Oral Once per day on Mon Tue Wed Thu Fri     methocarbamol  750 mg Oral 4x Daily     senna-docusate  1 tablet Oral BID    Or     senna-docusate  2 tablet Oral BID     sodium chloride (PF)  3 mL Intracatheter Q8H     sotalol  40 mg Oral BID       Data   Recent Labs   Lab 03/13/20  0708 03/12/20  1151   WBC 13.5* 18.0*   HGB 11.4* 13.9   MCV 93 93    390   INR  --  1.03    141   POTASSIUM 3.5 3.5   CHLORIDE 109 106   CO2 27 25   BUN 21 21   CR 0.63 0.54   ANIONGAP 5 10   LUIS ANTONIO 8.4* 9.0   GLC 95 112*   ALBUMIN 2.6* 3.1*   PROTTOTAL 5.3* 6.1*   BILITOTAL 0.6 0.8   ALKPHOS 75 104   ALT 44 55*   AST 28 53*   TROPI  --  0.018       Recent Results (from the past 24 hour(s))   Head CT w/o contrast    Narrative    CT SCAN OF THE HEAD WITHOUT CONTRAST   3/12/2020 12:22 PM     HISTORY: trauma, head injury, anticoagulated    TECHNIQUE:  Axial images of the head and coronal reformations without  IV contrast material. Radiation dose for this scan was reduced using  automated exposure control, adjustment of the mA and/or kV according  to patient size, or iterative reconstruction technique.    COMPARISON: Scan dated  7/30/2016.    FINDINGS:     Intracranial contents: There is diffuse parenchymal volume loss.   White matter changes are present in the cerebral hemispheres that are  consistent with small vessel ischemic disease in this age patient.  There is a calcification projected over the left convexity. This is  unchanged. This could be due to a small chronic calcified subdural.  There is no evidence of intracranial hemorrhage, mass, acute infarct  or anomaly.    Visualized orbits/sinuses/mastoids:  The visualized portions of the  sinuses and mastoids appear normal.    Osseous structures/soft tissues:  No intracranial bleed or skull  fractures are identified. No significant change.      Impression    IMPRESSION: No intracranial hemorrhage or skull fractures.   No change  compared to 7/30/2016.      FORD CARRIZALES MD   XR Pelvis 1/2 Views    Narrative    PELVIS ONE TO TWO VIEWS  3/12/2020 12:55 PM     HISTORY:  Trauma, pain.      Impression    IMPRESSION: Lumbar scoliosis and degenerative disc disease. The  osseous pelvis appears intact. Hip joint space widths appear within  normal limits. No apparent pelvic fracture.    TO KIM MD   XR Shoulder Left G/E 3 Views    Narrative    XR SHOULDER LT G/E 3 VW 3/12/2020 12:56 PM     HISTORY: trauma, pain      Impression    IMPRESSION: No fracture or dislocation. Acromioclavicular degenerative  arthrosis. Possible narrowing of the subacromial space which could  indicate chronic rotator cuff atrophy or tear.    TO KIM MD   XR Tibia & Fibula Left 2 Views    Narrative    LEFT TIBIA AND FIBULA TWO VIEWS  3/12/2020 12:56 PM     HISTORY:  Trauma, pain.      Impression    IMPRESSION: The tibia and fibula appear intact. Possible widening of  the lateral tibiotalar joint of uncertain significance. This could  represent ligament insufficiency.    TO KIM MD   Chest XR,  PA & LAT    Narrative    CHEST TWO VIEWS  3/12/2020 12:58 PM     HISTORY:  Left lower rib pain,  trauma.    COMPARISON: 12/27/2017.      Impression    IMPRESSION: No acute cardiopulmonary disease.    ARCENIO WU MD   XR Knee Bilateral 3 Views    Narrative    XR KNEE BILATERAL 3 VW 3/12/2020 1:09 PM     HISTORY: trauma, pain      Impression    IMPRESSION: No apparent fracture. Left medial and right lateral  compartment joint space narrowing. Right medial meniscal  chondrocalcinosis.    TO KIM MD   XR Abdomen 2 Views    Narrative    ABDOMEN TWO VIEWS 3/12/2020 4:12 PM     HISTORY: Intractable nausea.    COMPARISON: None.      Impression    IMPRESSION: Nonobstructive gas pattern. No evidence of free air,  organomegaly, or abnormal calculus. Degenerative changes noted of the  spine with a left apex scoliosis.    DULCE RUBI MD   US Lower Extremity Venous Duplex Bilateral    Narrative    US LOWER EXTREMITY VENOUS DUPLEX BILATERAL 3/12/2020 5:50 PM    CLINICAL HISTORY: Leg redness following fall.  TECHNIQUE: Venous Duplex ultrasound of bilateral lower extremities  with and without compression, augmentation and duplex. Color flow and  spectral Doppler with waveform analysis performed.    COMPARISON: None.    FINDINGS: Exam includes the common femoral, femoral, popliteal veins  as well as segmentally visualized deep calf veins and greater  saphenous vein.     RIGHT: No deep vein thrombosis. No superficial thrombophlebitis.  Popliteal cyst on the right measures 5.1 x 3.0 x 1.6 cm.    LEFT: No deep vein thrombosis. No superficial thrombophlebitis.  Popliteal cyst on the left measures 1.9 x 1.9 x 0.7 cm.      Impression    IMPRESSION:  1.  No deep venous thrombosis in the bilateral lower extremities.  2.  Bilateral Alexis's cysts.     DULCE RUBI MD

## 2020-03-13 NOTE — PLAN OF CARE
At baseline pt lives alone and is independent in self-cares, cooking, light cleaning, laundry, driving, med management, financial management, caring for her cat. Pt has hired assist with heavy cleaning. Pt reports she does not know what led to her being on the floor for 3 days    Discharge Planner OT   Patient plan for discharge: Home. Open to home therapy. Slightly open to TCU  Current status: Pt requiring assist of one for sit to stand, ambulation in her room and bathroom, grooming, and toileting. Pt reports thinking skills are improving but remain foggy. Pt very talkative. Therapeutic listening provided as pt discussed being on the floor at home for 3 days.  Barriers to return to prior living situation: cognitive impairments, pain, impaired activity tolerance, level of assist for ADLs and IADLs, lives alone, split level home  Recommendations for discharge: TCU  Rationale for recommendations: Pt functioning below baseline and will benefit from continued skilled OT to maximize safety and independence.         Entered by: Tawny Lobo 03/13/2020 12:07 PM

## 2020-03-13 NOTE — PLAN OF CARE
To Do:  End of Shift Summary  For vital signs and complete assessments, please see documentation flowsheets.     Pertinent assessments: denies N/V/D. Denies pain, VSS, on RA. Tele SR w/  BBB. Up in chair. No hallucinations. Wound left buttocks, LLE  Major Shift Events: Echo done today, Treatment Plan:  ml/hr, add gabapentin, change fent patch dose. PT/OT    Discharge Readiness: Medically active  Expected Discharge Date: TBD  Discharge Disposition: TBD  Barriers/Criteria for discharge: placement? Ability to care for self.

## 2020-03-13 NOTE — CONSULTS
[facility]  Pain Service Consultation   Text Page    Date of Admission:  3/12/2020    Assessment & Plan   Madhavi Castellanos is a 79 year old female who was admitted on 3/12/2020. I was asked by Dr. Reyes to see the patient for pain management during hospital stay.    1)  Acute on chronic low back and bilateral knee pain due to degenerative disc disease and degenerative joint disease.  Acute component due to mechanical fall at home for which the patient was down for two days.    2)  Patient with chronic low back and bilateral knee pain, on chronic opioid therapy managed by Yousuf Walker at [facility].  Baseline 52 mg Daily Morphine Equivalent as dispensed and as reported daily use. (Fentanyl patch 25mcg/hr q 72 hours, Norco 7.5 - 325 mg tablets, max 2 tablets per day).  Patient has an expected opioid tolerance.     Patient's opioid use in past 24 hours: fentanyl patch 25 mcg/hour, Norco 7.5 - 325 mg 1 tablet =  44.5 mg Daily Morphine Equivalent    3)  Risk factors for opioid related harms  - Age > 65 years old  -Anxiety/depression    4)  Opioid induced side-effects:  -Constipation - none reported.  -Nausea/Vomit - upon arrival to hospital, now resolved, non-specific nausea with codeine.  -Sedation - none reported.  Episodes of confusion, hallucinations and falls of unknown etiology, cannot rule out opiates as cause.  -Urinary Retention - none reported     5)  Other/Related:    - Recent falls, episodes of disorientation.    PLAN:   1)  Educated about the multimodal pain management model.  2)Non-opioid multimodal medication therapy  -Topical:Voltaren 1% Topical Gel four times daily, Lidocaine Patch 4% apply to low back in the evening.  -N-SAIDS: Ibuprofen 600 mg three times daily prn  -Muscle Relaxants:Methocarbamol (Robaxin) 750 mg four times daily  -Adjuvants:Acetaminophen 975 mg three times daily, Gabapentin 200 mg at bedtime.  -Antidepresants/anxiolytics: none  3)  Non-medication  interventions  Positioning, Heating pad PRN, ICE, Relaxation, Physical therapy for mobility assessment and for core strengthening exercises.  4)  Opioids:  - Decrease Fentanyl patch to 12 mcg/hour every 72 hours.  - hydrocodone - acetaminophen 7.5 - 325 mg 1 tablet every 4 hours prn  Opioids Treatment Goal:   - Improvement in function  - Safety  - Decrease opiate dosing and wean from long acting opiate due to unknown etiology for episodes of confusion and falls.  5)  Constipation Prophylaxis  - senna-docusate 1-2 tablets two times daily  6)  Pain Education  -Opioid safe use, storage and disposal information included in DC AVS  7)  DC Planning   Discussed goal of Opioid therapy as above with patient and primary hospitalist.  Recommend short supply of opioids to bridge patient to Pain clinic follow up.  Continued outpatient management of pain per [facility] Yousuf Walker.  Disposition: pending  Support systems: friends, brother.  Outpatient Referrals: Return to pain clinic in 1-2 weeks to follow up reduction in dose.  Continue with physical therapy.    The following risk factors have been identified for unintentional overdose: patient is > 65 years old, patient is overweight and patient is taking a high amount of opioids in 24 hour period. Discharge with intra-nasal naloxone if discharged to home with opioids  >40 mg MME/day.  Plan for education prior to discharge    Time Spent on this Encounter   Total unit/floor time 90 minutes, time consisted of the following, examination of the patient, reviewing the record and completing documentation. >50% of time spent in counseling and coordination of care.  Time spend counseling with patient consisted of the following topics, goals of care and symptom management.  Time spent in coordination of care with Bedside Nurse Kathy Durán and Hospitalist Dr. Hamzah Laura.     [place]cca GTZ, CNP  Pain Management and Palliative Care  [facility]  Pgr:  [phone]      Reason for Consult   Reason for consult: I was asked by Dr. Reyes to evaluate this patient for chronic pain management.    Primary Care Physician   Primary Care Physician:Cecy Edmondson  Pain Specialist: Yousuf Walker, [facility]    Chief Complaint   Fall and episode of confusion/hallucinations    History is obtained from the patient and electronic medical record.    History of Present Illness   Madhavi Castellanos is a 79 year old female with past medical history of chronic low back pain and bilateral knee pain, atrial fibrillation s/p watchman device, hyperthyroidism who presented to the emergency department after two days of acute confusion and hallucinations in her residence.  Madhavi reports that she fell at some point but has limited memory of the event.  She states she awoke in her hallway dressed in her night clothes, her phone and glasses were in her guest room which is uncharacteristic for her.  She reports hallucinating at that point and thinking that she was in the hospital.  She then had a period of clarity in her bathroom and realized that she had soiled herself with urine and bowel movement.    She has history of chronic pain and is treated at [facility], primarily with Yousuf Walker.  She states she has not had titration of her medications in the recent months. She states she has been 'warned' about being on opiates due to systemic risks, but states she has had the best pain control on her current plan.      CURRENT PAIN:  Her pain is located in the low back and bilateral knees.  It is described as Aching and cramping  She rates it as ranging between 2/10 and 8/10  The average is 3/10 on a scale of 0-10  Currently it is rated as 3/10  It improves by rest, lying in bed, physical therapy  It worsens by increased lifting, walking for too long, prolonged standing.  She has been compliant with the recommendations while in the hospital.      PAIN HISTORY:  The  "pain is mainly located in the low back, bilateral knees  It is described as Aching and cramping  Rates it as ranging between 2/10 and 8/10  Currently it is rated as 4/10    PAST PAIN TREATMENT:   Medications: Fentanyl patch (current), Hydrocodone (current), gabapentin (tapered off on her own), Aspercreme (current), methocarbamol (current)  Non-phamacologic modalities: physical therapy.  Previous interventions/surgeries: none.    [facility] database review: 3/3/2020 Fentanyl 25 mcg/hour patches #10, 2/11/2020 gabapentin 300 mg capsule #90, 2/4/2020 Hydrocodone- Acetaminophen 7.5 - 325 mg tablets #60  Reported use consistent with prescriptions obtained.  No concern for misuse or diversion.      Past Medical History   I have reviewed this patient's medical history and updated it with pertinent information if needed.   Past Medical History:   Diagnosis Date     Asthma      Atrial fibrillation (H)     had EP ablation 8/11/2017, pt reports being \"afib free\" now     CAD (coronary artery disease)      Depression      Esophageal reflux      Hypertension      LBBB (left bundle branch block)      Liver lesion     stable, seen on multiple CT scans     Lumbosacral spondylosis      Mumps      Nonspecific reaction to tuberculin skin test without active tuberculosis(795.51)      Obesity      TALI (obstructive sleep apnea)      Osteoarthritis      Palpitations      Prolonged QT interval      Pure hypercholesterolemia      Spinal stenosis of lumbar region      STD (sexually transmitted disease)      Tuberculosis        Past Surgical History   I have reviewed this patient's surgical history and updated it with pertinent information if needed.  Past Surgical History:   Procedure Laterality Date     CHOLECYSTECTOMY       COLONOSCOPY N/A 8/4/2019    Procedure: COLONOSCOPY;  Surgeon: Darron Cunha MD;  Location:  GI     EP ABLATION / EP STUDIES  08/11/2017    for hx a fib     ESOPHAGOSCOPY, GASTROSCOPY, DUODENOSCOPY (EGD), COMBINED  " 7/2014    reactive gastropathy, H. Pylori negative ([facility])     EXCISE VULVA WIDE LOCAL N/A 12/1/2017    Procedure: EXCISE VULVA WIDE LOCAL;  Wide Local Excision of Vulvar Lesion   ;  Surgeon: Nazario Tirado MD;  Location:  OR         Prior to Admission Medications   Prior to Admission Medications   Prescriptions Last Dose Informant Patient Reported? Taking?   Atorvastatin Calcium (LIPITOR PO) Past Week at Unknown time  Yes Yes   Sig: Take 10 mg by mouth every evening    HYDROCHLOROTHIAZIDE PO Past Week at Unknown time  Yes Yes   Sig: Take 12.5 mg by mouth daily    HYDROcodone-acetaminophen (NORCO) 7.5-325 MG per tablet   Yes Yes   Sig: Take 1-2 tablets by mouth 2 times daily as needed for moderate to severe pain    Losartan Potassium (COZAAR PO) Past Week at Unknown time  Yes Yes   Sig: Take 25 mg by mouth daily   METHOCARBAMOL PO   Yes Yes   Sig: Take 750 mg by mouth 6 times daily As needed   Methimazole (TAPAZOLE PO) Past Week at Unknown time  Yes Yes   Sig: Take 5 mg by mouth five times a week Monday thru Friday   Sotalol HCl (BETAPACE PO) Past Week at Unknown time  Yes Yes   Sig: Take 40 mg by mouth 2 times daily    alum & mag hydroxide-simethicone (MYLANTA/MAALOX) 200-200-20 MG/5ML SUSP   No Yes   Sig: Take 30 mLs by mouth every 4 hours as needed   aspirin (ASA) 325 MG EC tablet Past Week at Unknown time  Yes Yes   Sig: Take 325 mg by mouth daily   buPROPion (WELLBUTRIN XL) 150 MG 24 hr tablet Past Week at Unknown time  Yes Yes   Sig: Take 150 mg by mouth every morning   diltiazem (CARTIA XT) 120 MG 24 hr capsule Past Week at Unknown time  Yes Yes   Sig: Take 120 mg by mouth daily   fentaNYL (DURAGESIC) 25 mcg/hr patch 72 hr 3/10/2020 at Unknown time  Yes Yes   Sig: Place 1 patch onto the skin every 72 hours   polyethylene glycol (MIRALAX) packet   Yes Yes   Sig: Take 1 packet by mouth daily as needed for constipation   promethazine (PHENERGAN) 25 MG tablet   No Yes   Sig: Take 1 tablet (25 mg) by  "mouth every 6 hours as needed for nausea   psyllium (METAMUCIL/KONSYL) capsule   Yes Yes   Sig: Take 1 capsule by mouth daily as needed for constipation      Facility-Administered Medications: None     Allergies   Allergies   Allergen Reactions     Metoprolol Shortness Of Breath     2 hours after tasking for the first time SOB, Pt evaluated and pt WDL       Albuterol Unknown     Augmentin Nausea     Cephalexin      Codeine Sulfate      \"i dont't know, nothing, maybe sick to my stomach\"     Cymbalta      agitated     Lisinopril Cough     Omnicef [Cefdinir] Diarrhea     Percocet [Oxycodone-Acetaminophen]      nauseated       Social History   I have reviewed this patient's social history and updated it with pertinent information if needed. Madhavi Castellanos  reports that she has never smoked. She has never used smokeless tobacco. She reports that she does not drink alcohol or use drugs.    Family History   I have reviewed this patient's family history and updated it with pertinent information if needed.   Family History   Problem Relation Age of Onset     Colon Cancer No family hx of      Family history of addiction, none.    Review of Systems   The 10 point Review of Systems is negative other than noted in the HPI or here.    Denies Bowel or bladder dysfunction    Physical Exam   Temp:  [97  F (36.1  C)-99.6  F (37.6  C)] 97.3  F (36.3  C)  Pulse:  [] 98  Heart Rate:  [] 93  Resp:  [16-18] 18  BP: (118-166)/() 135/57  SpO2:  [95 %-100 %] 96 %  185 lbs 3.2 oz  GEN:  Alert, oriented x 3, appears comfortable, No apparent distress.  HEENT:  Normocephalic/atraumatic, no scleral icterus, no nasal discharge, mouth moist.  CV:  RRR, S1, S2; no murmurs or other irregularities noted.  +3 DP/PT pulses bilaterally; no edema bilateral lower extremeties.  RESP:  Clear to auscultation bilaterally without rales/rhonchi/wheezing/retractions.  Symmetric chest rise on inhalation noted.  Normal respiratory effort.  ABD:  " Rounded, soft, non-tender/non-distended.  +BS  EXT:  Edema & pulses as noted above.  Color, moisture and sensation intact x 4.     M/S:   No tenderness to palpation.    SKIN:  Dry to touch, no exanthems noted in the visualized areas.    NEURO: Symmetric strength +5/5.  Sensation to touch intact all extremities.   There is no area of allodynia or hyperesthesia.  PAIN BEHAVIOR: Cooperative  Psych:  Normal affect.  Calm, cooperative, conversant appropriately.       Data   Most Recent 3 CBC's:  Recent Labs   Lab Test 03/13/20  0708 03/12/20  1151 01/12/20  1155   WBC 13.5* 18.0* 18.3*   HGB 11.4* 13.9 13.4   MCV 93 93 93    390 404     Most Recent 3 BMP's:  Recent Labs   Lab Test 03/13/20  0708 03/12/20  1151 08/04/19  0615    141 141   POTASSIUM 3.5 3.5 3.8   CHLORIDE 109 106 108   CO2 27 25 29   BUN 21 21 12   CR 0.63 0.54 0.48*   ANIONGAP 5 10 4   LUIS ANTONIO 8.4* 9.0 8.9   GLC 95 112* 113*     Most Recent 2 LFT's:  Recent Labs   Lab Test 03/13/20  0708 03/12/20  1151   AST 28 53*   ALT 44 55*   ALKPHOS 75 104   BILITOTAL 0.6 0.8

## 2020-03-13 NOTE — PLAN OF CARE
PT: PT ken completed. Pt here due to fall and rhabdo due to several days on floor. Pt lives alone in split level home. Pt has assist with laundry otherwise inde with cares, has 4WW.   Discharge Planner PT   Patient plan for discharge: hopeful for home, was educated on TCU recs  Current status: Pt was cued for supine to sit, increased back pain and needing min A. Pt was cued for several sit<>stands from session, educated on using arms and LEs to control transfer/also to improve UE strength. Pt able to perform 5 reps only before fatiguing. Pt was cued for ambulation, restricted distance due to knee pain, adjusted walker and ultimately needing youth walker. Discussed having someone bring in her 4WW as it sounds like it is a poor fit. Pt agreeable. Pt reporting knee pain following, offering ice. Pt educated to only don for 20 min at a time or less pending on comfort. Pt agreeable. Pt educated on recs of TCU to improve endurance with activity, decreased assist with transfers, bed mob.   Barriers to return to prior living situation: needing A for transfers, needing A with ADLs/IADLs at this time due to limited upright activity due to knee pain, lives alone, also note OT concerned about cognition   Recommendations for discharge: TCU  Rationale for recommendations: Pt would benefit from continued progression of strengthening and activity tolerance to improve inde with activities. Pt lives alone does not have assistance besides cleaning.        Entered by: Yael Vizcarra 03/13/2020 3:28 PM

## 2020-03-13 NOTE — CONSULTS
"CLINICAL NUTRITION SERVICES  -  ASSESSMENT NOTE      MALNUTRITION:  % Weight Loss:  Weight loss does not meet criteria for malnutrition   % Intake:  Decreased intake does not meet criteria for malnutrition --> hard to determine baseline adequacy of diet  Subcutaneous Fat Loss:  Orbital region mild depletion and Upper arm region moderate depletion   Muscle Loss:  Temporal region mild to moderate depletion, Clavicle bone region moderate depletion, Acromion bone region moderate depletion and Dorsal hand region moderate depletion --> with palpation, masked by adiposity  Fluid Retention: None noted    Malnutrition Diagnosis: Non-Severe malnutrition  In Context of:  Chronic illness or disease  (question combination environmental or social circumstances)        REASON FOR ASSESSMENT  Madhavi Castellanos is a 79 year old female seen by Registered Dietitian for Admission Nutrition Risk Screen for reduced oral intake over the last month.    PMH of: A fib, depression, anxiety, chronic nausea, acute on chronic pain.    Admit 2/2: Falls.    NUTRITION HISTORY  - Information obtained from patient and chart.  - Diet at home: Regular.  - Usual intakes: Meals BID - exclusively eats out ([employer], [employer]).  Has not cooked a meal at home in \"years\".   - Barriers to PO intakes: Denies decrease in appetite/changes to intake except for 3-4 days PTA d/t not feeling hungry.  Suspect nutritional inadequate of diet at baseline, though hard to determine.  - Use of oral supplements: Likes Ensure, does not drink consistently.  - Chewing/swallowing issues: Denied.  No dentures, has all own teeth.  - Allergies: NKFA.      CURRENT NUTRITION ORDERS  Diet Order:     Regular    Current Intake/Tolerance:  50% intake (1 recording) per flowsheet review, limited timeframe since admit.  Mentions she has not been feeling hungry since admit.       NUTRITION FOCUSED PHYSICAL ASSESSMENT FOR DIAGNOSING MALNUTRITION)  Yes            Observed:    Muscle wasting " "(refer to documentation in Malnutrition section) and Subcutaneous fat loss (refer to documentation in Malnutrition section)   BLE fab appearing  Many bruises throughout    Obtained from Chart/Interdisciplinary Team:  No documentation of skin issues   Stooling patterns reviewed     ANTHROPOMETRICS  Height: 5' 1\"  Weight: 185 lbs 3.2 oz  Body mass index is 34.99 kg/m .  Weight Status:  Obesity Grade I BMI 30-34.9  Weight History:  Wt Readings from Last 10 Encounters:   03/12/20 84 kg (185 lb 3.2 oz)   08/03/19 84.8 kg (187 lb)   12/19/18 83.9 kg (185 lb)   07/27/18 83.9 kg (185 lb)   06/25/18 83.9 kg (185 lb)   01/09/18 83.9 kg (185 lb)   12/01/17 86.2 kg (190 lb)   11/08/16 78.5 kg (173 lb)   07/30/16 74.8 kg (165 lb)   05/02/16 76.2 kg (168 lb)   - Current wt consistent with that in 2018.    - Per care everywhere review:  1/20/2020: 189#  2/06/2020: 191#  3/09/2020: 193#  - Possibility for 3% wt loss over the past month, question accuracy of 4% wt loss over the past 3-4 days (admit wt compared to 3/09/2020).  Does appear wt has slightly trended down over the past 1-2 months, depending on accuracy of admit wt.    LABS  Labs reviewed:  Electrolytes  Potassium (mmol/L)   Date Value   03/13/2020 3.5   03/12/2020 3.5   08/04/2019 3.8    Blood Glucose  Glucose (mg/dL)   Date Value   03/13/2020 95   03/12/2020 112 (H)   08/04/2019 113 (H)   08/03/2019 114 (H)   03/28/2018 107 (H)    Inflammatory Markers  WBC (10e9/L)   Date Value   03/13/2020 13.5 (H)   03/12/2020 18.0 (H)   01/12/2020 18.3 (H)     Albumin (g/dL)   Date Value   03/13/2020 2.6 (L)   03/12/2020 3.1 (L)   03/28/2018 3.4      Magnesium (mg/dL)   Date Value   02/25/2016 1.9   12/24/2015 1.9   01/08/2015 2.1     Sodium (mmol/L)   Date Value   03/13/2020 141   03/12/2020 141   08/04/2019 141    Renal  Urea Nitrogen (mg/dL)   Date Value   03/13/2020 21   03/12/2020 21   08/04/2019 12     Creatinine (mg/dL)   Date Value   03/13/2020 0.63   03/12/2020 0.54 "   08/04/2019 0.48 (L)     Additional  Ketones Urine (mg/dL)   Date Value   03/12/2020 40 (A)        B/P: 119/67, T: 97.3, P: 98, R: 18      MEDICATIONS  Medications reviewed:    acetaminophen  975 mg Oral TID     aspirin  325 mg Oral Daily     atorvastatin  10 mg Oral QPM     buPROPion  150 mg Oral QAM     diltiazem ER COATED BEADS  120 mg Oral Daily     fentaNYL  25 mcg Transdermal Q72H     fentaNYL   Transdermal Q8H     losartan  25 mg Oral Daily     methimazole  5 mg Oral Once per day on Mon Tue Wed Thu Fri     methocarbamol  750 mg Oral 4x Daily     senna-docusate  1 tablet Oral BID    Or     senna-docusate  2 tablet Oral BID     sodium chloride (PF)  3 mL Intracatheter Q8H     sotalol  40 mg Oral BID        sodium chloride 100 mL/hr at 03/13/20 0844          ASSESSED NUTRITION NEEDS PER APPROVED PRACTICE GUIDELINES:    Dosing Weight 84 kg  Estimated Energy Needs: 1937-6243 kcals (20-25 Kcal/Kg)  Justification: maintenance, obese  Estimated Protein Needs:  grams protein (1-1.2 g pro/Kg)  Justification: preservation of lean body mass  Estimated Fluid Needs: per MD      NUTRITION DIAGNOSIS:  Inadequate oral intake related to acute on chronic decreased appetite as evidenced by meeting <75% needs for 3-4 days PTA + during admit, coding of malnutrition based on fat/muscle loss.      NUTRITION INTERVENTIONS  Recommendations / Nutrition Prescription  Continue regular diet.  Protein focus as able.    Chocolate Boost supplement at 2 pm while admitted.      Implementation  Nutrition education: Provided education on above.  Encouraged consistent protein intake and reviewed food sources.    Medical Food Supplement: As above.     Collaboration and Referral of Nutrition care: Discussed POC with team during rounds.      Nutrition Goals  Patient to consume at least 75% of meals TID or the equivalent with supplement use.      MONITORING AND EVALUATION:  Progress towards goals will be monitored and evaluated per protocol  and Practice Guidelines          Juanita Singh RDN, LD  Clinical Dietitian  3rd floor/ICU: [phone]  All other floors: [phone]  Weekend/holiday: [phone]

## 2020-03-14 ENCOUNTER — APPOINTMENT (OUTPATIENT)
Dept: PHYSICAL THERAPY | Facility: CLINIC | Age: 80
DRG: 566 | End: 2020-03-14
Payer: COMMERCIAL

## 2020-03-14 ENCOUNTER — APPOINTMENT (OUTPATIENT)
Dept: OCCUPATIONAL THERAPY | Facility: CLINIC | Age: 80
DRG: 566 | End: 2020-03-14
Payer: COMMERCIAL

## 2020-03-14 LAB
ANION GAP SERPL CALCULATED.3IONS-SCNC: 3 MMOL/L (ref 3–14)
BUN SERPL-MCNC: 24 MG/DL (ref 7–30)
CALCIUM SERPL-MCNC: 8.9 MG/DL (ref 8.5–10.1)
CHLORIDE SERPL-SCNC: 106 MMOL/L (ref 94–109)
CO2 SERPL-SCNC: 27 MMOL/L (ref 20–32)
CREAT SERPL-MCNC: 0.55 MG/DL (ref 0.52–1.04)
ERYTHROCYTE [DISTWIDTH] IN BLOOD BY AUTOMATED COUNT: 14.4 % (ref 10–15)
GFR SERPL CREATININE-BSD FRML MDRD: 90 ML/MIN/{1.73_M2}
GLUCOSE SERPL-MCNC: 114 MG/DL (ref 70–99)
HCT VFR BLD AUTO: 41.6 % (ref 35–47)
HGB BLD-MCNC: 12.9 G/DL (ref 11.7–15.7)
MAGNESIUM SERPL-MCNC: 2.3 MG/DL (ref 1.6–2.3)
MCH RBC QN AUTO: 28.6 PG (ref 26.5–33)
MCHC RBC AUTO-ENTMCNC: 31 G/DL (ref 31.5–36.5)
MCV RBC AUTO: 92 FL (ref 78–100)
PLATELET # BLD AUTO: 352 10E9/L (ref 150–450)
POTASSIUM SERPL-SCNC: 3.7 MMOL/L (ref 3.4–5.3)
RBC # BLD AUTO: 4.51 10E12/L (ref 3.8–5.2)
SODIUM SERPL-SCNC: 136 MMOL/L (ref 133–144)
WBC # BLD AUTO: 12.8 10E9/L (ref 4–11)

## 2020-03-14 PROCEDURE — 85027 COMPLETE CBC AUTOMATED: CPT | Performed by: HOSPITALIST

## 2020-03-14 PROCEDURE — 80048 BASIC METABOLIC PNL TOTAL CA: CPT | Performed by: HOSPITALIST

## 2020-03-14 PROCEDURE — 97530 THERAPEUTIC ACTIVITIES: CPT | Mod: GP

## 2020-03-14 PROCEDURE — 97110 THERAPEUTIC EXERCISES: CPT | Mod: GP

## 2020-03-14 PROCEDURE — 83735 ASSAY OF MAGNESIUM: CPT | Performed by: HOSPITALIST

## 2020-03-14 PROCEDURE — 97116 GAIT TRAINING THERAPY: CPT | Mod: GP

## 2020-03-14 PROCEDURE — 25000132 ZZH RX MED GY IP 250 OP 250 PS 637: Performed by: HOSPITALIST

## 2020-03-14 PROCEDURE — 12000000 ZZH R&B MED SURG/OB

## 2020-03-14 PROCEDURE — 97535 SELF CARE MNGMENT TRAINING: CPT | Mod: GO | Performed by: OCCUPATIONAL THERAPIST

## 2020-03-14 PROCEDURE — 36415 COLL VENOUS BLD VENIPUNCTURE: CPT | Performed by: HOSPITALIST

## 2020-03-14 PROCEDURE — 25000132 ZZH RX MED GY IP 250 OP 250 PS 637: Performed by: PHYSICIAN ASSISTANT

## 2020-03-14 PROCEDURE — 99232 SBSQ HOSP IP/OBS MODERATE 35: CPT | Performed by: HOSPITALIST

## 2020-03-14 RX ORDER — FENTANYL 25 UG/1
25 PATCH TRANSDERMAL
Status: DISCONTINUED | OUTPATIENT
Start: 2020-03-14 | End: 2020-03-16

## 2020-03-14 RX ORDER — HYDROCHLOROTHIAZIDE 12.5 MG/1
12.5 CAPSULE ORAL DAILY
Status: DISCONTINUED | OUTPATIENT
Start: 2020-03-14 | End: 2020-03-17 | Stop reason: HOSPADM

## 2020-03-14 RX ADMIN — DILTIAZEM HYDROCHLORIDE 120 MG: 120 CAPSULE, COATED, EXTENDED RELEASE ORAL at 08:51

## 2020-03-14 RX ADMIN — SENNOSIDES AND DOCUSATE SODIUM 1 TABLET: 8.6; 5 TABLET ORAL at 21:32

## 2020-03-14 RX ADMIN — BUPROPION HYDROCHLORIDE 150 MG: 150 TABLET, FILM COATED, EXTENDED RELEASE ORAL at 08:53

## 2020-03-14 RX ADMIN — METHOCARBAMOL TABLETS 750 MG: 750 TABLET, COATED ORAL at 08:59

## 2020-03-14 RX ADMIN — Medication 40 MG: at 08:52

## 2020-03-14 RX ADMIN — Medication 40 MG: at 21:32

## 2020-03-14 RX ADMIN — ASPIRIN 325 MG: 325 TABLET, COATED ORAL at 08:52

## 2020-03-14 RX ADMIN — HYDROCHLOROTHIAZIDE 12.5 MG: 12.5 CAPSULE ORAL at 08:59

## 2020-03-14 RX ADMIN — ACETAMINOPHEN 975 MG: 325 TABLET, FILM COATED ORAL at 17:08

## 2020-03-14 RX ADMIN — ATORVASTATIN CALCIUM 10 MG: 10 TABLET, FILM COATED ORAL at 21:32

## 2020-03-14 RX ADMIN — LOSARTAN POTASSIUM 25 MG: 25 TABLET, FILM COATED ORAL at 08:51

## 2020-03-14 ASSESSMENT — ACTIVITIES OF DAILY LIVING (ADL)
ADLS_ACUITY_SCORE: 21
ADLS_ACUITY_SCORE: 22
ADLS_ACUITY_SCORE: 21

## 2020-03-14 NOTE — PROGRESS NOTES
[facility]    Hospitalist Progress Note      Assessment & Plan   Madhavi Castellanos is a 79 year old female with a PMH significant for Afib on Sotalol, Diltiazem, s/p Watchmen, hyperthyroidism, hx of chronic back pain on Fentanyl patch, oral opiates, depression and anxiety who presents with concerns of falls at home (apparently had been on the floor for 2 days), CK elevation and leukocytosis but no obvious infectious etiology.      Here in ED, she was found to also have intractable dry heaving. Belly was soft and ABD xray showed non-obstructive pattern. She was xray'd (chest, shoulder, pelvis, bl knees, tibia/fibula) with no obvious fractures.      #S/p fall and apparently laying on the floor for 2 days--patient has bruises in multiple stages of healing.  She has had multiple falls at home.  She does admit to this.  I suspect this is probably secondary to generalized weakness in the setting of narcotic medications utilized for chronic pain.  There is no evidence of infection with negative x-ray, UA, procalcitonin.  Lower extremity ultrasound was negative for DVT.  CK was elevated over 800, kidney function okay.    Laboratory has not shown concerning arrhythmia.  TTE was checked and did show evidence of conduction abnormality which could be secondary to her frequent PACs.  There is no significant valvular abnormality that could account for any syncopal episodes.  She did symptomatically improve with IV fluids.  --I did discuss with her reduction of narcotics in order to reduce the risk of falls.  She was adamant that this is not going to happen.  She states that she has a contract with her pain clinic and she is not able to go down on her pain medications as this will worsen her pain.  Pain service was consulted on admission and attempted to reduce her fentanyl patch dose.  Unfortunately, patient is not willing to go down on this dose and her home fentanyl patch was resumed.  --Cont Fentanyl patch,   Resume oral Norco for chronic pain.   --Bowel regimen ordered   --Appreciate PT and OT assistance.  Patient will require TCU  --Wound RN consulted     #Elevated CK--secondary to fall.  Improved with IV fluids.  Kidney function is stable.    #Leukocytosis--suspect reactive, improved.   No fevers.  Chest x-ray is negative.  UA is negative.  Procalcitonin is negative.  No indication for antibiotics.     #Acute on chronic back, knee pain: Apparently follows an outpatient pain clinic who prescribes her fentanyl patch and oxycodone.  X-rays completed negative for acute fracture.  They do seem to show osteoarthritis.  -Continue home pain medications.  PT and OT as above.    Chronic Medical Conditions   #Afib: s/p ablation. In Sinus rhythm. Cont on ASA, Diltiazem, sotalol.  Telemetry as above  #CAD: Continue home statin, aspirin  #Hypertension: Continue home hydrochlorothiazide and losartan.    DVT Prophylaxis: Pneumatic Compression Devices  Code Status: Full Code  Expected discharge: Patient is medically ready for discharge.  Do not anticipate any further testing.  Awaiting insurance authorization for TCU    Hamzah Laura MD  Text Page    Interval History   No acute events overnight.  Patient was up with 1 and a walker.  Pain was rated mild this a.m.  States it is no different than her chronic pain.  No chest pain, shortness of breath, fevers or chills, headache.  No nausea or vomiting.  She is tolerating p.o. intake.  She does seem to understand that she cannot return home given her generalized weakness and frequent falls.  She is open to TCU.    -Data reviewed today: I reviewed all new labs and imaging results over the last 24 hours.     Physical Exam   Temp: 96.5  F (35.8  C) Temp src: Oral BP: (!) 156/75 Pulse: 90 Heart Rate: 88 Resp: 20 SpO2: 96 % O2 Device: None (Room air)    Vitals:    03/12/20 1647   Weight: 84 kg (185 lb 3.2 oz)     Vital Signs with Ranges  Temp:  [95.7  F (35.4  C)-96.7  F (35.9  C)] 96.5  F (35.8   C)  Pulse:  [89-90] 90  Heart Rate:  [88] 88  Resp:  [18-20] 20  BP: (128-156)/(61-75) 156/75  SpO2:  [96 %-97 %] 96 %  I/O last 3 completed shifts:  In: 1130 [P.O.:660; I.V.:470]  Out: -     GENERAL: Sitting up in a chair.  No acute distress.  Answers appropriately.  HEENT:  PERRLA. Normal conjunctiva, normal hearing, nasal mucosa and Oropharynx are normal.  NECK:  Supple, no neck vein distention, adenopathy or bruits, normal thyroid.  HEART:   Regular.  LUNGS:  Clear to auscultation, normal Respiratory effort. No wheezing, rales or ronchi.  ABDOMEN:  bowel sounds present, nontender nondistended  EXTREMITIES:   Patient with significant bruising of the left lower extremity.  She also does seem to have some chronic venous stasis changes noted.  Left foot is wrapped.  SKIN: Dry.  Scattered ecchymoses that are in various stages of healing.  NEUROLOGIC:  CN 2-12 intact, BL 5/5 symmetric upper and lower extremity strength, sensation is intact with no focal deficits.     Medications       acetaminophen  975 mg Oral TID     aspirin  325 mg Oral Daily     atorvastatin  10 mg Oral QPM     buPROPion  150 mg Oral QAM     diltiazem ER COATED BEADS  120 mg Oral Daily     fentaNYL  25 mcg Transdermal Q72H     fentaNYL   Transdermal Q8H     gabapentin  200 mg Oral At Bedtime     hydrochlorothiazide  12.5 mg Oral Daily     losartan  25 mg Oral Daily     methimazole  5 mg Oral Once per day on Mon Tue Wed Thu Fri     senna-docusate  1 tablet Oral BID    Or     senna-docusate  2 tablet Oral BID     sodium chloride (PF)  3 mL Intracatheter Q8H     sotalol  40 mg Oral BID       Data   Recent Labs   Lab 03/14/20  0820 03/13/20  0708 03/12/20  1151   WBC 12.8* 13.5* 18.0*   HGB 12.9 11.4* 13.9   MCV 92 93 93    324 390   INR  --   --  1.03    141 141   POTASSIUM 3.7 3.5 3.5   CHLORIDE 106 109 106   CO2 27 27 25   BUN 24 21 21   CR 0.55 0.63 0.54   ANIONGAP 3 5 10   LUIS ANTONIO 8.9 8.4* 9.0   * 95 112*   ALBUMIN  --  2.6* 3.1*    PROTTOTAL  --  5.3* 6.1*   BILITOTAL  --  0.6 0.8   ALKPHOS  --  75 104   ALT  --  44 55*   AST  --  28 53*   TROPI  --   --  0.018       Recent Results (from the past 24 hour(s))   Echocardiogram Complete    Narrative    [id number]  [id number]  [id number]  [id number]^BRICE^ANGELA           [facility]  Echocardiography Laboratory  [address] [place] [address]        Name: AC MCKOY  MRN: [id number]  : 1940  Study Date: 2020 01:42 PM  Age: 79 yrs  Gender: Female  Patient Location: [facility]  Reason For Study: Syncope and Collapse  Ordering Physician: ANGELA NARVAEZ  Referring Physician: Cecy Edmondson  Performed By: Hank Tapia RDCS     BSA: 1.8 m2  Height: 61 in  Weight: 185 lb  HR: 83  BP: 119/67 mmHg  _____________________________________________________________________________  __        Procedure  Complete Echo Adult. Optison (NDC #[id number]) given intravenously.  _____________________________________________________________________________  __        Interpretation Summary     Diastolic Doppler findings (E/E' ratio and/or other parameters) suggest left  ventricular filling pressures are increased.  Septal motion is consistent with conduction abnormality.  There is mild right ventricular hypertrophy.  Right ventricular systolic pressure is elevated, consistent with moderate  pulmonary hypertension.  Indeterminate rhythm-Rate <100 regular, wide but no A wave on MV inflow.  Please correlate with 12 lead ecg  _____________________________________________________________________________  __        Left Ventricle  The left ventricle is normal in size. There is normal left ventricular wall  thickness. The visual ejection fraction is estimated at 55-60%. Diastolic  function not assessed due to arrhythmia. Diastolic Doppler findings (E/E'  ratio and/or other parameters) suggest left ventricular filling pressures are  increased. Septal motion is consistent with conduction abnormality.      Right Ventricle  The right ventricle is normal size. There is mild right ventricular  hypertrophy. The right ventricular systolic function is normal.     Atria  The left atrium is mildly dilated. Right atrial size is normal.     Mitral Valve  There is no mitral annular calcification. There is trace mitral regurgitation.        Tricuspid Valve  There is mild (1+) tricuspid regurgitation. The right ventricular systolic  pressure is approximated at 51.4 mmHg plus the right atrial pressure. Right  ventricular systolic pressure is elevated, consistent with moderate pulmonary  hypertension. IVC diameter <2.1 cm collapsing >50% with sniff suggests a  normal RA pressure of 3 mmHg.     Aortic Valve  The aortic valve is trileaflet. No hemodynamically significant valvular aortic  stenosis.     Pulmonic Valve  The pulmonic valve is not well seen, but is grossly normal.     Vessels  Normal size aorta.     Pericardium  The pericardium appears normal.        Rhythm  The rhythm was undetermined. Indeterminate rhythm-Rate <100 regular, wide but  no A wave on MV inflow. Please correlate with 12 lead ecg.  _____________________________________________________________________________  __  MMode/2D Measurements & Calculations  IVSd: 0.87 cm     LVIDd: 4.8 cm  LVIDs: 2.4 cm  LVPWd: 1.0 cm  FS: 49.4 %  LV mass(C)d: 154.7 grams  LV mass(C)dI: 84.6 grams/m2  Ao root diam: 2.8 cm  LA dimension: 4.1 cm  asc Aorta Diam: 2.9 cm  LA/Ao: 1.5  LVOT diam: 2.0 cm  LVOT area: 3.0 cm2  LA Volume (BP): 58.1 ml  LA Volume Index (BP): 31.7 ml/m2  RWT: 0.42           Doppler Measurements & Calculations  MV E max trin[place]: 95.1 cm/sec  MV dec slope: 473.7 cm/sec2  MV dec time: 0.20 sec  LV V1 max PG: 10.5 mmHg  LV V1 max: 162.2 cm/sec  LV V1 VTI: 28.8 cm  CO(LVOT): 7.6 l/min  CI(LVOT): 4.2 l/min/m2  SV(LVOT): 86.2 ml  SI(LVOT): 47.2 ml/m2  PA acc time: 0.06 sec  TR max trin[place]: 358.3 cm/sec  TR max P.4 mmHg  E/E' avg: 15.9  Lateral E/e': 16.2  Medial E/e':  15.6              _____________________________________________________________________________  __        Report approved by: Eddie Petersen 03/13/2020 03:47 PM

## 2020-03-14 NOTE — PLAN OF CARE
Pertinent assessments: Denies N/V/D. Denies pain, VSS on room air, Afebrile. Tele SR w/  BBB. Wound left buttocks, LLE  Major Shift Events: Echo done, EKG done  Treatment Plan: SL fluids, pain management, PT/OT, sutures to left leg, abrasions to buttocks. Tele.    Full code.  Tolerating a regular diet.  CK: 273  PT, OT, SW following.  Fentanyl patch to ABD.  Up with assist of 1, gait belt, and walker.

## 2020-03-14 NOTE — PLAN OF CARE
Pertinent assessments: Pt up with 1 and a walker.  Sutures to left leg, abrasions to buttocks.  Lower extremities fab and bruised.  Pt states pain is 1/10.    Major Shift Events: none  Treatment Plan: SL fluids, pain management, PT/OT, Tele.

## 2020-03-14 NOTE — PLAN OF CARE
To Do:  End of Shift Summary  For vital signs and complete assessments, please see documentation flowsheets.     Pertinent assessments: up in chair, good appetite, voiding, no BM, dressing to LLE changed, leg swollen, red, sutures intact. Bottom open, moist. No hallucinations, 5/10 pain, robaxin given x 1.   Major Shift Events: none  Treatment Plan: pain, encouraged po fluids,, PT/OT, Tele.

## 2020-03-14 NOTE — PLAN OF CARE
Discharge Planner PT   Patient plan for discharge: TCU  Current status: Pt supine upon arrival, states she is fatigued but agreeable to PT with encouragement. Performed supine>sit with HOB minimally elevated & rail use with Kory & extra time, cues for log roll technique. Sit>stand with FWW and SBA. Ambulated 80' with FWW and SBA with heavy reliance on UE support- cues for reduced reliance as able, cues for posture. Upon return to room, engaged in standing exercises with FWW support and CGA with multiple seated rest breaks. Sit<>stand from chair x4 during session with FWW and SBA. Sit>stand from toilet with grab bar, FWW and CGA. Pt demos ind pericares. Sit>supine with ModA at flat bed without rail, pt declining to attempt log roll. Pt supine upon departure, all needs in reach.     Barriers to return to prior living situation: needing A for transfers, needing A with ADLs/IADLs at this time due to limited activity tolerance, lives alone, also note OT concerned about cognition   Recommendations for discharge: TCU  Rationale for recommendations: Pt would benefit from continued progression of strengthening and activity tolerance to improve independence with activities. Pt lives alone & does not have assistance besides cleaning.        Entered by: Shandra Pearson 03/14/2020 3:59 PM

## 2020-03-14 NOTE — PLAN OF CARE
Discharge Planner OT   Patient plan for discharge: home but open to continued therapy whether TCU or home  Current status: pt. seated up in chair and trying to reschedule dentist and tax appointments and look into which TCUs to suggest for continued care.  OT completed SLUMS with pt. who scored 29/30 indicating within normal perimeters.  Pt. able to recognize and utilize strategies throughout assessment and asked questions.  Pt. also notes that she feel so much better and is hydrated.  Pt. still noting pain in the groin impairing ability to transfer and walk compared to baseline and hopeful to improve.  Pt. is open to rehab but continues to be hopeful that she can work towards going home sooner.  Barriers to return to prior living situation: pain, impaired activity tolerance, level of assist for ADLs and IADLs, lives alone, split level home  Recommendations for discharge: TCU  Rationale for recommendations: Pt functioning below baseline and will benefit from continued skilled OT to maximize safety and independence.       Entered by: Monica Narvaez 03/14/2020 12:44 PM

## 2020-03-14 NOTE — PROGRESS NOTES
Discharge Planner   Discharge Plans in progress: spoke with pt about TCU placement. Pt agreeable but wants to stay close to home.. Spoke with pt about Medicare Compare list for SNF, with associated star ratings to assist with choice for referrals/discharge planning Yes    Barriers to discharge plan: Clinically accepted at [facility]. Needs Prior auth for placement. Will need to have current PT/OT notes on Monday for review   Will need scripts for pain meds  Follow up plan: MD aware of barrier    Corinne White [facility]  Inpatient Care Coordination   [phone]  [facility]          Entered by: Corinne C. White 03/14/2020 3:09 PM

## 2020-03-15 ENCOUNTER — APPOINTMENT (OUTPATIENT)
Dept: PHYSICAL THERAPY | Facility: CLINIC | Age: 80
DRG: 566 | End: 2020-03-15
Payer: COMMERCIAL

## 2020-03-15 ENCOUNTER — APPOINTMENT (OUTPATIENT)
Dept: OCCUPATIONAL THERAPY | Facility: CLINIC | Age: 80
DRG: 566 | End: 2020-03-15
Payer: COMMERCIAL

## 2020-03-15 ENCOUNTER — HEALTH MAINTENANCE LETTER (OUTPATIENT)
Age: 80
End: 2020-03-15

## 2020-03-15 PROCEDURE — 99207 ZZC CDG-MDM COMPONENT: MEETS MODERATE - UP CODED: CPT | Performed by: HOSPITALIST

## 2020-03-15 PROCEDURE — 99232 SBSQ HOSP IP/OBS MODERATE 35: CPT | Performed by: HOSPITALIST

## 2020-03-15 PROCEDURE — 25000132 ZZH RX MED GY IP 250 OP 250 PS 637: Performed by: HOSPITALIST

## 2020-03-15 PROCEDURE — 97116 GAIT TRAINING THERAPY: CPT | Mod: GP

## 2020-03-15 PROCEDURE — 12000000 ZZH R&B MED SURG/OB

## 2020-03-15 PROCEDURE — 25000132 ZZH RX MED GY IP 250 OP 250 PS 637: Performed by: PHYSICIAN ASSISTANT

## 2020-03-15 PROCEDURE — 97535 SELF CARE MNGMENT TRAINING: CPT | Mod: GO | Performed by: OCCUPATIONAL THERAPIST

## 2020-03-15 RX ADMIN — DILTIAZEM HYDROCHLORIDE 120 MG: 120 CAPSULE, COATED, EXTENDED RELEASE ORAL at 08:56

## 2020-03-15 RX ADMIN — ACETAMINOPHEN 975 MG: 325 TABLET, FILM COATED ORAL at 06:29

## 2020-03-15 RX ADMIN — METHOCARBAMOL TABLETS 750 MG: 750 TABLET, COATED ORAL at 06:29

## 2020-03-15 RX ADMIN — ACETAMINOPHEN 975 MG: 325 TABLET, FILM COATED ORAL at 16:19

## 2020-03-15 RX ADMIN — SENNOSIDES AND DOCUSATE SODIUM 1 TABLET: 8.6; 5 TABLET ORAL at 20:25

## 2020-03-15 RX ADMIN — Medication 40 MG: at 20:25

## 2020-03-15 RX ADMIN — HYDROCODONE BITARTRATE AND ACETAMINOPHEN 1 TABLET: 7.5; 325 TABLET ORAL at 22:12

## 2020-03-15 RX ADMIN — Medication 40 MG: at 08:56

## 2020-03-15 RX ADMIN — ASPIRIN 325 MG: 325 TABLET, COATED ORAL at 08:56

## 2020-03-15 RX ADMIN — LOSARTAN POTASSIUM 25 MG: 25 TABLET, FILM COATED ORAL at 08:56

## 2020-03-15 RX ADMIN — ACETAMINOPHEN 650 MG: 325 TABLET, FILM COATED ORAL at 22:12

## 2020-03-15 RX ADMIN — SENNOSIDES AND DOCUSATE SODIUM 2 TABLET: 8.6; 5 TABLET ORAL at 08:56

## 2020-03-15 RX ADMIN — HYDROCHLOROTHIAZIDE 12.5 MG: 12.5 CAPSULE ORAL at 08:56

## 2020-03-15 RX ADMIN — BUPROPION HYDROCHLORIDE 150 MG: 150 TABLET, FILM COATED, EXTENDED RELEASE ORAL at 08:56

## 2020-03-15 RX ADMIN — ATORVASTATIN CALCIUM 10 MG: 10 TABLET, FILM COATED ORAL at 20:25

## 2020-03-15 ASSESSMENT — ACTIVITIES OF DAILY LIVING (ADL)
ADLS_ACUITY_SCORE: 21
ADLS_ACUITY_SCORE: 20
ADLS_ACUITY_SCORE: 21
ADLS_ACUITY_SCORE: 20

## 2020-03-15 NOTE — PLAN OF CARE
Discharge Planner OT   Patient plan for discharge: open to short stay at preferred TCU  Current status: pt. seated in chair and agreeable to therapy.  Pt. noting fatigue and pain and swelling in L LE.  Pt. able to complete sit to stand with SBA and ambulate slowly into the bathroom.  Pt. completed toilet transfer and toileting with SBA.  Pt. noting increased pain in L LE and needed to sit.  After rest and OT looked at foot for weeping, pt. requesting to return to bed and elevate LE.  Pt. needing to slowly walk and sit in chair prior to sitting in bed.  Pt. able to return to bed and supine with min YAIR PARKER present at end of session to review L LE and vitals.  Barriers to return to prior living situation: pain, impaired activity tolerance, level of assist for ADLs and IADLs, lives alone, split level home  Recommendations for discharge: TCU  Rationale for recommendations: Pt functioning below baseline and will benefit from continued skilled OT to maximize safety and independence.       Entered by: Monica Narvaez 03/15/2020 3:54 PM

## 2020-03-15 NOTE — PLAN OF CARE
Discharge Planner PT   Patient plan for discharge: TCU  Current status: Patient reluctantly agreeable to working with PT; upset about no knowing anything about discharge destination or date. Sit<>stand from chair with FWW and CGA, gait for 120 feet with FWW and SBA. Patient fatigued after gait, in chair at end of session, all needs in reach and chair alarm on.  Barriers to return to prior living situation: Current level of A, decreased tolerance to activity, lives alone, falls risk, per OT- cog concerns  Recommendations for discharge: TCU  Rationale for recommendations: Patient requiring increased level of A for mobility/ambulation, limited by pain, weakness, fatigue; is at increased risk for falls and lives alone. Patient would benefit from continued skilled therapy to further improve strength, balance, and independence with mobility and ambulation to address functional limitations and decrease falls risk.         Entered by: Natalya Forbes 03/15/2020 10:28 AM

## 2020-03-15 NOTE — PLAN OF CARE
"NANCY legs with fab fragile skin, open sutured wound to LLE. Mentation clearing.     Major Shift Events: leg wound dressing changed  Treatment Plan: pain management, PT/OT, Tele.      Discharge Readiness: Medically active  Expected Discharge Date: TBD  Discharge Disposition: TBD  Barriers/Criteria for discharge: Placement TCU     Pt states she used to wear a life alert necklace but it was \"too sensitive, and called for help too often\" so she unsubscribed    "

## 2020-03-15 NOTE — PROGRESS NOTES
[facility]    Hospitalist Progress Note      Assessment & Plan   Madhavi Castellanos is a 79 year old female with a PMH significant for Afib on Sotalol, Diltiazem, s/p Watchmen, hyperthyroidism, hx of chronic back pain on Fentanyl patch, oral opiates, depression and anxiety who presents with concerns of falls at home (apparently had been on the floor for 2 days), CK elevation and leukocytosis but no obvious infectious etiology.      Here in ED, she was found to also have intractable dry heaving. Belly was soft and ABD xray showed non-obstructive pattern. She was xray'd (chest, shoulder, pelvis, bl knees, tibia/fibula) with no obvious fractures.      #S/p fall and apparently laying on the floor for 2 days--patient has bruises in multiple stages of healing.  She has had multiple falls at home.  She does admit to this.  I suspect this is probably secondary to generalized weakness in the setting of narcotic medications utilized for chronic pain.  There is no evidence of infection with negative x-ray, UA, procalcitonin.  Lower extremity ultrasound was negative for DVT.  CK was elevated over 800, kidney function okay.    Laboratory has not shown concerning arrhythmia.  TTE was checked and did show evidence of conduction abnormality which could be secondary to her frequent PACs.  There is no significant valvular abnormality that could account for any syncopal episodes.  She did symptomatically improve with IV fluids.  She feels to her baseline.  --I did discuss with her reduction of narcotics in order to reduce the risk of falls.  She was adamant that this is not going to happen.  She states that she has a contract with her pain clinic and she is not able to go down on her pain medications as this will worsen her pain.  Pain service was consulted on admission and attempted to reduce her fentanyl patch dose.  Unfortunately, patient is not willing to go down on this dose and her home fentanyl patch was  resumed.  --Cont Fentanyl patch,  Resume oral Norco for chronic pain.   --Bowel regimen ordered   --Appreciate PT and OT assistance.  Patient will require TCU  --Wound RN consulted     #Elevated CK--secondary to fall.  Improved with IV fluids.  Kidney function is stable.     #Leukocytosis--suspect reactive, improved.   No fevers.  Chest x-ray is negative.  UA is negative.  Procalcitonin is negative.  No indication for antibiotics.     #Acute on chronic back, knee pain: Apparently follows an outpatient pain clinic who prescribes her fentanyl patch and oxycodone.  X-rays completed negative for acute fracture.  They do seem to show osteoarthritis.  -Continue home pain medications.  PT and OT as above.     Chronic Medical Conditions   #Afib: s/p ablation. In Sinus rhythm. Cont on ASA, Diltiazem, sotalol.  Telemetry as above  #CAD: Continue home statin, aspirin  #Hypertension: Continue home hydrochlorothiazide and losartan.     DVT Prophylaxis: Pneumatic Compression Devices  Code Status: Full Code  Expected discharge: Patient is medically ready for discharge.  Do not anticipate any further testing.  Awaiting insurance authorization for TCU    Hamzah Laura MD  Text Page    Interval History   No acute events overnight.  Patient states she feels back to her baseline.  No new pain.  She just notes her chronic pain which seems to be well controlled.  She denies any chest pain, shortness of breath, fevers or chills.  No palpitations.    -Data reviewed today: I reviewed all new labs and imaging results over the last 24 hours.     Physical Exam   Temp: 95.5  F (35.3  C) Temp src: Oral BP: (!) 152/82   Heart Rate: 91 Resp: 16 SpO2: 98 % O2 Device: None (Room air)    Vitals:    03/12/20 1647   Weight: 84 kg (185 lb 3.2 oz)     Vital Signs with Ranges  Temp:  [95.5  F (35.3  C)-97.1  F (36.2  C)] 95.5  F (35.3  C)  Heart Rate:  [88-97] 91  Resp:  [16] 16  BP: (132-152)/(79-82) 152/82  SpO2:  [94 %-98 %] 98 %  I/O last 3 completed  shifts:  In: 1040 [P.O.:1040]  Out: -     GENERAL: Sitting up in a chair.  No acute distress.  Answers appropriately.  HEENT:  PERRLA. Normal conjunctiva, normal hearing, nasal mucosa and Oropharynx are normal.  NECK:  Supple, no neck vein distention, adenopathy or bruits, normal thyroid.  HEART:   Regular.  LUNGS:  Clear to auscultation, normal Respiratory effort. No wheezing, rales or ronchi.  ABDOMEN:  bowel sounds present, nontender nondistended  EXTREMITIES:   Patient with significant bruising of the left lower extremity.  She also does seem to have some chronic venous stasis changes noted.  Left foot is wrapped.  SKIN: Dry.  Scattered ecchymoses that are in various stages of healing.  NEUROLOGIC:  CN 2-12 intact, BL 5/5 symmetric upper and lower extremity strength, sensation is intact with no focal deficits.     Medications       acetaminophen  975 mg Oral TID     aspirin  325 mg Oral Daily     atorvastatin  10 mg Oral QPM     buPROPion  150 mg Oral QAM     diltiazem ER COATED BEADS  120 mg Oral Daily     fentaNYL  25 mcg Transdermal Q72H     fentaNYL   Transdermal Q8H     gabapentin  200 mg Oral At Bedtime     hydrochlorothiazide  12.5 mg Oral Daily     losartan  25 mg Oral Daily     methimazole  5 mg Oral Once per day on Mon Tue Wed Thu Fri     senna-docusate  1 tablet Oral BID    Or     senna-docusate  2 tablet Oral BID     sodium chloride (PF)  3 mL Intracatheter Q8H     sotalol  40 mg Oral BID       Data   Recent Labs   Lab 03/14/20  0820 03/13/20  0708 03/12/20  1151   WBC 12.8* 13.5* 18.0*   HGB 12.9 11.4* 13.9   MCV 92 93 93    324 390   INR  --   --  1.03    141 141   POTASSIUM 3.7 3.5 3.5   CHLORIDE 106 109 106   CO2 27 27 25   BUN 24 21 21   CR 0.55 0.63 0.54   ANIONGAP 3 5 10   LUIS ANTONIO 8.9 8.4* 9.0   * 95 112*   ALBUMIN  --  2.6* 3.1*   PROTTOTAL  --  5.3* 6.1*   BILITOTAL  --  0.6 0.8   ALKPHOS  --  75 104   ALT  --  44 55*   AST  --  28 53*   TROPI  --   --  0.018       No results  found for this or any previous visit (from the past 24 hour(s)).

## 2020-03-15 NOTE — PLAN OF CARE
Pertinent assessments: Bottom open, moist. Up to chair. Tolerating a regular diet. VSS on room air. Afebrile.  Major Shift Events: none  Treatment Plan: pain manage, encouraged po fluids, PT/OT, Tele.    Confused.  Up with 1 and a walker.  Tolerating a regular diet.  Fentanyl patch to abdomen.  CK: 273  Plan for discharge to TCU.

## 2020-03-15 NOTE — PLAN OF CARE
To Do:  End of Shift Summary  For vital signs and complete assessments, please see documentation flowsheets.     Pertinent assessments: AAOx4, no hallucinations/confusion noted. Assisted with shower, up in chair. Generalized pain (at baseline) no PRN's given. LLE weeping, dressing replaced, bottom red/sore, barrier cream applied. Tele d/c'd  Major Shift Events: none  Treatment Plan: placement

## 2020-03-16 LAB — INTERPRETATION ECG - MUSE: NORMAL

## 2020-03-16 PROCEDURE — 25000132 ZZH RX MED GY IP 250 OP 250 PS 637: Performed by: INTERNAL MEDICINE

## 2020-03-16 PROCEDURE — 99207 ZZC CDG-MDM COMPONENT: MEETS MODERATE - UP CODED: CPT | Performed by: HOSPITALIST

## 2020-03-16 PROCEDURE — 25000132 ZZH RX MED GY IP 250 OP 250 PS 637: Performed by: NURSE PRACTITIONER

## 2020-03-16 PROCEDURE — 25000132 ZZH RX MED GY IP 250 OP 250 PS 637: Performed by: PHYSICIAN ASSISTANT

## 2020-03-16 PROCEDURE — 12000000 ZZH R&B MED SURG/OB

## 2020-03-16 PROCEDURE — 25000132 ZZH RX MED GY IP 250 OP 250 PS 637: Performed by: HOSPITALIST

## 2020-03-16 PROCEDURE — 99232 SBSQ HOSP IP/OBS MODERATE 35: CPT | Performed by: HOSPITALIST

## 2020-03-16 RX ORDER — FENTANYL 25 UG/1
25 PATCH TRANSDERMAL
Status: DISCONTINUED | OUTPATIENT
Start: 2020-03-16 | End: 2020-03-17 | Stop reason: HOSPADM

## 2020-03-16 RX ADMIN — METHOCARBAMOL TABLETS 750 MG: 750 TABLET, COATED ORAL at 09:41

## 2020-03-16 RX ADMIN — HYDROCODONE BITARTRATE AND ACETAMINOPHEN 1 TABLET: 7.5; 325 TABLET ORAL at 22:17

## 2020-03-16 RX ADMIN — DICLOFENAC SODIUM 4 G: 10 GEL TOPICAL at 13:20

## 2020-03-16 RX ADMIN — LOSARTAN POTASSIUM 25 MG: 25 TABLET, FILM COATED ORAL at 09:41

## 2020-03-16 RX ADMIN — SENNOSIDES AND DOCUSATE SODIUM 1 TABLET: 8.6; 5 TABLET ORAL at 20:27

## 2020-03-16 RX ADMIN — HYDROCHLOROTHIAZIDE 12.5 MG: 12.5 CAPSULE ORAL at 09:41

## 2020-03-16 RX ADMIN — ASPIRIN 325 MG: 325 TABLET, COATED ORAL at 09:41

## 2020-03-16 RX ADMIN — BUPROPION HYDROCHLORIDE 150 MG: 150 TABLET, FILM COATED, EXTENDED RELEASE ORAL at 09:41

## 2020-03-16 RX ADMIN — FENTANYL 1 PATCH: 25 PATCH, EXTENDED RELEASE TRANSDERMAL at 09:42

## 2020-03-16 RX ADMIN — ACETAMINOPHEN 975 MG: 325 TABLET, FILM COATED ORAL at 09:41

## 2020-03-16 RX ADMIN — DICLOFENAC SODIUM 4 G: 10 GEL TOPICAL at 20:26

## 2020-03-16 RX ADMIN — SENNOSIDES AND DOCUSATE SODIUM 1 TABLET: 8.6; 5 TABLET ORAL at 09:41

## 2020-03-16 RX ADMIN — Medication 40 MG: at 20:27

## 2020-03-16 RX ADMIN — Medication 5 MG: at 11:48

## 2020-03-16 RX ADMIN — Medication 40 MG: at 09:41

## 2020-03-16 RX ADMIN — DILTIAZEM HYDROCHLORIDE 120 MG: 120 CAPSULE, COATED, EXTENDED RELEASE ORAL at 09:41

## 2020-03-16 RX ADMIN — ATORVASTATIN CALCIUM 10 MG: 10 TABLET, FILM COATED ORAL at 20:27

## 2020-03-16 RX ADMIN — HYDROCODONE BITARTRATE AND ACETAMINOPHEN 1 TABLET: 7.5; 325 TABLET ORAL at 20:27

## 2020-03-16 ASSESSMENT — ACTIVITIES OF DAILY LIVING (ADL)
ADLS_ACUITY_SCORE: 21

## 2020-03-16 NOTE — PLAN OF CARE
Pertinent assessments: Pt up with 1 and a walker.  LLE wound redressed and pt insisted on dressing being removed.  LLE elevated with chucks pad under for drainage.  Small amount serosanguineous.  2+ edema BLE.   Major Shift Events: none

## 2020-03-16 NOTE — PLAN OF CARE
To Do:  End of Shift Summary  For vital signs and complete assessments, please see documentation flowsheets.     Pertinent assessments: LLE wound large amt of yellow drainage, edema, denies pain. New dressing applied. Significant amt of back pain this morning. Robaxin, tylenol. Massage, walked in hallway, heat applied. Pain decreased. Started voltaren gel to b/l knees and back. No BM today, +passing gas, good appetite. Buttocks wound improving. AAOx4, no confusion noted.    Major Shift Events: none    Discharge Readiness: Medically active  Expected Discharge Date: TBD  Discharge Disposition: TBD  Barriers/Criteria for discharge: [facility] waiting prior approval.

## 2020-03-16 NOTE — PLAN OF CARE
Pertinent assessments: AAOx4. LLE weeping, PRN Norco given x1 for back pain, bottom red/sore.  Major Shift Events: none  Treatment Plan: placement    Tolerating a regular diet.  Up with SBA and walker.  Fentanyl patch to abdomen.  Awaiting placement at TCU.

## 2020-03-16 NOTE — PROGRESS NOTES
Your information has been submitted on March 16th, 2020 at 01:51:53 PM CDT. The confirmation number is [id number]

## 2020-03-16 NOTE — PROGRESS NOTES
[facility]    Hospitalist Progress Note      Assessment & Plan   Madhavi Castellanos is a 79 year old female with a PMH significant for Afib on Sotalol, Diltiazem, s/p Watchmen, hyperthyroidism, hx of chronic back pain on Fentanyl patch, oral opiates, depression and anxiety who presents with concerns of falls at home (apparently had been on the floor for 2 days), CK elevation and leukocytosis but no obvious infectious etiology.      Here in ED, she was found to also have intractable dry heaving. Belly was soft and ABD xray showed non-obstructive pattern. She was xray'd (chest, shoulder, pelvis, bl knees, tibia/fibula) with no obvious fractures.      #S/p fall and apparently laying on the floor for 2 days--patient has bruises in multiple stages of healing.  She has had multiple falls at home.  She does admit to this.  I suspect this is probably secondary to generalized weakness in the setting of narcotic medications utilized for chronic pain.  There is no evidence of infection with negative x-ray, UA, procalcitonin.  Lower extremity ultrasound was negative for DVT.  CK was elevated over 800, kidney function okay.    Laboratory has not shown concerning arrhythmia.  TTE was checked and did show evidence of conduction abnormality which could be secondary to her frequent PACs.  There is no significant valvular abnormality that could account for any syncopal episodes.  She did symptomatically improve with IV fluids.  She feels to her baseline.  --I did discuss with her reduction of narcotics in order to reduce the risk of falls.  She was adamant that this is not going to happen.  She states that she has a contract with her pain clinic and she is not able to go down on her pain medications as this will worsen her pain.  Pain service was consulted on admission and attempted to reduce her fentanyl patch dose.  Unfortunately, patient is not willing to go down on this dose and her home fentanyl patch was  resumed.  --Cont Fentanyl patch,  Resume oral Norco for chronic pain.   --Bowel regimen ordered   --Appreciate PT and OT assistance.  Patient will require TCU  --Wound RN consulted     #Elevated CK--secondary to fall.  Improved with IV fluids.  Kidney function is stable.     #Leukocytosis--suspect reactive, improved.   No fevers.  Chest x-ray is negative.  UA is negative.  Procalcitonin is negative.  No indication for antibiotics.     #Acute on chronic back, knee pain: Apparently follows an outpatient pain clinic who prescribes her fentanyl patch and oxycodone.  X-rays completed negative for acute fracture.  They do seem to show osteoarthritis.  -Continue home pain medications.  PT and OT as above.     Chronic Medical Conditions   #Afib: s/p ablation. In Sinus rhythm. Cont on ASA, Diltiazem, sotalol.  Telemetry as above  #CAD: Continue home statin, aspirin  #Hypertension: Continue home hydrochlorothiazide and losartan.     DVT Prophylaxis: Pneumatic Compression Devices  Code Status: Full Code  Expected discharge: Patient is medically ready for discharge.  Do not anticipate any further testing.  Awaiting insurance authorization for TCU    Hamzah Laura MD  Text Page    Interval History   No acute events.  Patient feels well.  She does still endorse chronic low back pain which seems to be bothering her most today.  Otherwise pain is at baseline.  She denies any worsening of her pain.  Denies any chest pain, shortness of breath, palpitations, lightheadedness.    -Data reviewed today: I reviewed all new labs and imaging results over the last 24 hours.     Physical Exam   Temp: 95.6  F (35.3  C) Temp src: Oral BP: (!) 154/74   Heart Rate: 93 Resp: 18 SpO2: 96 % O2 Device: None (Room air)    Vitals:    03/12/20 1647   Weight: 84 kg (185 lb 3.2 oz)     Vital Signs with Ranges  Temp:  [95.6  F (35.3  C)-96.6  F (35.9  C)] 95.6  F (35.3  C)  Heart Rate:  [72-93] 93  Resp:  [16-18] 18  BP: (134-154)/(67-82) 154/74  SpO2:  [94  %-96 %] 96 %  I/O last 3 completed shifts:  In: 1400 [P.O.:1400]  Out: -     GENERAL: Sitting up in a chair.  No acute distress.  Answers appropriately.  HEENT:  PERRLA. Normal conjunctiva, normal hearing, nasal mucosa and Oropharynx are normal.  NECK:  Supple, no neck vein distention, adenopathy or bruits, normal thyroid.  HEART:   Regular.  LUNGS:  Clear to auscultation, normal Respiratory effort. No wheezing, rales or ronchi.  ABDOMEN:  bowel sounds present, nontender nondistended  EXTREMITIES:   Patient with significant bruising of the left lower extremity.  She also does seem to have some chronic venous stasis changes noted.  Left foot is wrapped.  SKIN: Dry.  Scattered ecchymoses that are in various stages of healing.  NEUROLOGIC:  CN 2-12 intact, BL 5/5 symmetric upper and lower extremity strength, sensation is intact with no focal deficits.     Medications       acetaminophen  975 mg Oral TID     aspirin  325 mg Oral Daily     atorvastatin  10 mg Oral QPM     buPROPion  150 mg Oral QAM     diclofenac  4 g Transdermal 4x Daily     diltiazem ER COATED BEADS  120 mg Oral Daily     fentaNYL  25 mcg Transdermal Q72H     fentaNYL   Transdermal Q8H     gabapentin  200 mg Oral At Bedtime     hydrochlorothiazide  12.5 mg Oral Daily     losartan  25 mg Oral Daily     methimazole  5 mg Oral Once per day on Mon Tue Wed Thu Fri     senna-docusate  1 tablet Oral BID    Or     senna-docusate  2 tablet Oral BID     sodium chloride (PF)  3 mL Intracatheter Q8H     sotalol  40 mg Oral BID       Data   Recent Labs   Lab 03/14/20  0820 03/13/20  0708 03/12/20  1151   WBC 12.8* 13.5* 18.0*   HGB 12.9 11.4* 13.9   MCV 92 93 93    324 390   INR  --   --  1.03    141 141   POTASSIUM 3.7 3.5 3.5   CHLORIDE 106 109 106   CO2 27 27 25   BUN 24 21 21   CR 0.55 0.63 0.54   ANIONGAP 3 5 10   LUIS ANTONIO 8.9 8.4* 9.0   * 95 112*   ALBUMIN  --  2.6* 3.1*   PROTTOTAL  --  5.3* 6.1*   BILITOTAL  --  0.6 0.8   ALKPHOS  --  75 104    ALT  --  44 55*   AST  --  28 53*   TROPI  --   --  0.018       No results found for this or any previous visit (from the past 24 hour(s)).

## 2020-03-17 VITALS
SYSTOLIC BLOOD PRESSURE: 168 MMHG | TEMPERATURE: 96.9 F | RESPIRATION RATE: 16 BRPM | HEIGHT: 61 IN | DIASTOLIC BLOOD PRESSURE: 85 MMHG | HEART RATE: 93 BPM | BODY MASS INDEX: 34.97 KG/M2 | WEIGHT: 185.2 LBS | OXYGEN SATURATION: 96 %

## 2020-03-17 PROCEDURE — 25000132 ZZH RX MED GY IP 250 OP 250 PS 637: Performed by: HOSPITALIST

## 2020-03-17 PROCEDURE — 25000132 ZZH RX MED GY IP 250 OP 250 PS 637: Performed by: PHYSICIAN ASSISTANT

## 2020-03-17 PROCEDURE — 99239 HOSP IP/OBS DSCHRG MGMT >30: CPT | Performed by: HOSPITALIST

## 2020-03-17 RX ORDER — HYDROCODONE BITARTRATE AND ACETAMINOPHEN 7.5; 325 MG/1; MG/1
1-2 TABLET ORAL 2 TIMES DAILY PRN
Qty: 15 TABLET | Refills: 0 | Status: ON HOLD | OUTPATIENT
Start: 2020-03-17 | End: 2022-11-02

## 2020-03-17 RX ORDER — SIMETHICONE 80 MG
80 TABLET,CHEWABLE ORAL EVERY 6 HOURS PRN
Status: ON HOLD | DISCHARGE
Start: 2020-03-17 | End: 2020-11-30

## 2020-03-17 RX ORDER — FENTANYL 25 UG/1
1 PATCH TRANSDERMAL
Qty: 10 PATCH | Refills: 0 | Status: SHIPPED | OUTPATIENT
Start: 2020-03-17

## 2020-03-17 RX ADMIN — LOSARTAN POTASSIUM 25 MG: 25 TABLET, FILM COATED ORAL at 08:33

## 2020-03-17 RX ADMIN — HYDROCHLOROTHIAZIDE 12.5 MG: 12.5 CAPSULE ORAL at 08:33

## 2020-03-17 RX ADMIN — ALUMINUM HYDROXIDE, MAGNESIUM HYDROXIDE, AND DIMETHICONE 30 ML: 400; 400; 40 SUSPENSION ORAL at 12:05

## 2020-03-17 RX ADMIN — DICLOFENAC SODIUM 4 G: 10 GEL TOPICAL at 13:19

## 2020-03-17 RX ADMIN — DICLOFENAC SODIUM 4 G: 10 GEL TOPICAL at 08:36

## 2020-03-17 RX ADMIN — HYDROCODONE BITARTRATE AND ACETAMINOPHEN 1 TABLET: 7.5; 325 TABLET ORAL at 13:18

## 2020-03-17 RX ADMIN — Medication 40 MG: at 08:33

## 2020-03-17 RX ADMIN — Medication 5 MG: at 08:43

## 2020-03-17 RX ADMIN — BUPROPION HYDROCHLORIDE 150 MG: 150 TABLET, FILM COATED, EXTENDED RELEASE ORAL at 08:32

## 2020-03-17 RX ADMIN — ASPIRIN 325 MG: 325 TABLET, COATED ORAL at 08:33

## 2020-03-17 RX ADMIN — ACETAMINOPHEN 975 MG: 325 TABLET, FILM COATED ORAL at 08:32

## 2020-03-17 RX ADMIN — DILTIAZEM HYDROCHLORIDE 120 MG: 120 CAPSULE, COATED, EXTENDED RELEASE ORAL at 08:33

## 2020-03-17 RX ADMIN — SENNOSIDES AND DOCUSATE SODIUM 1 TABLET: 8.6; 5 TABLET ORAL at 08:33

## 2020-03-17 ASSESSMENT — ACTIVITIES OF DAILY LIVING (ADL)
ADLS_ACUITY_SCORE: 21

## 2020-03-17 NOTE — PLAN OF CARE
To Do:  End of Shift Summary  For vital signs and complete assessments, please see documentation flowsheets.     Pertinent assessments: LLE wound large amt of yellow drainage, edema, denies pain. New dressing applied X2.  No BM today, +passing gas, X2 doses of Senna, good appetite. Buttocks wound improving.     Major Shift Events: none    Discharge Readiness: Medically active  Expected Discharge Date: TBD  Discharge Disposition: TBD  Barriers/Criteria for discharge: [facility] waiting prior approval.

## 2020-03-17 NOTE — PLAN OF CARE
End of Shift Summary  For vital signs and complete assessments, please see documentation flowsheets.     Pertinent assessments: LLE wound with copious drainage, sutures in place. Dressing changed x2, barrier cream to buttocks for pink/flaky area. Up in the hallway x1    To discharge at 1630 to TCU

## 2020-03-17 NOTE — PROGRESS NOTES
Physical Therapy Discharge Summary    Reason for therapy discharge:    Discharged to transitional care facility.    Progress towards therapy goal(s). See goals on Care Plan in [facility] electronic health record for goal details.  Goals not met.  Barriers to achieving goals:   discharge from facility.    Therapy recommendation(s):    TCU was recommended.

## 2020-03-17 NOTE — PLAN OF CARE
Pt is AOx4, Ax1, GB and walker to transfer, VSS. Lungs dim/clear bilaterally, abdomen soft and non-tender to touch, positive flatus. Scheduled tylenol, Voltaren gel and PRN Norco administered and effective for pain control. LLE wound is draining mod amount of drainage- area cleansed and new dressing applied. Pt has a good appetite and drinking adequate amounts of fluids. Voiding sufficient amounts of urine. No BM this shift.

## 2020-03-17 NOTE — PROGRESS NOTES
Patient discharged at 15:45 to [facility] in [place]. Transfer was provided by friend via car. Packet was printed out and handed to patient to give to TCU staff. 2 hard scripts were signed, and the other medications were sent via e-script. Leg was wrapped/dressing changed before discharge. Vitals stable, BP was elevated, but she has a hx of HTN. No other concerns at this time.

## 2020-03-17 NOTE — PROGRESS NOTES
SW:  Discharge Planner   Discharge Plans in progress: TCU- [facility]  Barriers to discharge plan: None identified  Follow up plan: DIANA pagemanish BOONE for orders at 1203.    DC orders:  Scripts:  PAS:  Trans: Friends to transport at approx 1630    SW updated [facility] and medical team of transport time.    FIDELIA Gustafson  Daytime (8:00am-4:30pm): [phone]                Entered by: Marichuy Toney 03/17/2020 9:36 AM

## 2020-03-17 NOTE — DISCHARGE SUMMARY
"    [facility]    Discharge Summary  Hospitalist    Date of Admission:  3/12/2020  Date of Discharge:  3/17/2020  Provider:  Manuel Box MD  Date of Service (when I last saw the patient): 03/17/20    Discharge Diagnoses   1.  Mechanical fall in the setting of frequent falls at home, presumed laying on the floor for 2 days.  2.  Rhabdomyolysis secondary to #1.  3.  Reactive leukocytosis.  4.  Essential hypertension.  5.  Acute on chronic back-knee pain.  6.  Atrial fibrillation status post ablation on sinus rhythm.  7.  History of coronary artery disease stable      Other medical issues:  Past Medical History:   Diagnosis Date     Asthma      Atrial fibrillation (H)     had EP ablation 8/11/2017, pt reports being \"afib free\" now     CAD (coronary artery disease)      Depression      Esophageal reflux      Hypertension      LBBB (left bundle branch block)      Liver lesion     stable, seen on multiple CT scans     Lumbosacral spondylosis      Mumps      Nonspecific reaction to tuberculin skin test without active tuberculosis(795.51)      Obesity      TALI (obstructive sleep apnea)      Osteoarthritis      Palpitations      Prolonged QT interval      Pure hypercholesterolemia      Spinal stenosis of lumbar region      STD (sexually transmitted disease)      Tuberculosis        History of Present Illness   Madhavi Castellanos is an 79 year old female who presented with fall at home.  Please see the admission history and physical for full details.    Hospital Course    Madhavi Castellanos is a 79 year old female with a PMH significant for Afib on Sotalol, Diltiazem, s/p Watchmen, hyperthyroidism, hx of chronic back pain on Fentanyl patch, oral opiates, depression and anxiety who presents with concerns of falls at home (apparently had been on the floor for 2 days), CK elevation and leukocytosis but no obvious infectious etiology.   Here in ED, she was found to also have intractable dry heaving. Belly was soft " and ABD xray showed non-obstructive pattern. She was xray'd (chest, shoulder, pelvis, bl knees, tibia/fibula) with no obvious fractures.      #S/p fall and apparently laying on the floor for 2 days--patient has bruises in multiple stages of healing.  She has had multiple falls at home.  She does admit to this. Suspect this is probably secondary to generalized weakness in the setting of narcotic medications utilized for chronic pain.  There is no evidence of infection with negative x-ray, UA, procalcitonin. Lower extremity ultrasound was negative for DVT.  CK was elevated over 800, kidney function within normal limits.  Telemetry surveillance has not shown concerning arrhythmia.  TTE was checked and did show evidence of conduction abnormality which could be secondary to her frequent PACs.  There is no significant valvular abnormality that could account for any syncopal episodes.  She did symptomatically improve with IV fluids.  She feels to her baseline.  --Recommendation of reduction of narcotics in order to reduce the risk of falls.  She was adamant that this is not going to happen.  She states that she has a contract with her pain clinic and she is not able to go down on her pain medications as this will worsen her pain.  Pain service was consulted on admission and attempted to reduce her fentanyl patch dose.  Unfortunately, patient is not willing to go down on this dose and her home fentanyl patch was resumed.  --Cont Fentanyl patch,  Resume oral Norco for chronic pain.   --Bowel regimen ordered   --Appreciate PT and OT assistance.  Patient will go to TCU  --Wound RN consulted     #Elevated CK--secondary to fall.  Improved with IV fluids.  Kidney function is stable.     #Leukocytosis--suspect reactive, improved.   No fevers.  Chest x-ray is negative.  UA is negative.  Procalcitonin is negative.  No indication for antibiotics.     #Acute on chronic back, knee pain: Apparently follows an outpatient pain clinic who  prescribes her fentanyl patch and oxycodone.  X-rays completed negative for acute fracture.  They do seem to show osteoarthritis.  -Continue home pain medications.  PT and OT as above.     Chronic Medical Conditions   #Afib: s/p ablation. In Sinus rhythm. Cont on ASA, Diltiazem, sotalol.  Telemetry as above  #CAD: Continue home statin, aspirin  #Hypertension: Continue home hydrochlorothiazide and losartan.    # Discharge Pain Plan:    - Patient currently has NO PAIN and is not being prescribed pain medications on discharge.      Significant Results and Procedures   See below    Pending Results     Unresulted Labs Ordered in the Past 30 Days of this Admission     Date and Time Order Name Status Description    3/12/2020 1332 Blood culture ONE site Preliminary     3/12/2020 1332 Blood culture ONE site Preliminary           Code Status   Full Code       Primary Care Physician   Cecy Edmondson    GEN:  Alert, oriented x 3, appears comfortable, NAD.  HEENT:  Normocephalic/atraumatic, no scleral icterus, no nasal discharge, mouth moist.  CV:  Regular rate and rhythm, no murmur or JVD.  S1 + S2 noted, no S3 or S4.  LUNGS:  Clear to auscultation bilaterally without rales/rhonchi/wheezing/retractions.  Symmetric chest rise on inhalation noted.  ABD:  Active bowel sounds, soft, non-tender/non-distended.  No rebound/guarding/rigidity.  EXT:  No edema or cyanosis.  No joint synovitis noted.  SKIN:  Dry to touch, no exanthems noted in the visualized areas.     Discharge Disposition   Discharged to TCU    Consultations This Hospital Stay   SOCIAL WORK IP CONSULT  PHYSICAL THERAPY ADULT IP CONSULT  OCCUPATIONAL THERAPY ADULT IP CONSULT  PAIN MANAGEMENT ADULT IP CONSULT    Time Spent on this Encounter   IManuel MD, personally saw the patient today and spent greater than 30 minutes discharging this patient.    Discharge Orders   No discharge procedures on file.  Discharge Medications   Current Discharge Medication List       START taking these medications    Details   naloxone (NARCAN) 4 MG/0.1ML nasal spray Spray 1 spray (4 mg) into one nostril alternating nostrils as needed for opioid reversal every 2-3 minutes until assistance arrives  Qty: 0.2 mL, Refills: 1    Associated Diagnoses: Fall, initial encounter; Chronic use of opiate for therapeutic purpose      simethicone (MYLICON) 80 MG chewable tablet Take 1 tablet (80 mg) by mouth every 6 hours as needed for flatulence or cramping  Qty:      Associated Diagnoses: Chronic use of opiate for therapeutic purpose         CONTINUE these medications which have NOT CHANGED    Details   alum & mag hydroxide-simethicone (MYLANTA/MAALOX) 200-200-20 MG/5ML SUSP Take 30 mLs by mouth every 4 hours as needed  Qty: 300 mL, Refills: 0      aspirin (ASA) 325 MG EC tablet Take 325 mg by mouth daily      Atorvastatin Calcium (LIPITOR PO) Take 10 mg by mouth every evening       buPROPion (WELLBUTRIN XL) 150 MG 24 hr tablet Take 150 mg by mouth every morning      diltiazem (CARTIA XT) 120 MG 24 hr capsule Take 120 mg by mouth daily      fentaNYL (DURAGESIC) 25 mcg/hr patch 72 hr Place 1 patch onto the skin every 72 hours      HYDROCHLOROTHIAZIDE PO Take 12.5 mg by mouth daily       HYDROcodone-acetaminophen (NORCO) 7.5-325 MG per tablet Take 1-2 tablets by mouth 2 times daily as needed for moderate to severe pain       Losartan Potassium (COZAAR PO) Take 25 mg by mouth daily      Methimazole (TAPAZOLE PO) Take 5 mg by mouth five times a week Monday thru Friday      METHOCARBAMOL PO Take 750 mg by mouth 6 times daily As needed      polyethylene glycol (MIRALAX) packet Take 1 packet by mouth daily as needed for constipation      promethazine (PHENERGAN) 25 MG tablet Take 1 tablet (25 mg) by mouth every 6 hours as needed for nausea  Qty: 15 tablet, Refills: 0      psyllium (METAMUCIL/KONSYL) capsule Take 1 capsule by mouth daily as needed for constipation      Sotalol HCl (BETAPACE PO) Take 40 mg by mouth  "2 times daily            Allergies   Allergies   Allergen Reactions     Metoprolol Shortness Of Breath     2 hours after tasking for the first time SOB, Pt evaluated and pt WDL       Albuterol Unknown     Augmentin Nausea     Cephalexin      Codeine Sulfate      \"i dont't know, nothing, maybe sick to my stomach\"     Cymbalta      agitated     Lisinopril Cough     Omnicef [Cefdinir] Diarrhea     Percocet [Oxycodone-Acetaminophen]      nauseated     Data   Most Recent 3 CBC's:  Recent Labs   Lab Test 03/14/20  0820 03/13/20  0708 03/12/20  1151   WBC 12.8* 13.5* 18.0*   HGB 12.9 11.4* 13.9   MCV 92 93 93    324 390      Most Recent 3 BMP's:  Recent Labs   Lab Test 03/14/20  0820 03/13/20  0708 03/12/20  1151    141 141   POTASSIUM 3.7 3.5 3.5   CHLORIDE 106 109 106   CO2 27 27 25   BUN 24 21 21   CR 0.55 0.63 0.54   ANIONGAP 3 5 10   LUIS ANTONIO 8.9 8.4* 9.0   * 95 112*     Most Recent 2 LFT's:  Recent Labs   Lab Test 03/13/20  0708 03/12/20  1151   AST 28 53*   ALT 44 55*   ALKPHOS 75 104   BILITOTAL 0.6 0.8     Most Recent INR's and Anticoagulation Dosing History:  Anticoagulation Dose History     Recent Dosing and Labs Latest Ref Rng & Units 10/11/2013 11/16/2014 2/1/2015 1/12/2020 3/12/2020    INR 0.86 - 1.14 1.00 0.93 0.97 0.96 1.03        Most Recent 3 Troponin's:  Recent Labs   Lab Test 03/12/20  1151 01/05/18  2037 08/29/17  1047  11/16/14  0929 10/27/14  0843 10/05/14  0920   TROPI 0.018 <0.015 <0.015   < >  --   --   --    TROPONIN  --   --   --   --  0.00 0.00 0.00    < > = values in this interval not displayed.     Most Recent Cholesterol Panel:No lab results found.  Most Recent 6 Bacteria Isolates From Any Culture (See EPIC Reports for Culture Details):  Recent Labs   Lab Test 03/12/20  1432 03/12/20  1428 03/12/20  1056 11/08/16  1047 11/08/16  1030 06/28/16  0955   CULT No growth after 5 days No growth after 5 days No growth No growth No growth 10,000 to 50,000 colonies/mL mixed urogenital " lauri Susceptibility testing not   routinely done       Most Recent TSH, T4 and A1c Labs:  Recent Labs   Lab Test 03/12/20  1151   TSH 1.24     Results for orders placed or performed during the hospital encounter of 03/12/20   Head CT w/o contrast    Narrative    CT SCAN OF THE HEAD WITHOUT CONTRAST   3/12/2020 12:22 PM     HISTORY: trauma, head injury, anticoagulated    TECHNIQUE:  Axial images of the head and coronal reformations without  IV contrast material. Radiation dose for this scan was reduced using  automated exposure control, adjustment of the mA and/or kV according  to patient size, or iterative reconstruction technique.    COMPARISON: Scan dated 7/30/2016.    FINDINGS:     Intracranial contents: There is diffuse parenchymal volume loss.   White matter changes are present in the cerebral hemispheres that are  consistent with small vessel ischemic disease in this age patient.  There is a calcification projected over the left convexity. This is  unchanged. This could be due to a small chronic calcified subdural.  There is no evidence of intracranial hemorrhage, mass, acute infarct  or anomaly.    Visualized orbits/sinuses/mastoids:  The visualized portions of the  sinuses and mastoids appear normal.    Osseous structures/soft tissues:  No intracranial bleed or skull  fractures are identified. No significant change.      Impression    IMPRESSION: No intracranial hemorrhage or skull fractures.   No change  compared to 7/30/2016.      FORD CARRIZALES MD   XR Shoulder Left G/E 3 Views    Narrative    XR SHOULDER LT G/E 3 VW 3/12/2020 12:56 PM     HISTORY: trauma, pain      Impression    IMPRESSION: No fracture or dislocation. Acromioclavicular degenerative  arthrosis. Possible narrowing of the subacromial space which could  indicate chronic rotator cuff atrophy or tear.    TO KIM MD   XR Knee Bilateral 3 Views    Narrative    XR KNEE BILATERAL 3 VW 3/12/2020 1:09 PM     HISTORY: trauma, pain      Impression     IMPRESSION: No apparent fracture. Left medial and right lateral  compartment joint space narrowing. Right medial meniscal  chondrocalcinosis.    TO KIM MD   XR Tibia & Fibula Left 2 Views    Narrative    LEFT TIBIA AND FIBULA TWO VIEWS  3/12/2020 12:56 PM     HISTORY:  Trauma, pain.      Impression    IMPRESSION: The tibia and fibula appear intact. Possible widening of  the lateral tibiotalar joint of uncertain significance. This could  represent ligament insufficiency.    TO KIM MD   XR Pelvis 1/2 Views    Narrative    PELVIS ONE TO TWO VIEWS  3/12/2020 12:55 PM     HISTORY:  Trauma, pain.      Impression    IMPRESSION: Lumbar scoliosis and degenerative disc disease. The  osseous pelvis appears intact. Hip joint space widths appear within  normal limits. No apparent pelvic fracture.    TO KIM MD   Chest XR,  PA & LAT    Narrative    CHEST TWO VIEWS  3/12/2020 12:58 PM     HISTORY:  Left lower rib pain, trauma.    COMPARISON: 12/27/2017.      Impression    IMPRESSION: No acute cardiopulmonary disease.    ARCENIO WU MD   XR Abdomen 2 Views    Narrative    ABDOMEN TWO VIEWS 3/12/2020 4:12 PM     HISTORY: Intractable nausea.    COMPARISON: None.      Impression    IMPRESSION: Nonobstructive gas pattern. No evidence of free air,  organomegaly, or abnormal calculus. Degenerative changes noted of the  spine with a left apex scoliosis.    DULCE RUBI MD   US Lower Extremity Venous Duplex Bilateral    Narrative    US LOWER EXTREMITY VENOUS DUPLEX BILATERAL 3/12/2020 5:50 PM    CLINICAL HISTORY: Leg redness following fall.  TECHNIQUE: Venous Duplex ultrasound of bilateral lower extremities  with and without compression, augmentation and duplex. Color flow and  spectral Doppler with waveform analysis performed.    COMPARISON: None.    FINDINGS: Exam includes the common femoral, femoral, popliteal veins  as well as segmentally visualized deep calf veins and greater  saphenous vein.     RIGHT:  No deep vein thrombosis. No superficial thrombophlebitis.  Popliteal cyst on the right measures 5.1 x 3.0 x 1.6 cm.    LEFT: No deep vein thrombosis. No superficial thrombophlebitis.  Popliteal cyst on the left measures 1.9 x 1.9 x 0.7 cm.      Impression    IMPRESSION:  1.  No deep venous thrombosis in the bilateral lower extremities.  2.  Bilateral Alexis's cysts.     DULCE RUBI MD   Echocardiogram Complete    Narrative    [id number]  [facility]  [id number]  [id number]^BRICE^ANGELA           [facility]  Echocardiography Laboratory  [address] [place] [address]        Name: AC MCKOY  MRN: [id number]  : 1940  Study Date: 2020 01:42 PM  Age: 79 yrs  Gender: Female  Patient Location: [facility]  Reason For Study: Syncope and Collapse  Ordering Physician: ANGELA NARVAEZ  Referring Physician: Cecy Edmondson  Performed By: Hank Tapia RDCS     BSA: 1.8 m2  Height: 61 in  Weight: 185 lb  HR: 83  BP: 119/67 mmHg  _____________________________________________________________________________  __        Procedure  Complete Echo Adult. Optison (NDC #[id number]) given intravenously.  _____________________________________________________________________________  __        Interpretation Summary     Diastolic Doppler findings (E/E' ratio and/or other parameters) suggest left  ventricular filling pressures are increased.  Septal motion is consistent with conduction abnormality.  There is mild right ventricular hypertrophy.  Right ventricular systolic pressure is elevated, consistent with moderate  pulmonary hypertension.  Indeterminate rhythm-Rate <100 regular, wide but no A wave on MV inflow.  Please correlate with 12 lead ecg  _____________________________________________________________________________  __        Left Ventricle  The left ventricle is normal in size. There is normal left ventricular wall  thickness. The visual ejection fraction is estimated at 55-60%. Diastolic  function not  assessed due to arrhythmia. Diastolic Doppler findings (E/E'  ratio and/or other parameters) suggest left ventricular filling pressures are  increased. Septal motion is consistent with conduction abnormality.     Right Ventricle  The right ventricle is normal size. There is mild right ventricular  hypertrophy. The right ventricular systolic function is normal.     Atria  The left atrium is mildly dilated. Right atrial size is normal.     Mitral Valve  There is no mitral annular calcification. There is trace mitral regurgitation.        Tricuspid Valve  There is mild (1+) tricuspid regurgitation. The right ventricular systolic  pressure is approximated at 51.4 mmHg plus the right atrial pressure. Right  ventricular systolic pressure is elevated, consistent with moderate pulmonary  hypertension. IVC diameter <2.1 cm collapsing >50% with sniff suggests a  normal RA pressure of 3 mmHg.     Aortic Valve  The aortic valve is trileaflet. No hemodynamically significant valvular aortic  stenosis.     Pulmonic Valve  The pulmonic valve is not well seen, but is grossly normal.     Vessels  Normal size aorta.     Pericardium  The pericardium appears normal.        Rhythm  The rhythm was undetermined. Indeterminate rhythm-Rate <100 regular, wide but  no A wave on MV inflow. Please correlate with 12 lead ecg.  _____________________________________________________________________________  __  MMode/2D Measurements & Calculations  IVSd: 0.87 cm     LVIDd: 4.8 cm  LVIDs: 2.4 cm  LVPWd: 1.0 cm  FS: 49.4 %  LV mass(C)d: 154.7 grams  LV mass(C)dI: 84.6 grams/m2  Ao root diam: 2.8 cm  LA dimension: 4.1 cm  asc Aorta Diam: 2.9 cm  LA/Ao: 1.5  LVOT diam: 2.0 cm  LVOT area: 3.0 cm2  LA Volume (BP): 58.1 ml  LA Volume Index (BP): 31.7 ml/m2  RWT: 0.42           Doppler Measurements & Calculations  MV E max [place]: 95.1 cm/sec  MV dec slope: 473.7 cm/sec2  MV dec time: 0.20 sec  LV V1 max PG: 10.5 mmHg  LV V1 max: 162.2 cm/sec  LV V1 VTI: 28.8  cm  CO(LVOT): 7.6 l/min  CI(LVOT): 4.2 l/min/m2  SV(LVOT): 86.2 ml  SI(LVOT): 47.2 ml/m2  PA acc time: 0.06 sec  TR max [place]: 358.3 cm/sec  TR max P.4 mmHg  E/E' avg: 15.9  Lateral E/e': 16.2  Medial E/e': 15.6              _____________________________________________________________________________  __        Report approved by: Eddie Petersen 2020 03:47 PM        Disclaimer: This note consists of symbols derived from keyboarding, dictation and/or voice recognition software. As a result, there may be errors in the script that have gone undetected. Please consider this when interpreting information found in this chart.

## 2020-03-17 NOTE — PLAN OF CARE
Occupational Therapy Discharge Summary    Reason for therapy discharge:    Discharged to transitional care facility.    Progress towards therapy goal(s). See goals on Care Plan in [facility] electronic health record for goal details.  Goals partially met.  Barriers to achieving goals:   limited tolerance for therapy and discharge from facility.    Therapy recommendation(s):    Continued therapy is recommended.  Rationale/Recommendations:  ongoing skilled OT at TCU for increased ind and safety with ADLs.

## 2020-03-18 ENCOUNTER — RECORDS - HEALTHEAST (OUTPATIENT)
Dept: LAB | Facility: CLINIC | Age: 80
End: 2020-03-18

## 2020-03-18 LAB
BACTERIA SPEC CULT: NO GROWTH
BACTERIA SPEC CULT: NO GROWTH
Lab: NORMAL
SPECIMEN SOURCE: NORMAL
SPECIMEN SOURCE: NORMAL

## 2020-03-20 LAB
GAMMA INTERFERON BACKGROUND BLD IA-ACNC: 0.07 IU/ML
M TB IFN-G BLD-IMP: NEGATIVE
MITOGEN IGNF BCKGRD COR BLD-ACNC: 0 IU/ML
MITOGEN IGNF BCKGRD COR BLD-ACNC: 0 IU/ML
QTF INTERPRETATION: NORMAL
QTF MITOGEN - NIL: 5.23 IU/ML

## 2020-08-26 ENCOUNTER — HOSPITAL ENCOUNTER (OUTPATIENT)
Dept: MRI IMAGING | Facility: CLINIC | Age: 80
Discharge: HOME | End: 2020-08-26
Attending: PSYCHIATRY & NEUROLOGY | Admitting: PSYCHIATRY & NEUROLOGY
Payer: COMMERCIAL

## 2020-08-26 DIAGNOSIS — G56.30: ICD-10-CM

## 2020-08-26 DIAGNOSIS — F32.A DEPRESSION: ICD-10-CM

## 2020-08-26 DIAGNOSIS — G95.9 CERVICAL MYELOPATHY (H): ICD-10-CM

## 2020-08-26 DIAGNOSIS — R20.0 NUMBNESS AND TINGLING: ICD-10-CM

## 2020-08-26 DIAGNOSIS — M47.816 LUMBAR SPONDYLOSIS: ICD-10-CM

## 2020-08-26 DIAGNOSIS — M62.838 SPASM OF RIGHT PIRIFORMIS MUSCLE: ICD-10-CM

## 2020-08-26 DIAGNOSIS — M54.6 THORACIC BACK PAIN: ICD-10-CM

## 2020-08-26 DIAGNOSIS — H53.9 VISUAL DISTURBANCE: ICD-10-CM

## 2020-08-26 DIAGNOSIS — R10.31 GROIN PAIN, RIGHT: ICD-10-CM

## 2020-08-26 DIAGNOSIS — M53.3 SACROILIAC JOINT DYSFUNCTION OF RIGHT SIDE: ICD-10-CM

## 2020-08-26 DIAGNOSIS — M62.838 SPASM OF CERVICAL PARASPINOUS MUSCLE: ICD-10-CM

## 2020-08-26 DIAGNOSIS — H02.409 PTOSIS OF EYELID, UNSPECIFIED LATERALITY: ICD-10-CM

## 2020-08-26 DIAGNOSIS — M79.604 LEG PAIN, RIGHT: ICD-10-CM

## 2020-08-26 DIAGNOSIS — M47.812 CERVICAL SPONDYLOSIS: ICD-10-CM

## 2020-08-26 DIAGNOSIS — M54.50 LOW BACK PAIN: ICD-10-CM

## 2020-08-26 DIAGNOSIS — R20.2 NUMBNESS AND TINGLING: ICD-10-CM

## 2020-08-26 DIAGNOSIS — G83.20: ICD-10-CM

## 2020-08-26 DIAGNOSIS — G60.9 PERIPHERAL NEUROPATHY, IDIOPATHIC: ICD-10-CM

## 2020-08-26 DIAGNOSIS — G89.4 CHRONIC PAIN SYNDROME: ICD-10-CM

## 2020-08-26 DIAGNOSIS — M54.2 NECK PAIN: ICD-10-CM

## 2020-08-26 PROCEDURE — 72141 MRI NECK SPINE W/O DYE: CPT

## 2020-09-22 ENCOUNTER — HOSPITAL ENCOUNTER (OUTPATIENT)
Dept: ULTRASOUND IMAGING | Facility: CLINIC | Age: 80
Discharge: HOME | End: 2020-09-22
Attending: SPECIALIST | Admitting: SPECIALIST
Payer: COMMERCIAL

## 2020-09-22 DIAGNOSIS — E04.1 THYROID NODULE: ICD-10-CM

## 2020-09-22 PROCEDURE — 76536 US EXAM OF HEAD AND NECK: CPT

## 2020-11-28 ENCOUNTER — APPOINTMENT (OUTPATIENT)
Dept: GENERAL RADIOLOGY | Facility: CLINIC | Age: 80
End: 2020-11-28
Attending: EMERGENCY MEDICINE
Payer: COMMERCIAL

## 2020-11-28 ENCOUNTER — HOSPITAL ENCOUNTER (EMERGENCY)
Facility: CLINIC | Age: 80
Discharge: ANOTHER HOSPITAL | End: 2020-11-28
Attending: EMERGENCY MEDICINE | Admitting: EMERGENCY MEDICINE
Payer: COMMERCIAL

## 2020-11-28 ENCOUNTER — ANESTHESIA (OUTPATIENT)
Dept: SURGERY | Facility: CLINIC | Age: 80
DRG: 571 | End: 2020-11-28
Payer: COMMERCIAL

## 2020-11-28 ENCOUNTER — HOSPITAL ENCOUNTER (INPATIENT)
Facility: CLINIC | Age: 80
LOS: 5 days | Discharge: HOME | DRG: 571 | End: 2020-12-03
Attending: EMERGENCY MEDICINE | Admitting: SURGERY
Payer: COMMERCIAL

## 2020-11-28 ENCOUNTER — APPOINTMENT (OUTPATIENT)
Dept: CT IMAGING | Facility: CLINIC | Age: 80
End: 2020-11-28
Attending: EMERGENCY MEDICINE
Payer: COMMERCIAL

## 2020-11-28 ENCOUNTER — ANESTHESIA EVENT (OUTPATIENT)
Dept: SURGERY | Facility: CLINIC | Age: 80
DRG: 571 | End: 2020-11-28
Payer: COMMERCIAL

## 2020-11-28 VITALS
DIASTOLIC BLOOD PRESSURE: 79 MMHG | SYSTOLIC BLOOD PRESSURE: 125 MMHG | WEIGHT: 180 LBS | HEIGHT: 61 IN | TEMPERATURE: 98.3 F | HEART RATE: 111 BPM | BODY MASS INDEX: 33.99 KG/M2 | OXYGEN SATURATION: 96 % | RESPIRATION RATE: 18 BRPM

## 2020-11-28 DIAGNOSIS — S89.92XA KNEE INJURY, LEFT, INITIAL ENCOUNTER: ICD-10-CM

## 2020-11-28 DIAGNOSIS — S81.012A KNEE LACERATION, LEFT, INITIAL ENCOUNTER: ICD-10-CM

## 2020-11-28 DIAGNOSIS — S81.812A LACERATION OF LEFT LOWER EXTREMITY, INITIAL ENCOUNTER: ICD-10-CM

## 2020-11-28 DIAGNOSIS — S09.90XA CLOSED HEAD INJURY, INITIAL ENCOUNTER: ICD-10-CM
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
## 2020-11-28 DIAGNOSIS — S81.011A LACERATION OF KNEE, RIGHT, INITIAL ENCOUNTER: Primary | ICD-10-CM

## 2020-11-28 DIAGNOSIS — S40.022A CONTUSION OF LEFT UPPER EXTREMITY, INITIAL ENCOUNTER: ICD-10-CM

## 2020-11-28 DIAGNOSIS — R29.6 RECURRENT FALLS: ICD-10-CM

## 2020-11-28 LAB
ABO + RH BLD: NORMAL
ABO + RH BLD: NORMAL
ALBUMIN SERPL-MCNC: 3.4 G/DL (ref 3.4–5)
ALBUMIN UR-MCNC: NEGATIVE MG/DL
ALP SERPL-CCNC: 82 U/L (ref 40–150)
ALT SERPL W P-5'-P-CCNC: 28 U/L (ref 0–50)
ANION GAP SERPL CALCULATED.3IONS-SCNC: 1 MMOL/L (ref 3–14)
ANION GAP SERPL CALCULATED.3IONS-SCNC: 4 MMOL/L (ref 3–14)
APPEARANCE UR: CLEAR
AST SERPL W P-5'-P-CCNC: 21 U/L (ref 0–45)
BACTERIA #/AREA URNS HPF: ABNORMAL /HPF
BASOPHILS # BLD AUTO: 0.1 10E9/L (ref 0–0.2)
BASOPHILS # BLD AUTO: 0.1 10E9/L (ref 0–0.2)
BASOPHILS NFR BLD AUTO: 0.4 %
BASOPHILS NFR BLD AUTO: 0.5 %
BILIRUB SERPL-MCNC: 0.4 MG/DL (ref 0.2–1.3)
BILIRUB UR QL STRIP: NEGATIVE
BLD GP AB SCN SERPL QL: NORMAL
BLOOD BANK CMNT PATIENT-IMP: NORMAL
BUN SERPL-MCNC: 13 MG/DL (ref 7–30)
BUN SERPL-MCNC: 14 MG/DL (ref 7–30)
CALCIUM SERPL-MCNC: 9.3 MG/DL (ref 8.5–10.1)
CALCIUM SERPL-MCNC: 9.6 MG/DL (ref 8.5–10.1)
CHLORIDE SERPL-SCNC: 105 MMOL/L (ref 94–109)
CHLORIDE SERPL-SCNC: 107 MMOL/L (ref 94–109)
CK SERPL-CCNC: 80 U/L (ref 30–225)
CO2 SERPL-SCNC: 32 MMOL/L (ref 20–32)
CO2 SERPL-SCNC: 35 MMOL/L (ref 20–32)
COLOR UR AUTO: YELLOW
CREAT SERPL-MCNC: 0.48 MG/DL (ref 0.52–1.04)
CREAT SERPL-MCNC: 0.54 MG/DL (ref 0.52–1.04)
CREAT SERPL-MCNC: 0.61 MG/DL (ref 0.52–1.04)
DIFFERENTIAL METHOD BLD: ABNORMAL
DIFFERENTIAL METHOD BLD: ABNORMAL
EOSINOPHIL # BLD AUTO: 0.4 10E9/L (ref 0–0.7)
EOSINOPHIL # BLD AUTO: 0.4 10E9/L (ref 0–0.7)
EOSINOPHIL NFR BLD AUTO: 2.3 %
EOSINOPHIL NFR BLD AUTO: 2.6 %
ERYTHROCYTE [DISTWIDTH] IN BLOOD BY AUTOMATED COUNT: 14.2 % (ref 10–15)
ERYTHROCYTE [DISTWIDTH] IN BLOOD BY AUTOMATED COUNT: 14.3 % (ref 10–15)
GFR SERPL CREATININE-BSD FRML MDRD: 87 ML/MIN/{1.73_M2}
GFR SERPL CREATININE-BSD FRML MDRD: >90 ML/MIN/{1.73_M2}
GFR SERPL CREATININE-BSD FRML MDRD: >90 ML/MIN/{1.73_M2}
GLUCOSE BLDC GLUCOMTR-MCNC: 85 MG/DL (ref 70–99)
GLUCOSE BLDC GLUCOMTR-MCNC: 90 MG/DL (ref 70–99)
GLUCOSE SERPL-MCNC: 103 MG/DL (ref 70–99)
GLUCOSE SERPL-MCNC: 97 MG/DL (ref 70–99)
GLUCOSE UR STRIP-MCNC: NEGATIVE MG/DL
HCT VFR BLD AUTO: 42.2 % (ref 35–47)
HCT VFR BLD AUTO: 43.7 % (ref 35–47)
HGB BLD-MCNC: 13 G/DL (ref 11.7–15.7)
HGB BLD-MCNC: 13.5 G/DL (ref 11.7–15.7)
HGB UR QL STRIP: ABNORMAL
IMM GRANULOCYTES # BLD: 0.1 10E9/L (ref 0–0.4)
IMM GRANULOCYTES # BLD: 0.1 10E9/L (ref 0–0.4)
IMM GRANULOCYTES NFR BLD: 0.9 %
IMM GRANULOCYTES NFR BLD: 0.9 %
KETONES UR STRIP-MCNC: NEGATIVE MG/DL
LABORATORY COMMENT REPORT: NORMAL
LACTATE BLD-SCNC: 1.1 MMOL/L (ref 0.7–2)
LEUKOCYTE ESTERASE UR QL STRIP: NEGATIVE
LYMPHOCYTES # BLD AUTO: 2.6 10E9/L (ref 0.8–5.3)
LYMPHOCYTES # BLD AUTO: 3.2 10E9/L (ref 0.8–5.3)
LYMPHOCYTES NFR BLD AUTO: 17.5 %
LYMPHOCYTES NFR BLD AUTO: 20.4 %
MAGNESIUM SERPL-MCNC: 2 MG/DL (ref 1.6–2.3)
MCH RBC QN AUTO: 30 PG (ref 26.5–33)
MCH RBC QN AUTO: 30 PG (ref 26.5–33)
MCHC RBC AUTO-ENTMCNC: 30.8 G/DL (ref 31.5–36.5)
MCHC RBC AUTO-ENTMCNC: 30.9 G/DL (ref 31.5–36.5)
MCV RBC AUTO: 97 FL (ref 78–100)
MCV RBC AUTO: 98 FL (ref 78–100)
MONOCYTES # BLD AUTO: 1.3 10E9/L (ref 0–1.3)
MONOCYTES # BLD AUTO: 1.4 10E9/L (ref 0–1.3)
MONOCYTES NFR BLD AUTO: 8.8 %
MONOCYTES NFR BLD AUTO: 9.2 %
MUCOUS THREADS #/AREA URNS LPF: PRESENT /LPF
NEUTROPHILS # BLD AUTO: 10.3 10E9/L (ref 1.6–8.3)
NEUTROPHILS # BLD AUTO: 10.3 10E9/L (ref 1.6–8.3)
NEUTROPHILS NFR BLD AUTO: 66.7 %
NEUTROPHILS NFR BLD AUTO: 69.8 %
NITRATE UR QL: NEGATIVE
NRBC # BLD AUTO: 0 10*3/UL
NRBC # BLD AUTO: 0 10*3/UL
NRBC BLD AUTO-RTO: 0 /100
NRBC BLD AUTO-RTO: 0 /100
PH UR STRIP: 5.5 PH (ref 5–7)
PLATELET # BLD AUTO: 346 10E9/L (ref 150–450)
PLATELET # BLD AUTO: 359 10E9/L (ref 150–450)
POTASSIUM SERPL-SCNC: 3.2 MMOL/L (ref 3.4–5.3)
POTASSIUM SERPL-SCNC: 3.8 MMOL/L (ref 3.4–5.3)
POTASSIUM SERPL-SCNC: 3.9 MMOL/L (ref 3.4–5.3)
PROT SERPL-MCNC: 6.4 G/DL (ref 6.8–8.8)
RBC # BLD AUTO: 4.33 10E12/L (ref 3.8–5.2)
RBC # BLD AUTO: 4.5 10E12/L (ref 3.8–5.2)
RBC #/AREA URNS AUTO: 1 /HPF (ref 0–2)
SARS-COV-2 RNA SPEC QL NAA+PROBE: NEGATIVE
SARS-COV-2 RNA SPEC QL NAA+PROBE: NORMAL
SODIUM SERPL-SCNC: 141 MMOL/L (ref 133–144)
SODIUM SERPL-SCNC: 142 MMOL/L (ref 133–144)
SOURCE: ABNORMAL
SP GR UR STRIP: 1.01 (ref 1–1.03)
SPECIMEN EXP DATE BLD: NORMAL
SPECIMEN SOURCE: NORMAL
SPECIMEN SOURCE: NORMAL
SQUAMOUS #/AREA URNS AUTO: <1 /HPF (ref 0–1)
TRANS CELLS #/AREA URNS HPF: <1 /HPF (ref 0–1)
UROBILINOGEN UR STRIP-MCNC: NORMAL MG/DL (ref 0–2)
WBC # BLD AUTO: 14.7 10E9/L (ref 4–11)
WBC # BLD AUTO: 15.4 10E9/L (ref 4–11)
WBC #/AREA URNS AUTO: 1 /HPF (ref 0–5)

## 2020-11-28 PROCEDURE — 761N000003 HC RECOVERY PHASE 1 LEVEL 2 FIRST HR: Performed by: ORTHOPAEDIC SURGERY

## 2020-11-28 PROCEDURE — 250N000009 HC RX 250: Performed by: NURSE ANESTHETIST, CERTIFIED REGISTERED

## 2020-11-28 PROCEDURE — 250N000011 HC RX IP 250 OP 636: Performed by: NURSE ANESTHETIST, CERTIFIED REGISTERED

## 2020-11-28 PROCEDURE — U0003 INFECTIOUS AGENT DETECTION BY NUCLEIC ACID (DNA OR RNA); SEVERE ACUTE RESPIRATORY SYNDROME CORONAVIRUS 2 (SARS-COV-2) (CORONAVIRUS DISEASE [COVID-19]), AMPLIFIED PROBE TECHNIQUE, MAKING USE OF HIGH THROUGHPUT TECHNOLOGIES AS DESCRIBED BY CMS-2020-01-R: HCPCS | Performed by: EMERGENCY MEDICINE

## 2020-11-28 PROCEDURE — 360N000016 HC SURGERY LEVEL 2 1ST 30 MIN - UMMC: Performed by: ORTHOPAEDIC SURGERY

## 2020-11-28 PROCEDURE — 96365 THER/PROPH/DIAG IV INF INIT: CPT

## 2020-11-28 PROCEDURE — 370N000001 HC ANESTHESIA TECHNICAL FEE, 1ST 30 MIN: Performed by: ORTHOPAEDIC SURGERY

## 2020-11-28 PROCEDURE — 999N000139 HC STATISTIC PRE-PROCEDURE ASSESSMENT II: Performed by: ORTHOPAEDIC SURGERY

## 2020-11-28 PROCEDURE — 73090 X-RAY EXAM OF FOREARM: CPT | Mod: LT

## 2020-11-28 PROCEDURE — 360N000017 HC SURGERY LEVEL 2 EA 15 ADDTL MIN - UMMC: Performed by: ORTHOPAEDIC SURGERY

## 2020-11-28 PROCEDURE — 0JBP0ZZ EXCISION OF LEFT LOWER LEG SUBCUTANEOUS TISSUE AND FASCIA, OPEN APPROACH: ICD-10-PCS | Performed by: ORTHOPAEDIC SURGERY

## 2020-11-28 PROCEDURE — 999N001017 HC STATISTIC GLUCOSE BY METER IP

## 2020-11-28 PROCEDURE — 250N000003 HC SEVOFLURANE, EA 15 MIN: Performed by: ORTHOPAEDIC SURGERY

## 2020-11-28 PROCEDURE — 70450 CT HEAD/BRAIN W/O DYE: CPT

## 2020-11-28 PROCEDURE — 86850 RBC ANTIBODY SCREEN: CPT | Performed by: EMERGENCY MEDICINE

## 2020-11-28 PROCEDURE — 0S9D0ZZ DRAINAGE OF LEFT KNEE JOINT, OPEN APPROACH: ICD-10-PCS | Performed by: ORTHOPAEDIC SURGERY

## 2020-11-28 PROCEDURE — 99222 1ST HOSP IP/OBS MODERATE 55: CPT | Mod: 57 | Performed by: ORTHOPAEDIC SURGERY

## 2020-11-28 PROCEDURE — 90471 IMMUNIZATION ADMIN: CPT | Performed by: EMERGENCY MEDICINE

## 2020-11-28 PROCEDURE — 96360 HYDRATION IV INFUSION INIT: CPT | Performed by: EMERGENCY MEDICINE

## 2020-11-28 PROCEDURE — 370N000002 HC ANESTHESIA TECHNICAL FEE, EACH ADDTL 15 MIN: Performed by: ORTHOPAEDIC SURGERY

## 2020-11-28 PROCEDURE — 120N000002 HC R&B MED SURG/OB UMMC

## 2020-11-28 PROCEDURE — C9803 HOPD COVID-19 SPEC COLLECT: HCPCS

## 2020-11-28 PROCEDURE — 96361 HYDRATE IV INFUSION ADD-ON: CPT | Performed by: EMERGENCY MEDICINE

## 2020-11-28 PROCEDURE — 258N000003 HC RX IP 258 OP 636: Performed by: EMERGENCY MEDICINE

## 2020-11-28 PROCEDURE — 250N000013 HC RX MED GY IP 250 OP 250 PS 637: Performed by: NURSE ANESTHETIST, CERTIFIED REGISTERED

## 2020-11-28 PROCEDURE — 87040 BLOOD CULTURE FOR BACTERIA: CPT | Performed by: EMERGENCY MEDICINE

## 2020-11-28 PROCEDURE — 99222 1ST HOSP IP/OBS MODERATE 55: CPT | Performed by: PHYSICIAN ASSISTANT

## 2020-11-28 PROCEDURE — 99285 EMERGENCY DEPT VISIT HI MDM: CPT | Mod: 25 | Performed by: EMERGENCY MEDICINE

## 2020-11-28 PROCEDURE — 761N000004 HC RECOVERY PHASE 1 LEVEL 2 EA ADDTL HR: Performed by: ORTHOPAEDIC SURGERY

## 2020-11-28 PROCEDURE — 85025 COMPLETE CBC W/AUTO DIFF WBC: CPT | Performed by: EMERGENCY MEDICINE

## 2020-11-28 PROCEDURE — 99285 EMERGENCY DEPT VISIT HI MDM: CPT | Mod: 25

## 2020-11-28 PROCEDURE — 73562 X-RAY EXAM OF KNEE 3: CPT | Mod: 50

## 2020-11-28 PROCEDURE — 71046 X-RAY EXAM CHEST 2 VIEWS: CPT

## 2020-11-28 PROCEDURE — 250N000011 HC RX IP 250 OP 636: Performed by: EMERGENCY MEDICINE

## 2020-11-28 PROCEDURE — 250N000009 HC RX 250: Performed by: ORTHOPAEDIC SURGERY

## 2020-11-28 PROCEDURE — 83735 ASSAY OF MAGNESIUM: CPT | Performed by: PHYSICIAN ASSISTANT

## 2020-11-28 PROCEDURE — 84132 ASSAY OF SERUM POTASSIUM: CPT | Performed by: PHYSICIAN ASSISTANT

## 2020-11-28 PROCEDURE — 82565 ASSAY OF CREATININE: CPT | Performed by: PHYSICIAN ASSISTANT

## 2020-11-28 PROCEDURE — 250N000011 HC RX IP 250 OP 636: Performed by: PHYSICIAN ASSISTANT

## 2020-11-28 PROCEDURE — 81001 URINALYSIS AUTO W/SCOPE: CPT | Performed by: EMERGENCY MEDICINE

## 2020-11-28 PROCEDURE — 272N000001 HC OR GENERAL SUPPLY STERILE: Performed by: ORTHOPAEDIC SURGERY

## 2020-11-28 PROCEDURE — 36415 COLL VENOUS BLD VENIPUNCTURE: CPT | Performed by: EMERGENCY MEDICINE

## 2020-11-28 PROCEDURE — 86901 BLOOD TYPING SEROLOGIC RH(D): CPT | Performed by: EMERGENCY MEDICINE

## 2020-11-28 PROCEDURE — 83605 ASSAY OF LACTIC ACID: CPT | Performed by: EMERGENCY MEDICINE

## 2020-11-28 PROCEDURE — 87086 URINE CULTURE/COLONY COUNT: CPT | Performed by: EMERGENCY MEDICINE

## 2020-11-28 PROCEDURE — 250N000013 HC RX MED GY IP 250 OP 250 PS 637: Performed by: PHYSICIAN ASSISTANT

## 2020-11-28 PROCEDURE — 86900 BLOOD TYPING SEROLOGIC ABO: CPT | Performed by: EMERGENCY MEDICINE

## 2020-11-28 PROCEDURE — 82550 ASSAY OF CK (CPK): CPT | Performed by: EMERGENCY MEDICINE

## 2020-11-28 PROCEDURE — 258N000003 HC RX IP 258 OP 636: Performed by: NURSE ANESTHETIST, CERTIFIED REGISTERED

## 2020-11-28 PROCEDURE — 96366 THER/PROPH/DIAG IV INF ADDON: CPT

## 2020-11-28 PROCEDURE — 87040 BLOOD CULTURE FOR BACTERIA: CPT | Performed by: PHYSICIAN ASSISTANT

## 2020-11-28 PROCEDURE — 90714 TD VACC NO PRESV 7 YRS+ IM: CPT | Performed by: EMERGENCY MEDICINE

## 2020-11-28 PROCEDURE — 250N000009 HC RX 250: Performed by: STUDENT IN AN ORGANIZED HEALTH CARE EDUCATION/TRAINING PROGRAM

## 2020-11-28 PROCEDURE — 250N000013 HC RX MED GY IP 250 OP 250 PS 637: Performed by: EMERGENCY MEDICINE

## 2020-11-28 PROCEDURE — 250N000011 HC RX IP 250 OP 636: Performed by: ANESTHESIOLOGY

## 2020-11-28 PROCEDURE — 80048 BASIC METABOLIC PNL TOTAL CA: CPT | Performed by: EMERGENCY MEDICINE

## 2020-11-28 PROCEDURE — 80053 COMPREHEN METABOLIC PANEL: CPT | Performed by: EMERGENCY MEDICINE

## 2020-11-28 PROCEDURE — 36415 COLL VENOUS BLD VENIPUNCTURE: CPT | Performed by: PHYSICIAN ASSISTANT

## 2020-11-28 RX ORDER — ONDANSETRON 4 MG/1
4 TABLET, ORALLY DISINTEGRATING ORAL EVERY 30 MIN PRN
Status: DISCONTINUED | OUTPATIENT
Start: 2020-11-28 | End: 2020-11-28 | Stop reason: HOSPADM

## 2020-11-28 RX ORDER — PROCHLORPERAZINE MALEATE 5 MG
5 TABLET ORAL EVERY 6 HOURS PRN
Status: DISCONTINUED | OUTPATIENT
Start: 2020-11-28 | End: 2020-12-03 | Stop reason: HOSPADM

## 2020-11-28 RX ORDER — OXYCODONE HYDROCHLORIDE 5 MG/1
5 TABLET ORAL
Status: DISCONTINUED | OUTPATIENT
Start: 2020-11-28 | End: 2020-11-30

## 2020-11-28 RX ORDER — ACETAMINOPHEN 325 MG/1
975 TABLET ORAL 3 TIMES DAILY
Status: DISCONTINUED | OUTPATIENT
Start: 2020-11-28 | End: 2020-12-03 | Stop reason: HOSPADM

## 2020-11-28 RX ORDER — NALOXONE HYDROCHLORIDE 0.4 MG/ML
0.2 INJECTION, SOLUTION INTRAMUSCULAR; INTRAVENOUS; SUBCUTANEOUS
Status: ACTIVE | OUTPATIENT
Start: 2020-11-28 | End: 2020-11-29

## 2020-11-28 RX ORDER — ONDANSETRON 2 MG/ML
4 INJECTION INTRAMUSCULAR; INTRAVENOUS EVERY 6 HOURS PRN
Status: DISCONTINUED | OUTPATIENT
Start: 2020-11-28 | End: 2020-12-03 | Stop reason: HOSPADM

## 2020-11-28 RX ORDER — LIDOCAINE 40 MG/G
CREAM TOPICAL
Status: DISCONTINUED | OUTPATIENT
Start: 2020-11-28 | End: 2020-12-03 | Stop reason: HOSPADM

## 2020-11-28 RX ORDER — ATORVASTATIN CALCIUM 10 MG/1
10 TABLET, FILM COATED ORAL EVERY EVENING
Status: DISCONTINUED | OUTPATIENT
Start: 2020-11-28 | End: 2020-12-03 | Stop reason: HOSPADM

## 2020-11-28 RX ORDER — POTASSIUM CHLORIDE 20MEQ/15ML
40 LIQUID (ML) ORAL ONCE
Status: COMPLETED | OUTPATIENT
Start: 2020-11-28 | End: 2020-11-28

## 2020-11-28 RX ORDER — NALOXONE HYDROCHLORIDE 0.4 MG/ML
0.4 INJECTION, SOLUTION INTRAMUSCULAR; INTRAVENOUS; SUBCUTANEOUS
Status: ACTIVE | OUTPATIENT
Start: 2020-11-28 | End: 2020-11-29

## 2020-11-28 RX ORDER — ASPIRIN 81 MG/1
81 TABLET, CHEWABLE ORAL DAILY
Status: ON HOLD | COMMUNITY
End: 2022-11-02

## 2020-11-28 RX ORDER — FENTANYL CITRATE 50 UG/ML
25-50 INJECTION, SOLUTION INTRAMUSCULAR; INTRAVENOUS
Status: DISCONTINUED | OUTPATIENT
Start: 2020-11-28 | End: 2020-11-28 | Stop reason: HOSPADM

## 2020-11-28 RX ORDER — DEXAMETHASONE SODIUM PHOSPHATE 4 MG/ML
INJECTION, SOLUTION INTRA-ARTICULAR; INTRALESIONAL; INTRAMUSCULAR; INTRAVENOUS; SOFT TISSUE PRN
Status: DISCONTINUED | OUTPATIENT
Start: 2020-11-28 | End: 2020-11-28

## 2020-11-28 RX ORDER — ONDANSETRON 2 MG/ML
4 INJECTION INTRAMUSCULAR; INTRAVENOUS EVERY 30 MIN PRN
Status: DISCONTINUED | OUTPATIENT
Start: 2020-11-28 | End: 2020-11-28 | Stop reason: HOSPADM

## 2020-11-28 RX ORDER — PROPOFOL 10 MG/ML
INJECTION, EMULSION INTRAVENOUS PRN
Status: DISCONTINUED | OUTPATIENT
Start: 2020-11-28 | End: 2020-11-28

## 2020-11-28 RX ORDER — CLINDAMYCIN PHOSPHATE 900 MG/50ML
900 INJECTION, SOLUTION INTRAVENOUS
Status: COMPLETED | OUTPATIENT
Start: 2020-11-28 | End: 2020-11-28

## 2020-11-28 RX ORDER — LIDOCAINE 4 G/G
1-3 PATCH TOPICAL
Status: DISCONTINUED | OUTPATIENT
Start: 2020-11-28 | End: 2020-12-03 | Stop reason: HOSPADM

## 2020-11-28 RX ORDER — CLINDAMYCIN PHOSPHATE 900 MG/50ML
900 INJECTION, SOLUTION INTRAVENOUS SEE ADMIN INSTRUCTIONS
Status: DISCONTINUED | OUTPATIENT
Start: 2020-11-28 | End: 2020-11-28 | Stop reason: HOSPADM

## 2020-11-28 RX ORDER — LIDOCAINE HYDROCHLORIDE 20 MG/ML
INJECTION, SOLUTION INFILTRATION; PERINEURAL PRN
Status: DISCONTINUED | OUTPATIENT
Start: 2020-11-28 | End: 2020-11-28

## 2020-11-28 RX ORDER — ONDANSETRON 4 MG/1
4 TABLET, ORALLY DISINTEGRATING ORAL EVERY 6 HOURS PRN
Status: DISCONTINUED | OUTPATIENT
Start: 2020-11-28 | End: 2020-12-03 | Stop reason: HOSPADM

## 2020-11-28 RX ORDER — PROCHLORPERAZINE 25 MG
12.5 SUPPOSITORY, RECTAL RECTAL EVERY 12 HOURS PRN
Status: DISCONTINUED | OUTPATIENT
Start: 2020-11-28 | End: 2020-12-03 | Stop reason: HOSPADM

## 2020-11-28 RX ORDER — METHOCARBAMOL 750 MG/1
750 TABLET, FILM COATED ORAL 3 TIMES DAILY
Status: DISCONTINUED | OUTPATIENT
Start: 2020-11-28 | End: 2020-12-03 | Stop reason: HOSPADM

## 2020-11-28 RX ORDER — HYDROMORPHONE HCL IN WATER/PF 6 MG/30 ML
.2-.3 PATIENT CONTROLLED ANALGESIA SYRINGE INTRAVENOUS
Status: DISCONTINUED | OUTPATIENT
Start: 2020-11-28 | End: 2020-12-02

## 2020-11-28 RX ORDER — FENTANYL CITRATE 50 UG/ML
INJECTION, SOLUTION INTRAMUSCULAR; INTRAVENOUS PRN
Status: DISCONTINUED | OUTPATIENT
Start: 2020-11-28 | End: 2020-11-28

## 2020-11-28 RX ORDER — SODIUM CHLORIDE, SODIUM GLUCONATE, SODIUM ACETATE, POTASSIUM CHLORIDE AND MAGNESIUM CHLORIDE 526; 502; 368; 37; 30 MG/100ML; MG/100ML; MG/100ML; MG/100ML; MG/100ML
INJECTION, SOLUTION INTRAVENOUS CONTINUOUS PRN
Status: DISCONTINUED | OUTPATIENT
Start: 2020-11-28 | End: 2020-11-28

## 2020-11-28 RX ORDER — SODIUM CHLORIDE, SODIUM LACTATE, POTASSIUM CHLORIDE, CALCIUM CHLORIDE 600; 310; 30; 20 MG/100ML; MG/100ML; MG/100ML; MG/100ML
1000 INJECTION, SOLUTION INTRAVENOUS CONTINUOUS
Status: DISCONTINUED | OUTPATIENT
Start: 2020-11-28 | End: 2020-11-29 | Stop reason: CLARIF

## 2020-11-28 RX ORDER — ONDANSETRON 2 MG/ML
INJECTION INTRAMUSCULAR; INTRAVENOUS PRN
Status: DISCONTINUED | OUTPATIENT
Start: 2020-11-28 | End: 2020-11-28

## 2020-11-28 RX ORDER — AMOXICILLIN 250 MG
1-2 CAPSULE ORAL 2 TIMES DAILY
Status: DISCONTINUED | OUTPATIENT
Start: 2020-11-28 | End: 2020-12-03 | Stop reason: HOSPADM

## 2020-11-28 RX ORDER — ALBUTEROL SULFATE 90 UG/1
AEROSOL, METERED RESPIRATORY (INHALATION) PRN
Status: DISCONTINUED | OUTPATIENT
Start: 2020-11-28 | End: 2020-11-28

## 2020-11-28 RX ORDER — MAGNESIUM HYDROXIDE 1200 MG/15ML
LIQUID ORAL PRN
Status: DISCONTINUED | OUTPATIENT
Start: 2020-11-28 | End: 2020-11-28 | Stop reason: HOSPADM

## 2020-11-28 RX ORDER — POLYETHYLENE GLYCOL 3350 17 G/17G
17 POWDER, FOR SOLUTION ORAL DAILY PRN
Status: DISCONTINUED | OUTPATIENT
Start: 2020-11-28 | End: 2020-12-03 | Stop reason: HOSPADM

## 2020-11-28 RX ORDER — HYDROMORPHONE HYDROCHLORIDE 1 MG/ML
.3-.5 INJECTION, SOLUTION INTRAMUSCULAR; INTRAVENOUS; SUBCUTANEOUS EVERY 5 MIN PRN
Status: DISCONTINUED | OUTPATIENT
Start: 2020-11-28 | End: 2020-11-28 | Stop reason: HOSPADM

## 2020-11-28 RX ADMIN — ENOXAPARIN SODIUM 30 MG: 30 INJECTION SUBCUTANEOUS at 20:21

## 2020-11-28 RX ADMIN — PROPOFOL 150 MG: 10 INJECTION, EMULSION INTRAVENOUS at 15:29

## 2020-11-28 RX ADMIN — PHENYLEPHRINE HYDROCHLORIDE 100 MCG: 10 INJECTION INTRAVENOUS at 15:29

## 2020-11-28 RX ADMIN — FENTANYL CITRATE 75 MCG: 50 INJECTION, SOLUTION INTRAMUSCULAR; INTRAVENOUS at 15:29

## 2020-11-28 RX ADMIN — CLOSTRIDIUM TETANI TOXOID ANTIGEN (FORMALDEHYDE INACTIVATED) AND CORYNEBACTERIUM DIPHTHERIAE TOXOID ANTIGEN (FORMALDEHYDE INACTIVATED) 0.5 ML: 5; 2 INJECTION, SUSPENSION INTRAMUSCULAR at 08:29

## 2020-11-28 RX ADMIN — SODIUM CHLORIDE, SODIUM GLUCONATE, SODIUM ACETATE, POTASSIUM CHLORIDE AND MAGNESIUM CHLORIDE: 526; 502; 368; 37; 30 INJECTION, SOLUTION INTRAVENOUS at 15:25

## 2020-11-28 RX ADMIN — HYDROMORPHONE HYDROCHLORIDE 0.3 MG: 1 INJECTION, SOLUTION INTRAMUSCULAR; INTRAVENOUS; SUBCUTANEOUS at 17:15

## 2020-11-28 RX ADMIN — SUGAMMADEX 400 MG: 100 INJECTION, SOLUTION INTRAVENOUS at 16:24

## 2020-11-28 RX ADMIN — FENTANYL CITRATE 50 MCG: 50 INJECTION, SOLUTION INTRAMUSCULAR; INTRAVENOUS at 16:00

## 2020-11-28 RX ADMIN — PROPOFOL 75 MG: 10 INJECTION, EMULSION INTRAVENOUS at 16:32

## 2020-11-28 RX ADMIN — METHOCARBAMOL 750 MG: 750 TABLET, FILM COATED ORAL at 20:21

## 2020-11-28 RX ADMIN — ATORVASTATIN CALCIUM 10 MG: 10 TABLET, FILM COATED ORAL at 20:21

## 2020-11-28 RX ADMIN — POTASSIUM CHLORIDE 40 MEQ: 20 SOLUTION ORAL at 09:44

## 2020-11-28 RX ADMIN — FENTANYL CITRATE 25 MCG: 50 INJECTION, SOLUTION INTRAMUSCULAR; INTRAVENOUS at 16:04

## 2020-11-28 RX ADMIN — LIDOCAINE 1 PATCH: 560 PATCH PERCUTANEOUS; TOPICAL; TRANSDERMAL at 20:21

## 2020-11-28 RX ADMIN — ONDANSETRON 4 MG: 2 INJECTION INTRAMUSCULAR; INTRAVENOUS at 15:43

## 2020-11-28 RX ADMIN — HYDROMORPHONE HYDROCHLORIDE 0.2 MG: 0.2 INJECTION, SOLUTION INTRAMUSCULAR; INTRAVENOUS; SUBCUTANEOUS at 22:27

## 2020-11-28 RX ADMIN — LIDOCAINE HYDROCHLORIDE 100 MG: 20 INJECTION, SOLUTION INFILTRATION; PERINEURAL at 15:29

## 2020-11-28 RX ADMIN — DEXAMETHASONE SODIUM PHOSPHATE 4 MG: 4 INJECTION, SOLUTION INTRA-ARTICULAR; INTRALESIONAL; INTRAMUSCULAR; INTRAVENOUS; SOFT TISSUE at 15:43

## 2020-11-28 RX ADMIN — ROCURONIUM BROMIDE 90 MG: 10 INJECTION INTRAVENOUS at 15:29

## 2020-11-28 RX ADMIN — CLINDAMYCIN PHOSPHATE 900 MG: 900 INJECTION, SOLUTION INTRAVENOUS at 15:40

## 2020-11-28 RX ADMIN — VANCOMYCIN HYDROCHLORIDE 1500 MG: 10 INJECTION, POWDER, LYOPHILIZED, FOR SOLUTION INTRAVENOUS at 09:43

## 2020-11-28 RX ADMIN — ALBUTEROL SULFATE 12 PUFF: 108 INHALANT RESPIRATORY (INHALATION) at 16:32

## 2020-11-28 RX ADMIN — SODIUM CHLORIDE 1000 ML: 900 INJECTION, SOLUTION INTRAVENOUS at 12:59

## 2020-11-28 RX ADMIN — ACETAMINOPHEN 975 MG: 325 TABLET, FILM COATED ORAL at 20:20

## 2020-11-28 ASSESSMENT — ENCOUNTER SYMPTOMS
MYALGIAS: 1
ARTHRALGIAS: 1
PALPITATIONS: 0
FEVER: 0
DYSRHYTHMIAS: 1
DIARRHEA: 0
WOUND: 1
NECK PAIN: 0
VOMITING: 0
COUGH: 0
SHORTNESS OF BREATH: 0

## 2020-11-28 ASSESSMENT — MIFFLIN-ST. JEOR
SCORE: 1233.85
SCORE: 1249.72

## 2020-11-28 ASSESSMENT — ACTIVITIES OF DAILY LIVING (ADL): ADLS_ACUITY_SCORE: 21

## 2020-11-28 NOTE — CONSULTS
[facility], Orthopaedic Surgery Consultation    Madhavi Castellanos MRN# [id number]   Age: 80 year old YOB: 1940     Date of Admission:  11/28/2020    Reason for consult: Left knee laceration       Requesting physician: Dr. Mari         Assessment and Plan:   Assessment:    80 year-old female with an extensive left knee laceration and traumatic arthrotomy to the superomedial left knee capsule, confirmed with saline load test.    Given the intra-articular nature of the injury as well as the complexity of the laceration over the anterior left knee, recommend irrigation and debridement in the operating room with likely placement of a vacuum-assisted closure device.    Plan:  - To OR urgently today for I&D left knee and placement of wound vac  - May proceed with prophylactic antibiotics - given extent of wound and contamination, recommend broad-spectrum IV ABx for at least 24 hours.  - Will likely need plastic surgery coverage at some point in the future.  - Maintain NPO  - Trauma team admitting, appreciate medical optimization/clearance pre-operatively  - Hold AC  - Bedrest until OR    Patient was discussed with Dr. Peterson, who is in agreement with the plan.    Jeffery Hernandez MD  Orthopaedic Surgery PGY-4  #: [phone]    Procedure:  A saline load test was performed to the left knee.  Verbal consent was obtained.  The patient's left knee was prepped with a ChloraPrep swab.  Using a 22-gauge spinal needle and a superolateral approach, the needle was introduced into the left knee joint.  Saline was slowly delivered into the left knee joint.  At approximately 70 cc of normal saline, a steady stream of fluid was visualized within the wound.  This appeared to be coming from the superomedial aspect of the knee joint.  Thus, saline load test is considered positive.  Approximately 60 cc of saline was able to be returned from the knee and discarded.            History of Present Illness:   Patient was  "seen and examined by me. History, PMH, Meds, SH, complete ROS (10 organ systems) and PE reviewed with patient and prior medical records.      Ms. Castellanos is a 80-year-old female with a history of atrial fibrillation who presents to the emergency department as a transfer from [facility] after a fall in the bathroom at home overnight.  The patient sustained a significant laceration to her left knee but was unable to call for help until this morning.  She presented to [facility] ED where she was found to have an extensive laceration over her left knee.  She states that overnight she was crawling and trying to get help.    Currently, reports pain in bilateral knees.  She did sustain a fall approximately a week ago and sustained a laceration to her right knee as well.  This was treated with laceration repair at [facility].  Currently, the majority of her pain is in her left knee.  She denies any numbness or tingling in her lower extremities.  She reports no other significant symptoms such as fever, chills, lightheadedness.  She reports her pain is currently well controlled.  She has a history of chronic pain with use of a fentanyl patch.              Past Medical History:     Past Medical History:   Diagnosis Date     Asthma      Atrial fibrillation (H)     had EP ablation 8/11/2017, pt reports being \"afib free\" now     CAD (coronary artery disease)      Depression      Esophageal reflux      Hypertension      LBBB (left bundle branch block)      Liver lesion     stable, seen on multiple CT scans     Lumbosacral spondylosis      Mumps      Nonspecific reaction to tuberculin skin test without active tuberculosis(795.51)      Obesity      TALI (obstructive sleep apnea)      Osteoarthritis      Palpitations      Prolonged QT interval      Pure hypercholesterolemia      Spinal stenosis of lumbar region      STD (sexually transmitted disease)      Tuberculosis              Past Surgical History:     Past Surgical " History:   Procedure Laterality Date     CHOLECYSTECTOMY       COLONOSCOPY N/A 8/4/2019    Procedure: COLONOSCOPY;  Surgeon: Darron Cunha MD;  Location: [facility]     EP ABLATION / EP STUDIES  08/11/2017    for hx a fib     ESOPHAGOSCOPY, GASTROSCOPY, DUODENOSCOPY (EGD), COMBINED  7/2014    reactive gastropathy, H. Pylori negative ([facility])     EXCISE VULVA WIDE LOCAL N/A 12/1/2017    Procedure: EXCISE VULVA WIDE LOCAL;  Wide Local Excision of Vulvar Lesion   ;  Surgeon: Nazario Tirado MD;  Location:  OR             Social History:     Social History     Socioeconomic History     Marital status:      Spouse name: None     Number of children: None     Years of education: None     Highest education level: None   Occupational History     None   Social Needs     Financial resource strain: None     Food insecurity     Worry: None     Inability: None     Transportation needs     Medical: None     Non-medical: None   Tobacco Use     Smoking status: Never Smoker     Smokeless tobacco: Never Used   Substance and Sexual Activity     Alcohol use: No     Drug use: No     Sexual activity: Not Currently   Lifestyle     Physical activity     Days per week: None     Minutes per session: None     Stress: None   Relationships     Social connections     Talks on phone: None     Gets together: None     Attends [employer] service: None     Active member of club or organization: None     Attends meetings of clubs or organizations: None     Relationship status: None     Intimate partner violence     Fear of current or ex partner: None     Emotionally abused: None     Physically abused: None     Forced sexual activity: None   Other Topics Concern     Parent/sibling w/ CABG, MI or angioplasty before 65F 55M? Not Asked   Social History Narrative     None             Family History:     Family History   Problem Relation Age of Onset     Colon Cancer No family hx of               Medications:     Current Facility-Administered  Medications   Medication     0.9% sodium chloride BOLUS     sodium chloride (PF) 0.9% PF flush 3 mL     sodium chloride (PF) 0.9% PF flush 3 mL     Current Outpatient Medications   Medication Sig     alum & mag hydroxide-simethicone (MYLANTA/MAALOX) 200-200-20 MG/5ML SUSP Take 30 mLs by mouth every 4 hours as needed     aspirin (ASA) 325 MG EC tablet Take 325 mg by mouth daily     Atorvastatin Calcium (LIPITOR PO) Take 10 mg by mouth every evening      buPROPion (WELLBUTRIN XL) 150 MG 24 hr tablet Take 150 mg by mouth every morning     diltiazem (CARTIA XT) 120 MG 24 hr capsule Take 120 mg by mouth daily     fentaNYL (DURAGESIC) 25 mcg/hr 72 hr patch Place 1 patch onto the skin every 72 hours     HYDROCHLOROTHIAZIDE PO Take 12.5 mg by mouth daily      HYDROcodone-acetaminophen (NORCO) 7.5-325 MG per tablet Take 1-2 tablets by mouth 2 times daily as needed for moderate to severe pain     Losartan Potassium (COZAAR PO) Take 25 mg by mouth daily     Methimazole (TAPAZOLE PO) Take 5 mg by mouth five times a week Monday thru Friday     METHOCARBAMOL PO Take 750 mg by mouth 6 times daily As needed     naloxone (NARCAN) 4 MG/0.1ML nasal spray Spray 1 spray (4 mg) into one nostril alternating nostrils as needed for opioid reversal every 2-3 minutes until assistance arrives     polyethylene glycol (MIRALAX) packet Take 1 packet by mouth daily as needed for constipation     promethazine (PHENERGAN) 25 MG tablet Take 1 tablet (25 mg) by mouth every 6 hours as needed for nausea     psyllium (METAMUCIL/KONSYL) capsule Take 1 capsule by mouth daily as needed for constipation     simethicone (MYLICON) 80 MG chewable tablet Take 1 tablet (80 mg) by mouth every 6 hours as needed for flatulence or cramping     Sotalol HCl (BETAPACE PO) Take 40 mg by mouth 2 times daily              Allergies:      Allergies   Allergen Reactions     Metoprolol Shortness Of Breath     2 hours after tasking for the first time SOB, Pt evaluated and pt  "WDL       Albuterol Unknown     Augmentin Nausea     Cephalexin      Codeine Sulfate      \"i dont't know, nothing, maybe sick to my stomach\"     Cymbalta      agitated     Lisinopril Cough     Omnicef [Cefdinir] Diarrhea     Percocet [Oxycodone-Acetaminophen]      nauseated            Review of Systems:   A comprehensive 10 point review of systems (constitutional, ENT, cardiac, peripheral vascular, respiratory, GI, , Musculoskeletal, skin, Neurological) was performed and found to be negative except as described in this note.           Physical Exam:   COMPLETE EXAMINATION:   VITAL SIGNS: BP (!) 162/95   Pulse 102   Temp 98.8  F (37.1  C) (Oral)   Resp 16   Ht 1.575 m (5' 2\")   Wt 81.6 kg (180 lb)   SpO2 97%   BMI 32.92 kg/m    GENERAL:  No acute distress, calm and cooperative   RESP: Non labored breathing  ABD: Benign  SKIN: Multiple areas of bruising/scattered ecchymosis.  LYMPHATIC:  Grossly normal  NEURO:  Grossly normal   VASCULAR: Satisfactory perfusion of all extremities  MUSCULOSKELETAL:   Inspection: Complex, deep laceration to the anterior knee.  This extends medially at the proximal aspect of the wound and laterally as the wound propagates distally.  There is gross contamination with cat hair and human hair within the wound.  There is mild venous bleeding from the wound edges.    Palpation: No significant tenderness to palpation over the midshaft of the femur, tibia, ankle, or foot.  ROM: The patient is able to range her left knee and perform a straight leg raise.  Strength: Fires tibialis anterior and gastrocsoleus complex.  Unable to significantly fire EHL.  Sensory: Sensation is intact to light touch in the saphenous, sural, superficial peroneal, deep peroneal, and tibial nerve distributions.  Circulation: Foot is warm and well-perfused, although she has a barely palpable dorsalis pedis pulse.            Data:   All pertinent laboratory data reviewed  All imaging studies reviewed by " me.    X-rays of the left knee show no evidence of an air arthrogram.  No fractures are noted.    Recent Labs   Lab Test 11/28/20  0836 03/14/20  0820 03/13/20  0708   HGB 13.5 12.9 11.4*   WBC 14.7* 12.8* 13.5*     No results for input(s): FTYP, FNEU, FOTH, FCOL, FAPR, FWBC in the last 67707 hours.

## 2020-11-28 NOTE — ED NOTES
"[facility]    ED Nurse to Floor Handoff     Madhavi Castellanos is a 80 year old female who speaks English and lives alone,  in a home  They arrived in the ED by ambulance from [facility]    ED Chief Complaint: Fall    ED Dx;   Final diagnoses:   Knee laceration, left, initial encounter   Recurrent falls         Needed?: No    Allergies:   Allergies   Allergen Reactions     Metoprolol Shortness Of Breath     2 hours after tasking for the first time SOB, Pt evaluated and pt WDL       Albuterol Unknown     Augmentin Nausea     Cephalexin      Codeine Sulfate      \"i dont't know, nothing, maybe sick to my stomach\"     Cymbalta      agitated     Lisinopril Cough     Omnicef [Cefdinir] Diarrhea     Percocet [Oxycodone-Acetaminophen]      nauseated   .  Past Medical Hx:   Past Medical History:   Diagnosis Date     Asthma      Atrial fibrillation (H)     had EP ablation 8/11/2017, pt reports being \"afib free\" now     CAD (coronary artery disease)      Depression      Esophageal reflux      Hypertension      LBBB (left bundle branch block)      Liver lesion     stable, seen on multiple CT scans     Lumbosacral spondylosis      Mumps      Nonspecific reaction to tuberculin skin test without active tuberculosis(795.51)      Obesity      TALI (obstructive sleep apnea)      Osteoarthritis      Palpitations      Prolonged QT interval      Pure hypercholesterolemia      Spinal stenosis of lumbar region      STD (sexually transmitted disease)      Tuberculosis       Baseline Mental status: WDL  Current Mental Status changes: at basesline    Infection present or suspected this encounter: yes skin/wound/contact and cultures pending  Sepsis suspected: Yes  Isolation type: No active isolations  Patient tested for COVID 19 prior to admission: YES     Activity level - Baseline/Home:  Cane and Walker  Activity Level - Current:   Cane and Walker    Bariatric equipment needed?: No    In the " ED these meds were given:   Medications   sodium chloride (PF) 0.9% PF flush 3 mL (has no administration in time range)   sodium chloride (PF) 0.9% PF flush 3 mL (has no administration in time range)   0.9% sodium chloride BOLUS (1,000 mLs Intravenous New Bag 11/28/20 7729)       Drips running?  No    Home pump  No    Current LDAs  Peripheral IV 11/28/20 Right Upper forearm (Active)   Number of days: 0       Wound 04/21/17 Left Knee Skin tear fell on left knee on carpeted floor.  (Active)   Number of days: 1317       Wound 12/27/17 Left Knee Laceration large jagged laceration left knee s/p fall (Active)   Number of days: 1067       Wound 12/27/17 Right Knee Abrasion(s) s/p fall (Active)   Number of days: 1067       Wound 03/13/20 Left Buttocks Abrasion(s);Shear injury moist, red (Active)   Number of days: 260       Wound 03/13/20 Left Leg wound from fall with sutures (Active)   Number of days: 260       Incision/Surgical Site 12/01/17 Vagina (Active)   Number of days: 1093       Labs results:   Labs Ordered and Resulted from Time of ED Arrival Up to the Time of Departure from the ED   LACTATE FOR SEPSIS PROTOCOL   COMPREHENSIVE METABOLIC PANEL   CBC WITH PLATELETS DIFFERENTIAL   ROUTINE UA WITH MICROSCOPIC   PERIPHERAL IV CATHETER   PULSE OXIMETRY NURSING   CARDIAC CONTINUOUS MONITORING   NURSING DRAW AND HOLD   URINE CULTURE AEROBIC BACTERIAL       Imaging Studies:   Recent Results (from the past 24 hour(s))   Head CT w/o contrast    Narrative    CT SCAN OF THE HEAD WITHOUT CONTRAST   11/28/2020 9:04 AM     HISTORY: fall hematoma    TECHNIQUE:  Axial images of the head and coronal reformations without  IV contrast material. Radiation dose for this scan was reduced using  automated exposure control, adjustment of the mA and/or kV according  to patient size, or iterative reconstruction technique.    COMPARISON: Head CT 3/12/2020    FINDINGS:  Moderate volume loss is present. Scattered dural calcifications.  "White  matter hypoattenuation likely represents moderate chronic small vessel  ischemic change. The cerebral hemispheres, brainstem, and cerebellum  otherwise demonstrate normal morphology and attenuation. No evidence  of acute ischemia, hemorrhage, mass, mass effect or hydrocephalus.     The visualized calvarium and mastoid cavities are unremarkable. Mild  paranasal sinus mucosal thickening. Small left frontal scalp  contusion.      Impression    IMPRESSION:     1. No acute intracranial abnormality.  2. Small left frontal scalp contusion.    PEDRO LEW MD   Radius/Ulna XR,  PA &LAT, left    Narrative    FOREARM LEFT TWO VIEWS November 28, 2020 9:24 AM     INDICATION: Left forearm pain after a fall.     COMPARISON: None.      Impression    IMPRESSION:  1.  No fracture or joint malalignment.  2.  Moderate thumb CMC degenerative arthrosis.    DIXON ALFONSO MD   XR Knee Bilateral 3 Views    Narrative    KNEE BILATERAL THREE VIEWS November 28, 2020 9:26 AM     INDICATION: Bilateral knee pain after a fall.     COMPARISON: 3/12/2020.      Impression    IMPRESSION:  1.  No fracture or joint malalignment.  2.  Mild left knee degenerative arthrosis with medial compartment  narrowing and osteophytosis.  3.  Moderate right knee degenerative arthrosis with lateral  compartment narrowing and osteophytosis.  4.  Soft tissue irregularity-injury in the anterior left knee with  overlying bandage material.  5.  Subtle soft tissue irregularity in the anterior aspect of the  right knee.    DIXON ALFONSO MD   XR Chest 2 Views    Narrative    CHEST TWO VIEWS November 28, 2020 9:27 AM     HISTORY: Fall.    COMPARISON: March 12, 2020.       Impression    IMPRESSION: There are no acute infiltrates. The cardiac silhouette is  not enlarged. Pulmonary vasculature is unremarkable.    TAWANNA FUENTES MD       Recent vital signs:   BP (!) 111/99   Pulse 101   Temp 98.8  F (37.1  C) (Oral)   Resp 16   Ht 1.575 m (5' 2\")   Wt 81.6 kg (180 " lb)   SpO2 97%   BMI 32.92 kg/m      Arthur Coma Scale Score: 15 (11/28/20 1202)       Cardiac Rhythm: Tachycardia  Pt needs tele? Yes  Skin/wound Issues: left and right knee s/p fall    Code Status: Full Code    Pain control: good    Nausea control: pt had none    Abnormal labs/tests/findings requiring intervention:     Family present during ED course? No   Family Comments/Social Situation comments:     Tasks needing completion: continue with nursing care    Swetha Odonnell RN  [facility] -- [phone] [facility]  [phone] [facility]

## 2020-11-28 NOTE — ANESTHESIA PREPROCEDURE EVALUATION
"Anesthesia Pre-Procedure Evaluation    Patient: Madhavi Castellanos   MRN:     [id number] Gender:   female   Age:    80 year old :      1940        Preoperative Diagnosis: Open wound of left knee [S81.002A]   Procedure(s):  IRRIGATION AND DEBRIDEMENT, LOWER EXTREMITY     LABS:  CBC:   Lab Results   Component Value Date    WBC 15.4 (H) 2020    WBC 14.7 (H) 2020    HGB 13.0 2020    HGB 13.5 2020    HCT 42.2 2020    HCT 43.7 2020     2020     2020     BMP:   Lab Results   Component Value Date     2020     2020    POTASSIUM 3.8 2020    POTASSIUM 3.2 (L) 2020    CHLORIDE 107 2020    CHLORIDE 105 2020    CO2 32 2020    CO2 35 (H) 2020    BUN 13 2020    BUN 14 2020    CR 0.54 2020    CR 0.61 2020    GLC 97 2020     (H) 2020     COAGS:   Lab Results   Component Value Date    PTT 26 2020    INR 1.03 2020     POC:   Lab Results   Component Value Date    BGM 85 2020     OTHER:   Lab Results   Component Value Date    LACT 2.0 2020    LUIS ANTONIO 9.3 2020    MAG 2.3 2020    ALBUMIN 3.4 2020    PROTTOTAL 6.4 (L) 2020    ALT 28 2020    AST 21 2020    ALKPHOS 82 2020    BILITOTAL 0.4 2020    LIPASE 63 (L) 2016    TSH 1.24 2020        Preop Vitals    BP Readings from Last 3 Encounters:   20 (!) 111/99   20 125/79   20 (!) 168/85    Pulse Readings from Last 3 Encounters:   20 101   20 111   20 93      Resp Readings from Last 3 Encounters:   20 16   20 18   20 16    SpO2 Readings from Last 3 Encounters:   20 97%   20 96%   20 96%      Temp Readings from Last 1 Encounters:   20 37.1  C (98.8  F) (Oral)    Ht Readings from Last 1 Encounters:   20 1.575 m (5' 2\")      Wt Readings from Last 1 Encounters:   20 " "81.6 kg (180 lb)    Estimated body mass index is 32.92 kg/m  as calculated from the following:    Height as of this encounter: 1.575 m (5' 2\").    Weight as of this encounter: 81.6 kg (180 lb).     LDA:  Peripheral IV 11/28/20 Right Upper forearm (Active)   Site Assessment WDL 11/28/20 0851   Line Status Saline locked 11/28/20 0851   Dressing Intervention New dressing  11/28/20 0851   Phlebitis Scale 0-->no symptoms 11/28/20 0851   Infiltration Scale 0 11/28/20 0851   Number of days: 0        Past Medical History:   Diagnosis Date     Asthma      Atrial fibrillation (H)     had EP ablation 8/11/2017, pt reports being \"afib free\" now     CAD (coronary artery disease)      Depression      Esophageal reflux      Hypertension      LBBB (left bundle branch block)      Liver lesion     stable, seen on multiple CT scans     Lumbosacral spondylosis      Mumps      Nonspecific reaction to tuberculin skin test without active tuberculosis(795.51)      Obesity      TALI (obstructive sleep apnea)      Osteoarthritis      Palpitations      Prolonged QT interval      Pure hypercholesterolemia      Spinal stenosis of lumbar region      STD (sexually transmitted disease)      Tuberculosis       Past Surgical History:   Procedure Laterality Date     CHOLECYSTECTOMY       COLONOSCOPY N/A 8/4/2019    Procedure: COLONOSCOPY;  Surgeon: Darron Cunha MD;  Location: [facility]     EP ABLATION / EP STUDIES  08/11/2017    for hx a fib     ESOPHAGOSCOPY, GASTROSCOPY, DUODENOSCOPY (EGD), COMBINED  7/2014    reactive gastropathy, H. Pylori negative ([facility])     EXCISE VULVA WIDE LOCAL N/A 12/1/2017    Procedure: EXCISE VULVA WIDE LOCAL;  Wide Local Excision of Vulvar Lesion   ;  Surgeon: Nazario Tirado MD;  Location: [facility]      Allergies   Allergen Reactions     Metoprolol Shortness Of Breath     2 hours after tasking for the first time SOB, Pt evaluated and pt WDL       Albuterol Unknown     Augmentin Nausea     Cephalexin      Codeine " "Sulfate      \"i dont't know, nothing, maybe sick to my stomach\"     Cymbalta      agitated     Lisinopril Cough     Omnicef [Cefdinir] Diarrhea     Percocet [Oxycodone-Acetaminophen]      nauseated        Anesthesia Evaluation     . Pt has had prior anesthetic. Type: General    No history of anesthetic complications          ROS/MED HX    ENT/Pulmonary:     (+)sleep apnea, Intermittent asthma uses CPAP , . .    Neurologic:  - neg neurologic ROS     Cardiovascular:     (+) hypertension--CAD, --. : . . . :. dysrhythmias (afib resolved with ablation ) a-fib, . Previous cardiac testing Echodate:3/2020results:EF 55-60%  Diastolic Doppler findings (E/E' ratio and/or other parameters) suggest left  ventricular filling pressures are increased.  Septal motion is consistent with conduction abnormality.  There is mild right ventricular hypertrophy.  Right ventricular systolic pressure is elevated, consistent with moderate  pulmonary hypertension.  Indeterminate rhythm-Rate <100 regular, wide but no A wave on MV inflow.  Please correlate with 12 lead ecgdate: results:ECG reviewed date: results:LBBB, prolonged QT date: results:          METS/Exercise Tolerance:     Hematologic:         Musculoskeletal:   (+) arthritis (Lumbosacral spondylosis),  other musculoskeletal- knee laceration into joint capsule on left      GI/Hepatic:     (+) GERD Asymptomatic on medication,       Renal/Genitourinary:  - ROS Renal section negative       Endo:     (+) Obesity, .      Psychiatric:  - neg psychiatric ROS       Infectious Disease:  - neg infectious disease ROS       Malignancy:         Other:    (+) H/O Chronic Pain,H/O chronic opiod use ,                        PHYSICAL EXAM:   Mental Status/Neuro: A/A/O   Airway: Facies: Feasible  Mallampati: II  Mouth/Opening: Full  TM distance: > 6 cm  Neck ROM: Full   Respiratory: Auscultation: CTAB     Resp. Rate: Normal     Resp. Effort: Normal      CV: Rhythm: Regular  Rate: Age appropriate  Heart: " Normal Sounds  Edema: None   Comments:      Dental: Normal Dentition                Assessment:   ASA SCORE: 3 emergent   H&P: History and physical reviewed and following examination; no interval change.   Smoking Status:  Non-Smoker/Unknown   NPO Status: ELEVATED Aspiration Risk/Unknown     Plan:   Anes. Type:  General   Pre-Medication: None   Induction:  IV (RSI)   Airway: ETT; Oral   Access/Monitoring: PIV   Maintenance: Balanced     Postop Plan:   Postop Pain: Opioids  Postop Sedation/Airway: Not planned  Disposition: Inpatient/Admit     PONV Management:   Adult Risk Factors: Female, Non-Smoker, Postop Opioids   Prevention: Ondansetron     CONSENT: Direct conversation                         Peewee Simons III, MD

## 2020-11-28 NOTE — ED PROVIDER NOTES
"  History     Chief Complaint:  Fall      HPI   Madhavi Castellanos is a 80 year old female with a history of atrial fibrillation who presents via ambulance for the evaluation of fall. The patient reports that comes from her town house where she lives alone after she fell 7-8 hours ago after her \"right knee gave out,\" prompting her to the ED. The patient notes that she was not able to stand up after her fall and that she mainly injured her left knee with a large laceration. She states that her left knee possibly hit one of the door frames in her house when she fell and that she also hit the left side of her face. She also notes that one week ago she was seen at the [facility] urgent care in [place] after she fell, injured, and lacerated her right knee. Patient is taking prophylactic antibiotic for this and is unsure as to the exact one. The patient denies shortness of breath, rash, cough, fevers, vomiting, diarrhea, loss of taste of smell, syncope, chest pain, palpitations, neck pain, and other issues.  Tetanus last updated in 2014.     Allergies:  Metoprolol  Albuterol  Augmentin  Cephalexin  Codeine sulfate  Cymbalta  Lisinopril  Omnicef  Percocet      Medications:    Aspirin 325 mg  Lipitor  Wellbutrin  Cartia  Duragesic  Hydrochlorothiazide  Norco  Cozaar  Tapazole  Methocarbamol  Simethicon  Betapace     Past Medical History:    Rhabdomyolysis  Bright red blood per rectum  Asthma  Atrial fibrillation  CAD  Depression  GERD   Hypertension  Left bundle branch block  Liver lesion  Lumbosacral spondylosis  Mumps  Obesity  TALI  Osteoarthritis  Palpitations  Prolonged QT interval  Hypercholesterolemia   Spinal stenosis of lumbar region  STD  Tuberculosis     Past Surgical History:    Cholecystectomy  Ablation - 2017     Family History:    History reviewed. No pertinent family history.      Social History:  Smoking status: Never smoker  Alcohol use: No   Drug use: No  PCP: Cecy Edmondson   Marital Status:   [4] " "    Review of Systems   Constitutional: Negative for fever.   Respiratory: Negative for cough and shortness of breath.    Cardiovascular: Negative for chest pain and palpitations.   Gastrointestinal: Negative for diarrhea and vomiting.   Musculoskeletal: Positive for arthralgias and myalgias. Negative for neck pain.   Skin: Positive for wound. Negative for rash.   Neurological: Negative for syncope.   All other systems reviewed and are negative.      Physical Exam     Patient Vitals for the past 24 hrs:   BP Temp Temp src Pulse Resp SpO2 Height Weight   11/28/20 0930 (!) 163/89 -- -- 100 -- 93 % -- --   11/28/20 0923 -- -- -- -- -- -- 1.549 m (5' 1\") 81.6 kg (180 lb)   11/28/20 0830 (!) 171/104 -- -- 97 -- 99 % -- --   11/28/20 0810 (!) 187/112 98.3  F (36.8  C) Oral 101 18 98 % -- --      Physical Exam  General:    Well-nourished    Speaking in full sentences    GCS 15   Head:    Hematoma over anterior right forehead    No open wounds    Remainder of scalp without hematoma or open wound   Eyes:    Conjunctiva without injection or scleral icterus   ENT:    Moist mucous membranes    Nares patent     Pinnae normal   Neck:    Full ROM    No stiffness appreciated    No midline tenderness   Resp:    Lungs CTAB    No crackles, wheezing or audible rubs    Good air movement   CV:    Normal rate, regular rhythm    S1 and S2 present    No murmur, gallop or rub   GI:    BS present    Abdomen soft without distention    Non-tender to light and deep palpation    No guarding or rebound tenderness   Skin:    Warm, dry, well perfused    Ecchymoses noted about left forearm, no open wound    Superficial abrasion noted about left cheek    Prior wound noted to R knee, sutures and steri-strips in place    Some purulent drainage around wound edges noted    Large laceration overlying left knee    Joint capsule visualized    No active arterial bleeding   MSK:    Moves all extremities    Able to flex at knees bilaterally    Extremities are " warm bilaterally   DP and PT pulse easily identified with doppler.    Neuro:    Alert    Answers questions appropriately    Moves all extremities equally   Psych:    Normal affect, normal mood            Emergency Department Course   Imaging:  Radiographic findings were communicated with the patient who voiced understanding of the findings.  Head CT w/o Contrast  IMPRESSION:   1. No acute intracranial abnormality.  2. Small left frontal scalp contusion.  As read by Radiology.    XR Chest 2 Views  IMPRESSION: There are no acute infiltrates. The cardiac silhouette is  not enlarged. Pulmonary vasculature is unremarkable.  As read by Radiology.    XR Knee Bilateral 3 Views  IMPRESSION:  1.  No fracture or joint malalignment.  2.  Mild left knee degenerative arthrosis with medial compartment  narrowing and osteophytosis.  3.  Moderate right knee degenerative arthrosis with lateral  compartment narrowing and osteophytosis.  4.  Soft tissue irregularity-injury in the anterior left knee with  overlying bandage material.  5.  Subtle soft tissue irregularity in the anterior aspect of the  right knee.  As read by Radiology.    Radius/Ulna XR, PA and Lat, Left  IMPRESSION:  1.  No fracture or joint malalignment.  2.  Moderate thumb CMC degenerative arthrosis.  As read by Radiology.    Laboratory:  CBC: WBC 14.7 (H), WNL (HGB 13.5, )  BMP: Potassium 3.2 (L), Carbon Dioxide 35 (H), Anion Gap 1 (L), Glucose 03 (H), WNL (Creatinine 0.61)   CK Total: 80    ABO/Rh type and screen: O Pos    Asymptomatic COVID-19 Virus (Coronavirus) by PCR Nasopharyngeal swab: Pending     Interventions:  0829, tetanus, 0.5 mLs, IM  0943, vancomycin, 1500 mg, IV  0944, potassium chloride, 40 mEq, PO    Emergency Department Course:  Patient arrived via ambulance.     Past medical records, nursing notes, and vitals reviewed.  0808: I performed an exam of the patient and obtained history, as documented above.     IV inserted and blood drawn.     The  patient was sent for a head CT as well as chest, bilateral knee, and left radius/ulna x rays while in the emergency department, findings above.    0938: I rechecked the patient. Explained findings to patient.     0943: I spoke to Dr. Marley of orthopedics.     0953: I spoke with dr. Jaimes of plastic surgery.     1001: I rechecked the patient. Explained findings to patient.     Findings and plan explained to the Patient. Patient will be transferred to [facility] via EMS. Discussed the case with Dr. Leggett, who will admit the patient to a monitored bed for further monitoring, evaluation, and treatment.     Impression & Plan    Medical Decision Making:  Madhavi Castellanos is a markedly pleasant 80 yr old F presenting to the ED via EMS following a ground level fall.  VS on presentation reveal elevated BP, though are otherwise unremarkable.  Examination as noted above, notable for large open wound overlying left knee, as well as recently repaired wound overlying right knee.  Given mechanism, advanced age, and signs of injury on exam, advanced imaging performed.  CT head, and XR of left forearm and bilateral knees reveals no acute fracture, dislocation or intracranial hemorrhage.  Tetanus updated.  By history, fall most likely mechanical in nature (reports right knee has baseline instability and occasionally gives out, predisposing to falls).  Given degree of wound overlying left knee, and concern for joint capsule disruption, case discussed with Dr. Marley of [facility].  Given the nature and complexity of wound, he has recommended transfer to facility with orthopedic/plastic surgery capabilities given anticipated need for complex wound repair.  Case discussed with plastic surgery at the [facility], who was in agreement with transfer, as well as with Dr. Leggett, of the ED who has accepted the patient in transfer and will coordinate care with sub specialists on arrival. Prophylactic vancomycin  was given (hx of allergy to cephalosporins). Pain well controlled. Pt will be transferred via EMS for further definitive management and care.  Questions answered prior to transfer.    Diagnosis:    ICD-10-CM    1. Fall, initial encounter  W19.XXXA Basic metabolic panel     ABO/Rh type and screen     CK total   2. Knee injury, left, initial encounter  S89.92XA    3. Laceration of left lower extremity, initial encounter  S81.812A    4. Closed head injury, initial encounter  S09.90XA    5. Contusion of left upper extremity, initial encounter  S40.022A        Disposition:  Patient transferred to [facility] ED.     Scribe Disclosure:  Herbert AVILA, am serving as a scribe at 8:19 AM on 11/28/2020 to document services personally performed by Josr Tejeda MD based on my observations and the provider's statements to me.      Herbert Pablo  11/28/2020   [facility] EMERGENCY DEPT       Josr Tejeda MD  11/28/20 1007

## 2020-11-28 NOTE — BRIEF OP NOTE
[facility]     Brief Operative Note    Pre-operative diagnosis: Open wound of left knee [S81.002A]  Post-operative diagnosis Same as pre-operative diagnosis    Procedure: Procedure(s):  IRRIGATION AND DEBRIDEMENT, LOWER EXTREMITY with negative pressure dressing placement, left knee arthrotomy  Surgeon: Surgeon(s) and Role:     * Josef Peterson MD - Primary     * Jeffery Hernandez MD - Resident - Assisting  Anesthesia: General   Estimated blood loss: 50cc  Drains: Wound Vac  Specimens: * No specimens in log *  Findings:   None.  Complications: Skin tear left inferior leg noted at conclusion of case. Iatrogenic. Dressed with xeroform, gauze, Kerlix, ACE.  Implants: * No implants in log *    Plan:  Trauma Primary  Activity: Up with assist.  Weight bearing status: WBAT LLE  Antibiotics: Clindamycin x 48 hours.  Diet: Begin with clear fluids and progress diet as tolerated.  DVT prophylaxis: Per primary - recommend ASA or similar   Elevation: Elevate LLE on pillows to keep elevated above the level of the heart as much as possible.  Wound Care: Vac at -125mm Hg low continuous, monitor and record output  Pain management: transition from IV to orals as tolerated.   Physical Therapy:  ROM, ADL's.  Occupational Therapy: ADL's.  Labs: Trend Hgb on POD #1  Consults: PT, OT, plastic surgery, appreciate assistance in caring for this patient.  Follow-up: TBD pending any RTOR with plastic surgery    Disposition: Pending possible RTOR with PLS team for delayed primary closure vs flap/graft coverage.    Jeffery Hernandez MD  Orthopaedic Surgery PGY-4  #: [phone]

## 2020-11-28 NOTE — ED TRIAGE NOTES
"A&O x4.  ABC's intact.      Pt arrives with c/o laceration on left knee and re injuring right knee after falling about 7-8 hours ago. Patient reports hit door fame. Bleeding controlled. Left knee laceration 6\"x3\".  "

## 2020-11-28 NOTE — H&P
[facility]     History and Physical: Trauma Service       Date of Admission:  11/28/2020    Time of Admission/Consult Request (page/call): 12:12    Time of my evaluation: 12:45  Consulting services:  Orthopedics - Emergent consult (within 30 mins): Called by ED    Assessment & Plan   Trauma mechanism: Fall from standing  Time/date of injury: 11/28 around 2200  Known Injuries:  1. Large complex wound to Bilat Knees, reopened after this fall  2. Hematoma to left forehead     Procedure:  1. Ortho Surgery     Plan:  1. Admit to trauma   2. Cleared for surgery     Neuro/Pain/Psych:  # Fall  # Closed head injury  - Head CT: No acute intracranial abnormality. Small left frontal scalp contusion.    # Acute on chronic pain   - Holding PTA: fentanyl patch, norco,   - Scheduled: Tylenol    # Depression  - PTA: Bupropion   - Recent bupropion increase for treatment of worsening depression symptoms.   -  Maintain circadian rhythm.  Lights on during the day.  Off at night, minimize cares at night.  OOB during the day.    Pulmonary:  # TALI  # Asthma   - CXR in ED: There are no acute infiltrates. The cardiac silhouette is not enlarged. Pulmonary vasculature is unremarkable  - Supplemental oxygen to keep saturation above 92 %.  - Incentive spirometer while awake     Cardiovascular:    # Hypertension   # Paroxysmal Afib, s/p ablation 8/11/19  # Hypercholesterolemia  # CAD  # LBBB  # Prolonged QT interval   - 3/13/20: / QTc 506   - 11/28/20: / QTc 492    - Holding PTA: asa 325, atorvastatin, diltiazem, hydrochlorothiazide, losartan, solalol. Will plan to restart if appropriate post-op   - Monitor hemodynamic status.    GI/Nutrition:    # GERD  - PTA: phenergan,     Renal/ Fluids/Electrolytes:  # Hypokalemia   - Creatinine 0.61  - LR for IV fluid hydration.   - electrolyte replacement protocol in place.     Endocrine:  # Stress hyperglycemia, mild   - No management indication.  "Goal to keep BG < 180 for optimal wound healing    # Hx of Graves disease   # Thyroid nodules, last US 9/22/20  - holding PTA medications: methimazole      Infectious disease:   # Hx of Tb 1991  # COVID neg 11/28  # Leukocytosis, infectious vs stress from trauma   - WBC: 15.4 from 14.7 4 hours difference  - UA at OSH: small blood, few bacteria. UC pending   - BCx2 ordered pre-op    Antibiotics:  - Vanco 1500mg x1 11/28  - ana cristina-operative scheduled     Hematology:    - Hgb 13.0. Monitor and trend.   - Threshold for transfusion if hgb <7.0 or signs/symptoms of hypoperfusion.       # DVT Prophylaxis: Lovenox post-op     Musculoskeletal:  # Lumbosacral spondylosis  # OA bilat knee  # Weakness and deconditioning of critical illness   # Frequent falls   # Large complex wound to Bilat Knees, reopened after this fall, tracks to joint space  - Physical and occupational therapy consults.    Skin:  # Ecchymosis   - dilgent cares to prevent skin breakdown and wound formation.      Code status: Full confirmed with pt.     General Cares:  GI Prophylaxis: NA  DVT Prophylaxis: PCD, Lovenox post op  Date of last stool/Bowel Regimen:in place  Pulmonary toilet:IS    ETOH: This patient was asked if in the last 3-6 months there has been a time when she had 4 or more drinks in a single day/outing.. Patient answer to the screening question was in the negative. No intervention needed.  Primary Care Physician   Cecy Edmondson    Chief Complaint   Fall     History is obtained from the patient    History of Present Illness   Madhavi Castellanos is a 80 year old female who presented to [facility] ED after fall. Patient has a history of falling d/t her \"knees giving out,\" loosing support and falling. On 11/27, she was walking without her walker and felt her right thigh move laterally and her lower leg move medially. She then fell forward landing on her knees and hitting her head. She was unable to stand or get help, was down for about 7 hours. She had no " "dizziness before fall. No LOC, nausea, afterwards.   Lives alone,  and no children.    Past Medical History    I have reviewed this patient's medical history and updated it with pertinent information if needed.   Past Medical History:   Diagnosis Date     Asthma      Atrial fibrillation (H)     had EP ablation 8/11/2017, pt reports being \"afib free\" now     CAD (coronary artery disease)      Depression      Esophageal reflux      Hypertension      LBBB (left bundle branch block)      Liver lesion     stable, seen on multiple CT scans     Lumbosacral spondylosis      Mumps      Nonspecific reaction to tuberculin skin test without active tuberculosis(795.51)      Obesity      TALI (obstructive sleep apnea)      Osteoarthritis      Palpitations      Prolonged QT interval      Pure hypercholesterolemia      Spinal stenosis of lumbar region      STD (sexually transmitted disease)      Tuberculosis        Past Surgical History   I have reviewed this patient's surgical history and updated it with pertinent information if needed.  Past Surgical History:   Procedure Laterality Date     CHOLECYSTECTOMY       COLONOSCOPY N/A 8/4/2019    Procedure: COLONOSCOPY;  Surgeon: Darron Cunha MD;  Location:  GI     EP ABLATION / EP STUDIES  08/11/2017    for hx a fib     ESOPHAGOSCOPY, GASTROSCOPY, DUODENOSCOPY (EGD), COMBINED  7/2014    reactive gastropathy, H. Pylori negative ([facility])     EXCISE VULVA WIDE LOCAL N/A 12/1/2017    Procedure: EXCISE VULVA WIDE LOCAL;  Wide Local Excision of Vulvar Lesion   ;  Surgeon: Nazario Tirado MD;  Location:  OR     Prior to Admission Medications   Prior to Admission Medications   Prescriptions Last Dose Informant Patient Reported? Taking?   Atorvastatin Calcium (LIPITOR PO) Unknown at Unknown time  Yes No   Sig: Take 10 mg by mouth every evening    HYDROCHLOROTHIAZIDE PO Unknown at Unknown time  Yes No   Sig: Take 12.5 mg by mouth daily    HYDROcodone-acetaminophen (NORCO) " 7.5-325 MG per tablet Unknown at Unknown time  No No   Sig: Take 1-2 tablets by mouth 2 times daily as needed for moderate to severe pain   Losartan Potassium (COZAAR PO) Unknown at Unknown time  Yes No   Sig: Take 25 mg by mouth daily   METHOCARBAMOL PO Unknown at Unknown time  Yes No   Sig: Take 750 mg by mouth 6 times daily As needed   Methimazole (TAPAZOLE PO) Unknown at Unknown time  Yes No   Sig: Take 5 mg by mouth five times a week Monday thru Friday   Sotalol HCl (BETAPACE PO) Unknown at Unknown time  Yes No   Sig: Take 40 mg by mouth 2 times daily    alum & mag hydroxide-simethicone (MYLANTA/MAALOX) 200-200-20 MG/5ML SUSP Unknown at Unknown time  No No   Sig: Take 30 mLs by mouth every 4 hours as needed   aspirin (ASA) 81 MG chewable tablet Unknown at Unknown time  Yes No   Sig: Take 81 mg by mouth daily   buPROPion (WELLBUTRIN XL) 150 MG 24 hr tablet Unknown at Unknown time  Yes No   Sig: Take 150 mg by mouth every morning   diltiazem (CARTIA XT) 120 MG 24 hr capsule Unknown at Unknown time  Yes No   Sig: Take 120 mg by mouth daily   fentaNYL (DURAGESIC) 25 mcg/hr 72 hr patch Unknown at Unknown time  No No   Sig: Place 1 patch onto the skin every 72 hours   naloxone (NARCAN) 4 MG/0.1ML nasal spray Unknown at Unknown time  No No   Sig: Spray 1 spray (4 mg) into one nostril alternating nostrils as needed for opioid reversal every 2-3 minutes until assistance arrives   polyethylene glycol (MIRALAX) packet Unknown at Unknown time  Yes No   Sig: Take 1 packet by mouth daily as needed for constipation   promethazine (PHENERGAN) 25 MG tablet Unknown at Unknown time  No No   Sig: Take 1 tablet (25 mg) by mouth every 6 hours as needed for nausea   psyllium (METAMUCIL/KONSYL) capsule Unknown at Unknown time  Yes No   Sig: Take 1 capsule by mouth daily as needed for constipation   simethicone (MYLICON) 80 MG chewable tablet Unknown at Unknown time  No No   Sig: Take 1 tablet (80 mg) by mouth every 6 hours as needed  "for flatulence or cramping      Facility-Administered Medications: None     Allergies   Allergies   Allergen Reactions     Metoprolol Shortness Of Breath     2 hours after tasking for the first time SOB, Pt evaluated and pt WDL       Albuterol Unknown     Augmentin Nausea     Cephalexin      Codeine Sulfate      \"i dont't know, nothing, maybe sick to my stomach\"     Cymbalta      agitated     Lisinopril Cough     Omnicef [Cefdinir] Diarrhea     Percocet [Oxycodone-Acetaminophen]      nauseated       Social History   Social History     Socioeconomic History     Marital status:      Spouse name: Not on file     Number of children: Not on file     Years of education: Not on file     Highest education level: Not on file   Occupational History     Not on file   Social Needs     Financial resource strain: Not on file     Food insecurity     Worry: Not on file     Inability: Not on file     Transportation needs     Medical: Not on file     Non-medical: Not on file   Tobacco Use     Smoking status: Never Smoker     Smokeless tobacco: Never Used   Substance and Sexual Activity     Alcohol use: No     Drug use: No     Sexual activity: Not Currently   Lifestyle     Physical activity     Days per week: Not on file     Minutes per session: Not on file     Stress: Not on file   Relationships     Social connections     Talks on phone: Not on file     Gets together: Not on file     Attends Zoroastrian service: Not on file     Active member of club or organization: Not on file     Attends meetings of clubs or organizations: Not on file     Relationship status: Not on file     Intimate partner violence     Fear of current or ex partner: Not on file     Emotionally abused: Not on file     Physically abused: Not on file     Forced sexual activity: Not on file   Other Topics Concern     Parent/sibling w/ CABG, MI or angioplasty before 65F 55M? Not Asked   Social History Narrative     Not on file       Family History   Family " history reviewed with patient and is noncontributory.    Review of Systems   CONSTITUTIONAL: Positive for fatigue. No fever, chills, sweats  EYES: no visual blurring, no double vision or visual loss  ENT: no decrease in hearing, no tinnitus, no vertigo, no hoarseness  RESPIRATORY: no shortness of breath, no cough, no sputum   CARDIOVASCULAR: no palpitations, no chest  pain, no exertional chest pain or pressure  GASTROINTESTINAL: no nausea or vomiting, or abd pain  GENITOURINARY: no dysuria, no frequency or hesitancy, no hematuria  MUSCULOSKELETAL: positive for weakness, redness, swelling, and joint pain,   SKIN: positive for ecchymoses, abrasions and lacerations  NEUROLOGIC: no numbness or tingling of hands, no numbness or tingling  of feet, no syncope, no tremors or weakness  PSYCHIATRIC: no sleep disturbances, positive for depression    Physical Exam   Temp: 98.8  F (37.1  C) Temp src: Oral BP: (!) 111/99(doc at bedside) Pulse: 101   Resp: 16 SpO2: 97 % O2 Device: None (Room air)    Vital Signs with Ranges  Temp:  [98.3  F (36.8  C)-98.8  F (37.1  C)] 98.8  F (37.1  C)  Pulse:  [] 101  Resp:  [16-18] 16  BP: (111-187)/() 111/99  SpO2:  [93 %-100 %] 97 % 180 lbs 0 oz    Primary Survey:   Airway: patient talking  Breathing: symmetric respiratory effort bilaterally  Circulation: central pulses present and peripheral pulses present  Disability: Pupils - left 4 mm and brisk, right 4 mm and brisk     Arthur Coma Scale - Total 15/15    Secondary Survey:  General: alert, oriented to person, place, time  Head: normocephalic, hematoma to right forehead   Eyes: PERRLA, pupils 4mm, EOMI, corneas and conjunctivae clear  Ears: pearly grey bilateral TMs and non-inflamed external ear canals  Nose: nares patent, no drainage, nasal septum non-tender  Mouth/Throat: no exudates or erythema,  no dental tenderness or malocclusions, no tongue lacerations  Neck: Trachea midline. No midline posterior tenderness, full AROM  without pain or tenderness   Chest/Pulmonary: normal respiratory rate and rhythm,  bilateral clear breath sounds, no wheezes, rales or rhonchi, no chest wall tenderness or deformities,   Cardiovascular: S1, S2,  normal and regular rate and rhythm, no murmurs  Abdomen: soft, non-tender, no guarding, no rebound tenderness and no tenderness to palpation  : normal external genitalia, pelvis stable to lateral compression, no ramirez, no urine assess  Back/Spine: no deformity, no midline tenderness, no sacral tenderness,  no step-offs and no abrasions or contusions  Musculoskel/Extremities: Bilat Lower Extremity Edema. Large complex deep wound to bilateral knee. Right knee has sutures in place from previous laceration, partially reopened. Decreased ROM d/t pain.  Upper extremities without tenderness, edema, erythema, ecchymosis, or abrasions. + PP.   Hand: osteoarthritic changes to hands or fingers. Full AROM of hand and fingers in flexion and extension.  strength equal and symmetric.   Skin: Ecchymosis to distal right breast. Hematoma to left shoulder. Skin warm and dry.   Neuro: PERRLA, alert, oriented x 4. CN II-XII grossly intact. No focal deficits. Strength 3/5 x 2 (UE) extremities.  Sensation intact.  Psychiatric: affect/mood normal, cooperative, normal judgement/insight and memory intact  # Pain Assessment:  Current Pain Score 11/28/2020   Patient currently in pain? denies   Pain score (0-10) -   Pain location -   - Madhavi is experiencing pain due to open wounds on knees. Pain management was discussed and the plan was created in a collaborative fashion.  Madhavi's response to the current recommendations: engaged  - Please see the plan for pain management as documented above      Data   Results for orders placed or performed during the hospital encounter of 11/28/20 (from the past 24 hour(s))   Comprehensive metabolic panel   Result Value Ref Range    Sodium 142 133 - 144 mmol/L    Potassium 3.8 3.4 - 5.3 mmol/L     Chloride 107 94 - 109 mmol/L    Carbon Dioxide 32 20 - 32 mmol/L    Anion Gap 4 3 - 14 mmol/L    Glucose 97 70 - 99 mg/dL    Urea Nitrogen 13 7 - 30 mg/dL    Creatinine 0.54 0.52 - 1.04 mg/dL    GFR Estimate >90 >60 mL/min/[1.73_m2]    GFR Estimate If Black >90 >60 mL/min/[1.73_m2]    Calcium 9.3 8.5 - 10.1 mg/dL    Bilirubin Total 0.4 0.2 - 1.3 mg/dL    Albumin 3.4 3.4 - 5.0 g/dL    Protein Total 6.4 (L) 6.8 - 8.8 g/dL    Alkaline Phosphatase 82 40 - 150 U/L    ALT 28 0 - 50 U/L    AST 21 0 - 45 U/L   CBC with platelets differential   Result Value Ref Range    WBC 15.4 (H) 4.0 - 11.0 10e9/L    RBC Count 4.33 3.8 - 5.2 10e12/L    Hemoglobin 13.0 11.7 - 15.7 g/dL    Hematocrit 42.2 35.0 - 47.0 %    MCV 98 78 - 100 fl    MCH 30.0 26.5 - 33.0 pg    MCHC 30.8 (L) 31.5 - 36.5 g/dL    RDW 14.3 10.0 - 15.0 %    Platelet Count 359 150 - 450 10e9/L    Diff Method Automated Method     % Neutrophils 66.7 %    % Lymphocytes 20.4 %    % Monocytes 9.2 %    % Eosinophils 2.3 %    % Basophils 0.5 %    % Immature Granulocytes 0.9 %    Nucleated RBCs 0 0 /100    Absolute Neutrophil 10.3 (H) 1.6 - 8.3 10e9/L    Absolute Lymphocytes 3.2 0.8 - 5.3 10e9/L    Absolute Monocytes 1.4 (H) 0.0 - 1.3 10e9/L    Absolute Eosinophils 0.4 0.0 - 0.7 10e9/L    Absolute Basophils 0.1 0.0 - 0.2 10e9/L    Abs Immature Granulocytes 0.1 0 - 0.4 10e9/L    Absolute Nucleated RBC 0.0    Lactate for Sepsis Protocol   Result Value Ref Range    Lactate for Sepsis Protocol 1.1 0.7 - 2.0 mmol/L   UA with Microscopic   Result Value Ref Range    Color Urine Yellow     Appearance Urine Clear     Glucose Urine Negative NEG^Negative mg/dL    Bilirubin Urine Negative NEG^Negative    Ketones Urine Negative NEG^Negative mg/dL    Specific Gravity Urine 1.010 1.003 - 1.035    Blood Urine Small (A) NEG^Negative    pH Urine 5.5 5.0 - 7.0 pH    Protein Albumin Urine Negative NEG^Negative mg/dL    Urobilinogen mg/dL Normal 0.0 - 2.0 mg/dL    Nitrite Urine Negative  NEG^Negative    Leukocyte Esterase Urine Negative NEG^Negative    Source Midstream Urine     WBC Urine 1 0 - 5 /HPF    RBC Urine 1 0 - 2 /HPF    Bacteria Urine Few (A) NEG^Negative /HPF    Squamous Epithelial /HPF Urine <1 0 - 1 /HPF    Transitional Epi <1 0 - 1 /HPF    Mucous Urine Present (A) NEG^Negative /LPF   Urine Culture    Specimen: Urine clean catch; Midstream Urine   Result Value Ref Range    Specimen Description Midstream Urine     Special Requests Specimen received in preservative     Culture Micro PENDING    EKG 12-lead, complete   Result Value Ref Range    Interpretation ECG Click View Image link to view waveform and result        Studies:  No orders to display         Juanita Calix PA-C  Primary team: Trauma Services   Job code pager [phone] (24 hours a day)  Use icomply to text page (not text page compatible with [email or web address]).    Dial * * * [phone] then  [phone], wait for prompt and then enter call back number.   Do NOT call numbers listed to right in Treatment Team section.

## 2020-11-28 NOTE — ED NOTES
Bed: ED21  Expected date: 11/28/20  Expected time:   Means of arrival: Ambulance  Comments:  Madhavi Castellanos 80F from [facility] MRN [id number] with fall last night with open knee wound. Xray neg. Discussed with plastics. Need ortho and plastics for eval. ? Wash out and repair. Vanocmycin  ? Tetanus status    RN Report:     Fell 7-8 hrs ago at home. 3x6 inch open wound on left knee, has wound on right knee from fall last week, has bruising/abrasions from falls  Alert and oriented  Fentanyl patch on, hx of back pain  WBC 14.7, K 3.2 - 40 mEq PO given  Vanco 1500 mg infusing   18g R AC  Covid test pe  nding  VSS on RA         Bacilio Mari MD  11/28/20 9050

## 2020-11-28 NOTE — ANESTHESIA CARE TRANSFER NOTE
Patient: Madhavi Castellanos    Procedure(s):  IRRIGATION AND DEBRIDEMENT, LOWER EXTREMITY with negative pressure dressing placement, left knee arthrotomy    Diagnosis: Open wound of left knee [S81.002A]  Diagnosis Additional Information: No value filed.    Anesthesia Type:   General     Note:  Airway :Face Mask  Patient transferred to:PACU  Comments: Pt. Pink and breathing spontaneously.  Vitals stable.  Report given to oncoming nurse.  Transfer of care occurred. Handoff Report: Identifed the Patient, Identified the Reponsible Provider, Reviewed the pertinent medical history, Discussed the surgical course, Reviewed Intra-OP anesthesia mangement and issues during anesthesia, Set expectations for post-procedure period and Allowed opportunity for questions and acknowledgement of understanding      Vitals: (Last set prior to Anesthesia Care Transfer)    CRNA VITALS  11/28/2020 1610 - 11/28/2020 1657      11/28/2020             Pulse:  99    SpO2:  97 %                Electronically Signed By: TRESA Anderson CRNA  November 28, 2020  4:57 PM

## 2020-11-28 NOTE — ED NOTES
Bed: ED28  Expected date: 11/28/20  Expected time: 7:59 AM  Means of arrival: Ambulance  Comments:  BV3  Leg lac/fall

## 2020-11-28 NOTE — ED NOTES
"2020, 10:11 AM    Destination Facility:   ___________[facility] ER_______________________    Name:  Madhavi Castellanos  Age: 80 year old  : 1940  Gender: female  Weight:   Wt Readings from Last 2 Encounters:   20 81.6 kg (180 lb)   20 84 kg (185 lb 3.2 oz)       Diagnosis:      ICD-10-CM    1. Fall, initial encounter  W19.XXXA Basic metabolic panel     ABO/Rh type and screen     CK total   2. Knee injury, left, initial encounter  S89.92XA    3. Laceration of left lower extremity, initial encounter  S81.812A    4. Closed head injury, initial encounter  S09.90XA    5. Contusion of left upper extremity, initial encounter  S40.022A        Allergies:  Metoprolol  Albuterol  Augmentin  Cephalexin  Codeine Sulfate  Cymbalta  Lisinopril  Omnicef [Cefdinir]  Percocet [Oxycodone-Acetaminophen]    Vitals:  Patient Vitals for the past 2 hrs:   BP Pulse SpO2 Height Weight   20 1000 (!) 159/90 99 100 % -- --   20 (!) 163/89 100 93 % -- --   20 -- -- -- 1.549 m (5' 1\") 81.6 kg (180 lb)   20 (!) 171/104 97 99 % -- --        Medications Given:  Medications   vancomycin 1500 mg in 0.9% NaCl 250 ml intermittent infusion 1,500 mg (1,500 mg Intravenous Given 20 0943)   Td (tetanus & diphtheria toxoids) -  adult formulation - for ages 7 years and older (0.5 mLs Intramuscular Given 20 08)   potassium chloride (KAYCIEL) solution 40 mEq (40 mEq Oral Given 2044)       O2 / VENT Settings: None     1.  NC @ __ lpm     2.  BiPAP  __/__     __% FiO2     3.  ET Tube:  ____ mm.  Secured ____ cm @ teeth     4.  Vent:  Mode: ____  RR: ____ Vt: ____mL FiO2: _____%     IV/Drains:     1.  PIV:    ___18g right AC________      2.  Central line:  ___________     3.  Arterial line:  ___________     4.  Catheter:  ___________     5.  NGT  ___________     6.  Chest Tube ___________     7.  Other:   ___________      Time report called to receiving facility: " [id number]      INSTRUCTIONS TO EMS: [facility]

## 2020-11-28 NOTE — ED TRIAGE NOTES
Madhavi Castellanos 80F from [facility] MRN [id number] with fall last night with open knee wound. Xray neg. Discussed with plastics. Need ortho and plastics for eval. ? Wash out and repair. Vanocmycin given at [facility]. Tetanus status- given in 2014 per report by EMS.    RN Report:     Fell 7-8 hrs ago at home. 3x6 inch open wound on left knee- wrapped at this time, has wound on right knee from fall last week, has bruising/abrasions from falls  Alert and oriented  Fentanyl patch on (2100 last night), hx of back pain  WBC 14.7, K 3.2 - 40 mEq PO given  Vanco 1500 mg infusing   18g R AC  Covid test pending  VSS on RA

## 2020-11-28 NOTE — ED PROVIDER NOTES
"ED Provider Note  [facility]      History     Chief Complaint   Patient presents with     Fall     HPI  Madhavi Castellanos is a 80 year old female with a history of atrial fibrillation who presents to the Emergency Department for evaluation after fall. Patient was seen in the [facility] ED earlier this morning. She was found to have a large open wound on her left knee. She also has a recently repaired wound on her right knee. CT head and XR of left forearm and bilateral knees were done with no acute fracture, dislocation, or intracranial hemorrhage. Patient's tetanus was updated. Patient was transferred her to the ED for further management.  Orthopedic surgery and plastic surgery had been consulted at the outside facility.    Past Medical History  Past Medical History:   Diagnosis Date     Asthma      Atrial fibrillation (H)     had EP ablation 8/11/2017, pt reports being \"afib free\" now     CAD (coronary artery disease)      Depression      Esophageal reflux      Hypertension      LBBB (left bundle branch block)      Liver lesion     stable, seen on multiple CT scans     Lumbosacral spondylosis      Mumps      Nonspecific reaction to tuberculin skin test without active tuberculosis(795.51)      Obesity      TALI (obstructive sleep apnea)      Osteoarthritis      Palpitations      Prolonged QT interval      Pure hypercholesterolemia      Spinal stenosis of lumbar region      STD (sexually transmitted disease)      Tuberculosis      Past Surgical History:   Procedure Laterality Date     CHOLECYSTECTOMY       COLONOSCOPY N/A 8/4/2019    Procedure: COLONOSCOPY;  Surgeon: Darron Cunha MD;  Location:  GI     EP ABLATION / EP STUDIES  08/11/2017    for hx a fib     ESOPHAGOSCOPY, GASTROSCOPY, DUODENOSCOPY (EGD), COMBINED  7/2014    reactive gastropathy, H. Pylori negative ([facility])     EXCISE VULVA WIDE LOCAL N/A 12/1/2017    Procedure: EXCISE VULVA WIDE LOCAL;  Wide Local Excision of Vulvar " "Lesion   ;  Surgeon: Nazario Tirado MD;  Location: [facility] OR     IRRIGATION AND DEBRIDEMENT LOWER EXTREMITY, COMBINED Left 11/28/2020    Procedure: IRRIGATION AND DEBRIDEMENT, LOWER EXTREMITY with negative pressure dressing placement, left knee arthrotomy;  Surgeon: Josef Peterson MD;  Location: [facility] OR     No current outpatient medications on file.    Allergies   Allergen Reactions     Metoprolol Shortness Of Breath     2 hours after tasking for the first time SOB, Pt evaluated and pt WDL       Albuterol Unknown     Augmentin Nausea     Codeine Sulfate      \"i dont't know, nothing, maybe sick to my stomach\"     Cymbalta      agitated     Lisinopril Cough     Omnicef [Cefdinir] Diarrhea     Percocet [Oxycodone-Acetaminophen]      nauseated     Family History  Family History   Problem Relation Age of Onset     Colon Cancer No family hx of      Social History   Social History     Tobacco Use     Smoking status: Never Smoker     Smokeless tobacco: Never Used   Substance Use Topics     Alcohol use: No     Drug use: No      Past medical history, past surgical history, medications, allergies, family history, and social history were reviewed with the patient. No additional pertinent items.       Review of Systems   Constitutional: Negative for fever.   Respiratory: Negative for cough and shortness of breath.    Cardiovascular: Negative for chest pain.   Musculoskeletal: Negative for back pain and neck pain.   Skin: Positive for wound.   Neurological: Negative for headaches.   All other systems reviewed and are negative.    A complete review of systems was performed with pertinent positives and negatives noted in the HPI, and all other systems negative.    Physical Exam   BP: (!) 162/95  Pulse: 102  Temp: 98.8  F (37.1  C)  Resp: 16  Height: 157.5 cm (5' 2\")  Weight: 81.6 kg (180 lb)  SpO2: 97 %  Physical Exam  Vitals signs and nursing note reviewed.   Constitutional:       General: She is not in acute " distress.     Appearance: She is well-developed. She is not ill-appearing, toxic-appearing or diaphoretic.      Comments: Patient is awake and alert, mentating normally, no acute distress.  She is speaking in complete sentences and protecting her airway without difficulty.   HENT:      Head: Normocephalic.        Mouth/Throat:      Lips: Pink.      Mouth: Mucous membranes are moist.      Pharynx: Oropharynx is clear. No oropharyngeal exudate.   Eyes:      General: Lids are normal. No scleral icterus.     Extraocular Movements: Extraocular movements intact.      Right eye: No nystagmus.      Left eye: No nystagmus.      Conjunctiva/sclera: Conjunctivae normal.      Pupils: Pupils are equal, round, and reactive to light.   Neck:      Musculoskeletal: Normal range of motion and neck supple. No erythema or neck rigidity.      Thyroid: No thyromegaly.      Vascular: No JVD.      Trachea: No tracheal deviation.   Cardiovascular:      Rate and Rhythm: Normal rate and regular rhythm.      Pulses: Normal pulses.      Heart sounds: Normal heart sounds. No murmur. No friction rub. No gallop.    Pulmonary:      Effort: Pulmonary effort is normal. No respiratory distress.      Breath sounds: Normal breath sounds.   Chest:      Chest wall: No tenderness.   Abdominal:      General: Bowel sounds are normal. There is no distension.      Palpations: Abdomen is soft. There is no mass.      Tenderness: There is no abdominal tenderness. There is no guarding or rebound.   Musculoskeletal: Normal range of motion.         General: No tenderness.      Cervical back: She exhibits no tenderness.      Thoracic back: She exhibits no tenderness.      Lumbar back: She exhibits no tenderness.      Right lower leg: No edema.      Left lower leg: No edema.        Legs:    Lymphadenopathy:      Cervical: No cervical adenopathy.   Skin:     General: Skin is warm and dry.      Capillary Refill: Capillary refill takes less than 2 seconds.       Coloration: Skin is not pale.      Findings: No erythema or rash.   Neurological:      Mental Status: She is alert and oriented to person, place, and time.      GCS: GCS eye subscore is 4. GCS verbal subscore is 5. GCS motor subscore is 6.      Cranial Nerves: No cranial nerve deficit.      Sensory: No sensory deficit.      Motor: Motor function is intact.   Psychiatric:         Mood and Affect: Mood and affect normal.         Speech: Speech normal.         Behavior: Behavior normal.         ED Course      Procedures        Labs Ordered and Resulted from Time of ED Arrival Up to the Time of Departure from the ED   COMPREHENSIVE METABOLIC PANEL - Abnormal; Notable for the following components:       Result Value    Protein Total 6.4 (*)     All other components within normal limits   CBC WITH PLATELETS DIFFERENTIAL - Abnormal; Notable for the following components:    WBC 15.4 (*)     MCHC 30.8 (*)     Absolute Neutrophil 10.3 (*)     Absolute Monocytes 1.4 (*)     All other components within normal limits   ROUTINE UA WITH MICROSCOPIC - Abnormal; Notable for the following components:    Blood Urine Small (*)     Bacteria Urine Few (*)     Mucous Urine Present (*)     All other components within normal limits   LACTATE FOR SEPSIS PROTOCOL   PERIPHERAL IV CATHETER   PULSE OXIMETRY NURSING   NURSING DRAW AND HOLD   BLOOD CULTURE                 Assessments & Plan (with Medical Decision Making)     This patient presented to the emergency department in transfer from outside emergency department.  She lives alone and has had multiple falls lately, the latest resulting in a large laceration and soft tissue injury to the left knee.  She was unable to get up after this injury and laid on the ground for 8 h.  At presentation, I consulted with trauma surgery, plastic surgery and orthopedic surgery.  Patient will be admitted to the trauma service and was taken to the operating room by orthopedic surgery for washout and wound  care.    I have reviewed the nursing notes. I have reviewed the findings, diagnosis, plan and need for follow up with the patient.    Current Discharge Medication List          Final diagnoses:   Knee laceration, left, initial encounter   Recurrent falls       --  Bacilio FITZPATRICK [facility] EMERGENCY DEPARTMENT  11/28/2020     Bacilio Mari MD  12/01/20 0945       Bacilio Mari MD  12/01/20 0910

## 2020-11-29 LAB
ANION GAP SERPL CALCULATED.3IONS-SCNC: 4 MMOL/L (ref 3–14)
BACTERIA SPEC CULT: NORMAL
BUN SERPL-MCNC: 11 MG/DL (ref 7–30)
CALCIUM SERPL-MCNC: 8.3 MG/DL (ref 8.5–10.1)
CHLORIDE SERPL-SCNC: 109 MMOL/L (ref 94–109)
CO2 SERPL-SCNC: 28 MMOL/L (ref 20–32)
CREAT SERPL-MCNC: 0.46 MG/DL (ref 0.52–1.04)
ERYTHROCYTE [DISTWIDTH] IN BLOOD BY AUTOMATED COUNT: 14.4 % (ref 10–15)
GFR SERPL CREATININE-BSD FRML MDRD: >90 ML/MIN/{1.73_M2}
GLUCOSE SERPL-MCNC: 108 MG/DL (ref 70–99)
HCT VFR BLD AUTO: 37.2 % (ref 35–47)
HGB BLD-MCNC: 11.5 G/DL (ref 11.7–15.7)
INTERPRETATION ECG - MUSE: NORMAL
LACTATE BLD-SCNC: 1.9 MMOL/L (ref 0.7–2)
Lab: NORMAL
MCH RBC QN AUTO: 30.5 PG (ref 26.5–33)
MCHC RBC AUTO-ENTMCNC: 30.9 G/DL (ref 31.5–36.5)
MCV RBC AUTO: 99 FL (ref 78–100)
PLATELET # BLD AUTO: 258 10E9/L (ref 150–450)
POTASSIUM SERPL-SCNC: 4.1 MMOL/L (ref 3.4–5.3)
RBC # BLD AUTO: 3.77 10E12/L (ref 3.8–5.2)
SODIUM SERPL-SCNC: 141 MMOL/L (ref 133–144)
SPECIMEN SOURCE: NORMAL
WBC # BLD AUTO: 12.2 10E9/L (ref 4–11)

## 2020-11-29 PROCEDURE — 250N000013 HC RX MED GY IP 250 OP 250 PS 637: Performed by: PHYSICIAN ASSISTANT

## 2020-11-29 PROCEDURE — 250N000011 HC RX IP 250 OP 636: Performed by: PHYSICIAN ASSISTANT

## 2020-11-29 PROCEDURE — 36415 COLL VENOUS BLD VENIPUNCTURE: CPT | Performed by: SURGERY

## 2020-11-29 PROCEDURE — 120N000002 HC R&B MED SURG/OB UMMC

## 2020-11-29 PROCEDURE — 36415 COLL VENOUS BLD VENIPUNCTURE: CPT | Performed by: PHYSICIAN ASSISTANT

## 2020-11-29 PROCEDURE — 85027 COMPLETE CBC AUTOMATED: CPT | Performed by: PHYSICIAN ASSISTANT

## 2020-11-29 PROCEDURE — 80048 BASIC METABOLIC PNL TOTAL CA: CPT | Performed by: PHYSICIAN ASSISTANT

## 2020-11-29 PROCEDURE — 258N000003 HC RX IP 258 OP 636: Performed by: PHYSICIAN ASSISTANT

## 2020-11-29 PROCEDURE — 83605 ASSAY OF LACTIC ACID: CPT | Performed by: SURGERY

## 2020-11-29 PROCEDURE — 99232 SBSQ HOSP IP/OBS MODERATE 35: CPT | Performed by: PHYSICIAN ASSISTANT

## 2020-11-29 RX ORDER — DILTIAZEM HYDROCHLORIDE 120 MG/1
120 CAPSULE, COATED, EXTENDED RELEASE ORAL DAILY
Status: DISCONTINUED | OUTPATIENT
Start: 2020-11-29 | End: 2020-11-30

## 2020-11-29 RX ORDER — HYDROCHLOROTHIAZIDE 12.5 MG/1
12.5 CAPSULE ORAL DAILY
Status: DISCONTINUED | OUTPATIENT
Start: 2020-11-29 | End: 2020-12-03 | Stop reason: HOSPADM

## 2020-11-29 RX ORDER — METHIMAZOLE 5 MG/1
5 TABLET ORAL
Status: DISCONTINUED | OUTPATIENT
Start: 2020-11-30 | End: 2020-12-03 | Stop reason: HOSPADM

## 2020-11-29 RX ORDER — LOSARTAN POTASSIUM 25 MG/1
25 TABLET ORAL DAILY
Status: DISCONTINUED | OUTPATIENT
Start: 2020-11-29 | End: 2020-12-03 | Stop reason: HOSPADM

## 2020-11-29 RX ORDER — SIMETHICONE 80 MG
80 TABLET,CHEWABLE ORAL EVERY 6 HOURS PRN
Status: DISCONTINUED | OUTPATIENT
Start: 2020-11-29 | End: 2020-12-03 | Stop reason: HOSPADM

## 2020-11-29 RX ADMIN — METHOCARBAMOL 750 MG: 750 TABLET, FILM COATED ORAL at 08:06

## 2020-11-29 RX ADMIN — SODIUM CHLORIDE, POTASSIUM CHLORIDE, SODIUM LACTATE AND CALCIUM CHLORIDE 1000 ML: 600; 310; 30; 20 INJECTION, SOLUTION INTRAVENOUS at 04:07

## 2020-11-29 RX ADMIN — DILTIAZEM HYDROCHLORIDE 120 MG: 120 CAPSULE, COATED, EXTENDED RELEASE ORAL at 10:35

## 2020-11-29 RX ADMIN — ACETAMINOPHEN 975 MG: 325 TABLET, FILM COATED ORAL at 14:57

## 2020-11-29 RX ADMIN — OXYCODONE HYDROCHLORIDE 5 MG: 5 TABLET ORAL at 00:24

## 2020-11-29 RX ADMIN — LIDOCAINE 1 PATCH: 560 PATCH PERCUTANEOUS; TOPICAL; TRANSDERMAL at 20:17

## 2020-11-29 RX ADMIN — SODIUM CHLORIDE, POTASSIUM CHLORIDE, SODIUM LACTATE AND CALCIUM CHLORIDE 1000 ML: 600; 310; 30; 20 INJECTION, SOLUTION INTRAVENOUS at 14:57

## 2020-11-29 RX ADMIN — ENOXAPARIN SODIUM 30 MG: 30 INJECTION SUBCUTANEOUS at 08:06

## 2020-11-29 RX ADMIN — ATORVASTATIN CALCIUM 10 MG: 10 TABLET, FILM COATED ORAL at 20:17

## 2020-11-29 RX ADMIN — LOSARTAN POTASSIUM 25 MG: 25 TABLET, FILM COATED ORAL at 10:35

## 2020-11-29 RX ADMIN — HYDROMORPHONE HYDROCHLORIDE 0.3 MG: 0.2 INJECTION, SOLUTION INTRAMUSCULAR; INTRAVENOUS; SUBCUTANEOUS at 02:07

## 2020-11-29 RX ADMIN — OXYCODONE HYDROCHLORIDE 5 MG: 5 TABLET ORAL at 11:00

## 2020-11-29 RX ADMIN — POLYETHYLENE GLYCOL 3350 17 G: 17 POWDER, FOR SOLUTION ORAL at 08:11

## 2020-11-29 RX ADMIN — HYDROMORPHONE HYDROCHLORIDE 0.2 MG: 0.2 INJECTION, SOLUTION INTRAMUSCULAR; INTRAVENOUS; SUBCUTANEOUS at 17:29

## 2020-11-29 RX ADMIN — METHOCARBAMOL 750 MG: 750 TABLET, FILM COATED ORAL at 14:57

## 2020-11-29 RX ADMIN — HYDROMORPHONE HYDROCHLORIDE 0.2 MG: 0.2 INJECTION, SOLUTION INTRAMUSCULAR; INTRAVENOUS; SUBCUTANEOUS at 05:45

## 2020-11-29 RX ADMIN — OXYCODONE HYDROCHLORIDE 5 MG: 5 TABLET ORAL at 04:52

## 2020-11-29 RX ADMIN — METHOCARBAMOL 750 MG: 750 TABLET, FILM COATED ORAL at 20:17

## 2020-11-29 RX ADMIN — Medication 40 MG: at 20:24

## 2020-11-29 RX ADMIN — OXYCODONE HYDROCHLORIDE 5 MG: 5 TABLET ORAL at 16:35

## 2020-11-29 RX ADMIN — HYDROMORPHONE HYDROCHLORIDE 0.2 MG: 0.2 INJECTION, SOLUTION INTRAMUSCULAR; INTRAVENOUS; SUBCUTANEOUS at 12:54

## 2020-11-29 RX ADMIN — ACETAMINOPHEN 975 MG: 325 TABLET, FILM COATED ORAL at 08:06

## 2020-11-29 RX ADMIN — Medication 40 MG: at 10:35

## 2020-11-29 RX ADMIN — HYDROCHLOROTHIAZIDE 12.5 MG: 12.5 CAPSULE, GELATIN COATED ORAL at 10:35

## 2020-11-29 RX ADMIN — ACETAMINOPHEN 975 MG: 325 TABLET, FILM COATED ORAL at 20:16

## 2020-11-29 ASSESSMENT — ACTIVITIES OF DAILY LIVING (ADL)
ADLS_ACUITY_SCORE: 21

## 2020-11-29 NOTE — PLAN OF CARE
2130: Paged Trauma #[phone] re: diet.  MD returned paged; ordered Clear liquid diet, advance as tolerated; done per Telephone order.

## 2020-11-29 NOTE — PLAN OF CARE
"Patient arrived on unit from PACU at 18:50 - Alert and oriented. Attempted to complete skin check but patient needed to use bedpan; patient placed on bedpan, sat head of bed up; call light given - pt stated she will call once she is finished; NOC RN given report and notified of pt on bedpan.  Wound vac in place @ -125. LH PIV running LR @ 100mL/hr. Patient on 2L NC, with capnography in place.     BP (!) 160/90 (BP Location: Left arm)   Pulse 100   Temp 96.4  F (35.8  C) (Oral)   Resp 22   Ht 1.575 m (5' 2\")   Wt 81.6 kg (180 lb)   SpO2 100%   BMI 32.92 kg/m        "

## 2020-11-29 NOTE — PROGRESS NOTES
Orthopaedic Surgery Progress Note 11/29/2020    E: No acute events overnight.    S: Pain well controlled.  Denies numbness or tingling. Denies fever/chills/CP/SOB.  Plan of care reviewed and questions answered    O:  Temp: 96.3  F (35.7  C) Temp src: Oral BP: 126/71 Pulse: 88   Resp: 18 SpO2: 95 % O2 Device: None (Room air) Oxygen Delivery: 2 LPM    Exam:  Gen: No acute distress, resting comfortably in bed.  Resp: Non-labored breathing    MSK: Focused examination of the LLE shows:  Inspection: Vac to LLE in place and functioning appropriately.  Motor:  Tibialis anterior, gastroc/soleus, EHL strength 5/5   Sensory: SILT to superficial peroneal, deep peroneal, saphenous, sural, and tibial nerve territories  Circulation: palpable DP, foot warm and well perfused      Drain output: mild output from vac.    Recent Labs   Lab 11/28/20  1215 11/28/20  0836   WBC 15.4* 14.7*   HGB 13.0 13.5    346       Assessment: Madhavi Castellanos is a 80 year old female s/p left knee I&D, arthrotomy, and wound vac placement on 11/28 with Dr. Peterson. Doing well.    - Vac remains in place and functioning well.     Intra-operatively, there was significant degloving of the subcutaneous fat from the underlying fascia and joint capsule extending from the distal quadriceps (especially medially over VMO) across the anterior aspect of the knee joint to the level of the tibial tuberosity.  Non-viable fat was debrided, but there is concern for continued fat and skin necrosis secondary to the degloving.  Hence, no skin was closed at the time of surgery, and a temporizing wound vac was placed.    Plan:  Trauma Primary  Activity: Up with assist.  Weight bearing status: WBAT LLE  Antibiotics: Clindamycin x 48 hours.  Diet: Begin with clear fluids and progress diet as tolerated.  DVT prophylaxis: Per primary - recommend ASA or similar   Elevation: Elevate LLE on pillows to keep elevated above the level of the heart as much as possible.  Wound  Care: Vac at -125mm Hg low continuous, monitor and record output  Pain management: transition from IV to orals as tolerated.   Physical Therapy:  ROM, ADL's.  Occupational Therapy: ADL's.  Labs: Trend Hgb on POD #1  Consults: PT, OT, plastic surgery, appreciate assistance in caring for this patient.  Follow-up: TBD pending any RTOR with plastic surgery     Disposition: Pending possible RTOR with PLS team for delayed primary closure vs flap/graft coverage.       Jeffery Hernandez MD 11/29/2020  Orthopaedic Surgery Resident, PGY-4  Pager: [phone]    For questions about this patient, please attempt to contact me at my pager prior to contacting the orthopaedics resident on call. Thank you!

## 2020-11-29 NOTE — CONSULTS
"PLASTIC  SURGERY CONSULT NOTE      Date of Admission:  11/28/20  Date of Consult:   11/29/20  Reason for consult: Knee wound   Requesting service: Trauma; requesting provider: Dr. Hickman       CHIEF COMPLAINT: L knee wound    HISTORY OF PRESENT ILLNESS: The patient is a 79yo female who presented to [facility] ED yesterday as a transfer from [facility] with a complex left knee laceration. The patient states that she was going to the bathroom on Friday (11/27) night at her town home, when her right knee buckled and she fell onto her left knee. She uses a walker at home. Endorses immediate bleeding but no visible bone. Denies numbness/tingling in the foot. Was able to extend and flex her knee. In the ED she was found to have a traumatic arthrotomy of the left knee joint and was taken to the OR yesterday for a washout and closure of the joint by Orthopedic surgery. Some skin and subcutaneous tissue was debrided and VAC was placed. At the time of the exam the patient denies significant pain in the knee. She is on a fentanyl patch for her chronic back pain.     PAST MEDICAL HISTORY:   Past Medical History:   Diagnosis Date     Asthma      Atrial fibrillation (H)     had EP ablation 8/11/2017, pt reports being \"afib free\" now     CAD (coronary artery disease)      Depression      Esophageal reflux      Hypertension      LBBB (left bundle branch block)      Liver lesion     stable, seen on multiple CT scans     Lumbosacral spondylosis      Mumps      Nonspecific reaction to tuberculin skin test without active tuberculosis(795.51)      Obesity      TALI (obstructive sleep apnea)      Osteoarthritis      Palpitations      Prolonged QT interval      Pure hypercholesterolemia      Spinal stenosis of lumbar region      STD (sexually transmitted disease)      Tuberculosis      SURGICAL HISTORY:   Past Surgical History:   Procedure Laterality Date     CHOLECYSTECTOMY       COLONOSCOPY N/A 8/4/2019    Procedure: COLONOSCOPY;  Surgeon: " "Darron Cunha MD;  Location: [facility] GI     EP ABLATION / EP STUDIES  08/11/2017    for hx a fib     ESOPHAGOSCOPY, GASTROSCOPY, DUODENOSCOPY (EGD), COMBINED  7/2014    reactive gastropathy, H. Pylori negative ([facility])     EXCISE VULVA WIDE LOCAL N/A 12/1/2017    Procedure: EXCISE VULVA WIDE LOCAL;  Wide Local Excision of Vulvar Lesion   ;  Surgeon: Nazario Tirado MD;  Location: [facility] OR     ALLERGIES:   Allergies   Allergen Reactions     Metoprolol Shortness Of Breath     2 hours after tasking for the first time SOB, Pt evaluated and pt WDL       Albuterol Unknown     Augmentin Nausea     Cephalexin      Codeine Sulfate      \"i dont't know, nothing, maybe sick to my stomach\"     Cymbalta      agitated     Lisinopril Cough     Omnicef [Cefdinir] Diarrhea     Percocet [Oxycodone-Acetaminophen]      nauseated       MEDICATIONS:  Medications Prior to Admission   Medication Sig Dispense Refill Last Dose     aspirin (ASA) 81 MG chewable tablet Take 81 mg by mouth daily   11/27/2020 at 0700     Atorvastatin Calcium (LIPITOR PO) Take 10 mg by mouth every evening    11/27/2020 at 1900     buPROPion (WELLBUTRIN XL) 150 MG 24 hr tablet Take 150 mg by mouth every morning   11/27/2020 at [id number]     diltiazem (CARTIA XT) 120 MG 24 hr capsule Take 120 mg by mouth daily   11/27/2020 at 0700     fentaNYL (DURAGESIC) 25 mcg/hr 72 hr patch Place 1 patch onto the skin every 72 hours 10 patch 0 11/27/2020 at 0700     HYDROCHLOROTHIAZIDE PO Take 12.5 mg by mouth daily    11/27/2020 at 0700     HYDROcodone-acetaminophen (NORCO) 7.5-325 MG per tablet Take 1-2 tablets by mouth 2 times daily as needed for moderate to severe pain 15 tablet 0 11/27/2020 at 1500     Losartan Potassium (COZAAR PO) Take 25 mg by mouth daily   11/27/2020 at 0700     Methimazole (TAPAZOLE PO) Take 5 mg by mouth five times a week Monday thru Friday 11/27/2020 at 0700     Sotalol HCl (BETAPACE PO) Take 40 mg by mouth 2 times daily    11/27/2020 at 1900     alum & " "mag hydroxide-simethicone (MYLANTA/MAALOX) 200-200-20 MG/5ML SUSP Take 30 mLs by mouth every 4 hours as needed 300 mL 0 Unknown at Unknown time     METHOCARBAMOL PO Take 750 mg by mouth 6 times daily As needed   Unknown at Unknown time     naloxone (NARCAN) 4 MG/0.1ML nasal spray Spray 1 spray (4 mg) into one nostril alternating nostrils as needed for opioid reversal every 2-3 minutes until assistance arrives 0.2 mL 1 Unknown at Unknown time     polyethylene glycol (MIRALAX) packet Take 1 packet by mouth daily as needed for constipation   Unknown at Unknown time     promethazine (PHENERGAN) 25 MG tablet Take 1 tablet (25 mg) by mouth every 6 hours as needed for nausea 15 tablet 0 Unknown at Unknown time     psyllium (METAMUCIL/KONSYL) capsule Take 1 capsule by mouth daily as needed for constipation   Unknown at Unknown time     simethicone (MYLICON) 80 MG chewable tablet Take 1 tablet (80 mg) by mouth every 6 hours as needed for flatulence or cramping   Unknown at Unknown time       SOCIAL HISTORY: Denies tobacco, etoh. Lives alone in a townhouse in [place]. Retired EMT    FAMILY HISTORY:   Family History   Problem Relation Age of Onset     Colon Cancer No family hx of      REVIEW OF SYSTEMS: Negative other than what's stated in HPI      PHYSICAL EXAM:   /59 (BP Location: Left arm)   Pulse 94   Temp 97.2  F (36.2  C) (Oral)   Resp 18   Ht 1.575 m (5' 2\")   Wt 81.6 kg (180 lb)   SpO2 95%   BMI 32.92 kg/m        General: Alert, well-appearing, in no acute distress.  Respiratory: Non-labored breathing on RA  LLE: Dressing c/d/i. VAC holding suction. Foot wwp      LAB DATA: Reviewed.       IMAGING: Reviewed      ASSESSMENT/PLAN: 80F w/ multiple medical co-morbidities now with L knee laceration extending into the joint now POD 1 s/p I&D, repair of joint capsule, VAC application    -- No plans for surgical intervention this admission  -- Ok to discharge with home VAC  -- Weight bear status per Ortho  -- " F/u in plastic surgery clinic in 1-2 weeks for a wound check  -- Operative plan to be determined based on the wound dimensions and quality at that time    Discussed with Dr. Jaimes.    Bc Maher MD  Plastic surgery resident

## 2020-11-29 NOTE — PROGRESS NOTES
[facility]   Tertiary Survey Progress Note     Date of Service: 11/29/2020    Trauma mechanism: Fall from standing  Time/date of injury: 11/28 around 2200  Known Injuries:  1. Large complex wound to Bilat Knees, reopened after this fall  2. Hematoma to left forehead    Procedures:  1. Irrigation and Debridement, lower extremity with negative pressure dressing placement, left knee arthrotomy 11/28, Dr. Ramirez     Assessment & Plan   Neuro/Pain/Psych:  # Fall  # Closed head injury  - Head CT: No acute intracranial abnormality. Small left frontal scalp contusion.    # Acute on chronic pain   - Holding PTA: fentanyl patch, norco,   - Scheduled: Tylenol     # Depression  - PTA: Bupropion   - Recent bupropion increase for treatment of worsening depression symptoms.   -  Maintain circadian rhythm.  Lights on during the day.  Off at night, minimize cares at night.  OOB during the day.    Pulmonary:  # TALI  # Asthma   - CXR in ED: There are no acute infiltrates. The cardiac silhouette is not enlarged. Pulmonary vasculature is unremarkable  - Supplemental oxygen to keep saturation above 92 %.  - Incentive spirometer while awake     Cardiovascular:    # Hypertension   # Paroxysmal Afib, s/p ablation 8/11/19  # Hypercholesterolemia  # CAD  # LBBB  # Prolonged QT interval   - 3/13/20: / QTc 506   - 11/28/20: / QTc 492     - Holding PTA: asa 81  - Restarted PTA: atorvastatin, diltiazem, hydrochlorothiazide, losartan, solalol.   - Monitor hemodynamic status.    GI/Nutrition:    # GERD  - PTA: phenergan,      Renal/ Fluids/Electrolytes:  # Hypokalemia, resolved    - LR for IV fluid hydration.   - electrolyte replacement protocol in place.      Endocrine:  # Hx of Graves disease   # Thyroid nodules, last US 9/22/20  - restarted PTA medications: methimazole       Infectious disease:   # Hx of Tb 1991  # COVID neg 11/28  # Leukocytosis, infectious vs stress from trauma   -  WBC: 12.2 ? 15.4 ? 14.7  - UA at OSH: small blood, few bacteria. UC pending   - BC 11/28: NGTD     Antibiotics:  - Vanco 1500mg x1 11/28  - Clinda start 11/28- (end 11/30)     Hematology:    - Hgb 11.5 ? 13.0. Monitor and trend.   - Threshold for transfusion if hgb <7.0 or signs/symptoms of hypoperfusion.        # DVT Prophylaxis: Lovenox post-op, plan ASA on discharge       Musculoskeletal:  # Lumbosacral spondylosis  # OA bilat knee  # Weakness and deconditioning of critical illness   # Frequent falls   # Large complex wound to Bilat Knees, reopened after this fall, tracks to joint space s/p I&D 11/28  - Ortho recommendations: Up with assist, WBAT LLE, clinda x 48hrs, ASA, Elevate LLE on pillows to keep elevated above the level of the heart as much as possible. Wound vac at -125mm Hg low continuous, monitor and record output. Follow-up: TBD pending any RTOR with plastic surgery  - Physical and occupational therapy consults.     Skin:  # Ecchymosis   - dilgent cares to prevent skin breakdown and wound formation.      Lines/ tubes/ drains:  - PIV/ wound vac     General Cares:    PPI/H2 blocker:  NA   DVT prophylaxis: Lovenox    Bowel Regimen/Date of last stool: in place   Pulmonary toilet: IS   ETOH screen completed yes, neg              Frailty Score: 2   Lines / drains: Herr cath remains 2nd to immobility and need for strict I&O    Code status:  Full      Discharge goals:     Adequate pain management: in progress     VSS x24 hours: yes    Hemoglobin stable x 48 hours: yes    Ambulating safely and/or therapy evals complete: in process     Drains/lines removed or plan in place to manage: in process     Teaching done:     Other:  Expected D/C date: est 2-3 days       Interval History   No acute events overnight. Patient continues to due well after surgery.     Review of Systems   Skin: positive for bruising, lumps or bumps  Eyes: negative  Ears/Nose/Throat: negative  Respiratory: No shortness of breath, dyspnea on  exertion, cough, or hemoptysis  Cardiovascular: negative  Gastrointestinal: negative  Genitourinary: negative  Musculoskeletal: positive for joint pain  Neurologic: negative  Psychiatric: negative  Hematologic/Lymphatic/Immunologic: negative  Endocrine: negative     Physical Exam       [place] Coma Scale - Total 15/15    Frailty Questionnaire: To be done for all patients age 60+  F (Fatigue): Is the patient easily fatigued? NO = 0  R (Resistance): Is the patient unable to walk one flight of stairs? YES = 1  A (Ambulation): Is the patient unable to walk one block? YES = 1  I  (Illness): Does the patient have more than five illnesses? NO = 0  L (Loss of weight): Has the patient lost more than 5% of weight in the past 6 months. NO = 0  Lost five pounds or more in the last 3 months without trying? AND/OR Unintended weight loss?  Does the patient have difficulty performing housework such as washing windows or scrubbing floors? AND Activity in a typical 24-hour day- No moderate or vigorous activity    Score: 2    Score: 0-2: Ensure appropriate therapies consulted if needed     Physical Exam  Constitutional: Awake, alert, cooperative, no apparent distress.  Eyes: Lids and lashes normal, pupils equal, round and reactive to light, extra ocular muscles intact, sclera clear, conjunctiva normal.  HENT: Normocephalic, atraumatic  Respiratory: No increased work of breathing, good air exchange, clear to auscultation bilaterally, no crackles or wheezing.  Cardiovascular:  regular rate and rhythm, normal S1 and S2, no S3 or S4, and no murmur.   GI: Normal bowel sounds, abdomen soft, non-distended, non-tender, no guarding  Skin:  Hematoma to left shoulder, various areas of ecchymosis. Right knee wound dressing in place, left knee wound surgical dressing and wound vac in place.   Musculoskeletal: Lower extremity edema.   Neurologic: Awake, alert, oriented. Cranial nerves II-XII are grossly intact.  Strength and sensory is intact. No  focal deficits.  Neuropsychiatric: Calm, normal eye contact, alert, affect appropriate to situation, oriented, thought process normal.    Temp: 96.4  F (35.8  C) Temp src: Oral BP: 133/65 Pulse: 98   Resp: 18 SpO2: 97 % O2 Device: None (Room air) Oxygen Delivery: 2 LPM  Vitals:    11/28/20 1202   Weight: 81.6 kg (180 lb)     Vital Signs with Ranges  Temp:  [96.3  F (35.7  C)-98.8  F (37.1  C)] 96.4  F (35.8  C)  Pulse:  [] 98  Resp:  [10-22] 18  BP: (111-165)/() 133/65  SpO2:  [93 %-100 %] 97 %  I/O last 3 completed shifts:  In: 1585 [P.O.:180; I.V.:1005]  Out: 1100 [Urine:1050; Blood:50]      HAL Dimas  To contact the trauma service use job code pager [phone],   Numeric texts or alpha text through [employer]

## 2020-11-29 NOTE — ANESTHESIA POSTPROCEDURE EVALUATION
Anesthesia POST Procedure Evaluation    Patient: Madhavi Castellanos   MRN:     [id number] Gender:   female   Age:    80 year old :      1940        Preoperative Diagnosis: Open wound of left knee [S81.002A]   Procedure(s):  IRRIGATION AND DEBRIDEMENT, LOWER EXTREMITY with negative pressure dressing placement, left knee arthrotomy   Postop Comments: No value filed.     Anesthesia Type: General          Postop Pain Control: Uneventful            Sign Out: Well controlled pain   PONV: No   Neuro/Psych: Uneventful            Sign Out: Acceptable/Baseline neuro status   Airway/Respiratory: Uneventful            Sign Out: Acceptable/Baseline resp. status   CV/Hemodynamics: Uneventful            Sign Out: Acceptable CV status   Other NRE: NONE   DID A NON-ROUTINE EVENT OCCUR? No         Last Anesthesia Record Vitals:  CR[facility] VITALS  2020 1610 - 2020 1710      2020             Pulse:  99    SpO2:  97 %          Last PACU Vitals:  Vitals Value Taken Time   /91 20 1815   Temp 36.6  C (97.9  F) 20 1745   Pulse 96 20 1827   Resp 14 20 1745   SpO2 98 % 20 1826   Temp src     NIBP     Pulse     SpO2     Resp     Temp     Ht Rate     Temp 2     Vitals shown include unvalidated device data.      Electronically Signed By: Debo Rodriguez MD, 2020, 6:30 PM

## 2020-11-29 NOTE — PROGRESS NOTES
"Post op check    Pain controlled, denies SOB/CP    BP (!) 154/64   Pulse 100   Temp 96.4  F (35.8  C) (Oral)   Resp 20   Ht 1.575 m (5' 2\")   Wt 81.6 kg (180 lb)   SpO2 97%   BMI 32.92 kg/m        AAOx3  NAD  CTAB  RRR  Left knee wrapped CDI, VAC to good suction    80 year old female s/p left knee I&D, arthrotomy, and wound vac placement on 11/28   -Stable post op  -pulm toilet  -pain control    "

## 2020-11-29 NOTE — PLAN OF CARE
Patient is A/Ox4. Denies SOB, clear LS. VSS on RA; SBP at 160's but within parameter. +BS, soft non distended abdomen, non tender. Denies nausea, tolerated clears; changed diet to regular as verbal ordered. Voiding spont, uses commode. WBAT on LLE. Assist fo 2. LLE covered with ace wrap, wound vac in place at -125 mmhg, scant output in cannister. +CMS. Right knee wound, juliann, +erythema. Pain is managed with Dilaudid 0.2 mg IV 2x, and Oxycodone 5 mg po 2x. Uses call light appropriately. Bed alarm activated. Continue poc.   Vitals:    11/28/20 1910 11/29/20 0000 11/29/20 0013 11/29/20 0407   BP: (!) 160/90 (!) 165/78 (!) 154/64 126/71   BP Location: Left arm   Left arm   Pulse: 100 100  88   Resp: 22 20  18   Temp: 96.4  F (35.8  C) 96.4  F (35.8  C)  96.3  F (35.7  C)   TempSrc: Oral Oral  Oral   SpO2: 100% 97%  95%   Weight:       Height:

## 2020-11-29 NOTE — OP NOTE
DATE OF SURGERY: 11/28/2020    PREOPERATIVE DIAGNOSIS: Left knee traumatic wound and arthrotomy    POSTOPERATIVE DIAGNOSIS: Left knee traumatic wound and arthrotomy    PROCEDURE: Excisional Debridement of left knee wound skin and subcutaneous tissue as well as arthrotomy and washout of left knee.  Placement of wound vacuum to the left knee 20 x 5 cm    SURGEON: Josef Peterson MD     ASSISTANT: Jeffery Hernandez MD    PATIENT HISTORY: This patient fell at home a day ago and has an open wound.  Fluid challenge revealed extravasation of the fluid from her knee joint and so there is concern about a rent in the capsule and a traumatic arthrotomy.  She understands the risk of versus infection bleeding pain numbness and tingling.  She also knows that she will need a second procedure for definitive closure as we will place a wound vacuum.    DESCRIPTION OF PROCEDURE: The patient lay supine and underwent successful induction of general anesthesia.  The left leg was washed and sterilely prepped and draped.  We irrigated the wound with the liter of saline after removing the skin edge with a knife and some of the friable soft tissue with the cautery and rongeur.  There were some areas of fat which I felt would be nonviable and so I remove these with the scissors and knife.  We again loaded the knee and found an area of saline extravasation just medial to the patellar tendon.  I explored this area and it did track into the knee joint.  I made a small arthrotomy medial to the patellar tendon sharply through the capsule and we irrigated the through that area as that would flow out through the traumatic arthrotomy along the patellar tendon.  After doing several 100 mL of fluid through this direction we again irrigated the knee for total of 3 L of fluid irrigated through the field.  I closed the arthrotomy with 2-0 PDS with the knots buried and then we placed a wound vacuum over the field.  There were no complications.  The  blood loss is 50 mL.  I was present for all critical portions of the procedure.    Josef Peterson MD

## 2020-11-30 ENCOUNTER — APPOINTMENT (OUTPATIENT)
Dept: PHYSICAL THERAPY | Facility: CLINIC | Age: 80
DRG: 571 | End: 2020-11-30
Attending: PHYSICIAN ASSISTANT
Payer: COMMERCIAL

## 2020-11-30 LAB
ANION GAP SERPL CALCULATED.3IONS-SCNC: 2 MMOL/L (ref 3–14)
BUN SERPL-MCNC: 13 MG/DL (ref 7–30)
CALCIUM SERPL-MCNC: 8.9 MG/DL (ref 8.5–10.1)
CHLORIDE SERPL-SCNC: 106 MMOL/L (ref 94–109)
CO2 SERPL-SCNC: 30 MMOL/L (ref 20–32)
CREAT SERPL-MCNC: 0.54 MG/DL (ref 0.52–1.04)
ERYTHROCYTE [DISTWIDTH] IN BLOOD BY AUTOMATED COUNT: 14.7 % (ref 10–15)
GFR SERPL CREATININE-BSD FRML MDRD: >90 ML/MIN/{1.73_M2}
GLUCOSE SERPL-MCNC: 115 MG/DL (ref 70–99)
HCT VFR BLD AUTO: 39.8 % (ref 35–47)
HGB BLD-MCNC: 12.1 G/DL (ref 11.7–15.7)
MCH RBC QN AUTO: 30.6 PG (ref 26.5–33)
MCHC RBC AUTO-ENTMCNC: 30.4 G/DL (ref 31.5–36.5)
MCV RBC AUTO: 101 FL (ref 78–100)
PLATELET # BLD AUTO: 202 10E9/L (ref 150–450)
POTASSIUM SERPL-SCNC: 3.8 MMOL/L (ref 3.4–5.3)
RBC # BLD AUTO: 3.96 10E12/L (ref 3.8–5.2)
SODIUM SERPL-SCNC: 139 MMOL/L (ref 133–144)
WBC # BLD AUTO: 11.6 10E9/L (ref 4–11)

## 2020-11-30 PROCEDURE — 93010 ELECTROCARDIOGRAM REPORT: CPT | Performed by: INTERNAL MEDICINE

## 2020-11-30 PROCEDURE — 97161 PT EVAL LOW COMPLEX 20 MIN: CPT | Mod: GP | Performed by: REHABILITATION PRACTITIONER

## 2020-11-30 PROCEDURE — 97116 GAIT TRAINING THERAPY: CPT | Mod: GP | Performed by: REHABILITATION PRACTITIONER

## 2020-11-30 PROCEDURE — 999N000127 HC STATISTIC PERIPHERAL IV START W US GUIDANCE

## 2020-11-30 PROCEDURE — 250N000013 HC RX MED GY IP 250 OP 250 PS 637: Performed by: SURGERY

## 2020-11-30 PROCEDURE — 120N000002 HC R&B MED SURG/OB UMMC

## 2020-11-30 PROCEDURE — 250N000009 HC RX 250: Performed by: PHYSICIAN ASSISTANT

## 2020-11-30 PROCEDURE — 36415 COLL VENOUS BLD VENIPUNCTURE: CPT | Performed by: PHYSICIAN ASSISTANT

## 2020-11-30 PROCEDURE — 80048 BASIC METABOLIC PNL TOTAL CA: CPT | Performed by: PHYSICIAN ASSISTANT

## 2020-11-30 PROCEDURE — 93005 ELECTROCARDIOGRAM TRACING: CPT

## 2020-11-30 PROCEDURE — 97530 THERAPEUTIC ACTIVITIES: CPT | Mod: GP | Performed by: REHABILITATION PRACTITIONER

## 2020-11-30 PROCEDURE — 250N000011 HC RX IP 250 OP 636: Performed by: PHYSICIAN ASSISTANT

## 2020-11-30 PROCEDURE — 250N000013 HC RX MED GY IP 250 OP 250 PS 637: Performed by: PHYSICIAN ASSISTANT

## 2020-11-30 PROCEDURE — 85027 COMPLETE CBC AUTOMATED: CPT | Performed by: PHYSICIAN ASSISTANT

## 2020-11-30 PROCEDURE — 99232 SBSQ HOSP IP/OBS MODERATE 35: CPT | Performed by: PHYSICIAN ASSISTANT

## 2020-11-30 RX ORDER — METHOCARBAMOL 750 MG/1
750 TABLET, FILM COATED ORAL 4 TIMES DAILY PRN
COMMUNITY

## 2020-11-30 RX ORDER — ASPIRIN 81 MG/1
81 TABLET, CHEWABLE ORAL DAILY
Status: DISCONTINUED | OUTPATIENT
Start: 2020-11-30 | End: 2020-12-03 | Stop reason: HOSPADM

## 2020-11-30 RX ORDER — FENTANYL 25 UG/1
25 PATCH TRANSDERMAL
Status: DISCONTINUED | OUTPATIENT
Start: 2020-11-30 | End: 2020-11-30

## 2020-11-30 RX ORDER — CEPHALEXIN 500 MG/1
500 CAPSULE ORAL 2 TIMES DAILY
Status: ON HOLD | COMMUNITY
End: 2022-11-02

## 2020-11-30 RX ORDER — BUPROPION HYDROCHLORIDE 300 MG/1
300 TABLET ORAL EVERY MORNING
Status: ON HOLD | COMMUNITY
End: 2022-11-02

## 2020-11-30 RX ORDER — NALOXONE HYDROCHLORIDE 0.4 MG/ML
0.4 INJECTION, SOLUTION INTRAMUSCULAR; INTRAVENOUS; SUBCUTANEOUS
Status: DISCONTINUED | OUTPATIENT
Start: 2020-11-30 | End: 2020-12-03 | Stop reason: HOSPADM

## 2020-11-30 RX ORDER — CLINDAMYCIN PHOSPHATE 900 MG/50ML
900 INJECTION, SOLUTION INTRAVENOUS EVERY 8 HOURS
Status: COMPLETED | OUTPATIENT
Start: 2020-11-30 | End: 2020-12-02

## 2020-11-30 RX ORDER — NALOXONE HYDROCHLORIDE 0.4 MG/ML
0.2 INJECTION, SOLUTION INTRAMUSCULAR; INTRAVENOUS; SUBCUTANEOUS
Status: DISCONTINUED | OUTPATIENT
Start: 2020-11-30 | End: 2020-12-03 | Stop reason: HOSPADM

## 2020-11-30 RX ORDER — ATORVASTATIN CALCIUM 10 MG/1
10 TABLET, FILM COATED ORAL EVERY EVENING
COMMUNITY

## 2020-11-30 RX ORDER — LOSARTAN POTASSIUM 50 MG/1
TABLET ORAL
Status: ON HOLD | COMMUNITY
End: 2022-11-02

## 2020-11-30 RX ORDER — METHIMAZOLE 5 MG/1
5 TABLET ORAL
COMMUNITY

## 2020-11-30 RX ORDER — HYDROCHLOROTHIAZIDE 12.5 MG/1
12.5 CAPSULE ORAL DAILY PRN
COMMUNITY

## 2020-11-30 RX ORDER — SOTALOL HYDROCHLORIDE 80 MG/1
TABLET ORAL
Status: ON HOLD | COMMUNITY
End: 2022-11-02

## 2020-11-30 RX ORDER — HYDROCODONE BITARTRATE AND ACETAMINOPHEN 7.5; 325 MG/1; MG/1
1-2 TABLET ORAL 2 TIMES DAILY PRN
Status: DISCONTINUED | OUTPATIENT
Start: 2020-11-30 | End: 2020-12-03 | Stop reason: HOSPADM

## 2020-11-30 RX ORDER — POTASSIUM CHLORIDE 750 MG/1
20 TABLET, EXTENDED RELEASE ORAL ONCE
Status: COMPLETED | OUTPATIENT
Start: 2020-11-30 | End: 2020-11-30

## 2020-11-30 RX ORDER — BUPROPION HYDROCHLORIDE 150 MG/1
300 TABLET ORAL DAILY
Status: DISCONTINUED | OUTPATIENT
Start: 2020-11-30 | End: 2020-12-03 | Stop reason: HOSPADM

## 2020-11-30 RX ORDER — FENTANYL 25 UG/1
25 PATCH TRANSDERMAL
Status: DISCONTINUED | OUTPATIENT
Start: 2020-11-30 | End: 2020-12-03 | Stop reason: HOSPADM

## 2020-11-30 RX ADMIN — ATORVASTATIN CALCIUM 10 MG: 10 TABLET, FILM COATED ORAL at 19:46

## 2020-11-30 RX ADMIN — ACETAMINOPHEN 975 MG: 325 TABLET, FILM COATED ORAL at 08:35

## 2020-11-30 RX ADMIN — HYDROMORPHONE HYDROCHLORIDE 0.3 MG: 0.2 INJECTION, SOLUTION INTRAMUSCULAR; INTRAVENOUS; SUBCUTANEOUS at 06:06

## 2020-11-30 RX ADMIN — HYDROMORPHONE HYDROCHLORIDE 0.3 MG: 0.2 INJECTION, SOLUTION INTRAMUSCULAR; INTRAVENOUS; SUBCUTANEOUS at 03:06

## 2020-11-30 RX ADMIN — CLINDAMYCIN IN 5 PERCENT DEXTROSE 900 MG: 18 INJECTION, SOLUTION INTRAVENOUS at 14:02

## 2020-11-30 RX ADMIN — LOSARTAN POTASSIUM 25 MG: 25 TABLET, FILM COATED ORAL at 08:37

## 2020-11-30 RX ADMIN — ASPIRIN 81 MG CHEWABLE TABLET 81 MG: 81 TABLET CHEWABLE at 13:30

## 2020-11-30 RX ADMIN — Medication 40 MG: at 19:46

## 2020-11-30 RX ADMIN — ENOXAPARIN SODIUM 30 MG: 30 INJECTION SUBCUTANEOUS at 19:46

## 2020-11-30 RX ADMIN — HYDROCODONE BITARTRATE AND ACETAMINOPHEN 1 TABLET: 7.5; 325 TABLET ORAL at 15:07

## 2020-11-30 RX ADMIN — FENTANYL 1 PATCH: 25 PATCH, EXTENDED RELEASE TRANSDERMAL at 08:52

## 2020-11-30 RX ADMIN — BUPROPION HYDROCHLORIDE 300 MG: 150 TABLET, FILM COATED, EXTENDED RELEASE ORAL at 12:34

## 2020-11-30 RX ADMIN — METHIMAZOLE 5 MG: 5 TABLET ORAL at 08:38

## 2020-11-30 RX ADMIN — POTASSIUM CHLORIDE 20 MEQ: 750 TABLET, EXTENDED RELEASE ORAL at 13:14

## 2020-11-30 RX ADMIN — HYDROCODONE BITARTRATE AND ACETAMINOPHEN 1 TABLET: 7.5; 325 TABLET ORAL at 22:21

## 2020-11-30 RX ADMIN — Medication 40 MG: at 08:37

## 2020-11-30 RX ADMIN — POLYETHYLENE GLYCOL 3350 17 G: 17 POWDER, FOR SOLUTION ORAL at 10:55

## 2020-11-30 RX ADMIN — HYDROMORPHONE HYDROCHLORIDE 0.2 MG: 0.2 INJECTION, SOLUTION INTRAMUSCULAR; INTRAVENOUS; SUBCUTANEOUS at 18:10

## 2020-11-30 RX ADMIN — CLINDAMYCIN IN 5 PERCENT DEXTROSE 900 MG: 18 INJECTION, SOLUTION INTRAVENOUS at 21:06

## 2020-11-30 RX ADMIN — ENOXAPARIN SODIUM 30 MG: 30 INJECTION SUBCUTANEOUS at 08:36

## 2020-11-30 RX ADMIN — HYDROMORPHONE HYDROCHLORIDE 0.2 MG: 0.2 INJECTION, SOLUTION INTRAMUSCULAR; INTRAVENOUS; SUBCUTANEOUS at 01:20

## 2020-11-30 RX ADMIN — HYDROCHLOROTHIAZIDE 12.5 MG: 12.5 CAPSULE, GELATIN COATED ORAL at 08:37

## 2020-11-30 RX ADMIN — METHOCARBAMOL 750 MG: 750 TABLET, FILM COATED ORAL at 12:34

## 2020-11-30 ASSESSMENT — ACTIVITIES OF DAILY LIVING (ADL)
ADLS_ACUITY_SCORE: 21
ADLS_ACUITY_SCORE: 20
ADLS_ACUITY_SCORE: 21
ADLS_ACUITY_SCORE: 20

## 2020-11-30 NOTE — PLAN OF CARE
Admitted/transferred from:   2 RN skin assessment completed by Gilda Gamino RN and Nazanin CHESTER RN.  Skin assessment finding: forehead bruise, left cheek and left hand skin tear, extremities bruising, christiano butt cheek small bruise.  Right knee wound, left knee covered with ace wrap with wound vac.  Interventions/actions: Will continue to monitor.

## 2020-11-30 NOTE — PHARMACY-ADMISSION MEDICATION HISTORY
Admission Medication History Completed by Pharmacy    See [facility] Admission Navigator for allergy information, preferred outpatient pharmacy, prior to admission medications and immunization status.     Medication History Sources:     Patient interview    Surescripts Dispense history    Care Everywhere    Changes made to PTA medication list (reason):    Added:   o Keflex (recently prescribed for prophy after her first fall)    Deleted:   o Maalox (per pt and Care Everywhere)  o Diltiazem (Provider note in Care Everywhere by Tawny Vazquez NP 7/30/2020)  o Naloxone (per pt and Care Everywhere)  o Pysllium (per pt)  o Simethicone (per pt)    Changed:   o Reentered atorvastatin entry with appropriate dose  o Changed Bupropion XL dose from 150 mg to 300 mg (per pt and fill history)  o Reentered methimazole entry with appropriate dose  o Reentered methocarbamol entry with appropriate dose  o Reentered losartan entry with appropriate dose and directions  o Reentered hydrochlorothiazide entry with appropriate dose  o Reentered sotalol entry with appropriate dose and directions    Additional Information:    Pt was a reliable historian of her medications.    Of note, diltiazem was discontinue back in July by her outpt cardiology team at [facility]    Keflex was prescribed for prophylaxis after her first fall back on 11/21/2020. Directions says to take 1 capsule BID, however pt reports that she was told she can take 1 capsule once a day or twice a day. She has opted to take once a day. Has taken about 5 doses (5 days) thus far.    Prior to Admission medications    Medication Sig Last Dose Taking? Auth Provider   aspirin (ASA) 81 MG chewable tablet Take 81 mg by mouth daily 11/27/2020 at 0700 Yes Reported, Patient   atorvastatin (LIPITOR) 10 MG tablet Take 10 mg by mouth every evening  11/27/2020 at 1900 Yes Unknown, Entered By History   buPROPion (WELLBUTRIN XL) 300 MG 24 hr tablet Take 300 mg by mouth every morning 11/27/2020 at  0700 Yes Unknown, Entered By History   cephALEXin (KEFLEX) 500 MG capsule Take 500 mg by mouth 2 times daily 11/27/2020 at 0700 Yes Unknown, Entered By History   fentaNYL (DURAGESIC) 25 mcg/hr 72 hr patch Place 1 patch onto the skin every 72 hours 11/27/2020 at 0700 Yes Jimmy Arriaga MD   hydrochlorothiazide (MICROZIDE) 12.5 MG capsule Take 12.5 mg by mouth daily 11/27/2020 at 0700 Yes Unknown, Entered By History   HYDROcodone-acetaminophen (NORCO) 7.5-325 MG per tablet Take 1-2 tablets by mouth 2 times daily as needed for moderate to severe pain 11/27/2020 at 1500 Yes Jimmy Arriaga MD   losartan (COZAAR) 50 MG tablet Take 1/2 tablet (25 mg) by mouth daily 11/27/2020 at 0700 Yes Unknown, Entered By History   methimazole (TAPAZOLE) 5 MG tablet Take 5 mg by mouth five times a week Monday through Friday 11/27/2020 at 0700 Yes Unknown, Entered By History   methocarbamol (ROBAXIN) 750 MG tablet Take 1 tablet up to 6 times a day as needed. 11/27/2020 at 1200 Yes Unknown, Entered By History   polyethylene glycol (MIRALAX) packet Take 1 packet by mouth daily as needed for constipation Unknown at Unknown time  Unknown, Entered By History   promethazine (PHENERGAN) 25 MG tablet Take 1 tablet (25 mg) by mouth every 6 hours as needed for nausea Unknown at Unknown time  Popeye Kaur MD   sotalol (BETAPACE) 80 MG tablet Take 1/2 tablet (40 mg) by mouth every 12 hours. 11/27/2020 at 1900  Unknown, Entered By History       Date completed: 11/30/20    Medication history completed by: Kolby Maguire, [facility]

## 2020-11-30 NOTE — PROGRESS NOTES
11/30/20 1537   Quick Adds   Type of Visit Initial PT Evaluation   Student Supervision-Eval Only    Student Supervision Therapy services provided with the co-signing licensed therapist guiding and directing the services, and providing the skilled judgement and assessment throughout the session;Direct patient contact provided       Present no   Living Environment   People in home alone   Current Living Arrangements house  (town home)   Home Accessibility stairs to enter home;stairs within home   Number of Stairs, Main Entrance 7   Stair Railings, Main Entrance railings on both sides of stairs   Number of Stairs, Within Home, Primary 7   Stair Railings, Within Home, Primary railings on both sides of stairs  (from garage to main entrance)   Transportation Anticipated family or friend will provide   Living Environment Comments Friend that is a retired nurse that can come over and help as needed.    Self-Care   Usual Activity Tolerance moderate   Current Activity Tolerance fair   Regular Exercise Other (see comments)  (Previously saw OP PT for massage)   Equipment Currently Used at Home walker, rolling  (4WW)   Activity/Exercise/Self-Care Comment Pt reports not participating in any regular activity but states she completed all ADLs independently prior to discharge.    Disability/Function   Hearing Difficulty or Deaf no   Wear Glasses or Blind yes   Vision Management Glasses   Concentrating, Remembering or Making Decisions Difficulty no   Difficulty Communicating no   Difficulty Eating/Swallowing no   Walking or Climbing Stairs Difficulty yes   Walking or Climbing Stairs other (see comments)   Mobility Management Pt reports difficulty ambulating stairs at home. Navigates stairs by ascending/descending laterally.    Dressing/Bathing Difficulty no   Toileting no   Doing Errands Independently Difficulty (such as shopping) no   Fall history within last six months yes   Number of times patient has  "fallen within last six months 4   Change in Functional Status Since Onset of Current Illness/Injury yes  (2 accidents in past few weeks. Last occured walking w/o 4WW)   General Information   Onset of Illness/Injury or Date of Surgery 11/27/20   Referring Physician Juanita Calix PA   Patient/Family Therapy Goals Statement (PT) To return home safely   Pertinent History of Current Problem (include personal factors and/or comorbidities that impact the POC) 80 year old female who presented to ED after fall on 11/27 due to \"knees giving out,\" while walking in home without 4WW. S/p left knee I&D, arthrotomy, and wound vac placement.   Existing Precautions/Restrictions no known precautions/restrictions   Weight-Bearing Status - LLE weight-bearing as tolerated   Weight-Bearing Status - RLE full weight-bearing   Cognition   Orientation Status (Cognition) oriented x 4   Affect/Mental Status (Cognition) WNL   Follows Commands (Cognition) WNL   Pain Assessment   Patient Currently in Pain Yes, see Vital Sign flowsheet  (Knee soreness)   Integumentary/Edema   Integumentary/Edema other (describe)   Integumentary/Edema Comments Discolored LEs 2/2 vascular insufficiency   Posture    Posture Forward head position;Protracted shoulders   Posture Comments Forward trunk lean with ambulation which pt states is baseline   Range of Motion (ROM)   ROM Comment L knee flexion AROM currently limited to ~20 deg   Strength   Manual Muscle Testing Quick Adds Strength WFL   Strength Comments Not formally tested. Pt able to perform sit to stand and ambulate without assist.   Bed Mobility   Comment (Bed Mobility) IND with bed mobility   Transfers   Transfer Safety Comments IND with transfers   Gait/Stairs (Locomotion)   Comment (Gait/Stairs) Pt ambulates with 4WW and CGA. Gait deviations include forward flexed posture, decreased step length and decreased gait speed.   Balance   Balance no deficits were identified   Sensory Examination   Sensory " Perception other (describe)   Sensory Perception Comments Numbness and tingling in LEs 2/2 vascular insufficiency   Coordination   Coordination no deficits were identified   Muscle Tone   Muscle Tone no deficits were identified   Clinical Impression   Criteria for Skilled Therapeutic Intervention yes, treatment indicated   PT Diagnosis (PT) Decreased functional mobility   Influenced by the following impairments Bilateral knee pain, impaired LE sensation, gait deviations   Functional limitations due to impairments Impaired gait, decreased activity tolerance    Clinical Presentation Stable/Uncomplicated   Clinical Presentation Rationale Pt's condition is stable, she is primarily limited by knee injuries   Clinical Decision Making (Complexity) low complexity   Therapy Frequency (PT) Daily   Predicted Duration of Therapy Intervention (days/wks) 1 week   Planned Therapy Interventions (PT) bed mobility training;gait training;home exercise program;transfer training;strengthening;stair training;ROM (range of motion);balance training   Anticipated Equipment Needs at Discharge (PT) other (see comments)  (TBD, pt owns 4WW)   Risk & Benefits of therapy have been explained evaluation/treatment results reviewed;care plan/treatment goals reviewed;participants voiced agreement with care plan;current/potential barriers reviewed;risks/benefits reviewed   Clinical Impression Comments Pt reports being independent with ADLs prior to admission but has always had decreased activity tolerance 2/2 knee pain   PT Discharge Planning    PT Discharge Recommendation (DC Rec) Transitional Care Facility   PT Rationale for DC Rec Pt currently limited by knee pain and is high falls risk due to injuries and impaired sensation. Pt lives alone and has 2 sets of 7 stairs.    PT Brief overview of current status  IND with bed mobility and transfers, ambulates with CGA and 4WW.   Total Evaluation Time   Total Evaluation Time (Minutes) 10

## 2020-11-30 NOTE — PLAN OF CARE
Patient is A/Ox4. Denies SOB, clear LS. VSS on RA. +BS, soft non distended abdomen, non tender, denies nausea. Voiding spont, uses commode. WBAT on LLE. Assist of 1 with walker to ambulate. LLE covered with ace wrap, wound vac in place at -125 mmhg, scant output in cannister. +CMS. Right knee wound with primapore dressing. Pain is managed with Dilaudid 0.2- 0.3 mg IV 3x. New left PIV LS. Uses call light appropriately. Continue poc.   Vitals:    11/29/20 1800 11/29/20 1830 11/29/20 2053 11/30/20 0030   BP: 123/63 137/70 138/71 123/58   BP Location: Left arm Left arm Left arm Left arm   Pulse: 90 88 97 96   Resp: 18 18 18 16   Temp: 96.9  F (36.1  C) 96.9  F (36.1  C) 97.9  F (36.6  C) 97.6  F (36.4  C)   TempSrc: Oral Oral Oral Oral   SpO2: 95% 98% 94% 96%   Weight:       Height:

## 2020-11-30 NOTE — PROGRESS NOTES
[facility]   Trauma Service Progress Note    Date of Service: 11/30/2020    Trauma mechanism: Fall from standing  Time/date of injury: 11/28 around 2200  Known Injuries:  1. Large complex wound to Bilat Knees, reopened after this fall  2. Hematoma to left forehead     Procedures:  1. Irrigation and Debridement, lower extremity with negative pressure dressing placement, left knee arthrotomy 11/28, Dr. Ramirez     Assessment & Plan   Neuro/Pain/Psych:  # Fall  # Closed head injury  - Head CT: No acute intracranial abnormality. Small left frontal scalp contusion.     # Acute on chronic pain   - Seen At [facility]-  - Discussed with Pain team and Pharmacy, recommended we continue her PTA medications and have her closely follow-up with her pain clinic afterwards per their pain contract  - Restarted PTA: fentanyl patch, norco,   - Scheduled: Tylenol     # Depression  - continued PTA: Bupropion  - Recent bupropion increase for treatment of worsening depression symptoms.   -  Maintain circadian rhythm.  Lights on during the day.  Off at night, minimize cares at night.  OOB during the day.     Pulmonary:  # TALI  # Asthma   - CXR in ED: There are no acute infiltrates. The cardiac silhouette is not enlarged. Pulmonary vasculature is unremarkable  - Supplemental oxygen to keep saturation above 92 %.  - Incentive spirometer while awake      Cardiovascular:    # Hypertension   # Paroxysmal Afib, s/p ablation 8/11/19  # Hypercholesterolemia  # CAD  # LBBB  # Prolonged QT interval   - 3/13/20: / QTc 506   - 11/28/20: / QTc 492  - 11/30/20: / QTc 492  - Continued PTA:  asa 81, atorvastatin, diltiazem, hydrochlorothiazide, losartan, solalol.   - Monitor hemodynamic status.     GI/Nutrition:    # GERD  - PTA: phenergan,      Renal/ Fluids/Electrolytes:  # Hypokalemia, resolved    - LR for IV fluid hydration.   - electrolyte replacement protocol in place.       Endocrine:  # Hx of Graves disease   # Thyroid nodules, last US 9/22/20  - restarted PTA medications: methimazole       Infectious disease:   # Hx of Tb 1991  # COVID neg 11/28  # Leukocytosis, infectious vs stress from trauma   - WBC: 11.6 ? 12.2 ? 15.4  - UA at OSH: small blood, few bacteria. UC pending   - BC 11/28: NGTD     Antibiotics:  - Vanco 1500mg x1 11/28  - Clinda received 1 dose 11/28, per Ortho wanted 48 hrs, ordered on 11/30      Hematology:    - Hgb 12.1, stable. Monitor and trend.   - Threshold for transfusion if hgb <7.0 or signs/symptoms of hypoperfusion.        # DVT Prophylaxis: Lovenox post-op     Musculoskeletal:  # Lumbosacral spondylosis  # OA bilat knee  # Weakness and deconditioning of critical illness   # Frequent falls   # Large complex wound to Bilat Knees, reopened after this fall, tracks to joint space s/p I&D 11/28  - Ortho recommendations: Up with assist, WBAT LLE, clinda x 48hrs, ASA, Elevate LLE on pillows to keep elevated above the level of the heart as much as possible. Wound vac at -125mm Hg low continuous, monitor and record output. Follow-up: TBD pending any RTOR with plastic surgery  - Ortho requested Plastics now manage wound, I have paged Plastics to confirm or if Trauma is taking care of wound vac.   - Physical and occupational therapy consults.     Skin:  - WOC plan wound vac dressing changes Tuesday and Friday.   - dilgent cares to prevent skin breakdown and wound formation.       Lines/ tubes/ drains:  - PIV/ wound vac      General Cares:                 PPI/H2 blocker:  NA                DVT prophylaxis: Lovenox                 Bowel Regimen/Date of last stool: in place                Pulmonary toilet: IS                ETOH screen completed yes, neg              Frailty Score: 2                Lines / drains: Herr cath remains 2nd to immobility and need for strict I&O     Code status:  Full               Discharge goals:     Adequate pain management: in progress       Patient is seen at a Chronic Pain clinic. She will continue the pain medications she has at home and will follow-up quickly with them for any additions or changes since she is on a pain contract.     VSS x24 hours:  yes    Hemoglobin stable x 48 hours: in process     Ambulating safely and/or therapy evals complete: in process     Drains/lines removed or plan in place to manage: yes    Teaching done:     Other:  Expected D/C date: 1-2 days    HAL Dimas  To contact the trauma service use job code pager [phone],   Numeric texts or alpha text through [facility]     Interval History   Patient felt like she had skipped heart beats yesterday, feels better today. ECG shows no drastic change.   Patient having pain control issues post-op, transitioned her back to fent patch and Norco, which she prefers.  On second visit today while rounding with Dr. Jack she was more depressed because of her insurance lack of coverage.        ROS x 8 negative with exception of those things listed in interval hx    Physical Exam   Temp: 97.8  F (36.6  C) Temp src: Oral BP: (!) 143/90 Pulse: 103   Resp: 20 SpO2: 96 % O2 Device: None (Room air)    Vitals:    11/28/20 1202   Weight: 81.6 kg (180 lb)     Vital Signs with Ranges  Temp:  [96.9  F (36.1  C)-97.9  F (36.6  C)] 97.8  F (36.6  C)  Pulse:  [] 103  Resp:  [16-20] 20  BP: (116-143)/(58-90) 143/90  SpO2:  [94 %-98 %] 96 %  I/O last 3 completed shifts:  In: 1080 [P.O.:1080]  Out: 1000 [Urine:1000]    Arthur Coma Scale - Total 15/15  Constitutional: Awake, alert, cooperative, no apparent distress.  Eyes: Lids and lashes normal, pupils equal, round and reactive to light, extra ocular muscles intact, sclera clear, conjunctiva normal.  HENT: Normocephalic, atraumatic  Respiratory: No increased work of breathing, good air exchange, clear to auscultation bilaterally, no crackles or wheezing.  Cardiovascular:  regular rate and rhythm, normal S1 and S2, no S3 or S4, and no murmur.    GI: Normal bowel sounds, abdomen soft, non-distended, non-tender, no guarding  Skin:  Hematoma to left shoulder, various areas of ecchymosis. Right knee wound dressing in place, left knee wound surgical dressing and wound vac in place.   Musculoskeletal: Lower extremity edema.   Neurologic: Awake, alert, oriented. Cranial nerves II-XII are grossly intact.  Strength and sensory is intact. No focal deficits.  Neuropsychiatric: Calm, normal eye contact, alert, affect appropriate to situation, oriented, thought process normal.

## 2020-11-30 NOTE — PLAN OF CARE
Time: Day shift  Status: Stable  NEURO: Alert and oriented x4  RESPIRATORY: Lungs clear, O2 sats 96% on RA  CARDIAC: HR 90s to 100  GI/: Tolerating diet, BM today. Abdomen round, soft, non-tender. Good U/O  DIET: Regular  PAIN/NAUSEA: Norco given x1. Fentanyl patch on  INCISION/DRAIN: wound vac intact, leg ACE wrapped  IV ACCESS: SL patent.  ACTIVITY: Up with SBA and walker  LAB: K+3.8 and replacement done. Redraw in AM  PLAN: Discharge coordinator visited patient to discuss her concerns about going home.

## 2020-11-30 NOTE — PLAN OF CARE
"  /70 (BP Location: Left arm)   Pulse 88   Temp 96.9  F (36.1  C) (Oral)   Resp 18   Ht 1.575 m (5' 2\")   Wt 81.6 kg (180 lb)   SpO2 98%   BMI 32.92 kg/m      Time: 0642-8573.  Reason for admission: POD #1 s/p I&D of left knee and wound vac placement.     VS: VSS on RA with O2 sats in high 90s. Afebrile.   Activity: Up with SBA with walker, steady on feet. Calls appropriately.   Neuros: A&Ox4. Forgetful. Neuros intact, CMS intact. C/o left knee incisional pain managed with scheduled PO Tylenol, PRN PO Oxycodone, and PRN IV dilaudid.   Cardiac: Normotensive, 110-130s/50-70s. HR stable in 80s. Denies chest pain.   Respiratory: LS clear. Denies SOB or PENNY. Stable on RA. No cough noted. COVID negative.   GI/: Voiding without difficulty. X1 BM on this shift. +BS, +gas. Denies nausea or vomiting.   Diet: Regular diet, tolerating well.   Skin: Left knee wound vac WDL, minimal output, ACE bandage covering. Right knee laceration covered with primapore. Scabbing and bruising throughout. Laceration on face and hands.   Lines: Left PIV infusing LR at 100 mL/hr.   Labs: Triggered sepsis, LA 1.9.     New changes this shift/Plan: VSS on RA, afebrile. Pain well controlled with current regimen. Up with assist x1 with walker. LS clear. Denies nausea, tolerating diet. Left knee wound vac WDL. Right knee dressing CDI, face laceration scabbing. Voiding well, x1 BM. LA 1.9. Left PIV infusing LR at 100 mL/hr.   Will continue to monitor & follow POC.     "

## 2020-11-30 NOTE — PROGRESS NOTES
Orthopaedic Surgery Progress Note 11/30/2020    E: No acute events overnight.    S: Pain well controlled.  Denies numbness or tingling. Denies fever/chills/CP/SOB.  Multiple concerns about medications and care received on the floor. Plan of care reviewed and questions answered     O:  Temp: 97.6  F (36.4  C) Temp src: Oral BP: 123/58 Pulse: 96   Resp: 16 SpO2: 96 % O2 Device: None (Room air)      Exam:  Gen: No acute distress, resting comfortably in bed.  Resp: Non-labored breathing    MSK: Focused examination of the LLE shows:  Inspection: Vac to LLE in place and functioning appropriately.  Motor:  Tibialis anterior, gastroc/soleus, EHL strength 5/5   Sensory: SILT to superficial peroneal, deep peroneal, saphenous, sural, and tibial nerve territories  Circulation: palpable DP, foot warm and well perfused      Drain output: minimal output from vac.    Recent Labs   Lab 11/29/20  0735 11/28/20  1215 11/28/20  0836   WBC 12.2* 15.4* 14.7*   HGB 11.5* 13.0 13.5    [phone]       Assessment: Madhavi Castellanos is a 80 year old female s/p left knee I&D, arthrotomy, and wound vac placement on 11/28 with Dr. Peterson. Doing well.    - Vac remains in place and functioning well.     Intra-operatively, there was significant degloving of the subcutaneous fat from the underlying fascia and joint capsule extending from the distal quadriceps (especially medially over VMO) across the anterior aspect of the knee joint to the level of the tibial tuberosity.  Non-viable fat was debrided, but there is concern for continued fat and skin necrosis secondary to the degloving.  Hence, no skin was closed at the time of surgery, and a temporizing wound vac was placed.    Plan:  Trauma Primary  Activity: Up with assist.  Weight bearing status: WBAT LLE  Antibiotics: Clindamycin x 48 hours.  Diet: Begin with clear fluids and progress diet as tolerated.  DVT prophylaxis: Per primary - recommend ASA or similar   Elevation: Elevate LLE on  pillows to keep elevated above the level of the heart as much as possible.  Wound Care: Vac at -125mm Hg low continuous, monitor and record output  Pain management: transition from IV to orals as tolerated.   Physical Therapy:  ROM, ADL's.  Occupational Therapy: ADL's.  Labs: Trend Hgb on POD #1  Consults: PT, OT, plastic surgery, appreciate assistance in caring for this patient.  Follow-up: With Plastic surgery     Disposition: No further orthopaedic interventions. Dispo per primary.    Orthopaedic surgery will defer the management of this patient to the primary trauma team and wound management to the plastic surgery service.  We will continue to follow this patient peripherally while in-house.  Please page me prior to the Ortho resident on-call with questions regarding the orthopaedic care of this patient if questions/concerns.       Jeffery Hernandez MD 11/30/2020  Orthopaedic Surgery Resident, PGY-4  Pager: [phone]

## 2020-12-01 ENCOUNTER — APPOINTMENT (OUTPATIENT)
Dept: PHYSICAL THERAPY | Facility: CLINIC | Age: 80
DRG: 571 | End: 2020-12-01
Payer: COMMERCIAL

## 2020-12-01 ENCOUNTER — APPOINTMENT (OUTPATIENT)
Dept: OCCUPATIONAL THERAPY | Facility: CLINIC | Age: 80
DRG: 571 | End: 2020-12-01
Attending: PHYSICIAN ASSISTANT
Payer: COMMERCIAL

## 2020-12-01 LAB
CREAT SERPL-MCNC: 0.64 MG/DL (ref 0.52–1.04)
GFR SERPL CREATININE-BSD FRML MDRD: 85 ML/MIN/{1.73_M2}
INTERPRETATION ECG - MUSE: NORMAL
PLATELET # BLD AUTO: 322 10E9/L (ref 150–450)
POTASSIUM SERPL-SCNC: 4.1 MMOL/L (ref 3.4–5.3)

## 2020-12-01 PROCEDURE — 85049 AUTOMATED PLATELET COUNT: CPT | Performed by: PHYSICIAN ASSISTANT

## 2020-12-01 PROCEDURE — 97535 SELF CARE MNGMENT TRAINING: CPT | Mod: GO

## 2020-12-01 PROCEDURE — 97530 THERAPEUTIC ACTIVITIES: CPT | Mod: GO

## 2020-12-01 PROCEDURE — 999N000127 HC STATISTIC PERIPHERAL IV START W US GUIDANCE

## 2020-12-01 PROCEDURE — 97530 THERAPEUTIC ACTIVITIES: CPT | Mod: GP

## 2020-12-01 PROCEDURE — 250N000009 HC RX 250: Performed by: PHYSICIAN ASSISTANT

## 2020-12-01 PROCEDURE — 84132 ASSAY OF SERUM POTASSIUM: CPT | Performed by: PHYSICIAN ASSISTANT

## 2020-12-01 PROCEDURE — 82565 ASSAY OF CREATININE: CPT | Performed by: PHYSICIAN ASSISTANT

## 2020-12-01 PROCEDURE — 97165 OT EVAL LOW COMPLEX 30 MIN: CPT | Mod: GO

## 2020-12-01 PROCEDURE — 250N000011 HC RX IP 250 OP 636: Performed by: PHYSICIAN ASSISTANT

## 2020-12-01 PROCEDURE — 120N000002 HC R&B MED SURG/OB UMMC

## 2020-12-01 PROCEDURE — G0463 HOSPITAL OUTPT CLINIC VISIT: HCPCS | Mod: 25

## 2020-12-01 PROCEDURE — 36415 COLL VENOUS BLD VENIPUNCTURE: CPT | Performed by: PHYSICIAN ASSISTANT

## 2020-12-01 PROCEDURE — 97110 THERAPEUTIC EXERCISES: CPT | Mod: GP

## 2020-12-01 PROCEDURE — 250N000013 HC RX MED GY IP 250 OP 250 PS 637: Performed by: PHYSICIAN ASSISTANT

## 2020-12-01 PROCEDURE — 97606 NEG PRS WND THER DME>50 SQCM: CPT

## 2020-12-01 PROCEDURE — 99232 SBSQ HOSP IP/OBS MODERATE 35: CPT | Performed by: NURSE PRACTITIONER

## 2020-12-01 RX ORDER — LIDOCAINE HYDROCHLORIDE 40 MG/ML
30 SOLUTION TOPICAL EVERY 4 HOURS PRN
Status: DISCONTINUED | OUTPATIENT
Start: 2020-12-01 | End: 2020-12-03 | Stop reason: HOSPADM

## 2020-12-01 RX ADMIN — ONDANSETRON 4 MG: 4 TABLET, ORALLY DISINTEGRATING ORAL at 07:35

## 2020-12-01 RX ADMIN — ENOXAPARIN SODIUM 30 MG: 30 INJECTION SUBCUTANEOUS at 08:53

## 2020-12-01 RX ADMIN — HYDROCHLOROTHIAZIDE 12.5 MG: 12.5 CAPSULE, GELATIN COATED ORAL at 08:35

## 2020-12-01 RX ADMIN — Medication 40 MG: at 08:46

## 2020-12-01 RX ADMIN — CLINDAMYCIN IN 5 PERCENT DEXTROSE 900 MG: 18 INJECTION, SOLUTION INTRAVENOUS at 23:27

## 2020-12-01 RX ADMIN — BUPROPION HYDROCHLORIDE 300 MG: 150 TABLET, FILM COATED, EXTENDED RELEASE ORAL at 08:35

## 2020-12-01 RX ADMIN — ENOXAPARIN SODIUM 30 MG: 30 INJECTION SUBCUTANEOUS at 21:05

## 2020-12-01 RX ADMIN — CLINDAMYCIN IN 5 PERCENT DEXTROSE 900 MG: 18 INJECTION, SOLUTION INTRAVENOUS at 07:04

## 2020-12-01 RX ADMIN — ATORVASTATIN CALCIUM 10 MG: 10 TABLET, FILM COATED ORAL at 21:06

## 2020-12-01 RX ADMIN — HYDROCODONE BITARTRATE AND ACETAMINOPHEN 1 TABLET: 7.5; 325 TABLET ORAL at 21:11

## 2020-12-01 RX ADMIN — HYDROMORPHONE HYDROCHLORIDE 0.2 MG: 0.2 INJECTION, SOLUTION INTRAMUSCULAR; INTRAVENOUS; SUBCUTANEOUS at 11:00

## 2020-12-01 RX ADMIN — METHOCARBAMOL 750 MG: 750 TABLET, FILM COATED ORAL at 14:08

## 2020-12-01 RX ADMIN — CLINDAMYCIN IN 5 PERCENT DEXTROSE 900 MG: 18 INJECTION, SOLUTION INTRAVENOUS at 16:01

## 2020-12-01 RX ADMIN — METHOCARBAMOL 750 MG: 750 TABLET, FILM COATED ORAL at 08:36

## 2020-12-01 RX ADMIN — HYDROMORPHONE HYDROCHLORIDE 0.2 MG: 0.2 INJECTION, SOLUTION INTRAMUSCULAR; INTRAVENOUS; SUBCUTANEOUS at 01:04

## 2020-12-01 RX ADMIN — HYDROCODONE BITARTRATE AND ACETAMINOPHEN 1 TABLET: 7.5; 325 TABLET ORAL at 12:41

## 2020-12-01 RX ADMIN — METHIMAZOLE 5 MG: 5 TABLET ORAL at 08:46

## 2020-12-01 RX ADMIN — Medication 40 MG: at 21:05

## 2020-12-01 RX ADMIN — HYDROMORPHONE HYDROCHLORIDE 0.2 MG: 0.2 INJECTION, SOLUTION INTRAMUSCULAR; INTRAVENOUS; SUBCUTANEOUS at 18:07

## 2020-12-01 RX ADMIN — LOSARTAN POTASSIUM 25 MG: 25 TABLET, FILM COATED ORAL at 08:45

## 2020-12-01 RX ADMIN — HYDROMORPHONE HYDROCHLORIDE 0.2 MG: 0.2 INJECTION, SOLUTION INTRAMUSCULAR; INTRAVENOUS; SUBCUTANEOUS at 16:01

## 2020-12-01 RX ADMIN — ASPIRIN 81 MG CHEWABLE TABLET 81 MG: 81 TABLET CHEWABLE at 08:34

## 2020-12-01 ASSESSMENT — ENCOUNTER SYMPTOMS
WOUND: 1
FEVER: 0
SHORTNESS OF BREATH: 0
BACK PAIN: 0
HEADACHES: 0
COUGH: 0
NECK PAIN: 0

## 2020-12-01 ASSESSMENT — ACTIVITIES OF DAILY LIVING (ADL)
ADLS_ACUITY_SCORE: 20
DEPENDENT_IADLS:: INDEPENDENT
ADLS_ACUITY_SCORE: 20
PREVIOUS_RESPONSIBILITIES: MEAL PREP;HOUSEKEEPING;LAUNDRY;SHOPPING;MEDICATION MANAGEMENT;FINANCES;DRIVING
ADLS_ACUITY_SCORE: 20
ADLS_ACUITY_SCORE: 20

## 2020-12-01 NOTE — CONSULTS
Care Management Initial Consult    General Information  Assessment completed with: Patient,    Type of CM/SW Visit: Initial Assessment  Primary Care Provider verified and updated as needed: Yes   Readmission within the last 30 days: no previous admission in last 30 days      Reason for Consult: care coordination/care conference  Advance Care Planning: Advance Care Planning Reviewed: no concerns identified          Communication Assessment  Patient's communication style: spoken language (English or Bilingual)    Hearing Difficulty or Deaf: no   Wear Glasses or Blind: yes    Cognitive  Cognitive/Neuro/Behavioral: WDL                      Living Environment:   People in home: alone     Current living Arrangements: other (see comments)([facility])      Able to return to prior arrangements: yes       Family/Social Support:  Care provided by: self  Provides care for: no one  Marital Status: Single  Other (specify)(Friends)          Description of Support System: Supportive;Involved    Support Assessment: Adequate family and caregiver support    Current Resources:   Skilled Home Care Services:  NA  Community Resources: None  Equipment currently used at home: cane, straight;walker, rolling  Supplies currently used at home: None    Employment/Financial:  Employment Status: retired        Financial Concerns: No concerns identified           Lifestyle & Psychosocial Needs:        Socioeconomic History     Marital status:      Spouse name: Not on file     Number of children: Not on file     Years of education: Not on file     Highest education level: Not on file     Tobacco Use     Smoking status: Never Smoker     Smokeless tobacco: Never Used   Substance and Sexual Activity     Alcohol use: No     Drug use: No     Sexual activity: Not Currently       Functional Status:  Prior to admission patient needed assistance:   Dependent ADLs:: Independent;Ambulation-cane  Dependent IADLs:: Independent          Values/Beliefs:  Spiritual, Cultural Beliefs, Restorationist Practices, Values that affect care: no               Additional Information:  Patient is a 80 year old female who had a fall and sustained a left open knee laceration.  Patient is now s/p I&D with wound vac placement to the left knee.  PT/OT evaluated the patient and are recommending TCU, patient is declining TCU and plans to discharge to home with services.  Met with patient at bedside to introduce RNCC role and discuss discharge planning.  Patient verified that she is hoping to discharge directly to home and not to TCU.  [facility] in [place].  She has a friend, Ayse, who lives near her and is able to assist her as needed.  Discussed home care f/u which patient is agreeable to.  After providing choice of agency patient preferred a referral be made to [facility]. Pt/family was provided with the Medicare Compare list for Home Care.  Discussed associated Medicare star ratings to assist with choice for referrals/discharge planning Yes    Education was given to pt/family that star ratings are updated/maintained by Medicare and can be reviewed by visiting www.medicare.gov Yes    Referral made and orders placed for RN/PT/OT.  Patient is planning on her friend Ayse or Abdullahi to provide transportation to home.      Application for home wound vac started on [employer].  Order and supporting documentation faxed. Awaiting approval.      RNCC will continue to follow and assist with discharge planning.      [facility](RN/PT/OT)  Phone: [phone]  Fax: [phone]            PIYUSH Saenz  Phone: [phone]  Pager: [phone]  To contact the weekend RNCC, Page: [phone]

## 2020-12-01 NOTE — PROGRESS NOTES
"[facility]   WO Nurse Inpatient Wound Assessment with Negative Pressure Wound Therapy     Assessment   Initial Assessment of wound(s) on pt's: Left Knee  Left Knee wound due to   Status: Initial Assessment    Treatment Plan  Left knee wound: Negative Pressure Wound Therapy (NPWT) to be changed by WOC RN every Tues/ Fri week of 12/1 then M/W/F with medium black foam and adaptic. Staff RN to assess pump settings set to -125 and dressing integrity every shift. Remove foam dressing and replace with BID normal saline moist gauze dressing if:  -a dressing failure which cannot be repaired within 2 hours  -patient is discharging to home without a home pump  -patient is discharging to a facility outside the local area  -if a dressing is a \"Silver Foam\", remove before Radiation Therapy or MRI    Nursing to notify the Provider(s) and re-consult the [facility] Nurse if wound(s) deteriorate(s) or if necessary to reevaluate the plan.    Dressing change to R knee and Left shin to be completed MWF with Vac changes    1. Cleanse with microklenz and blot dry  2. Apply adaptic to wound bed  3. Cover with Mepilex  4. Time/Date/Initial Dressing change  ** If saturated ok to change dressing or notify [facility]      Patient History    According to medical record: Madhavi Castellanos is a 80 year old female who presented to [facility] ED after fall. Patient has a history of falling d/t her \"knees giving out,\" loosing support and falling. On 11/27, she was walking without her walker and felt her right thigh move laterally and her lower leg move medially. She then fell forward landing on her knees and hitting her head. She was unable to stand or get help, was down for about 7 hours. She had no dizziness before fall. No LOC, nausea, afterwards.   Lives alone,  and no children.    Objective Data  Current support surface: Standard , Atmos Air mattress  Current off-loading measures: Pillows  Containment of " urine/stool: Incontinence Protocol  Current diet/nutrition: Orders Placed This Encounter      Regular Diet Adult    Output: I/O last 3 completed shifts:  In: 1290 [P.O.:1190; I.V.:100]  Out: 250 [Urine:250]  Arvin:   Sensory Perception: 3-->slightly limited  Moisture: 3-->occasionally moist  Activity: 3-->walks occasionally  Mobility: 3-->slightly limited  Nutrition: 3-->adequate  Friction and Shear: 3-->no apparent problem  Arvin Score: 18  Labs:   Recent Labs   Lab Test 11/30/20  1132 11/28/20  1215 11/28/20  1215 03/12/20  1151 03/12/20  1151   ALBUMIN  --   --  3.4   < > 3.1*   HGB 12.1   < > 13.0   < > 13.9   INR  --   --   --   --  1.03   WBC 11.6*   < > 15.4*   < > 18.0*    < > = values in this interval not displayed.       Physical Exam  Wound Location: Left Knee     Left knee    Wound History: see above hx  Surgical date: 11/28/2020  Date Negative Pressure Wound Therapy initiated: 11/28 in surgery   Measurements: (length x width x depth in cm):14 x 7.2 x 2  Wound Base: moist muscle,adipose tissue small piece of tibia directly in center  Palpation of the wound bed:  Normal  Periwound Skin: intact and small skin tear at 3 O'clock, very fragile skin  Color: ecchymotic  Temperature  normal   Drainage: Amount: scant  Color: serosanguinous   Odor: none  Pain:  severe stabbing, sharp and shooting    Was patient premedicated prior to dressing change? Yes    Medication(s) used:  See MAR          Wound Location: Left Shin   0.3 x 11 x 0.1 cm skin tear with red moist dermis, bleeding easily and very painful, periwound ecchymotic    L shin        Wound Location:Right Knee  small dehiscence in previous sutured skin flap with red moist dermis, very painful, periwound ecchymotic.     Right Knee      Intervention:     Visual inspection of wound(s):  Wound was assessed.  Wound Care: was done  Orders:  Written  Supplies:  Ordered additional vac sponge and cannister  Discussed plan of care with: Patient, nurse,  physician    David Mock RN CWOCN

## 2020-12-01 NOTE — PLAN OF CARE
"Shift: 2300 - 0700  VS: BP (!) 142/75 (BP Location: Left arm)   Pulse 100   Temp 96.7  F (35.9  C) (Axillary)   Resp 18   Ht 1.575 m (5' 2\")   Wt 81.6 kg (180 lb)   SpO2 96%   BMI 32.92 kg/m      Neuro: A&Ox4, CMS & neuros intact q4h  Cardiac: int tachy, pulses palpable  Resp: lung sounds clear, no SOB reported, sating well on RA, IS encouraged  GI: passing gas, bowel sounds active, LBM 11/30  : voiding spontaneously   Skin: scabbing & bruising throughout entire body, hand and face lac scabbing, R knee dressing CDI, L knee wound vac -125 sx, 2+ edema  Pain/Nausea: L knee pain treated with IV dilaudid, pt refused PO meds, fent patch on abdomen; denies nausea  LDA: L PIV infusing IV abx  Diet: regular  Mobility: 1 assist FWW, WBAT BLE   Labs: reviewed  Plan: continue plan of care     "

## 2020-12-01 NOTE — PLAN OF CARE
"Up with assist of one. Alert, oriented x4. Lungs clear. Apical pulse 96 and regular. Abdomen soft, large, non-tender. BM today. Voiding in toilet but missing the \"hat\". Vac dressing changed by WOC nurse. Dressing changed on R knee.  "

## 2020-12-01 NOTE — PROGRESS NOTES
[facility]   Trauma Service Progress Note    Date of Service (when I saw the patient): 12/01/2020     Assessment & Plan   Trauma mechanism: Fall from standing  Time/date of injury: 11/28 around 2200  Known Injuries:  1. Large complex wound to Bilat Knees, reopened after this fall  2. Hematoma to left forehead     Procedures:  1. Irrigation and Debridement, lower extremity with negative pressure dressing placement, left knee arthrotomy 11/28, Dr. Ramirez     Neuro/Pain/Psych:  # Fall  # Closed head injury  - Head CT: No acute intracranial abnormality. Small left frontal scalp contusion.     # Acute on chronic pain   - Seen At [facility]-  - Discussed with Pain team and Pharmacy, recommended we continue her PTA medications and have her closely follow-up with her pain clinic afterwards per their pain contract  - Restarted PTA: fentanyl patch, norco,   - Scheduled: Tylenol  - IV Hydromorphone prior to dressing change   -Added Lidocaine solution for dressing changes     # Depression  - continued PTA: Bupropion  - Recent bupropion increase for treatment of worsening depression symptoms. Will need to follow up with OP provider upon discharge  -  Maintain circadian rhythm.  Lights on during the day.  Off at night, minimize cares at night.  OOB during the day.     Pulmonary:  # TALI  # Asthma   - Supplemental oxygen to keep saturation above 92 %.  - Incentive spirometer while awake      Cardiovascular:    # Hypertension   # Paroxysmal Afib, s/p ablation 8/11/19  # Hypercholesterolemia  # CAD  # LBBB  # Prolonged QT interval   - 3/13/20: / QTc 506   - 11/28/20: / QTc 492  - 11/30/20: / QTc 492  - Continued PTA:  asa 81, atorvastatin, diltiazem, hydrochlorothiazide, losartan, solalol.   - Monitor hemodynamic status.     GI/Nutrition:    # GERD  - PTA: phenergan,      Renal/ Fluids/Electrolytes:  SL MIVF  - electrolyte replacement protocol in place.       Endocrine:  # Hx of Graves disease   # Thyroid nodules, last US 9/22/20  - PTA medications: methimazole       Infectious disease:   # Hx of Tb 1991  # COVID neg 11/28  # Leukocytosis, infectious vs stress from trauma   - WBC: 11.6 ? 12.2 ? 15.4  - UA at OSH: small blood, few bacteria. UC resulted, plan not to treat as is asymtomatic  - BC 11/28: NGTD     Antibiotics:  - Vanco 1500mg x1 11/28  - Clindamycin x 48 completed     Hematology:    - Hgb stable without transfusions  - Threshold for transfusion if hgb <7.0 or signs/symptoms of hypoperfusion.        # DVT Prophylaxis: Lovenox post-op     Musculoskeletal:  # Lumbosacral spondylosis  # OA bilat knee  # Weakness and deconditioning  # Frequent falls   # Large complex wound to Bilat Knees, reopened after this fall, tracks to joint space s/p I&D 11/28  - Ortho recommendations: Up with assist, WBAT LLE, clinda x 48hrs, ASA, Elevate LLE on pillows to keep elevated above the level of the heart as much as possible.   - Wound vac at -125mm Hg low continuous, monitor and record output. dressing changed today 12/01/20  - Follow up with Plastics and Orthopedic Surgery outpatient  - Physical and occupational therapy consults.     Skin:  - WOC plan wound vac dressing changes Tuesday and Friday.      Lines/ tubes/ drains:  - PIV/ wound vac      General Cares:                 PPI/H2 blocker:  NA                DVT prophylaxis: Lovenox                 Bowel Regimen/Date of last stool: 11/30                Pulmonary toilet: IS                ETOH screen completed yes, neg              Frailty Score: 2                Lines / drains: PIV     Code status:  Full                Discharge goals:     Adequate pain management: in progress      Patient is seen at a Chronic Pain clinic. She will continue the pain medications she has at home and will follow-up quickly with them for any additions or changes since she is on a pain contract.     VSS x24 hours:  yes    Hemoglobin stable x  48 hours: in process     Ambulating safely and/or therapy evals complete: yes    Drains/lines removed or plan in place to manage: yes    Teaching done:     Other:  Expected D/C date: anticipate tomorrow pending safety with stairs mobility and pain control  Interval History   Report good pain control. Requesting Vicodin in the afternoon for addition pain control. Denies shortness of breath, chest pain or nausea.  ROS x 8 negative with exception of those things listed in interval hx    Physical Exam   Temp: 95.7  F (35.4  C) Temp src: Axillary BP: (!) 143/85 Pulse: 96   Resp: 16 SpO2: 96 % O2 Device: None (Room air)    Vitals:    11/28/20 1202   Weight: 81.6 kg (180 lb)     Vital Signs with Ranges  Temp:  [95.7  F (35.4  C)-97.5  F (36.4  C)] 95.7  F (35.4  C)  Pulse:  [] 96  Resp:  [16-20] 16  BP: (125-143)/(68-85) 143/85  SpO2:  [95 %-100 %] 96 %  I/O last 3 completed shifts:  In: 1290 [P.O.:1190; I.V.:100]  Out: 250 [Urine:250]    [place] Coma Scale - Total 15/15  Constitutional: Awake, alert, cooperative, no apparent distress.  Eyes: Lids and lashes normal, pupils equal, round and reactive to light  ENT: Normocephalic, forehead contussion  Respiratory: No increased work of breathing, good air exchange, clear to auscultation bilaterally, no crackles or wheezing.  Cardiovascular:  regular rate and rhythm, normal S1 and S2, no S3 or S4, and no murmur.   GI:round and soft  Genitourinary:  Not seen voids spontaneously  Skin: Left knee wound covered  Right knee wound sutures approximated with scant serosanguinous drainage  Musculoskeletal: There is no redness, warmth, or swelling of the joints.  Pedal pulse palpated.  Neurologic: Awake, alert, oriented. Cranial nerves II-XII are grossly intact.  Strength and sensory is intact. No focal deficits.  Neuropsychiatric: Calm, normal eye contact, alert, affect appropriate to situation, oriented, thought process normal.    TRESA Moran CNP  To contact the trauma  service use job code pager [phone],   Numeric texts or alpha text through [facility]

## 2020-12-01 NOTE — PLAN OF CARE
Neuros: alert and oriented x4. Calls appropriately. CMS intact to LLE, baseline bilateral toes numbness.  Cardiac: slightly tachy. EKG done on day shift. +2 edema to left zoe. Leg elevated on pillows.  Respiratory: WNL. Sating 100% on RA.  GI/: voiding spontaneously. +BS. No BM shift shift.  Diet: regular diet. Ate 100% for dinner.  Activity: up with assist of 1 and walker.  Skin: generalized bruising: (L) forehead, left arm, abdomen. Facial scabbing. (R) knee primapore dressing with dried drainage. LLE acewrapped.  LDA: (L) PIV at TKO. (L) knee wound vac to -125 mm Hg.   Pain: IV Dilaudid given x1.Norco given x1 bedtime (ordered twice a day). Pt states she takes norco at least 5 hours apart at home and tolerating well. Pt received norco 7 hr from the last dose at HS. Fentanyl patch on abdomen.  Plan: continue with poc.

## 2020-12-02 ENCOUNTER — APPOINTMENT (OUTPATIENT)
Dept: PHYSICAL THERAPY | Facility: CLINIC | Age: 80
DRG: 571 | End: 2020-12-02
Payer: COMMERCIAL

## 2020-12-02 PROCEDURE — 250N000009 HC RX 250: Performed by: PHYSICIAN ASSISTANT

## 2020-12-02 PROCEDURE — 999N000127 HC STATISTIC PERIPHERAL IV START W US GUIDANCE

## 2020-12-02 PROCEDURE — 250N000011 HC RX IP 250 OP 636: Performed by: PHYSICIAN ASSISTANT

## 2020-12-02 PROCEDURE — 97116 GAIT TRAINING THERAPY: CPT | Mod: GP

## 2020-12-02 PROCEDURE — 97530 THERAPEUTIC ACTIVITIES: CPT | Mod: GP

## 2020-12-02 PROCEDURE — 99232 SBSQ HOSP IP/OBS MODERATE 35: CPT | Performed by: NURSE PRACTITIONER

## 2020-12-02 PROCEDURE — 250N000013 HC RX MED GY IP 250 OP 250 PS 637: Performed by: PHYSICIAN ASSISTANT

## 2020-12-02 PROCEDURE — 120N000002 HC R&B MED SURG/OB UMMC

## 2020-12-02 PROCEDURE — 250N000011 HC RX IP 250 OP 636: Performed by: NURSE PRACTITIONER

## 2020-12-02 RX ORDER — LIDOCAINE 4 G/G
1-3 PATCH TOPICAL EVERY 24 HOURS
Qty: 10 PATCH | Refills: 0 | Status: ON HOLD | OUTPATIENT
Start: 2020-12-02 | End: 2022-11-02

## 2020-12-02 RX ORDER — LIDOCAINE HYDROCHLORIDE 40 MG/ML
30 SOLUTION TOPICAL DAILY
Qty: 50 ML | Refills: 3 | Status: ON HOLD | OUTPATIENT
Start: 2020-12-02 | End: 2022-11-02

## 2020-12-02 RX ORDER — HYDROMORPHONE HYDROCHLORIDE 1 MG/ML
0.5 INJECTION, SOLUTION INTRAMUSCULAR; INTRAVENOUS; SUBCUTANEOUS EVERY 6 HOURS PRN
Status: DISCONTINUED | OUTPATIENT
Start: 2020-12-02 | End: 2020-12-03

## 2020-12-02 RX ORDER — ACETAMINOPHEN 325 MG/1
975 TABLET ORAL 3 TIMES DAILY
Status: ON HOLD | COMMUNITY
Start: 2020-12-02 | End: 2022-11-02

## 2020-12-02 RX ADMIN — LOSARTAN POTASSIUM 25 MG: 25 TABLET, FILM COATED ORAL at 08:03

## 2020-12-02 RX ADMIN — CLINDAMYCIN IN 5 PERCENT DEXTROSE 900 MG: 18 INJECTION, SOLUTION INTRAVENOUS at 08:02

## 2020-12-02 RX ADMIN — Medication 40 MG: at 20:42

## 2020-12-02 RX ADMIN — METHIMAZOLE 5 MG: 5 TABLET ORAL at 08:05

## 2020-12-02 RX ADMIN — ENOXAPARIN SODIUM 30 MG: 30 INJECTION SUBCUTANEOUS at 08:13

## 2020-12-02 RX ADMIN — METHOCARBAMOL 750 MG: 750 TABLET, FILM COATED ORAL at 23:55

## 2020-12-02 RX ADMIN — HYDROMORPHONE HYDROCHLORIDE 0.2 MG: 0.2 INJECTION, SOLUTION INTRAMUSCULAR; INTRAVENOUS; SUBCUTANEOUS at 05:51

## 2020-12-02 RX ADMIN — HYDROCODONE BITARTRATE AND ACETAMINOPHEN 1 TABLET: 7.5; 325 TABLET ORAL at 14:11

## 2020-12-02 RX ADMIN — ACETAMINOPHEN 975 MG: 325 TABLET, FILM COATED ORAL at 23:54

## 2020-12-02 RX ADMIN — ENOXAPARIN SODIUM 30 MG: 30 INJECTION SUBCUTANEOUS at 20:42

## 2020-12-02 RX ADMIN — ATORVASTATIN CALCIUM 10 MG: 10 TABLET, FILM COATED ORAL at 20:42

## 2020-12-02 RX ADMIN — ACETAMINOPHEN 975 MG: 325 TABLET, FILM COATED ORAL at 13:23

## 2020-12-02 RX ADMIN — HYDROCHLOROTHIAZIDE 12.5 MG: 12.5 CAPSULE, GELATIN COATED ORAL at 08:03

## 2020-12-02 RX ADMIN — HYDROMORPHONE HYDROCHLORIDE 0.2 MG: 0.2 INJECTION, SOLUTION INTRAMUSCULAR; INTRAVENOUS; SUBCUTANEOUS at 03:06

## 2020-12-02 RX ADMIN — ASPIRIN 81 MG CHEWABLE TABLET 81 MG: 81 TABLET CHEWABLE at 08:03

## 2020-12-02 RX ADMIN — BUPROPION HYDROCHLORIDE 300 MG: 150 TABLET, FILM COATED, EXTENDED RELEASE ORAL at 08:03

## 2020-12-02 RX ADMIN — Medication 40 MG: at 08:03

## 2020-12-02 RX ADMIN — HYDROCODONE BITARTRATE AND ACETAMINOPHEN 1 TABLET: 7.5; 325 TABLET ORAL at 22:00

## 2020-12-02 RX ADMIN — METHOCARBAMOL 750 MG: 750 TABLET, FILM COATED ORAL at 13:22

## 2020-12-02 ASSESSMENT — ACTIVITIES OF DAILY LIVING (ADL)
ADLS_ACUITY_SCORE: 20

## 2020-12-02 NOTE — PLAN OF CARE
OT:  Patient declining OOB tasks, discussed cognitive screen to ensure safety with discharge planning with patient and patient became immediately agitated and asked therapist to leave.

## 2020-12-02 NOTE — PROGRESS NOTES
Care Management Follow Up    Length of Stay (days): 4    Expected Discharge Date: 12/04/20     Concerns to be Addressed:     Home Care f/u  Patient plan of care discussed at interdisciplinary rounds: Yes    Anticipated Discharge Disposition:  Home with home services     Anticipated Discharge Services:  Home Care RN/PT/OT  Anticipated Discharge DME:  NA    Patient/family educated on Medicare website which has current facility and service quality ratings:  Yes  Education Provided on the Discharge Plan:  Yes  Patient/Family in Agreement with the Plan:  Yes    Referrals Placed by CM/SW:     [facility](P: [phone]): declined patient   [facility](P: [phone]): do not accept patients insurance   [facility](P: [phone]): Full until next week  [facility](P: [phone]): Full until Monday  [facility](P: [phone], F: [phone])-Able to accept the patient for a SOC on Saturday, referral faxed    Private pay costs discussed: transportation costs    Additional Information:  Patient updated that [facility] unable to accept referral.  Patient did not have a preference, just wanted one that would be able to see her.  Updated her that [facility] Able to accept and will plan to see her on Saturday.  Plan is for wound vac to be changed tomorrow and then again on Saturday by home care.  Patient reports her friends are unable to provide transportation to home tomorrow.  Discussed that this writer could assist in arranging w/c transportation to home but it would likely be an out of pocket expense.  Patient was understanding of this.  Patient asked appox how much and this writer explained that it was around $75 for them to show up and then approx $4-6 per mile.  She said she would reach out to her friends one more time to see if they would be able to assist her.  She asked that this writer set up a w/c ride for tomorrow at 1pm.  [facility]([phone]) w/c Ride arranged for 1pm.  Patient was approved for home wound vac.  Will need to call sterile stores in the morning to have ready vac delivered.  RNCC will continue to follow and assist with discharge planning.       [facility](RN/PT/OT)  Phone: [phone]  Fax: [phone]       PIYUSH Saenz  Phone: [phone]  Pager: [phone]  To contact the weekend RNCC, Page: [phone]

## 2020-12-02 NOTE — PROGRESS NOTES
[facility]   Trauma Service Progress Note    Date of Service (when I saw the patient): 12/02/2020     Assessment & Plan   Trauma mechanism: Fall from standing  Time/date of injury: 11/28 around 2200  Known Injuries:  1. Large complex wound to Bilat Knees, reopened after this fall  2. Hematoma to left forehead     Procedures:  1. Irrigation and Debridement, lower extremity with negative pressure dressing placement, left knee arthrotomy 11/28, Dr. Ramirez     Neuro/Pain/Psych:  # Fall  # Closed head injury  - Head CT: No acute intracranial abnormality. Small left frontal scalp contusion.     # Acute on chronic pain   - Seen At [facility]-  - Discussed with Pain team and Pharmacy, recommended we continue her PTA medications and have her closely follow-up with her pain clinic afterwards per their pain contract  - Restarted PTA: fentanyl patch, norco,   - Scheduled: Tylenol  - IV Hydromorphone prior to dressing change only   -Added Lidocaine solution for dressing changes     # Depression  - continued PTA: Bupropion  - Recent bupropion increase for treatment of worsening depression symptoms. Will need to follow up with OP provider upon discharge  -  Maintain circadian rhythm.  Lights on during the day.  Off at night, minimize cares at night.  OOB during the day.     Pulmonary:  # TALI  # Asthma   - Supplemental oxygen to keep saturation above 92 %.  - Incentive spirometer while awake      Cardiovascular:    # Hypertension   # Paroxysmal Afib, s/p ablation 8/11/19  # Hypercholesterolemia  # CAD  # LBBB  # Hx Prolonged QT interval   - 3/13/20: / QTc 506   - 11/28/20: / QTc 492  - 11/30/20: / QTc 492  - Continued PTA:  asa 81, atorvastatin, diltiazem, hydrochlorothiazide, losartan, solalol.   - Monitor hemodynamic status.     GI/Nutrition:    # GERD  - PTA: phenergan,      Renal/ Fluids/Electrolytes:  SL MIVF  - electrolyte replacement protocol in  place.      Endocrine:  # Hx of Graves disease   # Thyroid nodules, last US 9/22/20  - PTA medications: methimazole       Infectious disease:   # Hx of Tb 1991  # COVID neg 11/28  # Leukocytosis, infectious vs stress from trauma, improved  - UA at OSH: small blood, few bacteria. UC resulted, plan not to treat as is asymtomatic  - BC 11/28: NGTD     Antibiotics:  - Vanco 1500mg x1 11/28  - Clindamycin x 48 completed     Hematology:    - Hgb stable without transfusions  - Threshold for transfusion if hgb <7.0 or signs/symptoms of hypoperfusion.        # DVT Prophylaxis: Lovenox post-op     Musculoskeletal:  # Lumbosacral spondylosis  # OA bilat knee  # Weakness and deconditioning  # Frequent falls   # Large complex wound to Bilat Knees, reopened after this fall, tracks to joint space s/p I&D 11/28  - Ortho recommendations: Up with assist, WBAT LLE, clinda x 48hrs, ASA, Elevate LLE on pillows to keep elevated above the level of the heart as much as possible.   - Wound vac at -125mm Hg low continuous, monitor and record output. dressing changed today 12/01/20  - Follow up with Plastics in 1 to 2 weeks for a wound check and to discuss further management of the left knee wound and Orthopedic Surgery outpatient  - Physical and occupational therapy consults.     Skin:  - WOC plan wound vac dressing changes Tuesday and Friday.   - Right knee laceration- suture removal in 7-10 days     Lines/ tubes/ drains:  - PIV/ wound vac      General Cares:                 PPI/H2 blocker:  NA                DVT prophylaxis: Lovenox                 Bowel Regimen/Date of last stool: 11/30                Pulmonary toilet: IS                Lines / drains: PIV     Code status:  Full                Discharge goals:     Adequate pain management: in progress      Patient is seen at a Chronic Pain clinic. She will continue the pain medications she has at home and will follow-up quickly with them for any additions or changes since she is on a pain  contract.     VSS x24 hours:  yes    Hemoglobin stable x 48 hours: yes    Ambulating safely and/or therapy evals complete: yes    Drains/lines removed or plan in place to manage: yes    Teaching done:     Other:  Expected D/C date: medically ready for discharge pending available home care    Interval History   Feels well and reports good pain control, was able to work with therapy on stairs today. Denies shortness of breath, abdominal pain or nausea. I discussed utilizing Lidocaine solution prior to dressing change and pt was agreeable to this. Agreeable to discharge to home with home nurse for vac dressing and home therapy.  ROS x 8 negative with exception of those things listed in interval hx    Physical Exam   Temp: 96.7  F (35.9  C) Temp src: Oral BP: (!) 141/73 Pulse: 102   Resp: 18 SpO2: 94 % O2 Device: None (Room air)    Vitals:    11/28/20 1202   Weight: 81.6 kg (180 lb)     Vital Signs with Ranges  Temp:  [95.7  F (35.4  C)-97.4  F (36.3  C)] 96.7  F (35.9  C)  Pulse:  [] 102  Resp:  [15-18] 18  BP: (114-143)/(71-85) 141/73  SpO2:  [94 %-96 %] 94 %  I/O last 3 completed shifts:  In: 240 [P.O.:240]  Out: 100 [Drains:100]    [place] Coma Scale - Total 15/15  Constitutional: Awake, alert, cooperative, no apparent distress.  ENT: Normocephalic, forehead contusion, scabbed abrasions  Respiratory: No increased work of breathing, good air exchange, clear to auscultation bilaterally, no crackles or wheezing.  Cardiovascular:  regular rate and rhythm, normal S1 and S2, no S3 or S4  GI:round and soft  Genitourinary:  Not seen voids spontaneously  Skin: Left knee wound covered, ACE wrap CDI  Right knee wound sutures approximated with scant serosanguinous drainage  Musculoskeletal: There is no redness, warmth, or swelling of the joints.  Pedal pulse palpated.  Neurologic: Awake, alert, oriented. Cranial nerves II-XII are grossly intact.  Strength and sensory is intact. No focal deficits.  Neuropsychiatric: Calm,  normal eye contact, alert, affect appropriate to situation, oriented, thought process normal.    TRESA Moran CNP  To contact the trauma service use job code pager [phone],   Numeric texts or alpha text through [facility]

## 2020-12-02 NOTE — PLAN OF CARE
"/78 (BP Location: Left arm)   Pulse 87   Temp 96.2  F (35.7  C) (Oral)   Resp 18   Ht 1.575 m (5' 2\")   Wt 81.6 kg (180 lb)   SpO2 94%   BMI 32.92 kg/m       Neuro: A&O x4, calls appropriately.   Respiratory: Sat mid 90's on RA, denies SOB.   Cardiac: HTN, asymptomatic, denies chest pain.   GI/: Voiding w/out difficulty, reports 3 BM this shift. Passing flatus.   Diet: Tolerating regular diet.   Pain/Nausea: C/O L knee & back pain. gave tylenol, robaxin, & norco w/ some relief. Denies nausea.   Incisions/Drains/Skin: L knee wound vac, -125, serosangenous. Small output in cannister. L leg ACE wrapped, unwrapped to assess skin. Bruising throughout whole body. R knee dressing CDI. L face laceration & bruising.   IV Access: R PIV TKO w/ intermittent antibiotics.   Labs: reviewed.   Activity: SBA w/ walker. Ambulating to bathroom. Worked w/ PT today.   Plan:  Possible discharge tomorrow depending on home health plans. Continue to monitor.   "

## 2020-12-02 NOTE — PLAN OF CARE
Timed Up and Go (TUG): TUG is a test of basic mobility skills. It is used to screen individuals prone to falls.  Gait assistive device used: FWW    Patient score trial 1: 47.8 seconds, trial 2: 42.8  ?13.5 seconds indicate at risk for falls in older adults according to Monica et al 2000.  ?30 seconds - indicates dependency in most ADL and mobility skills according to Poskirby & Ray 1991  >11.5 seconds indicate at risk for falls in adults with Parkinson's Disease    Minimal Detectable Change for patients with Alzheimer s = 4.09 sec   Minimal Detectable Change for patients with Parkinson s Disease = 3.5 sec   according to Natalya & Deya Moreno 2011    Assessment (rationale for performing, application to patient s function & care plan): To assess fall risk.

## 2020-12-02 NOTE — PLAN OF CARE
Neuros: alert and oriented x4. Calls appropriately. CMS intact to LLE. Bilateral toes numbness.  Cardiac: WNL. Denies chest pain.  Respiratory: WNL. Sating 96% on RA.  GI/: voiding spontaneously. +BS. No BM shift shift.  Diet: regular diet. Ate 100% for dinner.  Activity: up with assist of 1 and walker.  Skin: generalized bruising: (L) forehead, left face laceration with scabbing. Right knee and left shin primapore CDI. +2 edema to LLE. LLE ACE wrapped from mid thigh to ankle.  LDA: (L) PIV at TKO. (L) knee wound vac to -125 mm Hg with scant serosanguinous output.   Pain: IV Dilaudid given x2 and norco at bedtime for left knee pain.  Plan: continue with poc.

## 2020-12-03 ENCOUNTER — APPOINTMENT (OUTPATIENT)
Dept: OCCUPATIONAL THERAPY | Facility: CLINIC | Age: 80
DRG: 571 | End: 2020-12-03
Payer: COMMERCIAL

## 2020-12-03 ENCOUNTER — PATIENT OUTREACH (OUTPATIENT)
Dept: CARE COORDINATION | Facility: CLINIC | Age: 80
End: 2020-12-03

## 2020-12-03 ENCOUNTER — TELEPHONE (OUTPATIENT)
Dept: PLASTIC SURGERY | Facility: CLINIC | Age: 80
End: 2020-12-03

## 2020-12-03 ENCOUNTER — APPOINTMENT (OUTPATIENT)
Dept: PHYSICAL THERAPY | Facility: CLINIC | Age: 80
DRG: 571 | End: 2020-12-03
Payer: COMMERCIAL

## 2020-12-03 VITALS
TEMPERATURE: 97.1 F | HEIGHT: 62 IN | DIASTOLIC BLOOD PRESSURE: 64 MMHG | WEIGHT: 180 LBS | SYSTOLIC BLOOD PRESSURE: 151 MMHG | RESPIRATION RATE: 18 BRPM | OXYGEN SATURATION: 97 % | BODY MASS INDEX: 33.13 KG/M2 | HEART RATE: 104 BPM

## 2020-12-03 PROCEDURE — 99222 1ST HOSP IP/OBS MODERATE 55: CPT | Performed by: PSYCHIATRY & NEUROLOGY

## 2020-12-03 PROCEDURE — 250N000011 HC RX IP 250 OP 636: Performed by: NURSE PRACTITIONER

## 2020-12-03 PROCEDURE — 250N000009 HC RX 250: Performed by: NURSE PRACTITIONER

## 2020-12-03 PROCEDURE — 99239 HOSP IP/OBS DSCHRG MGMT >30: CPT | Performed by: NURSE PRACTITIONER

## 2020-12-03 PROCEDURE — 97530 THERAPEUTIC ACTIVITIES: CPT | Mod: GP

## 2020-12-03 PROCEDURE — 250N000013 HC RX MED GY IP 250 OP 250 PS 637: Performed by: PHYSICIAN ASSISTANT

## 2020-12-03 PROCEDURE — 250N000011 HC RX IP 250 OP 636: Performed by: PHYSICIAN ASSISTANT

## 2020-12-03 PROCEDURE — 97535 SELF CARE MNGMENT TRAINING: CPT | Mod: GO

## 2020-12-03 PROCEDURE — 97606 NEG PRS WND THER DME>50 SQCM: CPT

## 2020-12-03 RX ORDER — OXYCODONE HYDROCHLORIDE 5 MG/1
5 TABLET ORAL ONCE
Status: DISCONTINUED | OUTPATIENT
Start: 2020-12-03 | End: 2020-12-03 | Stop reason: HOSPADM

## 2020-12-03 RX ADMIN — METHOCARBAMOL 750 MG: 750 TABLET, FILM COATED ORAL at 13:34

## 2020-12-03 RX ADMIN — ASPIRIN 81 MG CHEWABLE TABLET 81 MG: 81 TABLET CHEWABLE at 08:27

## 2020-12-03 RX ADMIN — LIDOCAINE HYDROCHLORIDE 30 ML: 40 SOLUTION TOPICAL at 12:31

## 2020-12-03 RX ADMIN — LOSARTAN POTASSIUM 25 MG: 25 TABLET, FILM COATED ORAL at 08:27

## 2020-12-03 RX ADMIN — METHOCARBAMOL 750 MG: 750 TABLET, FILM COATED ORAL at 08:27

## 2020-12-03 RX ADMIN — HYDROCHLOROTHIAZIDE 12.5 MG: 12.5 CAPSULE, GELATIN COATED ORAL at 08:27

## 2020-12-03 RX ADMIN — ENOXAPARIN SODIUM 30 MG: 30 INJECTION SUBCUTANEOUS at 08:27

## 2020-12-03 RX ADMIN — BUPROPION HYDROCHLORIDE 300 MG: 150 TABLET, FILM COATED, EXTENDED RELEASE ORAL at 08:27

## 2020-12-03 RX ADMIN — Medication 40 MG: at 08:27

## 2020-12-03 RX ADMIN — METHIMAZOLE 5 MG: 5 TABLET ORAL at 08:27

## 2020-12-03 RX ADMIN — ACETAMINOPHEN 975 MG: 325 TABLET, FILM COATED ORAL at 08:27

## 2020-12-03 RX ADMIN — HYDROMORPHONE HYDROCHLORIDE 0.5 MG: 1 INJECTION, SOLUTION INTRAMUSCULAR; INTRAVENOUS; SUBCUTANEOUS at 11:56

## 2020-12-03 RX ADMIN — HYDROCODONE BITARTRATE AND ACETAMINOPHEN 1 TABLET: 7.5; 325 TABLET ORAL at 09:11

## 2020-12-03 ASSESSMENT — ACTIVITIES OF DAILY LIVING (ADL)
ADLS_ACUITY_SCORE: 20

## 2020-12-03 NOTE — PLAN OF CARE
Occupational Therapy Discharge Summary    Reason for therapy discharge:    Discharged to home.    Progress towards therapy goal(s). See goals on Care Plan in [facility] electronic health record for goal details.  Goals partially met.  Barriers to achieving goals:   discharge from facility.    Therapy recommendation(s):    Continued therapy is recommended.  Rationale/Recommendations:  Pt. below baseline and significant falls risk. Pt. lives alone and has had multiple falls in her home. Pt. would benefit from continued skilled OT services to address deficits in mobility, safety awareness, and ADL I. Pt. currently declining TCU placement and desires to return home alone. Pt. made aware of falls risk and safety concerns.

## 2020-12-03 NOTE — PROGRESS NOTES
"[facility]   WO Nurse Inpatient Wound Assessment with Negative Pressure Wound Therapy     Assessment   Re Assessment of wound(s) on pt's: Left Knee  Left Knee wound due to trauma  Status: evolving    Treatment Plan  Left knee wound: Negative Pressure Wound Therapy (NPWT) to be changed by WOC RN every Tues and Thursday this week, home care RN to change on Sat.  with medium black foam and adaptic. Staff RN to assess pump settings set to -125 and dressing integrity every shift. Remove foam dressing and replace with BID normal saline moist gauze dressing if:  -a dressing failure which cannot be repaired within 2 hours  -patient is discharging to home without a home pump  -patient is discharging to a facility outside the local area  -if a dressing is a \"Silver Foam\", remove before Radiation Therapy or MRI    Nursing to notify the Provider(s) and re-consult the WO Nurse if wound(s) deteriorate(s) or if necessary to reevaluate the plan.    Dressing change to R knee and Left shin to be completed MWF with Vac changes    1. Cleanse with microklenz and blot dry  2. Apply adaptic to wound bed  3. Cover with Mepilex  4. Time/Date/Initial Dressing change  ** If saturated ok to change dressing or notify [facility]      Patient History    According to medical record: Madhavi Castellanos is a 80 year old female who presented to [facility] ED after fall. Patient has a history of falling d/t her \"knees giving out,\" loosing support and falling. On 11/27, she was walking without her walker and felt her right thigh move laterally and her lower leg move medially. She then fell forward landing on her knees and hitting her head. She was unable to stand or get help, was down for about 7 hours. She had no dizziness before fall. No LOC, nausea, afterwards.   Lives alone,  and no children.    Objective Data  Current support surface: Standard , Atmos Air mattress  Current off-loading measures: " Pillows  Containment of urine/stool: Incontinence Protocol  Current diet/nutrition: Orders Placed This Encounter      Regular Diet Adult      Diet    Output: I/O last 3 completed shifts:  In: 240 [P.O.:240]  Out: -   Arvin:   Sensory Perception: 3-->slightly limited  Moisture: 4-->rarely moist  Activity: 3-->walks occasionally  Mobility: 3-->slightly limited  Nutrition: 3-->adequate  Friction and Shear: 3-->no apparent problem  Arvin Score: 19  Labs:   Recent Labs   Lab Test 11/30/20  1132 11/28/20  1215 11/28/20  1215 03/12/20  1151 03/12/20  1151   ALBUMIN  --   --  3.4   < > 3.1*   HGB 12.1   < > 13.0   < > 13.9   INR  --   --   --   --  1.03   WBC 11.6*   < > 15.4*   < > 18.0*    < > = values in this interval not displayed.       Physical Exam  Wound Location: Left Knee     Date of photos:  12/1 and 12/3  Left knee    Wound History: see above hx  Surgical date: 11/28/2020  Date Negative Pressure Wound Therapy initiated: 11/28 in surgery   Measurements: (length x width x depth in cm):14 x 7.2 x 2  Wound Base: moist muscle,adipose tissue small piece of tibia directly in center  Palpation of the wound bed:  Normal  Periwound Skin: intact and small skin tear at 3 O'clock, very fragile skin  Color: ecchymotic  Temperature  normal   Drainage: Amount: approximately 50cc in canister  Color: serosanguinous   Odor: none  Pain:  severe stabbing, sharp and shooting  Was patient premedicated prior to dressing change? Yes  Medication(s) used:  Hydromorphone IV and lidocaine to wound tissue      Below wounds redressed today, not measured  Wound Location: Left Shin   0.3 x 11 x 0.1 cm skin tear with red moist dermis, bleeding easily and very painful, periwound ecchymotic    L shin        Wound Location:Right Knee  small dehiscence in previous sutured skin flap with red moist dermis, very painful, periwound ecchymotic.     Right Knee      Intervention:     Visual inspection of wound(s):  Wound was assessed.  Wound Care: was  done  Had patient walk in room with walker and with home NPWT machine to demonstrate how not to trip on the tubing and how to place the tubing through her pants.    Orders: reviewed  Supplies:  Changed to home KCI vac.      Discussed plan of care with: Patient, nurse,

## 2020-12-03 NOTE — CONSULTS
Patient seen and chart reviewed. Note dictated (initial)   No need for psychiatric intervention   Page me at [phone] as needed.

## 2020-12-03 NOTE — TELEPHONE ENCOUNTER
12/3/2020 3:55 PM    Called patient at the contact number listed on file; no answer. Left voicemail informing of 12/18 @ 1230h post op appt. Provided contact info for patient to call back. Sent Leila w/ [facility] NRE[facility]

## 2020-12-03 NOTE — CONSULTS
Consult Date:  12/03/2020      PSYCHIATRY CONSULTATION      REQUESTING SOURCE:  Trauma team.      IDENTIFYING DATA:  This is a 80-year-old woman seen today for psychiatric consultation due to the primary team's concerns about her mood instability and irritability associated with intermittent outbursts.      HISTORY OF PRESENT ILLNESS:  A 80-year-old woman who presented to the Emergency Department in transfer from [facility] with a complex knee laceration.  She was entering her bathroom and her right knee buckled and she fell to her knees.  She was not lightheaded or dizzy.  She does use a walker at home.  In the Emergency Department, she was found to have a traumatic arthrotomy of the left knee joint was taken to the OR for washout and closure.  Some skin and subcutaneous tissue was debrided and a wound VAC was placed.  It is anticipated she will need to go home with the VAC.        Psychiatry is consulted due to her behavior on the unit.  She has been noted to be quite palacio and seems paranoid and frequently arguing with nursing about her pain medications, although she is not medication seeking; she just desires to have the medications prescribed exactly as she gets as an outpatient.  When I asked her about irritability, she gave this as an example of why she should be frustrated.      I see from Care Everywhere that her Wellbutrin was increased from 150 to 300 mg of the time release formulation in early August at [facility].  She minimized her depressive symptoms and she said at the beginning of interview that she did not need to talk to Psychiatry since she was planning to leave soon.  She did finally admit to having some problems with depression, but says recently her mood is actually pretty good.  She has chronic sleep difficulties due to a back injury 20 years ago.  She has a reasonably good appetite.  She is not hopeless or suicidal.  She does mentions that she is quite anxious and feels a lot of stress  related to COVID in terms of its leading to isolation, etc.  She mentions that this could be part of her irritability.  My interview was truncated a bit due to OT showing up to clarify her ability to care for herself.      PAST PSYCHIATRIC HISTORY:  She said she has had no psychiatric hospitalizations.  I tried to explore antidepressants used prior to Wellbutrin, but she really did not want to cooperate with this.  No psychotherapy.      SUBSTANCE USE HISTORY:  She denies excessive use of alcohol.  No use of drugs.  Never a smoker.      PAST MEDICAL HISTORY:  Very extensive and this is covered in her admission note by HAL Watt, from 11/28.  She also has quite extensive surgical history, which was reviewed.      ALLERGIES:  SHE HAS ALLERGIES TO 8 MEDICATIONS LISTED IN THE CHART.      REVIEW OF SYSTEMS:  A 10-point review of systems was negative except for some chronic low back pain and pain at the site of her wound.      OUTPATIENT PSYCHOACTIVE MEDICATIONS:  Wellbutrin- mg in the morning, Duragesic patch 25 mcg per hour q.72 hours,  Norco 7.5/325 1-2 twice a day, Robaxin 750 mg 1 up to 6 times per day as needed, Phenergan 25 mg as needed for nausea.      FAMILY HISTORY:  She did not want to discuss this with me.        SOCIAL HISTORY:  She grew up in the [place] and graduated from [employer] in Art.  For a number of years, she worked for a company drawing out circuit boards and then later she was working in the [employer] at [employer].  She has been  for about 30 years.  No children.  She does have some relatives in town that she socializes with as well as just a few friends, but this has been limited due to COVID.      MENTAL STATUS EXAMINATION:    Sitting in bedside chair  Muscle strength and tone: appears normal   General appearance:  well groomed  Speech:  Normal in volume, articulation, coherence   Use of language: Intact   Associations: WNL  Thought Process:   "Logical, linear and goal directed  Abnormal or psychotic thoughts: none  Orientation to person, place, date/time:  Mood (subjective report):  \"fair\"  Affect (objective appearance): Irritable   Attention Span and concentration: Intact   Recent and remote memory: WNL   Fund of Knowledge/Intelligence: appears average   Insight:  Fair  Judgment:  Adequate for safety      VITAL SIGNS:  Blood pressure 151/64, temperature 97.1, pulse 104, weight 180 pounds.      DIAGNOSES:  Major depressive disorder, recurrent, mild; unspecified anxiety disorder. Unspecified schizophrenia spectrum d/o      IMPRESSION:  I think her irritability is due to a combination of anxiety, depression and some mild paranoia.  Certainly isolation related to COVD is wearing on her and I think she is expressing her frustration with irritability and outbursts. Also, paranoia appears to be driving some of her behavior.  It is also conceivable that the increase in Wellbutrin added is leading to more irritability, but I do not think she would take my suggestion to try lowering the dose.      RECOMMENDATIONS:  No reason for psychiatric intervention at this point.  She may benefit from a low dose of an antipsychotic to address the paranoia, but I very much doubt that she would agree to take it.      Page me at [phone] or reconsult Psychiatry as needed.         SOFIA FLOYD MD             D: 2020   T: 2020   MT: TOM      Name:     AC MCKOY   MRN:      [id number]        Account:       [id number]   :      1940           Consult Date:  2020      Document: [id number]      "

## 2020-12-03 NOTE — DISCHARGE SUMMARY
"[facility]     Discharge Summary  Trauma Surgery Service    Date of Admission:  11/28/2020  Date of Discharge:  12/3/2020  Attending Physician: Dr. Kael Jack  Discharging Provider: Emili Diaz CNP  Date of Service (when I saw the patient): 12/03/20    Primary Provider: Miles Castellanos  [facility] [address] [address] [facility] [address]  Phone: [phone]  Fax number: [phone]     Discharge Diagnoses      Knee laceration, left  Recurrent falls  Laceration of knee, right, initial encounter    Hospital Course   Madhavi Castellanos is a 80 year old female with a PMH significant for chronic pain, Osteoarthritis, depression and falls who presented to [facility] ED after fall. Patient has a history of falling due her \"knees giving out,\" loosing support and falling. On 11/27, she was walking without her walker and felt her right thigh move laterally and her lower leg move medially. She then fell forward landing on her knees and hitting her head. She was unable to stand or get help, was down for about 7 hours. She had no dizziness before fall. No LOC, nausea, afterwards.     Traumatic Injury  The patient sustained the above injury as a result of fall.  The mechanism of injury and factors contributing to the accident were discussed with the patient.  Strategies on how to prevent future accidents were reviewed.  The patient underwent tertiary examination to evaluate for additional injuries.  The systematic review did not find any other injuries.    Left knee complex wound  Madhavi Castellanos was evaluated by Orthopedics and underwent an IRRIGATION AND DEBRIDEMENT, LOWER EXTREMITY with negative pressure dressing placement, left knee arthrotomy on 11/28/2020 by Dr. Peterson. Please see operative note by Dr Peterson for details regarding the procedure. She should continue her home dose of Aspirin. She is recommended to Weight bearing as tolerated and should " follow up at the Plastic Surgery Clinic in 1 week with Dr. Jaimes.     She will require three times a week starting Saturday December 5th by the home care RN.  Left knee wound: Negative Pressure Wound Therapy (NPWT) to be changed by WOC RN every Tues and Thursday this week, home care RN to change on Sat.  with medium black foam and adaptic.   Staff RN to assess pump settings set to -125 and dressing integrity. Remove foam dressing and replace with BID normal saline moist gauze dressing if:  -a dressing failure which cannot be repaired within 2 hours.    Right knee laceration  Sutures can be evaluated for possible removal at the follow up visit at the Plastic Surgery Clinic.  She was evaluated by physical and occupational therapies during her hospitalization.  Please see summary of most current therapy notes below.     Acute on chronic pain  Pain was controlled with a multi-modality approach.  The current regimen for Madhavi Castellanos includes the following, scheduled acetaminophen and PRN short acting opioids. Prior to discharge her pain was managed on her home regimen which includes:  Fentanyl patch 25mcg/hr  Norco 7.5/325: 2 tablets /day  Gabapentin at bedtime  Methocarbamol 750mg three times a day  Lidoderm patches  She should follow up at the with Dr. Yousuf Walker at the [facility] in the next week.  No pain medications were prescribed at discharge. Mrs Castellanos mentioned she had an adequate supply of her home pain regimen at home.  Therapy Recommendations:   Current status of physical therapies on discharge:   Mrs Castellanos was seen by therapies who recommended TCU to maximize safety and endurance. This was discussed with Mrs. Castellanos and she would prefer to discharge to home and was agreeable to home nursing care for the leg wound dressing changes and home PT/OT    Significant Results and Procedures   DATE: 11/28/2020  Procedure(s):  IRRIGATION AND DEBRIDEMENT, LOWER EXTREMITY with negative pressure  dressing placement, left knee arthrotomy  Surgeon(s): Surgeon(s) and Role:     * Josef Peterson MD - Primary     * Jeffery Hernandez MD - Resident - Assisting  Non-operative procedures None performed    Code Status   Full Code    SUBJECTIVE: I discussed the discharge and follow up plan with Mrs Castellanos and she verbalized an understanding of the plan. She mentioned frustration about the narcotic regimen. She was encouraged to follow up with her pain clinic for ongoing assessment of her pain regimen.    Physical Exam   Temp: 97.1  F (36.2  C) Temp src: Axillary BP: (!) 151/64 Pulse: 104   Resp: 18 SpO2: 97 % O2 Device: None (Room air)    Vitals:    11/28/20 1202   Weight: 81.6 kg (180 lb)     Vital Signs with Ranges  Temp:  [96.1  F (35.6  C)-97.1  F (36.2  C)] 97.1  F (36.2  C)  Pulse:  [] 104  Resp:  [18] 18  BP: (128-151)/(64-82) 151/64  SpO2:  [93 %-97 %] 97 %  I/O last 3 completed shifts:  In: 240 [P.O.:240]  Out: -     Arthur Coma Scale - Total 15/15  Constitutional: Awake, alert, cooperative, no apparent distress.  ENT: Normocephalic, forehead contusion, scabbed abrasions  Respiratory: No increased work of breathing, good air exchange, clear to auscultation bilaterally, no crackles or wheezing.  Cardiovascular:  regular rate and rhythm, normal S1 and S2, no S3 or S4  GI:round and soft  Genitourinary:  Not seen voids spontaneously  Skin: Left knee wound covered, ACE wrap CDI  Right knee wound sutures approximated with scant serosanguinous drainage  Musculoskeletal: There is no redness, warmth, or swelling of the joints.  Pedal pulse palpated.  Neurologic: Awake, alert, oriented. Cranial nerves II-XII are grossly intact.  Strength and sensory is intact. No focal deficits.  Neuropsychiatric: Calm, normal eye contact, alert, affect appropriate to situation, oriented, thought process normal.    Discharge Disposition   Discharged to home  Condition at discharge: Stable  Discharge VS: Blood pressure  "(!) 151/64, pulse 104, temperature 97.1  F (36.2  C), temperature source Axillary, resp. rate 18, height 1.575 m (5' 2\"), weight 81.6 kg (180 lb), SpO2 97 %.    Consultations This Hospital Stay   PHYSICAL THERAPY ADULT IP CONSULT  OCCUPATIONAL THERAPY ADULT IP CONSULT  PAIN MANAGEMENT ADULT IP CONSULT  WOUND OSTOMY CONTINENCE NURSE  IP CONSULT  CARE MANAGEMENT / SOCIAL WORK IP CONSULT  PSYCHIATRY IP CONSULT    Discharge Orders      Plastic Surgery Referral      Home care nursing referral      MD face to face encounter    Documentation of Face to Face and Certification for Home Health Services    I certify that patient: Madhavi Castellanos is under my care and that I, or a nurse practitioner or physician's assistant working with me, had a face-to-face encounter that meets the physician face-to-face encounter requirements with this patient on: 12/1/2020.    This encounter with the patient was in whole, or in part, for the following medical condition, which is the primary reason for home health care: fall resulting in left lower open knee laceration s/p IRRIGATION AND DEBRIDEMENT, LOWER EXTREMITY with negative pressure dressing placement, left knee arthrotomy.    I certify that, based on my findings, the following services are medically necessary home health services: Nursing, Occupational Therapy and Physical Therapy.    My clinical findings support the need for the above services because: Nurse is needed: to provide assessment of wound and wound vac dressing changes., Occupational Therapy Services are needed to assess and treat cognitive ability and address ADL safety due to impairment in endurance. and Physical Therapy Services are needed to assess and treat the following functional impairments: mobility, balance.    Further, I certify that my clinical findings support that this patient is homebound (i.e. absences from home require considerable and taxing effort and are for medical reasons or Mu-ism services or " "infrequently or of short duration when for other reasons) because: Requires assistance of another person or specialized equipment to access medical services because patient: Requires supervision of another for safe transfer...    Based on the above findings. I certify that this patient is confined to the home and needs intermittent skilled nursing care, physical therapy and/or speech therapy.  The patient is under my care, and I have initiated the establishment of the plan of care.  This patient will be followed by a physician who will periodically review the plan of care.  Physician/Provider to provide follow up care: Miles Castellanos    Attending hospital physician (the Medicare certified [place] provider): Dr. Kael Jack  Physician Signature: See electronic signature associated with these discharge orders.  Date: 12/1/2020     Reason for your hospital stay    Fall   Left knee laceration, degloving injury     Activity    Your activity upon discharge: activity as tolerated to the left lower extremity     Wound care and dressings    Instructions to care for your wound at home: as directed    Left knee wound: Negative Pressure Wound Therapy (NPWT) to be changed by WOC RN every Tues/ Fri week of 12/1(completed),   then M/W/F with medium black foam and adaptic. Staff RN to assess pump settings set to -125 and dressing integrity every shift. Remove foam dressing and replace with BID normal saline moist gauze dressing if:  -a dressing failure which cannot be repaired within 2 hours  -if a dressing is a \"Silver Foam\", remove before Radiation Therapy or MRI        Dressing change to Right knee and Left shin to be completed MWF with Vac changes     1. Cleanse with microklenz and blot dry  2. Apply adaptic to wound bed  3. Cover with Mepilex    .     Discharge Instructions    You have been provided with a referral to follow up with the Plastics Surgery team in  1-2 weeks for a wound check  Please call or seek emergency " "medical evaluation for any of the following:  Fall with potential for injury  Fever, foul discharge from the wound redness, swelling and increased pain not well control on the current oral pain regimen     Adult [facility] Follow-up and recommended labs and tests    Follow up with your primary care provider for continued medical care and hospital follow up in 5-10 days.  Trauma Clinic- as needed  [facility]  Floor 4  [address] [place] [address]   Appointments: [phone]    Orthopaedic Clinic in 4 weeks for a wound check ONLY if unable to follow up with Dr Naik at the Plastic Surgery Clinic. A referral was provided for you.  [facility]  Floor 4   [address] [id number]   Appointments: [phone]     You have been involved in a recent trauma incident resulting in an injury.  Studies show us that people affected by trauma have higher levels of post-traumatic stress disorder (PTSD) and/or depressive symptoms during the year following an injury.     Please consider the following.  Have you:  Had migraines about the event(s) or thought about the event(s) when you didn't want to?  Tried hard not to think about the event(s) or went out of your way to avoid situations that reminded you of the event(s)?  Been constantly on guard, watchful, or easily startled?  Felt numb or detached from people, activities, or your surroundings?   Felt guilty or unable to stop blaming yourself or others for the event(s) or any problems the event (s) may have caused?    If you answered \"yes\" to 3 or more of these questions, or if you simply want to discuss any of your feelings further, we recommend that you talk with your Primary Care Provider or a mental health professional.      Appointments on [place] and/or [facility] (with [facility] or [facility] provider or service). Call [phone] if you haven't heard regarding these appointments within 7 days of " discharge.     Diet    Follow this diet upon discharge: Regular     Discharge Medications   Current Discharge Medication List      START taking these medications    Details   acetaminophen (TYLENOL) 325 MG tablet Take 3 tablets (975 mg) by mouth 3 times daily  Qty:      Associated Diagnoses: Laceration of knee, right, initial encounter      Lidocaine (LIDOCARE) 4 % Patch Place 1-3 patches onto the skin every 24 hours To prevent lidocaine toxicity, patient should be patch free for 12 hrs daily.  Qty: 10 patch, Refills: 0    Associated Diagnoses: Laceration of knee, right, initial encounter      lidocaine (XYLOCAINE) 4 % external solution Apply 30 mLs topically daily Apply to wound prior to wound vac dressing change  Qty: 50 mL, Refills: 3    Associated Diagnoses: Laceration of knee, right, initial encounter         CONTINUE these medications which have NOT CHANGED    Details   aspirin (ASA) 81 MG chewable tablet Take 81 mg by mouth daily      atorvastatin (LIPITOR) 10 MG tablet Take 10 mg by mouth every evening       buPROPion (WELLBUTRIN XL) 300 MG 24 hr tablet Take 300 mg by mouth every morning      cephALEXin (KEFLEX) 500 MG capsule Take 500 mg by mouth 2 times daily      fentaNYL (DURAGESIC) 25 mcg/hr 72 hr patch Place 1 patch onto the skin every 72 hours  Qty: 10 patch, Refills: 0    Associated Diagnoses: Chronic use of opiate for therapeutic purpose      hydrochlorothiazide (MICROZIDE) 12.5 MG capsule Take 12.5 mg by mouth daily      HYDROcodone-acetaminophen (NORCO) 7.5-325 MG per tablet Take 1-2 tablets by mouth 2 times daily as needed for moderate to severe pain  Qty: 15 tablet, Refills: 0    Associated Diagnoses: Chronic use of opiate for therapeutic purpose      losartan (COZAAR) 50 MG tablet Take 1/2 tablet (25 mg) by mouth daily      methimazole (TAPAZOLE) 5 MG tablet Take 5 mg by mouth five times a week Monday through Friday      methocarbamol (ROBAXIN) 750 MG tablet Take 1 tablet up to 6 times a day as  "needed.      polyethylene glycol (MIRALAX) packet Take 1 packet by mouth daily as needed for constipation      promethazine (PHENERGAN) 25 MG tablet Take 1 tablet (25 mg) by mouth every 6 hours as needed for nausea  Qty: 15 tablet, Refills: 0      sotalol (BETAPACE) 80 MG tablet Take 1/2 tablet (40 mg) by mouth every 12 hours.         STOP taking these medications       simethicone (MYLICON) 80 MG chewable tablet Comments:   Reason for Stopping:             Allergies   Allergies   Allergen Reactions     Metoprolol Shortness Of Breath     2 hours after tasking for the first time SOB, Pt evaluated and pt WDL       Albuterol Unknown     Augmentin Nausea     Codeine Sulfate      \"i dont't know, nothing, maybe sick to my stomach\"     Cymbalta      agitated     Lisinopril Cough     Omnicef [Cefdinir] Diarrhea     Percocet [Oxycodone-Acetaminophen]      nauseated     Data   Most Recent 3 CBC's:  Recent Labs   Lab Test 12/01/20  0645 11/30/20  1132 11/29/20  0735 11/28/20  1215   WBC  --  11.6* 12.2* 15.4*   HGB  --  12.1 11.5* 13.0   MCV  --  101* 99 98    202 258 359      Most Recent 3 BMP's:  Recent Labs   Lab Test 12/01/20  0645 11/30/20  1132 11/29/20  0735 11/28/20  1215 11/28/20  1215   NA  --  139 141  --  142   POTASSIUM 4.1 3.8 4.1   < > 3.8   CHLORIDE  --  106 109  --  107   CO2  --  30 28  --  32   BUN  --  13 11  --  13   CR 0.64 0.54 0.46*   < > 0.54   ANIONGAP  --  2* 4  --  4   LUIS ANTONIO  --  8.9 8.3*  --  9.3   GLC  --  115* 108*  --  97    < > = values in this interval not displayed.     Most Recent 2 LFT's:  Recent Labs   Lab Test 11/28/20  1215 03/13/20  0708   AST 21 28   ALT 28 44   ALKPHOS 82 75   BILITOTAL 0.4 0.6     Most Recent INR's and Anticoagulation Dosing History:  Anticoagulation Dose History     Recent Dosing and Labs Latest Ref Rng & Units 10/11/2013 11/16/2014 2/1/2015 1/12/2020 3/12/2020    INR 0.86 - 1.14 1.00 0.93 0.97 0.96 1.03        Most Recent 3 Troponin's:  Recent Labs   Lab Test " 03/12/20  1151 01/05/18 2037 08/29/17  1047 11/16/14  0929 11/16/14  0929 10/27/14  0843 10/05/14  0920   TROPI 0.018 <0.015 <0.015   < >  --   --   --    TROPONIN  --   --   --   --  0.00 0.00 0.00    < > = values in this interval not displayed.     Most Recent 6 Bacteria Isolates From Any Culture (See EPIC Reports for Culture Details):  Recent Labs   Lab Test 11/28/20  1821 11/28/20  1311 03/12/20  1432 03/12/20  1428 03/12/20  1056 11/08/16  1047   CULT No growth after 5 days <10,000 colonies/mL  mixed urogenital lauri  Susceptibility testing not routinely done   No growth No growth No growth No growth     Most Recent TSH, T4 and A1c Labs:  Recent Labs   Lab Test 03/12/20  1151   TSH 1.24     Results for orders placed or performed during the hospital encounter of 11/28/20   XR Chest 2 Views    Narrative    CHEST TWO VIEWS November 28, 2020 9:27 AM     HISTORY: Fall.    COMPARISON: March 12, 2020.       Impression    IMPRESSION: There are no acute infiltrates. The cardiac silhouette is  not enlarged. Pulmonary vasculature is unremarkable.    TAWANNA FUENTES MD   XR Knee Bilateral 3 Views    Narrative    KNEE BILATERAL THREE VIEWS November 28, 2020 9:26 AM     INDICATION: Bilateral knee pain after a fall.     COMPARISON: 3/12/2020.      Impression    IMPRESSION:  1.  No fracture or joint malalignment.  2.  Mild left knee degenerative arthrosis with medial compartment  narrowing and osteophytosis.  3.  Moderate right knee degenerative arthrosis with lateral  compartment narrowing and osteophytosis.  4.  Soft tissue irregularity-injury in the anterior left knee with  overlying bandage material.  5.  Subtle soft tissue irregularity in the anterior aspect of the  right knee.    DIXON ALFONSO MD   Radius/Ulna XR,  PA &LAT, left    Narrative    FOREARM LEFT TWO VIEWS November 28, 2020 9:24 AM     INDICATION: Left forearm pain after a fall.     COMPARISON: None.      Impression    IMPRESSION:  1.  No fracture or joint  malalignment.  2.  Moderate thumb CMC degenerative arthrosis.    DIXON ALFONSO MD   Head CT w/o contrast    Narrative    CT SCAN OF THE HEAD WITHOUT CONTRAST   11/28/2020 9:04 AM     HISTORY: fall hematoma    TECHNIQUE:  Axial images of the head and coronal reformations without  IV contrast material. Radiation dose for this scan was reduced using  automated exposure control, adjustment of the mA and/or kV according  to patient size, or iterative reconstruction technique.    COMPARISON: Head CT 3/12/2020    FINDINGS:  Moderate volume loss is present. Scattered dural calcifications. White  matter hypoattenuation likely represents moderate chronic small vessel  ischemic change. The cerebral hemispheres, brainstem, and cerebellum  otherwise demonstrate normal morphology and attenuation. No evidence  of acute ischemia, hemorrhage, mass, mass effect or hydrocephalus.     The visualized calvarium and mastoid cavities are unremarkable. Mild  paranasal sinus mucosal thickening. Small left frontal scalp  contusion.      Impression    IMPRESSION:     1. No acute intracranial abnormality.  2. Small left frontal scalp contusion.    PEDRO LEW MD       Time Spent on this Encounter   I, TRESA Moran CNP, personally saw the patient today and spent greater than 30 minutes discharging this patient.    We appreciate the opportunity to care for your patient while in the hospital.  Should you have any questions about their injuries or this discharge summary our contact information is below.    Trauma Services  [facility]   Department of Critical Care and Acute Care Surgery  [address]  [place] [address]  Office: [phone]

## 2020-12-03 NOTE — PLAN OF CARE
"/74 (BP Location: Left arm)   Pulse 101   Temp 96.7  F (35.9  C) (Axillary)   Resp 18   Ht 1.575 m (5' 2\")   Wt 81.6 kg (180 lb)   SpO2 96%   BMI 32.92 kg/m      Neuro: A&Ox4, cooperative  Cardiac: WDL, denies chest pain, pt likes manual blood pressures to be done  Respiratory: on RA, denies SOB  GI/: LBM 12/2, voids spontaneously   Skin: bruising and scabbing throughout, LLE ace wrapped, L knee wound vac  Pain: Norco given x1  LDA: wound vac to -125 suction - small serosangious output, PIV SL  Activity: A1 with walker, walked in roomed  Diet/Appetite: reg diet, tolerating  Plan: pt discharge tomorrow, pt family coming @ 1430 for pickup tomorrow (please inform social work that w/c transport no longer needed), pt going home with home cares    "

## 2020-12-03 NOTE — PLAN OF CARE
"BP (!) 143/67 (BP Location: Left arm)   Pulse 92   Temp 96.1  F (35.6  C) (Axillary)   Resp 18   Ht 1.575 m (5' 2\")   Wt 81.6 kg (180 lb)   SpO2 97%   BMI 32.92 kg/m      Status: S/p I&D + wound vac placement  Activity: SBA + walker  Neuros: A&Ox4, no deficits noted.  Cardiac: WDL, denies chest pain.  Respiratory: WDL on RA, denies SOB.  GI/: +BS, LBM 12/3, voids spont/not saving.  Diet: Tolerating regular diet.  Skin/Incisions: Bruising and scabbing throughout. R knee dressing CDI. R knee wound vac CDI, ACE wraps CDI.  Lines/Drains: R PIV TKO + abx. Wound vac to R knee CDI to -125 suction with small serosang output.  Pain/Nausea: Pain remains an issue for pt, PO tylenol, robaxin, and fentanyl patch not adequate for pain control. PO oxycodone ordered x1 overnight, but pt has not requested pain medication since; continue to monitor. Denies nausea.  New Changes: No acute changes this shift.  Plan: Wound vac dressing to be changed this morning, family ride to arrive at 1430 to take pt home when discharged..     "

## 2020-12-03 NOTE — PROGRESS NOTES
Care Management Discharge Note    Discharge Date: 12/04/20       Discharge Disposition:  Home with services    Discharge Services:  Home Care    Discharge DME: [employer] wound vac    Discharge Transportation: family or friend will provide    Private pay costs discussed: transportation costs    Patient/family educated on Medicare website which has current facility and service quality ratings: Yes      Education Provided on the Discharge Plan:  Yes  Persons Notified of Discharge Plans: Patient  Patient/Family in Agreement with the Plan:  Yes    Handoff Referral Completed: No, patients PCP with [facility]    Additional Information:  Patient is medically ready for discharge to home today.  Patient will have wound vac changed and then discharge to home this afternoon.  Home ready wound vac delivered to the patient, proof of delivery signed and faxed to [facility].  Orders faxed to [employer] they will plan to see the patient on Saturday for next wound vac change.  Patient has now arranged to have her brother provide transportation to home around 2pm.  She asked this writer to cancel the 1pm [employer] w/c ride, this was done.  Reviewed discharge plan with the patient.  Patient had no further questions or concerns about the plan.      [employer](RN/PT/OT)  Phone: [phone]  Fax: [phone]         PIYUSH Saenz  Phone: [phone]  Pager: [phone]  To contact the weekend RNCC, Page: [phone]

## 2020-12-03 NOTE — PLAN OF CARE
DISCHARGE                         12/3/2020  2:33 PM  ----------------------------------------------------------------------------  Discharged to: Home  Via: private transportation meeting at front door  Accompanied by: pushed to pharm then front door via transport staff  Discharge Instructions: diet, activity, medications, follow up appointments, when to call the MD, aftercare instructions.  Prescriptions: To be filled by [facility] d/c pharmacy; medication list reviewed & sent with pt  Follow Up Appointments: arranged; information given  Belongings: All sent with pt- Belongings from Security given to pt in sealed envelope.   IV: d/c'd  Pt exhibits understanding of above discharge instructions; all questions answered.    Discharge Paperwork: Signed, copied, and sent home with patient.  Wound vac supplies sent with pt.         Problem:     Adult Inpatient Plan of Care  Goal: Plan of Care Review  Outcome: Adequate for Discharge  Goal: Patient-Specific Goal (Individualized)  Outcome: Adequate for Discharge  Goal: Absence of Hospital-Acquired Illness or Injury  Outcome: Adequate for Discharge  Intervention: Prevent Skin Injury  Recent Flowsheet Documentation  Taken 12/3/2020 1000 by Patience Morrison RN  Body Position:   position changed independently   position maintained  Goal: Optimal Comfort and Wellbeing  Outcome: Adequate for Discharge  Goal: Readiness for Transition of Care  Outcome: Adequate for Discharge     Problem: Fall Injury Risk  Goal: Absence of Fall and Fall-Related Injury  Outcome: Adequate for Discharge     Problem: Bleeding (Surgery Nonspecified)  Goal: Absence of Bleeding  Outcome: Adequate for Discharge     Problem: Bowel Motility Impaired (Surgery Nonspecified)  Goal: Effective Bowel Elimination  Outcome: Adequate for Discharge     Problem: Infection (Surgery Nonspecified)  Goal: Absence of Infection Signs and Symptoms  Outcome: Adequate for Discharge     Problem: Ongoing Anesthesia Effects  (Surgery Nonspecified)  Goal: Anesthesia/Sedation Recovery  Outcome: Adequate for Discharge     Problem: Pain (Surgery Nonspecified)  Goal: Acceptable Pain Control  Outcome: Adequate for Discharge  Intervention: Prevent or Manage Pain  Recent Flowsheet Documentation  Taken 12/3/2020 1200 by Patience Morrison RN  Pain Management Interventions: medication (see MAR)     Problem: Postoperative Nausea and Vomiting (Surgery Nonspecified)  Goal: Nausea and Vomiting Relief  Outcome: Adequate for Discharge     Problem: Postoperative Urinary Retention (Surgery Nonspecified)  Goal: Effective Urinary Elimination  Outcome: Adequate for Discharge     Problem: OT General Care Plan  Goal: Home Management (OT)  Description: Home Management (OT)  Outcome: Adequate for Discharge

## 2020-12-04 ENCOUNTER — NURSE TRIAGE (OUTPATIENT)
Dept: NURSING | Facility: CLINIC | Age: 80
End: 2020-12-04

## 2020-12-04 ENCOUNTER — HOSPITAL ENCOUNTER (EMERGENCY)
Facility: CLINIC | Age: 80
End: 2020-12-04
Payer: COMMERCIAL

## 2020-12-04 LAB
BACTERIA SPEC CULT: NO GROWTH
SPECIMEN SOURCE: NORMAL

## 2020-12-04 NOTE — PROGRESS NOTES
Attempted to contact the patient x2 for post-hospital call without answer. Will close encounter at this time.    Zahra Reed CMA, [facility]  Post Hospital Discharge Team

## 2020-12-04 NOTE — TELEPHONE ENCOUNTER
Pt just got home from the hospital the other day.  11/28/2020 IRRIGATION AND DEBRIDEMENT, LOWER EXTREMITY with negative pressure dressing placement, left knee arthrotomy    Pt having issues with her would vac.     The wound vac was making noise last night and not working properly.   So the Pt shut off the machine last night.     So Pt tried to turn on the machine this morning and it did not work.    Now the the drainage from the wound it running down her leg and her sponge from the wound vac is completely soaked.        So now the Pt is trying to figure out what to do.      Pt did not have any other supplies for the wound vac.  She was not able to get the wound vac to work.     So Pt will be going back to the ER to have the wound vac changed and fixed.      Nevaeh Davidson RN  Central Triage Red Flags/Med Refills         Additional Information    Negative: Major abdominal surgical incision and wound gaping open with visible internal organs    Negative: Sounds like a life-threatening emergency to the triager    Negative: Bleeding from incision and won't stop after 10 minutes of direct pressure    Negative: Widespread rash and bright red, sunburn-like    Negative: Severe pain in the incision    Negative: Incision gaping open and < 2 days (48 hours) since wound re-opened    Negative: Incision gaping open and length of opening > 2 inches (5 cm)    Negative: Patient sounds very sick or weak to the triager    Sounds like a serious complication to the triager    Protocols used: POST-OP INCISION SYMPTOMS AND [id number]

## 2020-12-11 ENCOUNTER — HOSPITAL ENCOUNTER (EMERGENCY)
Facility: CLINIC | Age: 80
Discharge: HOME | End: 2020-12-11
Attending: EMERGENCY MEDICINE | Admitting: EMERGENCY MEDICINE
Payer: COMMERCIAL

## 2020-12-11 VITALS
OXYGEN SATURATION: 98 % | DIASTOLIC BLOOD PRESSURE: 78 MMHG | RESPIRATION RATE: 16 BRPM | SYSTOLIC BLOOD PRESSURE: 123 MMHG | TEMPERATURE: 97.5 F | HEART RATE: 88 BPM

## 2020-12-11 DIAGNOSIS — S81.002A OPEN WOUND OF LEFT KNEE, LEG, AND ANKLE, INITIAL ENCOUNTER: ICD-10-CM

## 2020-12-11 DIAGNOSIS — S81.802A OPEN WOUND OF LEFT KNEE, LEG, AND ANKLE, INITIAL ENCOUNTER: ICD-10-CM

## 2020-12-11 DIAGNOSIS — S91.002A OPEN WOUND OF LEFT KNEE, LEG, AND ANKLE, INITIAL ENCOUNTER: ICD-10-CM

## 2020-12-11 PROCEDURE — 99282 EMERGENCY DEPT VISIT SF MDM: CPT

## 2020-12-11 RX ORDER — PROMETHAZINE HYDROCHLORIDE 25 MG/1
12.5-25 TABLET ORAL EVERY 8 HOURS PRN
Qty: 15 TABLET | Refills: 0 | Status: ON HOLD | OUTPATIENT
Start: 2020-12-11 | End: 2022-11-02

## 2020-12-11 ASSESSMENT — ENCOUNTER SYMPTOMS
COLOR CHANGE: 1
ARTHRALGIAS: 1
WOUND: 1

## 2020-12-11 NOTE — ED TRIAGE NOTES
Pt c/o large wound on L knee. Pt was admitted for 4 days at the [facility] for wound. Pt had wound care nurse at her home today. Home nurse had not seen the wound yet and was concerned with pt pain and possible infection. ABC in tact. A/OX4

## 2020-12-11 NOTE — ED PROVIDER NOTES
History     Chief Complaint:  Wound Check      The history is provided by the patient.      Madhavi Castellanos is a 80 year old female with a history of atrial fibrillation, coronary artery disease, Grave's disease, osteoarthritis, and hypertension who presents for evaluation of a left knee wound. The patient was admitted to the hospital on 11/28/20 for four days following the initial laceration. She was walking when her right knee gave out and she fell, lacerating her left knee on a door hinge. She has been seen at the [facility]. The redness surrounding the wound is the same but she presents today due to increasing pain.     Allergies:  Metoprolol  Albuterol  Augmentin  Codeine Sulfate  Cymbalta  Lisinopril  Omnicef [Cefdinir]  Percocet [Oxycodone-Acetaminophen]    Medications:    Acetaminophen   Aspirin 81 mg  Atorvastatin   Bupropion   Cephalexin   Hydrochlorothiazide   Hydrocodone-acetaminophen   Losartan   Methimazole   Methocarbamol   Polyethylene glycol   Promethazine   Sotalol     Past Medical History:    Asthma  Atrial fibrillation  Coronary artery disease  Depression  Esophageal reflux  Grave's disease   Hypertension  LBBB  Liver lesion  Lumbosacral spondylosis  Mumps  Obesity  TALI  Osteoarthritis  Palpitations  Prolonged QT interval  Pure hypercholesterolemia   STD  Tuberculosis     Past Surgical History:    Ablation for atrial fibrillation   Cholecystectomy  Colonoscopy  Esophagoscopy, gastroscopy, duodenoscopy, combined   Excise vulva wide local  Irrigation and debridement lower extremity, combined left    Family History:    No past pertinent family history.     Social History:  Marital Status:   [4]  Presents unaccompanied to the ED  PCP: Miles Castellanos DO  Negative for tobacco use.  Negative for smokeless tobacco use.  Negative for alcohol use.  Negative for drug use.       Review of Systems   Musculoskeletal: Positive for arthralgias (knee, left).   Skin: Positive for color  change and wound.   All other systems reviewed and are negative.      Physical Exam     Patient Vitals for the past 24 hrs:   BP Temp Temp src Pulse Resp SpO2   12/11/20 1620 123/78 97.5  F (36.4  C) Temporal 88 18 98 %       Physical Exam  General: Patient is alert and interactive when I enter the room  Head:  The scalp, face, and head appear normal  Eyes:  The pupils are equal, round, and reactive to light    Conjunctivae and sclerae are normal  ENT:    External acoustic canals are normal    The oropharynx is normal without erythema.     Uvula is in the midline  Neck:  Normal range of motion  CV:  Regular rate. S1/S2. No murmurs.   Resp:  Lungs are clear without wheezes or rales. No distress  GI:  Abdomen is soft, no rigidity, guarding, or rebound    No distension. No tenderness to palpation in any quadrant.     MS:  Normal tone. Joints grossly normal without effusions.     No asymmetric leg swelling, calf or thigh tenderness.      Normal motor assessment of all extremities.  Skin:  Open wound as noted below.  There is mild surrounding erythema which actually looks more prominent in the picture below the does not real life.  She has excellent range of motion of the knee without pain.  Normal distal capillary refill noted.   Neuro: Speech is normal and fluent. Face is symmetric.     Moving all extremities well.   Psych:  Awake. Alert.  Normal affect.  Appropriate interactions.  Lymph: No anterior cervical lymphadenopathy noted              Emergency Department Course     Procedures:  None    Emergency Department Course:  Past medical records, nursing notes, and vitals reviewed.    1645 I performed an exam of the patient as documented above.     Findings and plan explained to the Patient. Patient discharged home with instructions regarding supportive care, medications, and reasons to return. The importance of close follow-up was reviewed. The patient was prescribed promethazine.    I personally answered all related  questions prior to discharge.     Impression & Plan     CMS Diagnoses:      Medical Decision Making:  The 80-year-old female presents for evaluation open wound.  She is worried that there is a infection brewing and by clinical exam I am not convinced.  She has no drainage, foul odor, but only minimal erythema in the wound edges.  There is no red streaks up the leg nor evidence of spreading out from the wound.  No purulent drainage is noted.  There is no blisters or necrosis to suggest necrotizing infection.  At this point I think she can safely follow-up with the wound care clinic and watch carefully over the weekend.  If it gets worse she should return here obviously for further consideration of antibiotics.  No indication for blood work at this time.      Diagnosis:    ICD-10-CM    1. Open wound of left knee, leg, and ankle, initial encounter  S81.002A     S81.802A     S91.002A        Disposition:  Discharged to home.    Discharge Medications:  Current Discharge Medication List          Scribe Disclosure:  AUSTIN, Winifred Sorensen, am serving as a scribe at 4:45 PM on 12/11/2020 to document services personally performed by Hamzah Ortez MD based on my observations and the provider's statements to me.     [facility] EMERGENCY DEPT       Hamzah Ortez MD  12/11/20 2151

## 2020-12-11 NOTE — ED AVS SNAPSHOT
[facility] Emergency Dept  [address]  [facility] [address]  Phone: [phone]  Fax: [phone]                                    Madhavi Castellanos   MRN: [id number]    Department: [facility] Emergency Dept   Date of Visit: 12/11/2020           After Visit Summary Signature Page    I have received my discharge instructions, and my questions have been answered. I have discussed any challenges I see with this plan with the nurse or doctor.    ..........................................................................................................................................  Patient/Patient Representative Signature      ..........................................................................................................................................  Patient Representative Print Name and Relationship to Patient    ..................................................               ................................................  Date                                   Time    ..........................................................................................................................................  Reviewed by Signature/Title    ...................................................              ..............................................  Date                                               Time          22EPIC Rev 08/18

## 2020-12-11 NOTE — DISCHARGE INSTRUCTIONS
Return to the emergency room if you have fevers more than 101, worsening pain, spreading redness around the wound that is new.

## 2020-12-18 ENCOUNTER — HOSPITAL ENCOUNTER (EMERGENCY)
Facility: CLINIC | Age: 80
Discharge: LEFT BEFORE DISCHARGE | End: 2020-12-18
Attending: EMERGENCY MEDICINE | Admitting: EMERGENCY MEDICINE
Payer: COMMERCIAL

## 2020-12-18 VITALS
HEART RATE: 92 BPM | TEMPERATURE: 97.9 F | SYSTOLIC BLOOD PRESSURE: 184 MMHG | HEIGHT: 61 IN | BODY MASS INDEX: 33.61 KG/M2 | RESPIRATION RATE: 18 BRPM | OXYGEN SATURATION: 98 % | DIASTOLIC BLOOD PRESSURE: 108 MMHG | WEIGHT: 178 LBS

## 2020-12-18 DIAGNOSIS — S51.011A SKIN TEAR OF RIGHT ELBOW WITHOUT COMPLICATION, INITIAL ENCOUNTER: ICD-10-CM

## 2020-12-18 PROCEDURE — 99282 EMERGENCY DEPT VISIT SF MDM: CPT

## 2020-12-18 ASSESSMENT — ENCOUNTER SYMPTOMS
COLOR CHANGE: 1
WOUND: 1

## 2020-12-18 ASSESSMENT — MIFFLIN-ST. JEOR: SCORE: 1224.78

## 2020-12-18 NOTE — ED PROVIDER NOTES
"  History   Chief Complaint:  Wound Check     HPI   Madhavi Castellanos is a 80 year old female with history of hypertension, hyperlipidemia, osteoarthritis, and degenerative joint disease who presents with a wound check on her right lower shin. The patient reports that early this morning she struck her right leg on a door. She also has multiple other wounds on both legs. Had a home-care nurse but she recently fired her.    Review of Systems   Skin: Positive for color change and wound.   All other systems reviewed and are negative.      Allergies:  Metoprolol  Albuterol  Augmentin  Codeine Sulfate  Cymbalta  Lisinopril  Omnicef  Percocet    Medications:  Aspirin 81 mg  Wellbutrin  Microzide  Losartan  Tapazole  Sotalol  Fentanyl  Neurontin    Past Medical History:    Asthma  Afib  CAD  Depression  Hypertension  Left bundle branch block  TALI  Osteoarthritis  Hyperlipidemia   Chronic low back pain  Chronic neck pain  Grave's disease  DJD    Past Surgical History:    Cholecystectomy  Vulva excision wide  Septoplasty  Radiofrequency ablation  Lumbar ablation  Hysterectomy    Family History:    Cancer (Father)    Social History:  PCP: Miles Castellanos     Physical Exam     Patient Vitals for the past 24 hrs:   BP Temp Temp src Pulse Resp SpO2 Height Weight   12/18/20 0937 (!) 184/108 97.9  F (36.6  C) Temporal 92 18 98 % 1.549 m (5' 1\") 80.7 kg (178 lb)       Physical Exam  Constitutional: Alert, attentive, GCS 15  HENT:    Nose: Nose normal.    Mouth/Throat: Oropharynx is clear, mucous membranes are moist.  Eyes: EOM are normal, anicteric, conjugate gaze  CV: regular rate and rhythm; no murmurs  Chest: Effort normal and breath sounds clear without wheezing or rales, symmetric bilaterally   GI:  non tender. No distension. No guarding or rebound.    MSK: No LE edema, no tenderness to palpation of BLE.  Neurological: Alert, attentive, moving all extremities equally.   Skin: Multiple healing wounds on bilateral anterior lower " extremities, no evidence of surrounding erythema or cellulitis or drainage. She has a small approximately 4 cm irregularly shaped skin tear and anterior right shin.    Emergency Department Course   Emergency Department Course:    Reviewed:  946 I reviewed the patient's nursing notes, vitals, past medical records, Care Everywhere.     Assessments:  1016 I performed an exam of the patient as documented above.     Disposition:  The patient left AMA.     Impression & Plan   Medical Decision Makin-year-old woman past medical history significant for complicated lower extremity recurrent lacerations who is status post debridement and washout of her left leg who was discharged several days ago with wound VAC in place and plan for home care delivery. Patient reports she had subsequently fired her home care nurse and is decided to do her dressing changes herself. She presents today for a new right lower leg anterior skin tear that she sustained after brushing against something in her house. This wound was cleaned with plan for Steri-Strip and however patient then eloped from the emergency department prior to repair. At time of my visit no evidence that extended deep into the subcutaneous tissue was hemostatic with out evidence of infection.    Peewee Bhatt MD  [employer]  4:04 PM 20       Scribe Disclosure:  I, Derrick Serrato, am serving as a scribe at 11:11 AM on 2020 to document services personally performed by Peewee Bhatt MD based on my observations and the provider's statements to me.       Peewee Bhatt MD  [phone]

## 2020-12-23 ENCOUNTER — APPOINTMENT (OUTPATIENT)
Dept: CT IMAGING | Facility: CLINIC | Age: 80
End: 2020-12-23
Attending: EMERGENCY MEDICINE
Payer: COMMERCIAL

## 2020-12-23 ENCOUNTER — HOSPITAL ENCOUNTER (EMERGENCY)
Facility: CLINIC | Age: 80
Discharge: HOME | End: 2020-12-23
Attending: EMERGENCY MEDICINE | Admitting: EMERGENCY MEDICINE
Payer: COMMERCIAL

## 2020-12-23 VITALS
OXYGEN SATURATION: 98 % | HEART RATE: 94 BPM | WEIGHT: 178 LBS | TEMPERATURE: 97.8 F | SYSTOLIC BLOOD PRESSURE: 134 MMHG | BODY MASS INDEX: 33.61 KG/M2 | HEIGHT: 61 IN | RESPIRATION RATE: 20 BRPM | DIASTOLIC BLOOD PRESSURE: 96 MMHG

## 2020-12-23 DIAGNOSIS — S09.90XA ACUTE HEAD INJURY, INITIAL ENCOUNTER: ICD-10-CM
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
## 2020-12-23 LAB — INTERPRETATION ECG - MUSE: NORMAL

## 2020-12-23 PROCEDURE — 99284 EMERGENCY DEPT VISIT MOD MDM: CPT | Mod: 25

## 2020-12-23 PROCEDURE — 70450 CT HEAD/BRAIN W/O DYE: CPT

## 2020-12-23 PROCEDURE — 93005 ELECTROCARDIOGRAM TRACING: CPT

## 2020-12-23 ASSESSMENT — ENCOUNTER SYMPTOMS
HEADACHES: 0
NECK PAIN: 0

## 2020-12-23 ASSESSMENT — MIFFLIN-ST. JEOR: SCORE: 1224.78

## 2020-12-23 NOTE — ED NOTES
Bed: ED15  Expected date: 12/23/20  Expected time: 2:06 AM  Means of arrival: Ambulance  Comments:  BV1 80y/oF Fall

## 2020-12-23 NOTE — ED PROVIDER NOTES
"  History   Chief Complaint:  Fall       HPI   Madhavi Castellanos is a 80 year old female with history of chronic low back pain, atrial fibrillation, hypertension and CAD who presents after a fall. The patient reported as she was walking to the bathroom with her walker tonight when her knees gave out on her causing her to fall and hit the back of her head quite hard on the wall as she went down. She denied any loss of consciousness, use of blood thinner, or current headache. She called for EMS as she could not get up and she reports difficulty getting off of the ground in general. The patient states she was not on the ground long before she called. Per chart review, the patient was hospitalized after a fall on 12/18 for wound care. She denies any new injuries secondary to this fall. Her chronic low back pain is not any worse after this fall tonight.      Review of Systems   Musculoskeletal: Negative for neck pain.   Neurological: Negative for syncope and headaches.   All other systems reviewed and are negative.        Allergies:  Metoprolol  Albuterol  Augmentin  Codeine Sulfate  Cymbalta  Lisinopril  Omnicef [Cefdinir]  Percocet [Oxycodone-Acetaminophen]    Medications:  Aspirin 81   Lipitor   Wellbutrin   Fentanyl   Hydrochlorothiazide  Losartan  Tapazole   Robaxin   Phenergan   Sotalol    Past Medical History:    Asthma   Atrial fibrillation   CAD   Depression   hypertension   LBBB   Hypercholesteremia    TB      Past Surgical History:    Cholecystectomy   Colonoscopy   EGD combined   Excise vulva wide lesion   Irrigation and debridement lower extremity-left      Social History:  Presents to the ED: via EMS    Living Situation: Lives independently    Ambulates: with a walker at baseline    Physical Exam     Patient Vitals for the past 24 hrs:   BP Temp Temp src Pulse Resp SpO2 Height Weight   12/23/20 0230 (!) 153/72 -- -- 99 -- 98 % -- --   12/23/20 0221 (!) 134/97 97.8  F (36.6  C) Oral 95 20 95 % 1.549 m (5' 1\") " 80.7 kg (178 lb)       Physical Exam  Constitutional:  Oriented to person, place, and time. Well appearing.   HENT:   Head:    Normocephalic. Atraumatic. TMs clear.   Mouth/Throat:   Oropharynx is clear and moist.   Eyes:    EOM are normal. Pupils are equal, round, and reactive to light.   Neck:    Neck supple.   Cardiovascular:  Normal rate, regular rhythm and normal heart sounds.      Exam reveals no gallop and no friction rub.       No murmur heard.  Pulmonary/Chest:  Effort normal and breath sounds normal.      No respiratory distress. No wheezes. No rales.      No reproducible chest wall pain.  Abdominal:   Soft. No distension. No tenderness. No rebound and no guarding.   Musculoskeletal:  Normal range of motion. No midline spinal tenderness. No pelvic tenderness. 2+ distal pulses.   Neurological:   Alert and oriented to person, place, and time.           Moves all 4 extremities spontaneously. GCS: 15  Skin:    No rash noted. No pallor. Multiple abrasions at different stages of healing on her bilateral anterior legs. Chronic packed wound on her left anterior leg. No new or acute wounds.     Emergency Department Course     ECG:  Indication: Fall  Time: 0224  Vent. Rate 90 bpm. DE interval 220. QRS duration 148. QT/QTc 428/523. P-R-T axis 60 -30 100.  Sinus rhythm with sinus arrhythmia with 1st degree AV block. Left axis deviation. left bundle branch block. Abnormal ECG. Read time: 0225     Imaging:    CT Head without contrast:   1.  No acute intracranial process.  2.  Extensive membrane thickening of the right maxillary sinus, as per radiology.      Emergency Department Course:    Reviewed:  I reviewed the patient's nursing notes, vitals, past medical history and care everywhere.     Assessments:  0227 I evaluated the patient.   0322 I rechecked the patient.       Disposition:  The patient was discharged to home.       Impression & Plan     Medical Decision Making:  Madhavi Castellanos is a 80 year old female that  came in s/p fall. States that her leg gave out which has happened multiple times in the past and she states that she hit her head fairly hard. I obtained a CT of her head which is fortunately otherwise negative., She has no other injuries, denies neck pain, no midline spinal tenderness therefore no concern for spinal fracture. She is currently ambulating at baseline and denies any other injuries and is otherwise appropriate for outpatient management. Told to return for worsening headache or any concerning symptoms.      Diagnosis:    ICD-10-CM    1. Acute head injury, initial encounter  S09.90XA    2. Fall, initial encounter  W19.XXXA        Scribe Disclosure:  I, Jessica Jacob, am serving as a scribe at 2:27 AM on 12/23/2020 to document services personally performed by Froy Begum MD based on my observations and the provider's statements to me.          Froy Begum MD  12/23/20 0351

## 2020-12-23 NOTE — ED TRIAGE NOTES
Pt to ED via EMS from home s/p mechanical fall. Pt reports hitting head, denies LOC or the use of blood thinners. Pt a/o x4. Pt denies any head/neck/back pain. Ems reports pt fell on the 18th of this month and has multiple leg wounds.

## 2020-12-23 NOTE — ED AVS SNAPSHOT
[facility] Emergency Dept  [address]  [facility] [address]  Phone: [phone]  Fax: [phone]                                    Madhavi Castellanos   MRN: [id number]    Department: [facility] Emergency Dept   Date of Visit: 12/23/2020           After Visit Summary Signature Page    I have received my discharge instructions, and my questions have been answered. I have discussed any challenges I see with this plan with the nurse or doctor.    ..........................................................................................................................................  Patient/Patient Representative Signature      ..........................................................................................................................................  Patient Representative Print Name and Relationship to Patient    ..................................................               ................................................  Date                                   Time    ..........................................................................................................................................  Reviewed by Signature/Title    ...................................................              ..............................................  Date                                               Time          22EPIC Rev 08/18        stated

## 2021-05-08 ENCOUNTER — HEALTH MAINTENANCE LETTER (OUTPATIENT)
Age: 81
End: 2021-05-08

## 2021-07-09 NOTE — ED TRIAGE NOTES
Pt has wound on right lower leg for past 4 weeks after striking into a door.  She has been treating wounds at home until she can get to a wound clinic.  She also has wound on left lower leg.    
9

## 2021-08-21 ENCOUNTER — APPOINTMENT (OUTPATIENT)
Dept: CT IMAGING | Facility: CLINIC | Age: 81
End: 2021-08-21
Attending: EMERGENCY MEDICINE
Payer: COMMERCIAL

## 2021-08-21 ENCOUNTER — APPOINTMENT (OUTPATIENT)
Dept: GENERAL RADIOLOGY | Facility: CLINIC | Age: 81
End: 2021-08-21
Attending: EMERGENCY MEDICINE
Payer: COMMERCIAL

## 2021-08-21 ENCOUNTER — HOSPITAL ENCOUNTER (EMERGENCY)
Facility: CLINIC | Age: 81
Discharge: HOME | End: 2021-08-21
Attending: EMERGENCY MEDICINE | Admitting: EMERGENCY MEDICINE
Payer: COMMERCIAL

## 2021-08-21 VITALS
HEART RATE: 78 BPM | TEMPERATURE: 96.8 F | DIASTOLIC BLOOD PRESSURE: 88 MMHG | RESPIRATION RATE: 18 BRPM | SYSTOLIC BLOOD PRESSURE: 168 MMHG | OXYGEN SATURATION: 99 %

## 2021-08-21 DIAGNOSIS — D72.829 LEUKOCYTOSIS, UNSPECIFIED TYPE: ICD-10-CM

## 2021-08-21 DIAGNOSIS — R06.00 DYSPNEA, UNSPECIFIED TYPE: ICD-10-CM

## 2021-08-21 DIAGNOSIS — I10 BENIGN ESSENTIAL HYPERTENSION: ICD-10-CM

## 2021-08-21 DIAGNOSIS — S00.31XA ABRASION OF NOSE, INITIAL ENCOUNTER: ICD-10-CM

## 2021-08-21 DIAGNOSIS — S81.812A SKIN TEAR OF LEFT LOWER LEG WITHOUT COMPLICATION, INITIAL ENCOUNTER: ICD-10-CM

## 2021-08-21 LAB
ANION GAP SERPL CALCULATED.3IONS-SCNC: 7 MMOL/L (ref 3–14)
BASOPHILS # BLD AUTO: 0.1 10E3/UL (ref 0–0.2)
BASOPHILS NFR BLD AUTO: 0 %
BUN SERPL-MCNC: 17 MG/DL (ref 7–30)
CALCIUM SERPL-MCNC: 9.6 MG/DL (ref 8.5–10.1)
CHLORIDE BLD-SCNC: 105 MMOL/L (ref 94–109)
CO2 SERPL-SCNC: 24 MMOL/L (ref 20–32)
CREAT SERPL-MCNC: 0.74 MG/DL (ref 0.52–1.04)
EOSINOPHIL # BLD AUTO: 0.2 10E3/UL (ref 0–0.7)
EOSINOPHIL NFR BLD AUTO: 1 %
ERYTHROCYTE [DISTWIDTH] IN BLOOD BY AUTOMATED COUNT: 14 % (ref 10–15)
GFR SERPL CREATININE-BSD FRML MDRD: 77 ML/MIN/1.73M2
GLUCOSE BLD-MCNC: 105 MG/DL (ref 70–99)
HCT VFR BLD AUTO: 44.4 % (ref 35–47)
HGB BLD-MCNC: 14.2 G/DL (ref 11.7–15.7)
IMM GRANULOCYTES # BLD: 0.1 10E3/UL
IMM GRANULOCYTES NFR BLD: 1 %
LYMPHOCYTES # BLD AUTO: 2.7 10E3/UL (ref 0.8–5.3)
LYMPHOCYTES NFR BLD AUTO: 20 %
MCH RBC QN AUTO: 30.1 PG (ref 26.5–33)
MCHC RBC AUTO-ENTMCNC: 32 G/DL (ref 31.5–36.5)
MCV RBC AUTO: 94 FL (ref 78–100)
MONOCYTES # BLD AUTO: 1.1 10E3/UL (ref 0–1.3)
MONOCYTES NFR BLD AUTO: 8 %
NEUTROPHILS # BLD AUTO: 9.1 10E3/UL (ref 1.6–8.3)
NEUTROPHILS NFR BLD AUTO: 70 %
NRBC # BLD AUTO: 0 10E3/UL
NRBC BLD AUTO-RTO: 0 /100
NT-PROBNP SERPL-MCNC: 461 PG/ML (ref 0–1800)
PLATELET # BLD AUTO: 332 10E3/UL (ref 150–450)
POTASSIUM BLD-SCNC: 3.8 MMOL/L (ref 3.4–5.3)
RBC # BLD AUTO: 4.71 10E6/UL (ref 3.8–5.2)
SODIUM SERPL-SCNC: 136 MMOL/L (ref 133–144)
TROPONIN I SERPL-MCNC: <0.015 UG/L (ref 0–0.04)
WBC # BLD AUTO: 13.2 10E3/UL (ref 4–11)

## 2021-08-21 PROCEDURE — 36415 COLL VENOUS BLD VENIPUNCTURE: CPT | Performed by: EMERGENCY MEDICINE

## 2021-08-21 PROCEDURE — 80048 BASIC METABOLIC PNL TOTAL CA: CPT | Performed by: EMERGENCY MEDICINE

## 2021-08-21 PROCEDURE — 70450 CT HEAD/BRAIN W/O DYE: CPT

## 2021-08-21 PROCEDURE — 84484 ASSAY OF TROPONIN QUANT: CPT | Performed by: EMERGENCY MEDICINE

## 2021-08-21 PROCEDURE — 99285 EMERGENCY DEPT VISIT HI MDM: CPT | Mod: 25

## 2021-08-21 PROCEDURE — 85004 AUTOMATED DIFF WBC COUNT: CPT | Performed by: EMERGENCY MEDICINE

## 2021-08-21 PROCEDURE — 83880 ASSAY OF NATRIURETIC PEPTIDE: CPT | Performed by: EMERGENCY MEDICINE

## 2021-08-21 PROCEDURE — 93005 ELECTROCARDIOGRAM TRACING: CPT

## 2021-08-21 PROCEDURE — 71045 X-RAY EXAM CHEST 1 VIEW: CPT

## 2021-08-21 ASSESSMENT — ENCOUNTER SYMPTOMS
COUGH: 0
WOUND: 1
SHORTNESS OF BREATH: 1
FEVER: 0

## 2021-08-21 NOTE — ED PROVIDER NOTES
History   Chief Complaint:  Fall and Shortness of Breath       The history is provided by the patient.      Madhavi Castellanos is a 81 year old female with history of asthma and hypertension who presents with a fall and shortness of breath. The patient reports she was breathing just fine and was suddenly struck with an episode of shortness of breath yesterday morning. She also notes she was walking and stumbled causing her to hit her left leg yesterday evening. The patient denies hitting her head. She has wounds on her left leg and on her nose. She denies chest pain, abdominal pain, fever, and cough.  She denies current dyspnea. She denies smoking and drinking alcohol. She mentions she takes aspirin. No history blood clots/cancer. She is COVID 19 vaccinated.    Echocardiogram from 3/13/2020  Diastolic Doppler findings (E/E' ratio and/or other parameters) suggest left  ventricular filling pressures are increased.  Septal motion is consistent with conduction abnormality.  There is mild right ventricular hypertrophy.  Right ventricular systolic pressure is elevated, consistent with moderate  pulmonary hypertension.  Indeterminate rhythm-Rate <100 regular, wide but no A wave on MV inflow.  Please correlate with 12 lead ecg    Review of Systems   Constitutional: Negative for fever.   Respiratory: Positive for shortness of breath. Negative for cough.    Cardiovascular: Negative for chest pain.   Skin: Positive for wound (left shin, nose).   All other systems reviewed and are negative.    Allergies:  Metoprolol  Albuterol  Apixaban  Augmentin  Codeine Sulfate  Cymbalta  Lisinopril  Omnicef   Percocet    Medications:  aspirin   atorvastatin   bupropion   cephalexin   fentnyl  hydrochlorothiazide   hydrocodone-acetaminophen   Lidocaine   losartan  methimazole   methocarbamol   promethazine   sotalol     Past Medical History:    Asthma  Atrial fibrillation  CAD  Depression  GERD  Hypertension  LBBB  Lumbosacral  spondylosis  Mumps  Obesity  TALI  Osteoarthritis  Prolonged QT interval  Palpitations  Hypercholesterolemia  Spinal stenosis of lumbar region  STD  Tuberculosis   Rhabdomyolysis  BRBPR   Atrial fibrillation  Sleep apnea    Past Surgical History:    Cholecystectomy  Colonoscopy  EGD  Excise vulva wide local  Irrigation and debridement lower extremity      Social History:  Patient presents alone.  Denies ETOH use    Physical Exam     Patient Vitals for the past 24 hrs:   BP Temp Temp src Pulse Resp SpO2   08/21/21 0330 (!) 180/99 -- -- -- -- --   08/21/21 0328 -- 96.8  F (36  C) Temporal 86 22 99 %       Physical Exam  General: Alert. Appears comfortable  Head:  The scalp is without trauma  Eyes:  Sclera white; Pupils are equal and round  ENT:    External ears normal.  No hemotympanum.      External nares normal.  No septal hematoma.  Nasal bridge with abrasion, no tenderness  Neck:  No midline tenderness or pain with full ROM.  CV:  Rate as above with regular rhythm   No murmur   2/2 radial and dorsal pedal pulses  Resp:  Breath sounds clear and equal bilaterally    Non-labored, no retractions or accessory muscle use  GI:  Abdomen soft, non-tender, non-distended    No rebound tenderness or guarding  MSK:  No midline tenderness or bony step-off    No deformity    Moves all extremities equally and symmetrically  Skin:  No rash or lesions noted.   L. Anterior distal shin with 4cm skin tear, no bony tenderness  Neuro:   No apparent deficit.    Strength 5/5 x4.  Sensation intact x4.     GCS: 15  Psych:  Normal affect.          Emergency Department Course   ECG:  ECG taken at 0342, ECG read at 0342  Sinus rhythm with 1st degree AV block  Left axis deviation  Left bundle branch block  Abnormal ECG  Rate 90 bpm. ME interval 248. QRS duration 148. QT/QTc 406/496. P-R-T axes 5 -36 107.    Imaging:  XR Chest Port 1 View  No acute abnormality.  As per radiology.    Head CT w/o contrast  1.  No CT evidence for acute intracranial  process.  2.  Brain atrophy and presumed chronic microvascular ischemic changes as above.  As per radiology.      Laboratory:  CBC: WBC 13.2 (H), HGB 14.2,    Troponin I: <0.015  BMP: Glucose 105 (H) o/w WNL (Creatinine 0.74)   BNP: 461    Emergency Department Course:    Reviewed:  I reviewed nursing notes, vitals, past medical history and care everywhere    Assessments:  0432 I obtained history and examined the patient as noted above.   0635 I rechecked the patient and explained findings.     Disposition:  The patient was discharged to home.       Impression & Plan     Medical Decision Making:  Patient is a 81-year-old female presenting with reported dyspnea as well as a mechanical fall though on arrival she reports her dyspnea has resolved.  She is mildly hypertensive though this down trended during her time in the ED without intervention.  EKG with left bundle branch block though this appears chronic.  Screening troponin negative, she denies any active chest pain and I have a lower suspicion for ACS.  Clinically doubt PE given no tachycardia or hypoxia.  Chest x-ray without focal pneumonia, fluid overload, widened mediastinum or pneumothorax.  Labs with mild nonspecific leukocytosis though I suspect this is more reactive.  I doubt serious bacterial infection.  No profound anemia noted.  Patient did undergo formal CT head given obvious nasal trauma on exam though fortunately CT head without acute injury.  She is noted to have a skin tear which was cleaned and dressed appropriately.  Wound precautions given.  The remainder of her head to toe exam is without trauma.  We did discuss COVID-19 testing in light of her symptoms though patient is declining today.  She ambulated without hypoxia and appears stable for discharge.  Planning close outpatient follow-up for reevaluation, return precautions given.    Diagnosis:    ICD-10-CM    1. Dyspnea, unspecified type  R06.00    2. Skin tear of left lower leg without  complication, initial encounter  S81.812A    3. Benign essential hypertension  I10    4. Abrasion of nose, initial encounter  S00.31XA    5. Leukocytosis, unspecified type  D72.829        Discharge Medications:  New Prescriptions    No medications on file       Scribe Disclosure:  I, Gabriela Griggs, am serving as a scribe at 4:05 AM on 8/21/2021 to document services personally performed by Daisy Jackson DO based on my observations and the provider's statements to me.            Daisy Jackson DO  08/21/21 0653

## 2021-08-23 LAB
ATRIAL RATE - MUSE: 90 BPM
DIASTOLIC BLOOD PRESSURE - MUSE: NORMAL MMHG
INTERPRETATION ECG - MUSE: NORMAL
P AXIS - MUSE: 5 DEGREES
PR INTERVAL - MUSE: 248 MS
QRS DURATION - MUSE: 148 MS
QT - MUSE: 406 MS
QTC - MUSE: 496 MS
R AXIS - MUSE: -36 DEGREES
SYSTOLIC BLOOD PRESSURE - MUSE: NORMAL MMHG
T AXIS - MUSE: 107 DEGREES
VENTRICULAR RATE- MUSE: 90 BPM

## 2021-10-23 ENCOUNTER — HEALTH MAINTENANCE LETTER (OUTPATIENT)
Age: 81
End: 2021-10-23

## 2021-11-15 ENCOUNTER — APPOINTMENT (OUTPATIENT)
Dept: GENERAL RADIOLOGY | Facility: CLINIC | Age: 81
End: 2021-11-15
Attending: EMERGENCY MEDICINE
Payer: COMMERCIAL

## 2021-11-15 ENCOUNTER — HOSPITAL ENCOUNTER (EMERGENCY)
Facility: CLINIC | Age: 81
Discharge: HOME | End: 2021-11-15
Attending: EMERGENCY MEDICINE | Admitting: EMERGENCY MEDICINE
Payer: COMMERCIAL

## 2021-11-15 VITALS
OXYGEN SATURATION: 95 % | DIASTOLIC BLOOD PRESSURE: 111 MMHG | SYSTOLIC BLOOD PRESSURE: 148 MMHG | HEART RATE: 107 BPM | TEMPERATURE: 97.9 F | RESPIRATION RATE: 13 BRPM
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
## 2021-11-15 DIAGNOSIS — S80.211A ABRASION OF RIGHT KNEE, INITIAL ENCOUNTER: ICD-10-CM

## 2021-11-15 DIAGNOSIS — S80.01XA CONTUSION OF RIGHT KNEE, INITIAL ENCOUNTER: ICD-10-CM

## 2021-11-15 DIAGNOSIS — T14.8XXA AVULSION, SKIN: ICD-10-CM

## 2021-11-15 PROCEDURE — 250N000013 HC RX MED GY IP 250 OP 250 PS 637: Performed by: EMERGENCY MEDICINE

## 2021-11-15 PROCEDURE — 73562 X-RAY EXAM OF KNEE 3: CPT | Mod: RT

## 2021-11-15 PROCEDURE — 99283 EMERGENCY DEPT VISIT LOW MDM: CPT

## 2021-11-15 RX ADMIN — SOTALOL HYDROCHLORIDE 40 MG: 80 TABLET ORAL at 09:24

## 2021-11-15 ASSESSMENT — ENCOUNTER SYMPTOMS
WOUND: 1
ARTHRALGIAS: 1

## 2021-11-15 NOTE — ED TRIAGE NOTES
Patient arrives from home via EMS after losing her footing while walking with her walker. Significant abrasion to R knee, covered with ABD pad. Patient is not on blood thinners, only a baby aspirin daily (did not take this morning). ABCs intact, alert and orientated.

## 2021-11-15 NOTE — DISCHARGE INSTRUCTIONS
What do you do next:   Continue your home medications unless we have specifically changed them  Continue daily wound care including bacitracin, nonadherent gauze, and wrapping your affected extremity.  Follow up as indicated below    When do you return: If you have fevers, persistent bloody or foul-smelling drainage, uncontrollable pain, spreading redness at your wound site, or any other symptoms that concern you, please return to the ED for reevaluation.    Thank you for allowing us to care for you today.

## 2021-11-15 NOTE — ED NOTES
[place] [facility] [place] ready to [place] but needs a ride. Writer called [employer] and confirmed patient does not have transportation benefits as patient has a advantage plan. Writer confirmed with patient that she has no family or friends. Pt states her family is busy with their own life. Confirmed with patient she has money to pay for cab and lives close to this hospital. Writer set up [employer] [phone] and will pick her up soon. Updated [facility] and [place].    DMITRI Martinez on 11/15/2021 at 11:13 AM  [phone]

## 2021-11-15 NOTE — ED PROVIDER NOTES
History   Chief Complaint:  Fall     The history is provided by the patient.      Madhavi Castellanos is a 81 year old female with history of A-fib (Watchman device, not anticoagulated, but is on Sotalol) who presents via EMS after a fall. This morning she was walking with her walker and felt her legs and hips go rubbery causing her to fall. She has an abrasion to her right knee. She does have some right knee pain. She notes she took a Neurontin a couple of hours before the fall. She is not on any blood thinners. Her last tetanus was in 2020.     Separately, the patient states that she has not taken her sotalol dose this morning and requests that as that helps keep her out of A. fib.    Review of Systems   Musculoskeletal: Positive for arthralgias (right knee).   Skin: Positive for wound.   All other systems reviewed and are negative.    Allergies:  Metoprolol  Albuterol  Apixaban  Augmentin  Codeine sulfate  Cymbalta  Lisinopril  Omnicef   Percocet   Acetaminophen   Duloxetine   Cephalexin    Medications:  Aspirin 81 mg  Lipitor  Wellbutrin  Microzide  Cozaar  Tapazole  Robaxin  Phenergan  Betapace    Past Medical History:     Asthma  Atrial fibrillation  CAD  Depression  GERD  Hypertension  Left bundle branch block  Liver lesion  Lumbosacral spondylosis  Mumps  Obesity  TALI  Osteoarthritis  Hypercholesterolemia  STD  Spinal stenosis  Tuberculosis   Rhabdomyolysis    Lower GI bleed  Kidney stones  Vulvar intraepithelial neoplasia III    Past Surgical History:    Cholecystectomy  Colonoscopy  Ablation  EGD combined  Excise vulva wide local  Irrigation and debridement lower extremity    Septoplasty  Vaginal hysterectomy   Carpal tunnel release  Knee arthroscopy x2  Blepharoplasty     Family History:    Father: lung and kidney cancer  Mother: MS    Social History:  Presents to ED alone    Physical Exam     Patient Vitals for the past 24 hrs:   BP Temp Temp src Pulse Resp SpO2   11/15/21 0924 (!) 158/96 -- -- -- 12 --    11/15/21 0845 (!) 159/100 -- -- 92 -- 98 %   11/15/21 0830 (!) 132/113 -- -- 93 -- 99 %   11/15/21 0805 (!) 160/11 97.9  F (36.6  C) Oral 95 -- 95 %       Physical Exam  Constitutional: Vital signs reviewed as above.   Head: No external signs of trauma noted.  Eyes: Pupils are equal, round, and reactive to light.   Neck: No JVD noted  Cardiovascular: Normal rate, regular rhythm and normal heart sounds at the time of my exam.  No murmur heard. Equal B/L peripheral pulses.  Pulmonary/Chest: Effort normal and breath sounds normal. No respiratory distress. Patient has no wheezes. Patient has no rales.   Gastrointestinal: Soft. There is no tenderness.   Musculoskeletal/Extremities: There is mild right knee tenderness.  Neurological: Patient is alert and oriented to person, place, and time.   Skin: Notable right knee abrasion and skin tear, approximately 10 cm in diameter.  There is no deep laceration that will need suturing.  Psychiatric: The patient appears calm.      Emergency Department Course     Imaging:  XR Knee Right 3 Views   Preliminary Result   IMPRESSION: Advanced lateral compartment degenerative changes. Mild   patellofemoral degenerative changes. No evidence of acute fracture or   definite effusion.      Report per radiology    Emergency Department Course:  Reviewed:  I reviewed nursing notes, vitals, past medical history, Care Everywhere and [facility]    Assessments/Consults:  ED Course as of 11/15/21 1033   Mon Nov 15, 2021   0825 Obtained history and examined the patient as noted above.    1009 Rechecked and updated the patient.      Interventions:  0924 Sotalol, 40 mg, PO    Disposition:  The patient was discharged to home.     Impression & Plan     CMS Diagnoses: None    Medical Decision Making:  This 81-year-old female patient presents to the ED after a fall.  Please see the HPI and exam for specifics.  The patient had mild tenderness of her right knee.  Fortunately, no fracture was noted.  There is a  notable abrasion and skin tear.  No suturing was required.  The patient noted a prior fall on her left knee that took quite a long time to heal.  She states she is comfortable performing the wound cares at home and I think she can be discharged.  I think that a wound recheck in the next 2 days is a reasonable next step.  She notes she has an appointment with Dr. Monterroso this week.  She can return at anytime with new or worsening symptoms.  Anticipatory guidance given prior to discharge.    Diagnosis:    ICD-10-CM    1. Fall, initial encounter  W19.XXXA    2. Contusion of right knee, initial encounter  S80.01XA    3. Abrasion of right knee, initial encounter  S80.211A    4. Avulsion, skin  T14.8XXA     right knee       Scribe Disclosure:  I, Aurelio Mcgarry, am serving as a scribe at 8:01 AM on 11/15/2021 to document services personally performed by Sachin Correa DO based on my observations and the provider's statements to me.            Sachin Correa DO  11/15/21 1107

## 2021-11-15 NOTE — ED NOTES
Bed: HW01  Expected date: 11/15/21  Expected time: 7:54 AM  Means of arrival: Ambulance  Comments:  BV2  81F  Fall, skin avulsion

## 2021-11-30 ENCOUNTER — APPOINTMENT (OUTPATIENT)
Dept: GENERAL RADIOLOGY | Facility: CLINIC | Age: 81
End: 2021-11-30
Attending: EMERGENCY MEDICINE
Payer: COMMERCIAL

## 2021-11-30 ENCOUNTER — HOSPITAL ENCOUNTER (EMERGENCY)
Facility: CLINIC | Age: 81
Discharge: HOME | End: 2021-11-30
Attending: EMERGENCY MEDICINE | Admitting: EMERGENCY MEDICINE
Payer: COMMERCIAL

## 2021-11-30 VITALS
BODY MASS INDEX: 31.74 KG/M2 | SYSTOLIC BLOOD PRESSURE: 137 MMHG | TEMPERATURE: 97.8 F | RESPIRATION RATE: 18 BRPM | WEIGHT: 168 LBS | OXYGEN SATURATION: 96 % | HEART RATE: 109 BPM | DIASTOLIC BLOOD PRESSURE: 120 MMHG

## 2021-11-30 DIAGNOSIS — R29.6 FALLS FREQUENTLY: ICD-10-CM

## 2021-11-30 DIAGNOSIS — S80.211A ABRASION OF RIGHT KNEE, INITIAL ENCOUNTER: ICD-10-CM
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
## 2021-11-30 DIAGNOSIS — R00.0 SINUS TACHYCARDIA: ICD-10-CM

## 2021-11-30 DIAGNOSIS — S81.801A AVULSION OF SKIN OF RIGHT LOWER LEG, INITIAL ENCOUNTER: ICD-10-CM

## 2021-11-30 LAB
ANION GAP SERPL CALCULATED.3IONS-SCNC: 5 MMOL/L (ref 3–14)
BASOPHILS # BLD AUTO: 0.1 10E3/UL (ref 0–0.2)
BASOPHILS NFR BLD AUTO: 1 %
BUN SERPL-MCNC: 21 MG/DL (ref 7–30)
CALCIUM SERPL-MCNC: 9.7 MG/DL (ref 8.5–10.1)
CHLORIDE BLD-SCNC: 103 MMOL/L (ref 94–109)
CO2 SERPL-SCNC: 31 MMOL/L (ref 20–32)
CREAT SERPL-MCNC: 0.68 MG/DL (ref 0.52–1.04)
EOSINOPHIL # BLD AUTO: 0.1 10E3/UL (ref 0–0.7)
EOSINOPHIL NFR BLD AUTO: 1 %
ERYTHROCYTE [DISTWIDTH] IN BLOOD BY AUTOMATED COUNT: 13.6 % (ref 10–15)
GFR SERPL CREATININE-BSD FRML MDRD: 83 ML/MIN/1.73M2
GLUCOSE BLD-MCNC: 97 MG/DL (ref 70–99)
HCT VFR BLD AUTO: 46.4 % (ref 35–47)
HGB BLD-MCNC: 14.7 G/DL (ref 11.7–15.7)
IMM GRANULOCYTES # BLD: 0.1 10E3/UL
IMM GRANULOCYTES NFR BLD: 1 %
LYMPHOCYTES # BLD AUTO: 3.3 10E3/UL (ref 0.8–5.3)
LYMPHOCYTES NFR BLD AUTO: 24 %
MAGNESIUM SERPL-MCNC: 2.3 MG/DL (ref 1.6–2.3)
MCH RBC QN AUTO: 31.3 PG (ref 26.5–33)
MCHC RBC AUTO-ENTMCNC: 31.7 G/DL (ref 31.5–36.5)
MCV RBC AUTO: 99 FL (ref 78–100)
MONOCYTES # BLD AUTO: 1.3 10E3/UL (ref 0–1.3)
MONOCYTES NFR BLD AUTO: 9 %
NEUTROPHILS # BLD AUTO: 8.8 10E3/UL (ref 1.6–8.3)
NEUTROPHILS NFR BLD AUTO: 64 %
NRBC # BLD AUTO: 0 10E3/UL
NRBC BLD AUTO-RTO: 0 /100
PLATELET # BLD AUTO: 349 10E3/UL (ref 150–450)
POTASSIUM BLD-SCNC: 3.8 MMOL/L (ref 3.4–5.3)
RBC # BLD AUTO: 4.69 10E6/UL (ref 3.8–5.2)
SODIUM SERPL-SCNC: 139 MMOL/L (ref 133–144)
TROPONIN I SERPL HS-MCNC: 8 NG/L
TROPONIN I SERPL HS-MCNC: 8 NG/L
WBC # BLD AUTO: 13.8 10E3/UL (ref 4–11)

## 2021-11-30 PROCEDURE — 250N000013 HC RX MED GY IP 250 OP 250 PS 637: Performed by: EMERGENCY MEDICINE

## 2021-11-30 PROCEDURE — 93005 ELECTROCARDIOGRAM TRACING: CPT | Mod: 76

## 2021-11-30 PROCEDURE — 83735 ASSAY OF MAGNESIUM: CPT | Performed by: EMERGENCY MEDICINE

## 2021-11-30 PROCEDURE — 96360 HYDRATION IV INFUSION INIT: CPT

## 2021-11-30 PROCEDURE — 258N000003 HC RX IP 258 OP 636: Performed by: EMERGENCY MEDICINE

## 2021-11-30 PROCEDURE — 36415 COLL VENOUS BLD VENIPUNCTURE: CPT | Performed by: EMERGENCY MEDICINE

## 2021-11-30 PROCEDURE — 99285 EMERGENCY DEPT VISIT HI MDM: CPT | Mod: 25

## 2021-11-30 PROCEDURE — 84484 ASSAY OF TROPONIN QUANT: CPT | Performed by: EMERGENCY MEDICINE

## 2021-11-30 PROCEDURE — 85025 COMPLETE CBC W/AUTO DIFF WBC: CPT | Performed by: EMERGENCY MEDICINE

## 2021-11-30 PROCEDURE — 80048 BASIC METABOLIC PNL TOTAL CA: CPT | Performed by: EMERGENCY MEDICINE

## 2021-11-30 PROCEDURE — 93005 ELECTROCARDIOGRAM TRACING: CPT

## 2021-11-30 PROCEDURE — 96361 HYDRATE IV INFUSION ADD-ON: CPT

## 2021-11-30 PROCEDURE — 73562 X-RAY EXAM OF KNEE 3: CPT | Mod: RT

## 2021-11-30 RX ORDER — ATORVASTATIN CALCIUM 10 MG/1
10 TABLET, FILM COATED ORAL ONCE
Status: COMPLETED | OUTPATIENT
Start: 2021-11-30 | End: 2021-11-30

## 2021-11-30 RX ADMIN — SODIUM CHLORIDE 500 ML: 9 INJECTION, SOLUTION INTRAVENOUS at 19:13

## 2021-11-30 RX ADMIN — SOTALOL HYDROCHLORIDE 60 MG: 80 TABLET ORAL at 19:12

## 2021-11-30 RX ADMIN — ATORVASTATIN CALCIUM 10 MG: 10 TABLET, FILM COATED ORAL at 19:12

## 2021-11-30 ASSESSMENT — ENCOUNTER SYMPTOMS
ARTHRALGIAS: 1
SHORTNESS OF BREATH: 0
WOUND: 1
NECK PAIN: 1

## 2021-11-30 NOTE — ED TRIAGE NOTES
"Arrives via EMS. Pt reports previous R knee injury. She had mechanical fall today and scraped the R knee again. She currently is taking abx for the skin tear.  Pt has Fentanyl patch on abd. EMS reports VSS. Pt denies CP and/or SOB. ABC's intact. Pt states she \"just want to make sure there's no infection\".  "

## 2021-11-30 NOTE — ED PROVIDER NOTES
History   Chief Complaint:  Fall       The history is provided by the patient.      Madhavi Castellanos is a 81 year old female with history of atrial fibrillation and recurrent falls who presents via EMS for evaluation after a mechanical fall this afternoon. She reports walking and using a carrying basket, but she was unable to keep up with this and fell forwards. The patient notes tucking her head so she did not hit it or lose consciousness. She has a large skin tear to the right knee. The patient also notes neck pain, but says this is from a fall last winter. She denies chest pain and shortness of breath. She confirms being seen here by me a couple weeks ago after a fall, which caused a previous knee injury. The patient says her knee was looking better after this and prior to today's fall. Of note, the patient had an appointment at [facility] earlier today, before her fall.      Review of Systems   Respiratory: Negative for shortness of breath.    Cardiovascular: Negative for chest pain.   Musculoskeletal: Positive for arthralgias and neck pain.   Skin: Positive for wound (right knee).   Neurological:        (-) loss of consciousness   All other systems reviewed and are negative.    Allergies:  Metoprolol  Albuterol  Apixaban  Augmentin  Codeine Sulfate  Cymbalta  Lisinopril  Omnicef  Percocet   Duloxetine  Cefdinir  Prednisone    Medications:  Aspirin  Lipitor  Wellbutrin  Cozaar  Tapazole  Robaxin  Phenergan  Sotalol   Fentanyl patch   Hydrochlorothiazide    Past Medical History:     Asthma  Atrial fibrillation  CAD  Depression  Esophageal reflux  Hypertension  LBBB  Liver lesion  Lumbosacral spondylosis  Mumps  Obesity  TALI  Osteoarthritis  Prolonged QT interval  Pure hypercholesterolemia  STD  BRBPR  Spinal stenosis of lumbar region  Tuberculosis  Rhabdomyolysis  Recurrent falls  Grave's disease  Kidney stones    Past Surgical History:    Cholecystectomy  Colonoscopy   EP ablation  Combined esophagoscopy,  gastroscopy, and duodenoscopy  Excise vulva wide local  Irrigation and debridement lower extremity   Septoplasty  Vaginal hysterectomy  Insertion linq heart monitor  Watchman LT atrial appendage insert  Blepharoplasty  Bilateral knee arthroscopy     Family History:    Lung cancer  Kidney cancer  MS    Social History:  The patient presents alone via EMS.  She was at [facility] her [facility] earlier today.     Physical Exam     Patient Vitals for the past 24 hrs:   BP Temp Temp src Pulse Resp SpO2 Weight   11/30/21 2015 -- -- -- 109 -- -- --   11/30/21 1915 (!) 137/120 -- -- 102 -- 96 % --   11/30/21 1842 (!) 146/125 -- -- 117 -- 95 % --   11/30/21 1838 -- -- -- (!) 121 -- -- --   11/30/21 1753 (!) 142/100 -- -- -- 18 95 % --   11/30/21 1650 (!) 159/107 -- -- -- -- 96 % --   11/30/21 1625 (!) 142/109 97.8  F (36.6  C) Oral 115 22 95 % 76.2 kg (168 lb)       Physical Exam    Constitutional: Vital signs reviewed as above.   Head: No external signs of trauma noted.  Eyes: Pupils are equal, round, and reactive to light.   Neck: No JVD noted  Cardiovascular: Tachycardic rate, seemingly irregular rhythm and normal heart sounds.  No murmur heard. Equal B/L peripheral pulses.  Pulmonary/Chest: Effort normal and breath sounds normal. No respiratory distress. Patient has no wheezes. Patient has no rales.   Gastrointestinal: Soft. There is no tenderness.   Musculoskeletal/Extremities: Mild tenderness to the right knee.  Neurological: Patient is alert and oriented to person, place, and time.   Skin: Notable large skin tear on the right knee. See clinical image below.  Psychiatric: The patient appears calm.          Emergency Department Course   ECG  ECG obtained at 1648, ECG read at 1655  Sinus tachycardia with first degree AV block   Left axis deviation  Left bundle branch block  Abnormal ECG  Change compared to prior, dated 8/21/2021.  Rate 118 bpm. MA interval * ms. QRS duration 146 ms. QT/QTc 382/535 ms. P-R-T axes * -35 117.     ECG  #2  ECG taken at 2038, ECG read at 2040  Sinus tachycardia with first degree AV block   Left axis deviation  Left bundle branch block  Abnormal ECG  No significant change compared to prior, 11/30/2021 at 1648.  Rate 116 bpm. AR interval * ms. QRS duration 144 ms. QT/QTc 400/556 ms. P-R-T axes * -40 112.     Imaging:  XR Knee Right 3 Views   Final Result   IMPRESSION: Somewhat limited evaluation due to osteopenia. No acute fracture or malalignment. Severe lateral compartment degenerative changes with bone-on-bone articulation. Mild patellofemoral compartment degenerative changes. Small knee joint effusion.      Report per radiology    Laboratory:  Labs Ordered and Resulted from Time of ED Arrival to Time of ED Departure   CBC WITH PLATELETS AND DIFFERENTIAL - Abnormal       Result Value    WBC Count 13.8 (*)     RBC Count 4.69      Hemoglobin 14.7      Hematocrit 46.4      MCV 99      MCH 31.3      MCHC 31.7      RDW 13.6      Platelet Count 349      % Neutrophils 64      % Lymphocytes 24      % Monocytes 9      % Eosinophils 1      % Basophils 1      % Immature Granulocytes 1      NRBCs per 100 WBC 0      Absolute Neutrophils 8.8 (*)     Absolute Lymphocytes 3.3      Absolute Monocytes 1.3      Absolute Eosinophils 0.1      Absolute Basophils 0.1      Absolute Immature Granulocytes 0.1      Absolute NRBCs 0.0     BASIC METABOLIC PANEL - Normal    Sodium 139      Potassium 3.8      Chloride 103      Carbon Dioxide (CO2) 31      Anion Gap 5      Urea Nitrogen 21      Creatinine 0.68      Calcium 9.7      Glucose 97      GFR Estimate 83     TROPONIN I - Normal    Troponin I High Sensitivity 8     MAGNESIUM - Normal    Magnesium 2.3     TROPONIN I - Normal    Troponin I High Sensitivity 8          Emergency Department Course:  Reviewed:  I reviewed nursing notes, vitals, past medical history and Care Everywhere    Assessments/Consults:  ED Course as of 11/30/21 2055 Tue Nov 30, 2021 1621 I obtained history and  examined the patient as noted above.   1833 I rechecked the patient and explained findings.   2042 I rechecked and updated the patient. She is getting dressed and would like to be discharged.       Interventions:  1912 Atorvastatin 10 mg Oral  1912 Sotalol 60 mg Oral  1913  mL IV    Disposition:  The patient was discharged to home.     Impression & Plan     CMS Diagnoses: None    Medical Decision Making:  This 81-year-old female patient presents to the ED after a fall.  Please see the HPI and exam for specifics.  The patient does have a history of A. fib for which she takes sotalol and has a watchman device.  She is not anticoagulated.  She had a mechanical fall today.  She also notes that her heart rate has been somewhat high for the last 2 days.  A broad work-up was undertaken as above.  She has been persistently tachycardic though she denies chest pain or shortness of breath.  I gave her her evening sotalol dose (the patient is prescribed 120 mg of sotalol but takes it in divided dosing).  She also requested her atorvastatin.  2 troponins are negative.      The patient wished to be discharged.  She states she will contact her cardiology office at [facility] tomorrow and she already contacted [facility] regarding her knee.  She should certainly be seen somewhere in 2 days for a wound check.  No suturing was required for the wound on her knee.  I urged her to return immediately should she have new or worsening symptoms.  Anticipatory guidance given prior to discharge      Diagnosis:    ICD-10-CM    1. Fall, initial encounter  W19.XXXA    2. Falls frequently  R29.6    3. Abrasion of right knee, initial encounter  S80.211A    4. Avulsion of skin of right lower leg, initial encounter  S81.801A    5. Sinus tachycardia  R00.0        Discharge Medications:  New Prescriptions    No medications on file       Scribe Disclosure:  I, Lilia Jhaveri, am serving as a scribe at 4:18 PM on 11/30/2021 to  document services personally performed by Sachin Correa DO based on my observations and the provider's statements to me.      Sachin Correa DO  11/30/21 2056

## 2021-12-01 ENCOUNTER — TELEPHONE (OUTPATIENT)
Dept: WOUND CARE | Facility: CLINIC | Age: 81
End: 2021-12-01
Payer: COMMERCIAL

## 2021-12-01 LAB
ATRIAL RATE - MUSE: 116 BPM
ATRIAL RATE - MUSE: 122 BPM
DIASTOLIC BLOOD PRESSURE - MUSE: NORMAL MMHG
DIASTOLIC BLOOD PRESSURE - MUSE: NORMAL MMHG
INTERPRETATION ECG - MUSE: NORMAL
INTERPRETATION ECG - MUSE: NORMAL
P AXIS - MUSE: NORMAL DEGREES
P AXIS - MUSE: NORMAL DEGREES
PR INTERVAL - MUSE: NORMAL MS
PR INTERVAL - MUSE: NORMAL MS
QRS DURATION - MUSE: 144 MS
QRS DURATION - MUSE: 146 MS
QT - MUSE: 382 MS
QT - MUSE: 400 MS
QTC - MUSE: 535 MS
QTC - MUSE: 556 MS
R AXIS - MUSE: -35 DEGREES
R AXIS - MUSE: -40 DEGREES
SYSTOLIC BLOOD PRESSURE - MUSE: NORMAL MMHG
SYSTOLIC BLOOD PRESSURE - MUSE: NORMAL MMHG
T AXIS - MUSE: 112 DEGREES
T AXIS - MUSE: 117 DEGREES
VENTRICULAR RATE- MUSE: 116 BPM
VENTRICULAR RATE- MUSE: 118 BPM

## 2021-12-01 NOTE — DISCHARGE INSTRUCTIONS
What do you do next:   Continue your home medications unless we have specifically changed them  Your heart rate is still elevated though you wish to be discharged.  You stated that he will contact your cardiology clinic at [facility] tomorrow.  Follow up as indicated below    When do you return: If you have chest pain, shortness of breath, lightheadedness, fainting, severe palpitations, or any other symptoms that concern you, please return to the ED for reevaluation.    Thank you for allowing us to care for you today.

## 2021-12-01 NOTE — TELEPHONE ENCOUNTER
Madhavi called and has a knee wound from falling and [facility] from [place] referred her to us.   Please call her at .

## 2021-12-02 NOTE — TELEPHONE ENCOUNTER
Consult received via phone from provider Correa for ulcer of the Right knee.     Schedule with Norma Seals Katie at [facility] at [facility].     Routing to .

## 2021-12-08 ENCOUNTER — TELEPHONE (OUTPATIENT)
Dept: WOUND CARE | Facility: CLINIC | Age: 81
End: 2021-12-08
Payer: COMMERCIAL

## 2021-12-08 NOTE — TELEPHONE ENCOUNTER
Madhavi would like to be seen sooner than 12/29/21 with Dr. Green.    Please call her if anything opens up.

## 2022-02-26 ENCOUNTER — APPOINTMENT (OUTPATIENT)
Dept: CT IMAGING | Facility: CLINIC | Age: 82
End: 2022-02-26
Attending: EMERGENCY MEDICINE
Payer: COMMERCIAL

## 2022-02-26 ENCOUNTER — HOSPITAL ENCOUNTER (EMERGENCY)
Facility: CLINIC | Age: 82
Discharge: HOME | End: 2022-02-26
Attending: EMERGENCY MEDICINE | Admitting: EMERGENCY MEDICINE
Payer: COMMERCIAL

## 2022-02-26 VITALS
DIASTOLIC BLOOD PRESSURE: 87 MMHG | RESPIRATION RATE: 16 BRPM | SYSTOLIC BLOOD PRESSURE: 148 MMHG | HEART RATE: 86 BPM | TEMPERATURE: 97.6 F | OXYGEN SATURATION: 98 %

## 2022-02-26 DIAGNOSIS — S00.03XA HEMATOMA OF SCALP, INITIAL ENCOUNTER: ICD-10-CM

## 2022-02-26 PROCEDURE — 72125 CT NECK SPINE W/O DYE: CPT

## 2022-02-26 PROCEDURE — 70450 CT HEAD/BRAIN W/O DYE: CPT

## 2022-02-26 PROCEDURE — 99284 EMERGENCY DEPT VISIT MOD MDM: CPT | Mod: 25

## 2022-02-26 ASSESSMENT — ENCOUNTER SYMPTOMS
ARTHRALGIAS: 1
ABDOMINAL PAIN: 0
NECK PAIN: 0

## 2022-02-26 NOTE — ED NOTES
Pt ambulated in [place] with cane, tolerated well. Discharged via wheelchair, friend is picking her up.

## 2022-02-26 NOTE — ED NOTES
Bed: ED28  Expected date: 2/26/22  Expected time:   Means of arrival: Ambulance  Comments:  BV1 Fall

## 2022-02-26 NOTE — ED PROVIDER NOTES
History   Chief Complaint:  Fall       HPI   Madhavi Castellanos is a 81 year old female with history of recurrent falls who presents with with pain on back of her head following a fall on the stairs during which she hit this area. She did not lose consciousness. She notes of knee pain from previous injury which was not aggravated from her fall today. Denies abdominal and neck pain.    Review of Systems   Gastrointestinal: Negative for abdominal pain.   Musculoskeletal: Positive for arthralgias. Negative for neck pain.   All other systems reviewed and are negative.    Allergies:  Gabapentin  Metoprolol  Acetaminophen  Albuterol  Apixaban  Augmentin  Codeine Sulfate  Cymbalta  Lisinopril  Omnicef [Cefdinir]  Percocet [Oxycodone-Acetaminophen]  Prednisone  Duloxetine    Medications:  Aspirin 81  Lipitor  Wellbutrin  Keflex  Duragesic  Microzide  Norco  Cozaar  Tapazole  Robaxin  Phenergan  Betapace    Past Medical History:     Asthma  Atrial fibrillation  CAD  Depression  Esophageal reflux  LBBB  Liver lesion  Lumbosacral spondylosis  Mumps  Obstructive sleep apnea  Osteoarthritis  Palpitations  Prolonged QT interval  Hypercholesterolemia  Spinal stenosis of lumbar  STD  Tuberculosis  Rhabdomyolysis  Knee laceration left and right  Recurrent falls    Grave's disease  Spinal stenosis  Lower extremity edema    Past Surgical History:    Cholecystectomy  Colonoscopy  Ablation for PAF  EGD combined  Excise vulva wide local  Irrigation and debridement left knee     Kidney stones  Lowe GI bleed  Septoplasty  Watchman Lt atrial appendage insert  Blepharoplasty  Carpal tunnel release      Family History:    Father - lung and kidney cancer    Social History:  The patient presents to the ED alone.   She lives alone and has a cat.    Physical Exam     Patient Vitals for the past 24 hrs:   BP Temp Temp src Pulse Resp SpO2   02/26/22 1458 -- -- -- -- -- 97 %   02/26/22 1457 (!) 148/87 -- -- 86 18 97 %   02/26/22 1415 135/83 -- -- 70  -- 99 %   02/26/22 1330 114/86 -- -- 84 -- 96 %   02/26/22 1315 124/70 -- -- 83 -- 96 %   02/26/22 1300 121/74 -- -- 84 -- 98 %   02/26/22 1245 134/86 -- -- 62 -- 97 %   02/26/22 1235 (!) 142/109 97.6  F (36.4  C) Oral 87 16 93 %       Physical Exam  Gen: well appearing, in no acute distress  Oral : Mucous membranes moist,   Nose: No rhinorhea  Ears: External near normal, without drainage  Eyes: periorbital tissues and sclera normal   Neck: supple, no abnormal swelling  Lungs:  CTAB,  no resp distress, speaks full sentences  CV: Regular rate, regular rhythm  Abd: soft, nontender, nondistended, no rebound/guarding  Ext: no lower extremity edema  Skin: warm, dry, well perfused, no rashes/bruising/lesions on exposed skin, small hematoma on back of head  Neuro: alert, no gross motor or sensory deficits,   Psych: pleasant mood, normal affect    Emergency Department Course     Imaging:  Head CT w/o contrast   Final Result   IMPRESSION:   1.  No CT evidence for acute intracranial process.   2.  Brain atrophy and presumed chronic microvascular ischemic changes as above.      Cervical spine CT w/o contrast   Final Result   IMPRESSION:   1.  No acute cervical spine fracture.        Report per radiology      Emergency Department Course:    Reviewed:  I reviewed nursing notes, vitals, past medical history and Care Everywhere    Assessments:  1235 I obtained history and examined the patient as noted above.   1532 I rechecked the patient and explained findings.     Disposition:  The patient was discharged to home.     Impression & Plan     CMS Diagnoses: None      Medical Decision Making:  Patient presents to ED after a fall down some stairs at home. She has a small scalp hematoma posteriorly, otherwise no complaints. She's ranging her extremities well. This was a mechanical fall. No dizziness or lightheadedness prior to her falling. Head CT and C spine CT are normal. She was able to ambulate in the ED at her baseline without any  evidence of long bone trauma, she's got reassuring vital signs. We'll go ahead and get her discharged home for outpatient management, she's comfortable with that plan.    Critical Care Time: None    Diagnosis:    ICD-10-CM    1. Hematoma of scalp, initial encounter  S00.03XA        Scribe Disclosure:  I, Marii Shah, am serving as a scribe at 12:36 PM on 2/26/2022 to document services personally performed by Hank Booker MD based on my observations and the provider's statements to me.              Hank Booker MD  02/26/22 3808

## 2022-02-26 NOTE — ED NOTES
Pt refused to leave blood pressure monitor on. Assisted pt to bedside commode with standby assist.

## 2022-02-26 NOTE — ED TRIAGE NOTES
"Presents to ED via EMS for fall. Pt states she fell down 7 stairs at home today. Pt has hx of back problems and wants to get checked. Pt states she did not lose consciousness, stating she \"fought it off.\" Pt denies pain at this time. Pt has scrape to left wrist. Pt also has fentanyl patch to stomach of 25mcg. Pt takes one baby aspirin daily, otherwise no thinners.   "

## 2022-06-04 ENCOUNTER — HEALTH MAINTENANCE LETTER (OUTPATIENT)
Age: 82
End: 2022-06-04

## 2022-10-10 ENCOUNTER — HEALTH MAINTENANCE LETTER (OUTPATIENT)
Age: 82
End: 2022-10-10

## 2022-11-02 ENCOUNTER — HOSPITAL ENCOUNTER (INPATIENT)
Facility: CLINIC | Age: 82
LOS: 3 days | Discharge: HOME | DRG: 854 | End: 2022-11-05
Attending: EMERGENCY MEDICINE | Admitting: INTERNAL MEDICINE
Payer: COMMERCIAL

## 2022-11-02 ENCOUNTER — APPOINTMENT (OUTPATIENT)
Dept: GENERAL RADIOLOGY | Facility: CLINIC | Age: 82
DRG: 854 | End: 2022-11-02
Attending: EMERGENCY MEDICINE
Payer: COMMERCIAL

## 2022-11-02 ENCOUNTER — APPOINTMENT (OUTPATIENT)
Dept: CT IMAGING | Facility: CLINIC | Age: 82
DRG: 854 | End: 2022-11-02
Attending: EMERGENCY MEDICINE
Payer: COMMERCIAL

## 2022-11-02 DIAGNOSIS — A41.9 SEPSIS, DUE TO UNSPECIFIED ORGANISM, UNSPECIFIED WHETHER ACUTE ORGAN DYSFUNCTION PRESENT (H): ICD-10-CM

## 2022-11-02 DIAGNOSIS — E86.0 DEHYDRATION: Primary | ICD-10-CM

## 2022-11-02 DIAGNOSIS — N39.0 URINARY TRACT INFECTION WITHOUT HEMATURIA, SITE UNSPECIFIED: ICD-10-CM

## 2022-11-02 DIAGNOSIS — M62.81 GENERALIZED MUSCLE WEAKNESS: ICD-10-CM

## 2022-11-02 DIAGNOSIS — N20.0 KIDNEY STONE: ICD-10-CM

## 2022-11-02 DIAGNOSIS — N20.1 LEFT URETERAL STONE: ICD-10-CM

## 2022-11-02 LAB
ALBUMIN SERPL BCG-MCNC: 3.6 G/DL (ref 3.5–5.2)
ALBUMIN UR-MCNC: 20 MG/DL
ALP SERPL-CCNC: 116 U/L (ref 35–104)
ALT SERPL W P-5'-P-CCNC: 35 U/L (ref 10–35)
ANION GAP SERPL CALCULATED.3IONS-SCNC: 19 MMOL/L (ref 7–15)
APPEARANCE UR: CLEAR
AST SERPL W P-5'-P-CCNC: 32 U/L (ref 10–35)
BACTERIA #/AREA URNS HPF: ABNORMAL /HPF
BASOPHILS # BLD AUTO: 0.1 10E3/UL (ref 0–0.2)
BASOPHILS # BLD AUTO: 0.1 10E3/UL (ref 0–0.2)
BASOPHILS NFR BLD AUTO: 0 %
BASOPHILS NFR BLD AUTO: 0 %
BILIRUB SERPL-MCNC: 1.1 MG/DL
BILIRUB UR QL STRIP: NEGATIVE
BUN SERPL-MCNC: 31.6 MG/DL (ref 8–23)
CALCIUM SERPL-MCNC: 9.3 MG/DL (ref 8.8–10.2)
CHLORIDE SERPL-SCNC: 94 MMOL/L (ref 98–107)
COLOR UR AUTO: YELLOW
CREAT SERPL-MCNC: 0.59 MG/DL (ref 0.51–0.95)
DEPRECATED HCO3 PLAS-SCNC: 22 MMOL/L (ref 22–29)
EOSINOPHIL # BLD AUTO: 0.1 10E3/UL (ref 0–0.7)
EOSINOPHIL # BLD AUTO: 0.1 10E3/UL (ref 0–0.7)
EOSINOPHIL NFR BLD AUTO: 0 %
EOSINOPHIL NFR BLD AUTO: 1 %
ERYTHROCYTE [DISTWIDTH] IN BLOOD BY AUTOMATED COUNT: 13.2 % (ref 10–15)
ERYTHROCYTE [DISTWIDTH] IN BLOOD BY AUTOMATED COUNT: 13.2 % (ref 10–15)
ETHANOL SERPL-MCNC: <0.01 G/DL
FLUAV RNA SPEC QL NAA+PROBE: NEGATIVE
FLUBV RNA RESP QL NAA+PROBE: NEGATIVE
GFR SERPL CREATININE-BSD FRML MDRD: >90 ML/MIN/1.73M2
GLUCOSE SERPL-MCNC: 125 MG/DL (ref 70–99)
GLUCOSE UR STRIP-MCNC: NEGATIVE MG/DL
HCO3 BLDV-SCNC: 24 MMOL/L (ref 21–28)
HCO3 BLDV-SCNC: 25 MMOL/L (ref 21–28)
HCT VFR BLD AUTO: 50.6 % (ref 35–47)
HCT VFR BLD AUTO: 51.8 % (ref 35–47)
HGB BLD-MCNC: 16.2 G/DL (ref 11.7–15.7)
HGB BLD-MCNC: 17 G/DL (ref 11.7–15.7)
HGB UR QL STRIP: ABNORMAL
HOLD SPECIMEN: NORMAL
HOLD SPECIMEN: NORMAL
IMM GRANULOCYTES # BLD: 0.2 10E3/UL
IMM GRANULOCYTES # BLD: 0.3 10E3/UL
IMM GRANULOCYTES NFR BLD: 1 %
IMM GRANULOCYTES NFR BLD: 1 %
KETONES UR STRIP-MCNC: 60 MG/DL
LACTATE BLD-SCNC: 1.9 MMOL/L
LACTATE BLD-SCNC: 2.5 MMOL/L
LEUKOCYTE ESTERASE UR QL STRIP: ABNORMAL
LYMPHOCYTES # BLD AUTO: 3.1 10E3/UL (ref 0.8–5.3)
LYMPHOCYTES # BLD AUTO: 3.3 10E3/UL (ref 0.8–5.3)
LYMPHOCYTES NFR BLD AUTO: 14 %
LYMPHOCYTES NFR BLD AUTO: 17 %
MAGNESIUM SERPL-MCNC: 1.9 MG/DL (ref 1.7–2.3)
MCH RBC QN AUTO: 30.5 PG (ref 26.5–33)
MCH RBC QN AUTO: 30.9 PG (ref 26.5–33)
MCHC RBC AUTO-ENTMCNC: 32 G/DL (ref 31.5–36.5)
MCHC RBC AUTO-ENTMCNC: 32.8 G/DL (ref 31.5–36.5)
MCV RBC AUTO: 93 FL (ref 78–100)
MCV RBC AUTO: 96 FL (ref 78–100)
MONOCYTES # BLD AUTO: 2.1 10E3/UL (ref 0–1.3)
MONOCYTES # BLD AUTO: 2.7 10E3/UL (ref 0–1.3)
MONOCYTES NFR BLD AUTO: 11 %
MONOCYTES NFR BLD AUTO: 12 %
MUCOUS THREADS #/AREA URNS LPF: PRESENT /LPF
NEUTROPHILS # BLD AUTO: 13.5 10E3/UL (ref 1.6–8.3)
NEUTROPHILS # BLD AUTO: 15.5 10E3/UL (ref 1.6–8.3)
NEUTROPHILS NFR BLD AUTO: 70 %
NEUTROPHILS NFR BLD AUTO: 73 %
NITRATE UR QL: NEGATIVE
NRBC # BLD AUTO: 0 10E3/UL
NRBC # BLD AUTO: 0 10E3/UL
NRBC BLD AUTO-RTO: 0 /100
NRBC BLD AUTO-RTO: 0 /100
NT-PROBNP SERPL-MCNC: 3271 PG/ML (ref 0–1800)
PCO2 BLDV: 34 MM HG (ref 40–50)
PCO2 BLDV: 35 MM HG (ref 40–50)
PH BLDV: 7.46 [PH] (ref 7.32–7.43)
PH BLDV: 7.46 [PH] (ref 7.32–7.43)
PH UR STRIP: 6 [PH] (ref 5–7)
PLATELET # BLD AUTO: 359 10E3/UL (ref 150–450)
PLATELET # BLD AUTO: 418 10E3/UL (ref 150–450)
PO2 BLDV: 49 MM HG (ref 25–47)
PO2 BLDV: 65 MM HG (ref 25–47)
POTASSIUM SERPL-SCNC: 3.3 MMOL/L (ref 3.4–5.3)
PROT SERPL-MCNC: 6.5 G/DL (ref 6.4–8.3)
RBC # BLD AUTO: 5.25 10E6/UL (ref 3.8–5.2)
RBC # BLD AUTO: 5.57 10E6/UL (ref 3.8–5.2)
RBC URINE: >182 /HPF
RSV RNA SPEC NAA+PROBE: NEGATIVE
SAO2 % BLDV: 87 % (ref 94–100)
SAO2 % BLDV: 94 % (ref 94–100)
SARS-COV-2 RNA RESP QL NAA+PROBE: NEGATIVE
SODIUM SERPL-SCNC: 135 MMOL/L (ref 136–145)
SP GR UR STRIP: 1.01 (ref 1–1.03)
SQUAMOUS EPITHELIAL: 1 /HPF
TROPONIN T SERPL HS-MCNC: 21 NG/L
TROPONIN T SERPL HS-MCNC: 21 NG/L
TSH SERPL DL<=0.005 MIU/L-ACNC: 1.41 UIU/ML (ref 0.3–4.2)
UROBILINOGEN UR STRIP-MCNC: NORMAL MG/DL
WBC # BLD AUTO: 19.4 10E3/UL (ref 4–11)
WBC # BLD AUTO: 21.8 10E3/UL (ref 4–11)
WBC URINE: 22 /HPF

## 2022-11-02 PROCEDURE — 250N000013 HC RX MED GY IP 250 OP 250 PS 637: Performed by: PHYSICIAN ASSISTANT

## 2022-11-02 PROCEDURE — 96365 THER/PROPH/DIAG IV INF INIT: CPT

## 2022-11-02 PROCEDURE — 83735 ASSAY OF MAGNESIUM: CPT | Performed by: EMERGENCY MEDICINE

## 2022-11-02 PROCEDURE — 258N000003 HC RX IP 258 OP 636: Performed by: PHYSICIAN ASSISTANT

## 2022-11-02 PROCEDURE — 36415 COLL VENOUS BLD VENIPUNCTURE: CPT | Performed by: EMERGENCY MEDICINE

## 2022-11-02 PROCEDURE — 71046 X-RAY EXAM CHEST 2 VIEWS: CPT

## 2022-11-02 PROCEDURE — 74177 CT ABD & PELVIS W/CONTRAST: CPT

## 2022-11-02 PROCEDURE — 82310 ASSAY OF CALCIUM: CPT | Performed by: EMERGENCY MEDICINE

## 2022-11-02 PROCEDURE — 87040 BLOOD CULTURE FOR BACTERIA: CPT | Performed by: EMERGENCY MEDICINE

## 2022-11-02 PROCEDURE — 87088 URINE BACTERIA CULTURE: CPT | Performed by: EMERGENCY MEDICINE

## 2022-11-02 PROCEDURE — 99285 EMERGENCY DEPT VISIT HI MDM: CPT | Mod: CS,25

## 2022-11-02 PROCEDURE — C9803 HOPD COVID-19 SPEC COLLECT: HCPCS

## 2022-11-02 PROCEDURE — 250N000013 HC RX MED GY IP 250 OP 250 PS 637: Performed by: INTERNAL MEDICINE

## 2022-11-02 PROCEDURE — 120N000001 HC R&B MED SURG/OB

## 2022-11-02 PROCEDURE — 87637 SARSCOV2&INF A&B&RSV AMP PRB: CPT | Performed by: EMERGENCY MEDICINE

## 2022-11-02 PROCEDURE — 84484 ASSAY OF TROPONIN QUANT: CPT | Performed by: EMERGENCY MEDICINE

## 2022-11-02 PROCEDURE — 81001 URINALYSIS AUTO W/SCOPE: CPT | Performed by: EMERGENCY MEDICINE

## 2022-11-02 PROCEDURE — 258N000003 HC RX IP 258 OP 636: Performed by: EMERGENCY MEDICINE

## 2022-11-02 PROCEDURE — 93005 ELECTROCARDIOGRAM TRACING: CPT

## 2022-11-02 PROCEDURE — 96361 HYDRATE IV INFUSION ADD-ON: CPT

## 2022-11-02 PROCEDURE — 82077 ASSAY SPEC XCP UR&BREATH IA: CPT | Performed by: EMERGENCY MEDICINE

## 2022-11-02 PROCEDURE — 250N000011 HC RX IP 250 OP 636: Performed by: EMERGENCY MEDICINE

## 2022-11-02 PROCEDURE — 84443 ASSAY THYROID STIM HORMONE: CPT | Performed by: EMERGENCY MEDICINE

## 2022-11-02 PROCEDURE — 99223 1ST HOSP IP/OBS HIGH 75: CPT | Mod: AI | Performed by: PHYSICIAN ASSISTANT

## 2022-11-02 PROCEDURE — 83605 ASSAY OF LACTIC ACID: CPT

## 2022-11-02 PROCEDURE — 83880 ASSAY OF NATRIURETIC PEPTIDE: CPT | Performed by: EMERGENCY MEDICINE

## 2022-11-02 PROCEDURE — 250N000009 HC RX 250: Performed by: EMERGENCY MEDICINE

## 2022-11-02 PROCEDURE — 85025 COMPLETE CBC W/AUTO DIFF WBC: CPT | Performed by: EMERGENCY MEDICINE

## 2022-11-02 RX ORDER — HYDROCHLOROTHIAZIDE 12.5 MG/1
12.5 CAPSULE ORAL DAILY
Status: DISCONTINUED | OUTPATIENT
Start: 2022-11-03 | End: 2022-11-05 | Stop reason: HOSPADM

## 2022-11-02 RX ORDER — LOSARTAN POTASSIUM 25 MG/1
25 TABLET ORAL DAILY
COMMUNITY
Start: 2022-10-13

## 2022-11-02 RX ORDER — POTASSIUM CHLORIDE 1500 MG/1
40 TABLET, EXTENDED RELEASE ORAL ONCE
Status: COMPLETED | OUTPATIENT
Start: 2022-11-02 | End: 2022-11-02

## 2022-11-02 RX ORDER — HYDROCODONE BITARTRATE AND ACETAMINOPHEN 7.5; 325 MG/1; MG/1
1-2 TABLET ORAL 2 TIMES DAILY PRN
Status: DISCONTINUED | OUTPATIENT
Start: 2022-11-02 | End: 2022-11-05 | Stop reason: HOSPADM

## 2022-11-02 RX ORDER — CEFTRIAXONE 1 G/1
1 INJECTION, POWDER, FOR SOLUTION INTRAMUSCULAR; INTRAVENOUS EVERY 24 HOURS
Status: DISCONTINUED | OUTPATIENT
Start: 2022-11-03 | End: 2022-11-05

## 2022-11-02 RX ORDER — AMOXICILLIN 250 MG
1 CAPSULE ORAL 2 TIMES DAILY PRN
Status: DISCONTINUED | OUTPATIENT
Start: 2022-11-02 | End: 2022-11-05 | Stop reason: HOSPADM

## 2022-11-02 RX ORDER — NALOXONE HYDROCHLORIDE 0.4 MG/ML
0.2 INJECTION, SOLUTION INTRAMUSCULAR; INTRAVENOUS; SUBCUTANEOUS
Status: DISCONTINUED | OUTPATIENT
Start: 2022-11-02 | End: 2022-11-05 | Stop reason: HOSPADM

## 2022-11-02 RX ORDER — FENTANYL 25 UG/1
25 PATCH TRANSDERMAL
Status: DISCONTINUED | OUTPATIENT
Start: 2022-11-06 | End: 2022-11-05 | Stop reason: HOSPADM

## 2022-11-02 RX ORDER — ATORVASTATIN CALCIUM 10 MG/1
10 TABLET, FILM COATED ORAL EVERY EVENING
Status: DISCONTINUED | OUTPATIENT
Start: 2022-11-02 | End: 2022-11-05 | Stop reason: HOSPADM

## 2022-11-02 RX ORDER — POLYETHYLENE GLYCOL 3350 17 G/17G
17 POWDER, FOR SOLUTION ORAL DAILY PRN
Status: DISCONTINUED | OUTPATIENT
Start: 2022-11-02 | End: 2022-11-05 | Stop reason: HOSPADM

## 2022-11-02 RX ORDER — ACETAMINOPHEN 650 MG/1
650 SUPPOSITORY RECTAL EVERY 6 HOURS PRN
Status: DISCONTINUED | OUTPATIENT
Start: 2022-11-02 | End: 2022-11-05 | Stop reason: HOSPADM

## 2022-11-02 RX ORDER — ONDANSETRON 4 MG/1
4 TABLET, ORALLY DISINTEGRATING ORAL EVERY 6 HOURS PRN
Status: DISCONTINUED | OUTPATIENT
Start: 2022-11-02 | End: 2022-11-05 | Stop reason: HOSPADM

## 2022-11-02 RX ORDER — LIDOCAINE 40 MG/G
CREAM TOPICAL
Status: DISCONTINUED | OUTPATIENT
Start: 2022-11-02 | End: 2022-11-05 | Stop reason: HOSPADM

## 2022-11-02 RX ORDER — NALOXONE HYDROCHLORIDE 0.4 MG/ML
0.4 INJECTION, SOLUTION INTRAMUSCULAR; INTRAVENOUS; SUBCUTANEOUS
Status: DISCONTINUED | OUTPATIENT
Start: 2022-11-02 | End: 2022-11-05 | Stop reason: HOSPADM

## 2022-11-02 RX ORDER — SIMETHICONE 80 MG
80-160 TABLET,CHEWABLE ORAL EVERY 6 HOURS PRN
COMMUNITY

## 2022-11-02 RX ORDER — METHIMAZOLE 5 MG/1
5 TABLET ORAL
Status: DISCONTINUED | OUTPATIENT
Start: 2022-11-02 | End: 2022-11-05 | Stop reason: HOSPADM

## 2022-11-02 RX ORDER — HYDROCODONE BITARTRATE AND ACETAMINOPHEN 7.5; 325 MG/1; MG/1
1 TABLET ORAL EVERY 8 HOURS PRN
COMMUNITY

## 2022-11-02 RX ORDER — ASPIRIN 81 MG/1
81 TABLET ORAL DAILY
COMMUNITY

## 2022-11-02 RX ORDER — CEFTRIAXONE 1 G/1
1 INJECTION, POWDER, FOR SOLUTION INTRAMUSCULAR; INTRAVENOUS ONCE
Status: COMPLETED | OUTPATIENT
Start: 2022-11-02 | End: 2022-11-02

## 2022-11-02 RX ORDER — ONDANSETRON 2 MG/ML
4 INJECTION INTRAMUSCULAR; INTRAVENOUS EVERY 6 HOURS PRN
Status: DISCONTINUED | OUTPATIENT
Start: 2022-11-02 | End: 2022-11-05 | Stop reason: HOSPADM

## 2022-11-02 RX ORDER — SODIUM CHLORIDE 9 MG/ML
INJECTION, SOLUTION INTRAVENOUS CONTINUOUS
Status: DISCONTINUED | OUTPATIENT
Start: 2022-11-02 | End: 2022-11-04

## 2022-11-02 RX ORDER — AMOXICILLIN 250 MG
2 CAPSULE ORAL 2 TIMES DAILY PRN
Status: DISCONTINUED | OUTPATIENT
Start: 2022-11-02 | End: 2022-11-05 | Stop reason: HOSPADM

## 2022-11-02 RX ORDER — ACETAMINOPHEN 500 MG
500 TABLET ORAL DAILY PRN
Status: ON HOLD | COMMUNITY
End: 2022-12-15

## 2022-11-02 RX ORDER — ACETAMINOPHEN 325 MG/1
650 TABLET ORAL EVERY 6 HOURS PRN
Status: DISCONTINUED | OUTPATIENT
Start: 2022-11-02 | End: 2022-11-05 | Stop reason: HOSPADM

## 2022-11-02 RX ORDER — BUPROPION HYDROCHLORIDE 150 MG/1
450 TABLET ORAL DAILY
COMMUNITY
Start: 2022-10-27

## 2022-11-02 RX ORDER — IOPAMIDOL 755 MG/ML
120 INJECTION, SOLUTION INTRAVASCULAR ONCE
Status: COMPLETED | OUTPATIENT
Start: 2022-11-02 | End: 2022-11-02

## 2022-11-02 RX ORDER — BUPROPION HYDROCHLORIDE 150 MG/1
450 TABLET ORAL EVERY MORNING
Status: DISCONTINUED | OUTPATIENT
Start: 2022-11-03 | End: 2022-11-05 | Stop reason: HOSPADM

## 2022-11-02 RX ORDER — SOTALOL HYDROCHLORIDE 120 MG/1
120 TABLET ORAL 2 TIMES DAILY
COMMUNITY
Start: 2022-04-15

## 2022-11-02 RX ORDER — LOSARTAN POTASSIUM 25 MG/1
25 TABLET ORAL DAILY
Status: DISCONTINUED | OUTPATIENT
Start: 2022-11-03 | End: 2022-11-05 | Stop reason: HOSPADM

## 2022-11-02 RX ADMIN — SOTALOL HYDROCHLORIDE 40 MG: 80 TABLET ORAL at 23:18

## 2022-11-02 RX ADMIN — CEFTRIAXONE 1 G: 1 INJECTION, POWDER, FOR SOLUTION INTRAMUSCULAR; INTRAVENOUS at 18:50

## 2022-11-02 RX ADMIN — SODIUM CHLORIDE: 9 INJECTION, SOLUTION INTRAVENOUS at 20:42

## 2022-11-02 RX ADMIN — SODIUM CHLORIDE, POTASSIUM CHLORIDE, SODIUM LACTATE AND CALCIUM CHLORIDE 1000 ML: 600; 310; 30; 20 INJECTION, SOLUTION INTRAVENOUS at 16:56

## 2022-11-02 RX ADMIN — IOPAMIDOL 80 ML: 755 INJECTION, SOLUTION INTRAVENOUS at 17:39

## 2022-11-02 RX ADMIN — SODIUM CHLORIDE 60 ML: 9 INJECTION, SOLUTION INTRAVENOUS at 17:39

## 2022-11-02 RX ADMIN — ATORVASTATIN CALCIUM 10 MG: 10 TABLET, FILM COATED ORAL at 23:18

## 2022-11-02 RX ADMIN — POTASSIUM CHLORIDE 40 MEQ: 1500 TABLET, EXTENDED RELEASE ORAL at 23:18

## 2022-11-02 ASSESSMENT — ACTIVITIES OF DAILY LIVING (ADL)
ADLS_ACUITY_SCORE: 35
ADLS_ACUITY_SCORE: 26

## 2022-11-02 ASSESSMENT — ENCOUNTER SYMPTOMS
EYE PAIN: 0
NECK PAIN: 0
SORE THROAT: 0
SHORTNESS OF BREATH: 0
AGITATION: 0
COLOR CHANGE: 0
NAUSEA: 1
APPETITE CHANGE: 1
NUMBNESS: 0
CHILLS: 0
COUGH: 0
PALPITATIONS: 0
RHINORRHEA: 1
DIARRHEA: 0
BLOOD IN STOOL: 0
HEADACHES: 0
CHEST TIGHTNESS: 0
ABDOMINAL PAIN: 1
BACK PAIN: 0
DYSURIA: 0
FEVER: 0
VOMITING: 1
FATIGUE: 1
HEMATURIA: 0

## 2022-11-02 NOTE — ED PROVIDER NOTES
"  History   Chief Complaint:  Nausea & Vomiting and Generalized Weakness       HPI   Madhavi Castellanos is a 82 year old female with history of atrial fibrillation, left bundle branch block, and a watchman who presents with generalized feeling of fatigue and weakness. She hasn't taken her medications, including sotalol, in three days because she has been feeling unwell and exhausted. She states that she has been feeling unwell since last Saturday (4 days ago) when she experienced 9 episodes of \"projectile vomiting\". She has not vomited since this episode, but has only been consuming small sips of water and has not ate much due to nausea. She has also been experiencing abdominal pain, nausea, runny nose and cough. Per chart review, her last echocardiogram was in 2020 with an EF of 55-60%. Not on blood thinners, was on Apixaban but this caused a GI bleed. Denies congestion, sore throat, fever, or chills. Denies chest pain, increased heart rate, and chest tightness. Denies dysuria, hematuria, diarrhea, hematochezia. Denies numbness or tingling.     Review of Systems   Constitutional: Positive for appetite change and fatigue. Negative for chills and fever.   HENT: Positive for rhinorrhea. Negative for congestion and sore throat.    Eyes: Negative for pain and visual disturbance.   Respiratory: Negative for cough, chest tightness and shortness of breath.    Cardiovascular: Negative for chest pain and palpitations.   Gastrointestinal: Positive for abdominal pain, nausea and vomiting. Negative for blood in stool and diarrhea.   Genitourinary: Negative for dysuria and hematuria.   Musculoskeletal: Negative for back pain and neck pain.   Skin: Negative for color change and pallor.   Neurological: Negative for numbness and headaches.   Psychiatric/Behavioral: Negative for agitation and behavioral problems.   All other systems reviewed and are " "negative.      Allergies:  Gabapentin  Metoprolol  Acetaminophen  Albuterol  Augmentin  Apixaban  Codeine Sulfate  Cymbalta   Lisinopril  Cefdinir  Percocet   Prednisone  Duloxetine    Medications:  Sotalol   Hydrochlorothiazide  Promethazine  Aspirin 81  Lipitor  Wellbutrin  Keflex  Microzide  Norco  Cozaar  Tapazole  Robaxin  Miralax  Phenergan  Betapace    Past Medical History:     BRBPR  Rhabdomyolysis  Recurrent falls  Cervical myelopathy  Lower GI bleeding  Kidney stones  LBBB  ELIESER III  PVCs  Osteoarthritis of both knees  Spinal stenosis of lumbar region  CAD  TALI  Graves disease  Depression  DJD  Pure hypercholesterolemia  Hypertension  Paroxysmal atrial fibrillation  GERD    Past Surgical History:   Cholecystectomy  Colonoscopy  EP ablation  EGD  Excise vulva wide local  Irrigation and debridement, LLE  Septoplasty  Hysterectomy  Carpal tunnel release  Knee arthroscopy, left  Knee arthroscopy, right  Blepharoplasty  Cholecystectomy  Watchman insert    Family History:    Father - cancer  Mother - MS    Social History:  The patient presents to the ED via EMS alone.   Living situation: Alone. She has a cat.  PCP: Miles Castellanos       Physical Exam     Patient Vitals for the past 24 hrs:   BP Temp Temp src Pulse Resp SpO2 Height Weight   11/02/22 1700 (!) 140/88 -- -- 68 -- 95 % -- --   11/02/22 1645 -- -- -- 115 -- 97 % -- --   11/02/22 1630 (!) 154/85 -- -- (!) 130 -- 96 % -- --   11/02/22 1611 (!) 156/87 97.6  F (36.4  C) Temporal (!) 131 20 96 % 1.549 m (5' 1\") 72.6 kg (160 lb)       Physical Exam  Constitutional:       General: Not in acute distress.     Appearance: Normal appearance.   HENT:      Head: Normocephalic and atraumatic.   Eyes:      Extraocular Movements: Extraocular movements intact.      Conjunctiva/sclera: Conjunctivae normal.   Cardiovascular:      Rate and Rhythm: Tachycardic. Irregular rhythm.  Pulmonary:      Effort: Pulmonary effort is normal. No respiratory distress.      Breath " sounds: Normal breath sounds.  Abdominal:      General: Abdomen is flat. There is no distension.      Palpations: Abdomen is soft.      Tenderness: There is no abdominal tenderness.   Musculoskeletal:      Cervical back: Normal range of motion. No rigidity.   Skin:     General: Skin is warm and dry.   Neurological:      General: No focal deficit present.      Mental Status: Alert and oriented to person, place, and time.   Psychiatric:         Mood and Affect: Mood normal.         Behavior: Behavior normal.    Emergency Department Course   ECG  ECG results from 11/30/21   EKG 12-lead, tracing only     Value    Systolic Blood Pressure     Diastolic Blood Pressure     Ventricular Rate 118    Atrial Rate 122    NC Interval     QRS Duration 146        QTc 535    P Axis     R AXIS -35    T Axis 117    Interpretation ECG      Wide QRS rhythm  Left axis deviation  Left bundle branch block  Abnormal ECG  When compared with ECG of 21-AUG-2021 03:42,  Wide QRS rhythm has replaced Sinus rhythm  Confirmed by - EMERGENCY ROOM, PHYSICIAN ([id number]),  IESHA KEN (Noemy) on 12/1/2021 6:45:44 AM     EKG 12 lead     Value    Systolic Blood Pressure     Diastolic Blood Pressure     Ventricular Rate 116    Atrial Rate 116    NC Interval     QRS Duration 144        QTc 556    P Axis     R AXIS -40    T Axis 112    Interpretation ECG      Wide QRS rhythm  Left axis deviation  Left bundle branch block  Abnormal ECG  No previous ECGs available  Confirmed by - EMERGENCY ROOM, PHYSICIAN ([id number]),  IESHA KEN ([id number]) on 12/1/2021 6:45:30 AM         Imaging:  XR Chest 2 Views   Final Result   IMPRESSION: Heart and mediastinal size normal. Lungs and pleural spaces are clear. No pleural effusion or pneumothorax. Electronic device is again identified involving the anterior chest wall. 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         CT Abdomen Pelvis w Contrast   Final Result   IMPRESSION:    1.  Obstructing 5 mm  stone in the mid left ureter with mild proximal hydroureteronephrosis and perinephric stranding. If there is suspicion for superimposed urinary tract infection, recommend consultation with urology for consideration of decompression    of the collecting system.   2.  Additional bilateral nonobstructing renal calculi.        Report per radiology    Laboratory:  Labs Ordered and Resulted from Time of ED Arrival to Time of ED Departure   NT PROBNP INPATIENT - Abnormal       Result Value    N terminal Pro BNP Inpatient 3,271 (*)    TROPONIN T, HIGH SENSITIVITY - Abnormal    Troponin T, High Sensitivity 21 (*)    ROUTINE UA WITH MICROSCOPIC REFLEX TO CULTURE - Abnormal    Color Urine Yellow      Appearance Urine Clear      Glucose Urine Negative      Bilirubin Urine Negative      Ketones Urine 60 (*)     Specific Gravity Urine 1.015      Blood Urine Large (*)     pH Urine 6.0      Protein Albumin Urine 20 (*)     Urobilinogen Urine Normal      Nitrite Urine Negative      Leukocyte Esterase Urine Small (*)     Bacteria Urine Few (*)     Mucus Urine Present (*)     RBC Urine >182 (*)     WBC Urine 22 (*)     Squamous Epithelials Urine 1     COMPREHENSIVE METABOLIC PANEL - Abnormal    Sodium 135 (*)     Potassium 3.3 (*)     Chloride 94 (*)     Carbon Dioxide (CO2) 22      Anion Gap 19 (*)     Urea Nitrogen 31.6 (*)     Creatinine 0.59      Calcium 9.3      Glucose 125 (*)     Alkaline Phosphatase 116 (*)     AST 32      ALT 35      Protein Total 6.5      Albumin 3.6      Bilirubin Total 1.1      GFR Estimate >90     CBC WITH PLATELETS AND DIFFERENTIAL - Abnormal    WBC Count 19.4 (*)     RBC Count 5.57 (*)     Hemoglobin 17.0 (*)     Hematocrit 51.8 (*)     MCV 93      MCH 30.5      MCHC 32.8      RDW 13.2      Platelet Count 418      % Neutrophils 70      % Lymphocytes 17      % Monocytes 11      % Eosinophils 1      % Basophils 0      % Immature Granulocytes 1      NRBCs per 100 WBC 0      Absolute Neutrophils 13.5 (*)      Absolute Lymphocytes 3.3      Absolute Monocytes 2.1 (*)     Absolute Eosinophils 0.1      Absolute Basophils 0.1      Absolute Immature Granulocytes 0.2      Absolute NRBCs 0.0     ISTAT GASES LACTATE VENOUS POCT - Abnormal    Lactic Acid POCT 2.5 (*)     Bicarbonate Venous POCT 24      O2 Sat, Venous POCT 87 (*)     pCO2V Venous POCT 34 (*)     pH Venous POCT 7.46 (*)     pO2 Venous POCT 49 (*)    ETHYL ALCOHOL LEVEL - Abnormal    Alcohol ethyl <0.01 (*)    TROPONIN T, HIGH SENSITIVITY - Abnormal    Troponin T, High Sensitivity 21 (*)    ISTAT GASES LACTATE VENOUS POCT - Abnormal    Lactic Acid POCT 1.9      Bicarbonate Venous POCT 25      O2 Sat, Venous POCT 94      pCO2V Venous POCT 35 (*)     pH Venous POCT 7.46 (*)     pO2 Venous POCT 65 (*)    CBC WITH PLATELETS AND DIFFERENTIAL - Abnormal    WBC Count 21.8 (*)     RBC Count 5.25 (*)     Hemoglobin 16.2 (*)     Hematocrit 50.6 (*)     MCV 96      MCH 30.9      MCHC 32.0      RDW 13.2      Platelet Count 359      % Neutrophils 73      % Lymphocytes 14      % Monocytes 12      % Eosinophils 0      % Basophils 0      % Immature Granulocytes 1      NRBCs per 100 WBC 0      Absolute Neutrophils 15.5 (*)     Absolute Lymphocytes 3.1      Absolute Monocytes 2.7 (*)     Absolute Eosinophils 0.1      Absolute Basophils 0.1      Absolute Immature Granulocytes 0.3      Absolute NRBCs 0.0     MAGNESIUM - Normal    Magnesium 1.9     TSH WITH FREE T4 REFLEX - Normal    TSH 1.41     INFLUENZA A/B & SARS-COV2 PCR MULTIPLEX   BLOOD CULTURE   BLOOD CULTURE   URINE CULTURE        Emergency Department Course:       Reviewed:  I reviewed nursing notes, vitals, past medical history and Care Everywhere    Assessments/Consults:  ED Course as of 11/02/22 2008 Wed Nov 02, 2022   1615 I obtained history and examined the patient as noted above.   1625 Lactic Acid POCT(!): 2.5   1639 WBC(!): 19.4   1639 Hemoglobin(!): 17.0   1653 Creatinine: 0.59   1705 N-Terminal Pro BNP  Inpatient(!): 3,271   1706 Pulse: 115   1731 Pulse: 68   1811 CT Abdomen Pelvis w Contrast  1.  Obstructing 5 mm stone in the mid left ureter with mild proximal hydroureteronephrosis and perinephric stranding. If there is suspicion for superimposed urinary tract infection, recommend consultation with urology for consideration of decompression   of the collecting system.  2.  Additional bilateral nonobstructing renal calculi.   1857 Lactic Acid POCT: 1.9   1858 XR Chest 2 Views  Heart and mediastinal size normal. Lungs and pleural spaces are clear. No pleural effusion or pneumothorax. Electronic device is again identified involving the anterior chest wall. 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   1909 Troponin T, High Sensitivity(!): 21  Stable   1910 Patient updated on plan for admission and voiced understanding and agreement with plan.  Patient resting comfortably in bed.  Repeat CBC drawn.   1913 Discussed patient with hospitalist HAL Warren  who will admit patient under Dr. Mares.       Interventions:  1656 Lactated Ringers bolus 1,000 mL IV     Disposition:  The patient was admitted to the hospital under the care of Dr. Mares.     Impression & Plan     CMS Diagnoses:   The patient has signs of Severe Sepsis        If one the following conditions is present, a 30 mL/kg bolus is recommended as part of the 6 hour bundle (IBW can be used for BMI >30, or document refusal/contraindication):      1.   Initial hypotension  defined as 2 bps < 90 or map < 65 in the 6hrs before or 3hrs after time zero.     2.  Lactate >4.      The patient has signs of Severe Sepsis as evidenced by:    1. 2 SIRS criteria, AND  2. Suspected infection, AND   3. Organ dysfunction: Lactic Acidosis with value >2.0    Time severe sepsis diagnosis confirmed: 1639  11/02/22 as this was the time when Lactate resulted, and the level was > 2.0    3 Hour Severe Sepsis Bundle Completion:    1. Initial Lactic Acid Result:   Recent Labs   Lab  Test 22  1851 22  1622 20  1429   LACT 1.9 2.5* 2.0     2. Blood Cultures before Antibiotics: Yes  3. Broad Spectrum Antibiotics Administered:  Rocephin ordered given ureteral stone and UTI/pyelonephritis suspected.       Anti-infectives (From admission through now)    Start     Dose/Rate Route Frequency Ordered Stop    22 183  cefTRIAXone (ROCEPHIN) 1 g vial to attach to  mL bag for ADULTS or NS 50 mL bag for PEDS         1 g  over 15-30 Minutes Intravenous ONCE 22 18322 192          4. Is initial hypotension present?     No (IV fluid bolus NOT required). IV Fluid volume administered: 1L               Medical Decision Makin-year-old female as described above presents to the emergency department with generalized weakness and recent history of projectile nausea vomiting.  Patient hemodynamically stable at time evaluation.  Afebrile.  Patient is tachycardic upon arrival questionable A. fib with RVR with left bundle branch block versus sinus tachycardic with left bundle branch block.  Nonetheless, differential diagnosis considered includes, but not limited to, infectious causes of sinus tachycardia, hypovolemia/dehydration, small bowel obstruction, or intra-abdominal infection.  Infectious work-up ordered.  Cardiac work-up.  Additionally, given patient has history of nausea vomiting and initial mild lactic acidemia, will order CT abdomen pelvis with contrast.  IV fluid rehydration.  Initiation of antibiotics if potential source of infection.  Discussed care plan with patient who voiced understanding and agreement with plan.  Answered all questions.  Additional work-up and orders as listed in chart.    Please refer to ED course above for details on the patient's treatment course and any changes or updates in care plan beyond my initial evaluation and MDM.      Diagnosis:    ICD-10-CM    1. Dehydration  E86.0       2. Generalized muscle weakness  M62.81       3. Sepsis,  due to unspecified organism, unspecified whether acute organ dysfunction present (H)  A41.9       4. Kidney stone  N20.0           Discharge Medications:  New Prescriptions    No medications on file       Scribe Disclosure:  I, Tawny Anderson, am serving as a scribe at 4:00 PM on 11/2/2022 to document services personally performed by Tad Flores DO based on my observations and the provider's statements to me.     I, Marii Shah, am serving as a scribe () on 11/2/2022 at 5:01 PM to personally document services performed by Tad Flores DO based on my observations and the provider's statements to me.             Tad Flores DO  11/02/22 2008

## 2022-11-02 NOTE — ED TRIAGE NOTES
Pt presents via EMS for generalized weakness. Pt became sick over the week with nausea and emesis. On Saturday, 9 episodes of emesis, which was projectile. Hasn't taken meds for a few days due to symptoms. Hx of A-markie, has a watchmen. Had a fentanyl patch on abdomen which was placed today. . IV established in left wrist.

## 2022-11-02 NOTE — H&P (VIEW-ONLY)
"  History   Chief Complaint:  Nausea & Vomiting and Generalized Weakness       HPI   Madhavi Castellanos is a 82 year old female with history of atrial fibrillation, left bundle branch block, and a watchman who presents with generalized feeling of fatigue and weakness. She hasn't taken her medications, including sotalol, in three days because she has been feeling unwell and exhausted. She states that she has been feeling unwell since last Saturday (4 days ago) when she experienced 9 episodes of \"projectile vomiting\". She has not vomited since this episode, but has only been consuming small sips of water and has not ate much due to nausea. She has also been experiencing abdominal pain, nausea, runny nose and cough. Per chart review, her last echocardiogram was in 2020 with an EF of 55-60%. Not on blood thinners, was on Apixaban but this caused a GI bleed. Denies congestion, sore throat, fever, or chills. Denies chest pain, increased heart rate, and chest tightness. Denies dysuria, hematuria, diarrhea, hematochezia. Denies numbness or tingling.     Review of Systems   Constitutional: Positive for appetite change and fatigue. Negative for chills and fever.   HENT: Positive for rhinorrhea. Negative for congestion and sore throat.    Eyes: Negative for pain and visual disturbance.   Respiratory: Negative for cough, chest tightness and shortness of breath.    Cardiovascular: Negative for chest pain and palpitations.   Gastrointestinal: Positive for abdominal pain, nausea and vomiting. Negative for blood in stool and diarrhea.   Genitourinary: Negative for dysuria and hematuria.   Musculoskeletal: Negative for back pain and neck pain.   Skin: Negative for color change and pallor.   Neurological: Negative for numbness and headaches.   Psychiatric/Behavioral: Negative for agitation and behavioral problems.   All other systems reviewed and are " "negative.      Allergies:  Gabapentin  Metoprolol  Acetaminophen  Albuterol  Augmentin  Apixaban  Codeine Sulfate  Cymbalta   Lisinopril  Cefdinir  Percocet   Prednisone  Duloxetine    Medications:  Sotalol   Hydrochlorothiazide  Promethazine  Aspirin 81  Lipitor  Wellbutrin  Keflex  Microzide  Norco  Cozaar  Tapazole  Robaxin  Miralax  Phenergan  Betapace    Past Medical History:     BRBPR  Rhabdomyolysis  Recurrent falls  Cervical myelopathy  Lower GI bleeding  Kidney stones  LBBB  ELIESER III  PVCs  Osteoarthritis of both knees  Spinal stenosis of lumbar region  CAD  TALI  Graves disease  Depression  DJD  Pure hypercholesterolemia  Hypertension  Paroxysmal atrial fibrillation  GERD    Past Surgical History:   Cholecystectomy  Colonoscopy  EP ablation  EGD  Excise vulva wide local  Irrigation and debridement, LLE  Septoplasty  Hysterectomy  Carpal tunnel release  Knee arthroscopy, left  Knee arthroscopy, right  Blepharoplasty  Cholecystectomy  Watchman insert    Family History:    Father - cancer  Mother - MS    Social History:  The patient presents to the ED via EMS alone.   Living situation: Alone. She has a cat.  PCP: Miles Castellanos       Physical Exam     Patient Vitals for the past 24 hrs:   BP Temp Temp src Pulse Resp SpO2 Height Weight   11/02/22 1700 (!) 140/88 -- -- 68 -- 95 % -- --   11/02/22 1645 -- -- -- 115 -- 97 % -- --   11/02/22 1630 (!) 154/85 -- -- (!) 130 -- 96 % -- --   11/02/22 1611 (!) 156/87 97.6  F (36.4  C) Temporal (!) 131 20 96 % 1.549 m (5' 1\") 72.6 kg (160 lb)       Physical Exam  Constitutional:       General: Not in acute distress.     Appearance: Normal appearance.   HENT:      Head: Normocephalic and atraumatic.   Eyes:      Extraocular Movements: Extraocular movements intact.      Conjunctiva/sclera: Conjunctivae normal.   Cardiovascular:      Rate and Rhythm: Tachycardic. Irregular rhythm.  Pulmonary:      Effort: Pulmonary effort is normal. No respiratory distress.      Breath " sounds: Normal breath sounds.  Abdominal:      General: Abdomen is flat. There is no distension.      Palpations: Abdomen is soft.      Tenderness: There is no abdominal tenderness.   Musculoskeletal:      Cervical back: Normal range of motion. No rigidity.   Skin:     General: Skin is warm and dry.   Neurological:      General: No focal deficit present.      Mental Status: Alert and oriented to person, place, and time.   Psychiatric:         Mood and Affect: Mood normal.         Behavior: Behavior normal.    Emergency Department Course   ECG  ECG results from 11/30/21   EKG 12-lead, tracing only     Value    Systolic Blood Pressure     Diastolic Blood Pressure     Ventricular Rate 118    Atrial Rate 122    MI Interval     QRS Duration 146        QTc 535    P Axis     R AXIS -35    T Axis 117    Interpretation ECG      Wide QRS rhythm  Left axis deviation  Left bundle branch block  Abnormal ECG  When compared with ECG of 21-AUG-2021 03:42,  Wide QRS rhythm has replaced Sinus rhythm  Confirmed by - EMERGENCY ROOM, PHYSICIAN ([id number]),  IESHA KEN (Noemy) on 12/1/2021 6:45:44 AM     EKG 12 lead     Value    Systolic Blood Pressure     Diastolic Blood Pressure     Ventricular Rate 116    Atrial Rate 116    MI Interval     QRS Duration 144        QTc 556    P Axis     R AXIS -40    T Axis 112    Interpretation ECG      Wide QRS rhythm  Left axis deviation  Left bundle branch block  Abnormal ECG  No previous ECGs available  Confirmed by - EMERGENCY ROOM, PHYSICIAN ([id number]),  IESHA KEN ([id number]) on 12/1/2021 6:45:30 AM         Imaging:  XR Chest 2 Views   Final Result   IMPRESSION: Heart and mediastinal size normal. Lungs and pleural spaces are clear. No pleural effusion or pneumothorax. Electronic device is again identified involving the anterior chest wall. 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         CT Abdomen Pelvis w Contrast   Final Result   IMPRESSION:    1.  Obstructing 5 mm  stone in the mid left ureter with mild proximal hydroureteronephrosis and perinephric stranding. If there is suspicion for superimposed urinary tract infection, recommend consultation with urology for consideration of decompression    of the collecting system.   2.  Additional bilateral nonobstructing renal calculi.        Report per radiology    Laboratory:  Labs Ordered and Resulted from Time of ED Arrival to Time of ED Departure   NT PROBNP INPATIENT - Abnormal       Result Value    N terminal Pro BNP Inpatient 3,271 (*)    TROPONIN T, HIGH SENSITIVITY - Abnormal    Troponin T, High Sensitivity 21 (*)    ROUTINE UA WITH MICROSCOPIC REFLEX TO CULTURE - Abnormal    Color Urine Yellow      Appearance Urine Clear      Glucose Urine Negative      Bilirubin Urine Negative      Ketones Urine 60 (*)     Specific Gravity Urine 1.015      Blood Urine Large (*)     pH Urine 6.0      Protein Albumin Urine 20 (*)     Urobilinogen Urine Normal      Nitrite Urine Negative      Leukocyte Esterase Urine Small (*)     Bacteria Urine Few (*)     Mucus Urine Present (*)     RBC Urine >182 (*)     WBC Urine 22 (*)     Squamous Epithelials Urine 1     COMPREHENSIVE METABOLIC PANEL - Abnormal    Sodium 135 (*)     Potassium 3.3 (*)     Chloride 94 (*)     Carbon Dioxide (CO2) 22      Anion Gap 19 (*)     Urea Nitrogen 31.6 (*)     Creatinine 0.59      Calcium 9.3      Glucose 125 (*)     Alkaline Phosphatase 116 (*)     AST 32      ALT 35      Protein Total 6.5      Albumin 3.6      Bilirubin Total 1.1      GFR Estimate >90     CBC WITH PLATELETS AND DIFFERENTIAL - Abnormal    WBC Count 19.4 (*)     RBC Count 5.57 (*)     Hemoglobin 17.0 (*)     Hematocrit 51.8 (*)     MCV 93      MCH 30.5      MCHC 32.8      RDW 13.2      Platelet Count 418      % Neutrophils 70      % Lymphocytes 17      % Monocytes 11      % Eosinophils 1      % Basophils 0      % Immature Granulocytes 1      NRBCs per 100 WBC 0      Absolute Neutrophils 13.5 (*)      Absolute Lymphocytes 3.3      Absolute Monocytes 2.1 (*)     Absolute Eosinophils 0.1      Absolute Basophils 0.1      Absolute Immature Granulocytes 0.2      Absolute NRBCs 0.0     ISTAT GASES LACTATE VENOUS POCT - Abnormal    Lactic Acid POCT 2.5 (*)     Bicarbonate Venous POCT 24      O2 Sat, Venous POCT 87 (*)     pCO2V Venous POCT 34 (*)     pH Venous POCT 7.46 (*)     pO2 Venous POCT 49 (*)    ETHYL ALCOHOL LEVEL - Abnormal    Alcohol ethyl <0.01 (*)    TROPONIN T, HIGH SENSITIVITY - Abnormal    Troponin T, High Sensitivity 21 (*)    ISTAT GASES LACTATE VENOUS POCT - Abnormal    Lactic Acid POCT 1.9      Bicarbonate Venous POCT 25      O2 Sat, Venous POCT 94      pCO2V Venous POCT 35 (*)     pH Venous POCT 7.46 (*)     pO2 Venous POCT 65 (*)    CBC WITH PLATELETS AND DIFFERENTIAL - Abnormal    WBC Count 21.8 (*)     RBC Count 5.25 (*)     Hemoglobin 16.2 (*)     Hematocrit 50.6 (*)     MCV 96      MCH 30.9      MCHC 32.0      RDW 13.2      Platelet Count 359      % Neutrophils 73      % Lymphocytes 14      % Monocytes 12      % Eosinophils 0      % Basophils 0      % Immature Granulocytes 1      NRBCs per 100 WBC 0      Absolute Neutrophils 15.5 (*)     Absolute Lymphocytes 3.1      Absolute Monocytes 2.7 (*)     Absolute Eosinophils 0.1      Absolute Basophils 0.1      Absolute Immature Granulocytes 0.3      Absolute NRBCs 0.0     MAGNESIUM - Normal    Magnesium 1.9     TSH WITH FREE T4 REFLEX - Normal    TSH 1.41     INFLUENZA A/B & SARS-COV2 PCR MULTIPLEX   BLOOD CULTURE   BLOOD CULTURE   URINE CULTURE        Emergency Department Course:       Reviewed:  I reviewed nursing notes, vitals, past medical history and Care Everywhere    Assessments/Consults:  ED Course as of 11/02/22 2008 Wed Nov 02, 2022   1615 I obtained history and examined the patient as noted above.   1625 Lactic Acid POCT(!): 2.5   1639 WBC(!): 19.4   1639 Hemoglobin(!): 17.0   1653 Creatinine: 0.59   1705 N-Terminal Pro BNP  Inpatient(!): 3,271   1706 Pulse: 115   1731 Pulse: 68   1811 CT Abdomen Pelvis w Contrast  1.  Obstructing 5 mm stone in the mid left ureter with mild proximal hydroureteronephrosis and perinephric stranding. If there is suspicion for superimposed urinary tract infection, recommend consultation with urology for consideration of decompression   of the collecting system.  2.  Additional bilateral nonobstructing renal calculi.   1857 Lactic Acid POCT: 1.9   1858 XR Chest 2 Views  Heart and mediastinal size normal. Lungs and pleural spaces are clear. No pleural effusion or pneumothorax. Electronic device is again identified involving the anterior chest wall. 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   1909 Troponin T, High Sensitivity(!): 21  Stable   1910 Patient updated on plan for admission and voiced understanding and agreement with plan.  Patient resting comfortably in bed.  Repeat CBC drawn.   1913 Discussed patient with hospitalist HAL Warren  who will admit patient under Dr. Mares.       Interventions:  1656 Lactated Ringers bolus 1,000 mL IV     Disposition:  The patient was admitted to the hospital under the care of Dr. Mares.     Impression & Plan     CMS Diagnoses:   The patient has signs of Severe Sepsis        If one the following conditions is present, a 30 mL/kg bolus is recommended as part of the 6 hour bundle (IBW can be used for BMI >30, or document refusal/contraindication):      1.   Initial hypotension  defined as 2 bps < 90 or map < 65 in the 6hrs before or 3hrs after time zero.     2.  Lactate >4.      The patient has signs of Severe Sepsis as evidenced by:    1. 2 SIRS criteria, AND  2. Suspected infection, AND   3. Organ dysfunction: Lactic Acidosis with value >2.0    Time severe sepsis diagnosis confirmed: 1639  11/02/22 as this was the time when Lactate resulted, and the level was > 2.0    3 Hour Severe Sepsis Bundle Completion:    1. Initial Lactic Acid Result:   Recent Labs   Lab  Test 22  1851 22  1622 20  1429   LACT 1.9 2.5* 2.0     2. Blood Cultures before Antibiotics: Yes  3. Broad Spectrum Antibiotics Administered:  Rocephin ordered given ureteral stone and UTI/pyelonephritis suspected.       Anti-infectives (From admission through now)    Start     Dose/Rate Route Frequency Ordered Stop    22 183  cefTRIAXone (ROCEPHIN) 1 g vial to attach to  mL bag for ADULTS or NS 50 mL bag for PEDS         1 g  over 15-30 Minutes Intravenous ONCE 22 18322 192          4. Is initial hypotension present?     No (IV fluid bolus NOT required). IV Fluid volume administered: 1L               Medical Decision Makin-year-old female as described above presents to the emergency department with generalized weakness and recent history of projectile nausea vomiting.  Patient hemodynamically stable at time evaluation.  Afebrile.  Patient is tachycardic upon arrival questionable A. fib with RVR with left bundle branch block versus sinus tachycardic with left bundle branch block.  Nonetheless, differential diagnosis considered includes, but not limited to, infectious causes of sinus tachycardia, hypovolemia/dehydration, small bowel obstruction, or intra-abdominal infection.  Infectious work-up ordered.  Cardiac work-up.  Additionally, given patient has history of nausea vomiting and initial mild lactic acidemia, will order CT abdomen pelvis with contrast.  IV fluid rehydration.  Initiation of antibiotics if potential source of infection.  Discussed care plan with patient who voiced understanding and agreement with plan.  Answered all questions.  Additional work-up and orders as listed in chart.    Please refer to ED course above for details on the patient's treatment course and any changes or updates in care plan beyond my initial evaluation and MDM.      Diagnosis:    ICD-10-CM    1. Dehydration  E86.0       2. Generalized muscle weakness  M62.81       3. Sepsis,  due to unspecified organism, unspecified whether acute organ dysfunction present (H)  A41.9       4. Kidney stone  N20.0           Discharge Medications:  New Prescriptions    No medications on file       Scribe Disclosure:  I, Tawny Anderson, am serving as a scribe at 4:00 PM on 11/2/2022 to document services personally performed by Tad Flores DO based on my observations and the provider's statements to me.     I, Marii Shah, am serving as a scribe () on 11/2/2022 at 5:01 PM to personally document services performed by Tad Flores DO based on my observations and the provider's statements to me.             Tad Flores DO  11/02/22 2008

## 2022-11-03 ENCOUNTER — APPOINTMENT (OUTPATIENT)
Dept: GENERAL RADIOLOGY | Facility: CLINIC | Age: 82
DRG: 854 | End: 2022-11-03
Attending: STUDENT IN AN ORGANIZED HEALTH CARE EDUCATION/TRAINING PROGRAM
Payer: COMMERCIAL

## 2022-11-03 ENCOUNTER — ANESTHESIA (OUTPATIENT)
Dept: SURGERY | Facility: CLINIC | Age: 82
DRG: 854 | End: 2022-11-03
Payer: COMMERCIAL

## 2022-11-03 ENCOUNTER — ANESTHESIA EVENT (OUTPATIENT)
Dept: SURGERY | Facility: CLINIC | Age: 82
DRG: 854 | End: 2022-11-03
Payer: COMMERCIAL

## 2022-11-03 LAB
ANION GAP SERPL CALCULATED.3IONS-SCNC: 11 MMOL/L (ref 7–15)
BASOPHILS # BLD AUTO: 0.1 10E3/UL (ref 0–0.2)
BASOPHILS NFR BLD AUTO: 0 %
BUN SERPL-MCNC: 23.8 MG/DL (ref 8–23)
CALCIUM SERPL-MCNC: 8.5 MG/DL (ref 8.8–10.2)
CHLORIDE SERPL-SCNC: 102 MMOL/L (ref 98–107)
CREAT SERPL-MCNC: 0.53 MG/DL (ref 0.51–0.95)
DEPRECATED HCO3 PLAS-SCNC: 24 MMOL/L (ref 22–29)
EOSINOPHIL # BLD AUTO: 0.2 10E3/UL (ref 0–0.7)
EOSINOPHIL NFR BLD AUTO: 1 %
ERYTHROCYTE [DISTWIDTH] IN BLOOD BY AUTOMATED COUNT: 13.2 % (ref 10–15)
GFR SERPL CREATININE-BSD FRML MDRD: >90 ML/MIN/1.73M2
GLUCOSE SERPL-MCNC: 88 MG/DL (ref 70–99)
HCT VFR BLD AUTO: 47 % (ref 35–47)
HGB BLD-MCNC: 14.9 G/DL (ref 11.7–15.7)
IMM GRANULOCYTES # BLD: 0.2 10E3/UL
IMM GRANULOCYTES NFR BLD: 1 %
LYMPHOCYTES # BLD AUTO: 3.2 10E3/UL (ref 0.8–5.3)
LYMPHOCYTES NFR BLD AUTO: 19 %
MCH RBC QN AUTO: 30 PG (ref 26.5–33)
MCHC RBC AUTO-ENTMCNC: 31.7 G/DL (ref 31.5–36.5)
MCV RBC AUTO: 95 FL (ref 78–100)
MONOCYTES # BLD AUTO: 1.9 10E3/UL (ref 0–1.3)
MONOCYTES NFR BLD AUTO: 12 %
NEUTROPHILS # BLD AUTO: 10.9 10E3/UL (ref 1.6–8.3)
NEUTROPHILS NFR BLD AUTO: 67 %
NRBC # BLD AUTO: 0 10E3/UL
NRBC BLD AUTO-RTO: 0 /100
PLATELET # BLD AUTO: 307 10E3/UL (ref 150–450)
POTASSIUM SERPL-SCNC: 3.8 MMOL/L (ref 3.4–5.3)
POTASSIUM SERPL-SCNC: 4.1 MMOL/L (ref 3.4–5.3)
RBC # BLD AUTO: 4.96 10E6/UL (ref 3.8–5.2)
SODIUM SERPL-SCNC: 137 MMOL/L (ref 136–145)
WBC # BLD AUTO: 16.5 10E3/UL (ref 4–11)

## 2022-11-03 PROCEDURE — 36415 COLL VENOUS BLD VENIPUNCTURE: CPT | Performed by: PHYSICIAN ASSISTANT

## 2022-11-03 PROCEDURE — 84132 ASSAY OF SERUM POTASSIUM: CPT | Performed by: INTERNAL MEDICINE

## 2022-11-03 PROCEDURE — 250N000011 HC RX IP 250 OP 636: Performed by: ANESTHESIOLOGY

## 2022-11-03 PROCEDURE — 360N000083 HC SURGERY LEVEL 3 W/ FLUORO, PER MIN: Performed by: STUDENT IN AN ORGANIZED HEALTH CARE EDUCATION/TRAINING PROGRAM

## 2022-11-03 PROCEDURE — 250N000009 HC RX 250: Performed by: STUDENT IN AN ORGANIZED HEALTH CARE EDUCATION/TRAINING PROGRAM

## 2022-11-03 PROCEDURE — C2617 STENT, NON-COR, TEM W/O DEL: HCPCS | Performed by: STUDENT IN AN ORGANIZED HEALTH CARE EDUCATION/TRAINING PROGRAM

## 2022-11-03 PROCEDURE — 84132 ASSAY OF SERUM POTASSIUM: CPT | Performed by: PHYSICIAN ASSISTANT

## 2022-11-03 PROCEDURE — 999N000141 HC STATISTIC PRE-PROCEDURE NURSING ASSESSMENT: Performed by: STUDENT IN AN ORGANIZED HEALTH CARE EDUCATION/TRAINING PROGRAM

## 2022-11-03 PROCEDURE — 99222 1ST HOSP IP/OBS MODERATE 55: CPT | Mod: 25 | Performed by: STUDENT IN AN ORGANIZED HEALTH CARE EDUCATION/TRAINING PROGRAM

## 2022-11-03 PROCEDURE — 120N000001 HC R&B MED SURG/OB

## 2022-11-03 PROCEDURE — 74420 UROGRAPHY RTRGR +-KUB: CPT | Mod: 26 | Performed by: STUDENT IN AN ORGANIZED HEALTH CARE EDUCATION/TRAINING PROGRAM

## 2022-11-03 PROCEDURE — 250N000011 HC RX IP 250 OP 636: Performed by: PHYSICIAN ASSISTANT

## 2022-11-03 PROCEDURE — 258N000001 HC RX 258: Performed by: STUDENT IN AN ORGANIZED HEALTH CARE EDUCATION/TRAINING PROGRAM

## 2022-11-03 PROCEDURE — 250N000011 HC RX IP 250 OP 636: Performed by: NURSE ANESTHETIST, CERTIFIED REGISTERED

## 2022-11-03 PROCEDURE — 370N000017 HC ANESTHESIA TECHNICAL FEE, PER MIN: Performed by: STUDENT IN AN ORGANIZED HEALTH CARE EDUCATION/TRAINING PROGRAM

## 2022-11-03 PROCEDURE — 255N000002 HC RX 255 OP 636: Performed by: STUDENT IN AN ORGANIZED HEALTH CARE EDUCATION/TRAINING PROGRAM

## 2022-11-03 PROCEDURE — 0T778DZ DILATION OF LEFT URETER WITH INTRALUMINAL DEVICE, VIA NATURAL OR ARTIFICIAL OPENING ENDOSCOPIC: ICD-10-PCS | Performed by: STUDENT IN AN ORGANIZED HEALTH CARE EDUCATION/TRAINING PROGRAM

## 2022-11-03 PROCEDURE — 52332 CYSTOSCOPY AND TREATMENT: CPT | Mod: LT | Performed by: STUDENT IN AN ORGANIZED HEALTH CARE EDUCATION/TRAINING PROGRAM

## 2022-11-03 PROCEDURE — 258N000003 HC RX IP 258 OP 636: Performed by: PHYSICIAN ASSISTANT

## 2022-11-03 PROCEDURE — 36415 COLL VENOUS BLD VENIPUNCTURE: CPT | Performed by: INTERNAL MEDICINE

## 2022-11-03 PROCEDURE — 272N000001 HC OR GENERAL SUPPLY STERILE: Performed by: STUDENT IN AN ORGANIZED HEALTH CARE EDUCATION/TRAINING PROGRAM

## 2022-11-03 PROCEDURE — 250N000009 HC RX 250: Performed by: ANESTHESIOLOGY

## 2022-11-03 PROCEDURE — C1769 GUIDE WIRE: HCPCS | Performed by: STUDENT IN AN ORGANIZED HEALTH CARE EDUCATION/TRAINING PROGRAM

## 2022-11-03 PROCEDURE — 710N000009 HC RECOVERY PHASE 1, LEVEL 1, PER MIN: Performed by: STUDENT IN AN ORGANIZED HEALTH CARE EDUCATION/TRAINING PROGRAM

## 2022-11-03 PROCEDURE — 999N000179 XR SURGERY CARM FLUORO LESS THAN 5 MIN W STILLS: Mod: TC

## 2022-11-03 PROCEDURE — 85004 AUTOMATED DIFF WBC COUNT: CPT | Performed by: PHYSICIAN ASSISTANT

## 2022-11-03 PROCEDURE — 99231 SBSQ HOSP IP/OBS SF/LOW 25: CPT | Performed by: INTERNAL MEDICINE

## 2022-11-03 PROCEDURE — 250N000013 HC RX MED GY IP 250 OP 250 PS 637: Performed by: PHYSICIAN ASSISTANT

## 2022-11-03 DEVICE — STENT URETERAL POLARIS ULTRA 6FRX24CM M0061921320
Type: IMPLANTABLE DEVICE | Site: URETER | Status: NON-FUNCTIONAL
Removed: 2022-12-15

## 2022-11-03 RX ORDER — DEXAMETHASONE SODIUM PHOSPHATE 4 MG/ML
INJECTION, SOLUTION INTRA-ARTICULAR; INTRALESIONAL; INTRAMUSCULAR; INTRAVENOUS; SOFT TISSUE PRN
Status: DISCONTINUED | OUTPATIENT
Start: 2022-11-03 | End: 2022-11-03

## 2022-11-03 RX ORDER — SIMETHICONE 80 MG
80 TABLET,CHEWABLE ORAL EVERY 6 HOURS PRN
Status: DISCONTINUED | OUTPATIENT
Start: 2022-11-03 | End: 2022-11-05 | Stop reason: HOSPADM

## 2022-11-03 RX ORDER — CEFAZOLIN SODIUM/WATER 2 G/20 ML
2 SYRINGE (ML) INTRAVENOUS
Status: DISCONTINUED | OUTPATIENT
Start: 2022-11-03 | End: 2022-11-03 | Stop reason: HOSPADM

## 2022-11-03 RX ORDER — PROPOFOL 10 MG/ML
INJECTION, EMULSION INTRAVENOUS PRN
Status: DISCONTINUED | OUTPATIENT
Start: 2022-11-03 | End: 2022-11-03

## 2022-11-03 RX ORDER — HYDRALAZINE HYDROCHLORIDE 20 MG/ML
2.5-5 INJECTION INTRAMUSCULAR; INTRAVENOUS EVERY 10 MIN PRN
Status: DISCONTINUED | OUTPATIENT
Start: 2022-11-03 | End: 2022-11-03 | Stop reason: HOSPADM

## 2022-11-03 RX ORDER — SODIUM CHLORIDE, SODIUM LACTATE, POTASSIUM CHLORIDE, CALCIUM CHLORIDE 600; 310; 30; 20 MG/100ML; MG/100ML; MG/100ML; MG/100ML
INJECTION, SOLUTION INTRAVENOUS CONTINUOUS
Status: DISCONTINUED | OUTPATIENT
Start: 2022-11-03 | End: 2022-11-03 | Stop reason: HOSPADM

## 2022-11-03 RX ORDER — CEFAZOLIN SODIUM/WATER 2 G/20 ML
2 SYRINGE (ML) INTRAVENOUS SEE ADMIN INSTRUCTIONS
Status: DISCONTINUED | OUTPATIENT
Start: 2022-11-03 | End: 2022-11-03 | Stop reason: HOSPADM

## 2022-11-03 RX ORDER — ONDANSETRON 2 MG/ML
4 INJECTION INTRAMUSCULAR; INTRAVENOUS EVERY 30 MIN PRN
Status: DISCONTINUED | OUTPATIENT
Start: 2022-11-03 | End: 2022-11-03 | Stop reason: HOSPADM

## 2022-11-03 RX ORDER — LIDOCAINE 40 MG/G
CREAM TOPICAL
Status: DISCONTINUED | OUTPATIENT
Start: 2022-11-03 | End: 2022-11-03 | Stop reason: HOSPADM

## 2022-11-03 RX ORDER — ONDANSETRON 4 MG/1
4 TABLET, ORALLY DISINTEGRATING ORAL EVERY 30 MIN PRN
Status: DISCONTINUED | OUTPATIENT
Start: 2022-11-03 | End: 2022-11-03 | Stop reason: HOSPADM

## 2022-11-03 RX ORDER — FENTANYL CITRATE 50 UG/ML
50 INJECTION, SOLUTION INTRAMUSCULAR; INTRAVENOUS EVERY 5 MIN PRN
Status: DISCONTINUED | OUTPATIENT
Start: 2022-11-03 | End: 2022-11-03 | Stop reason: HOSPADM

## 2022-11-03 RX ORDER — HYDROMORPHONE HCL IN WATER/PF 6 MG/30 ML
0.4 PATIENT CONTROLLED ANALGESIA SYRINGE INTRAVENOUS EVERY 5 MIN PRN
Status: DISCONTINUED | OUTPATIENT
Start: 2022-11-03 | End: 2022-11-03 | Stop reason: HOSPADM

## 2022-11-03 RX ADMIN — SODIUM CHLORIDE: 9 INJECTION, SOLUTION INTRAVENOUS at 16:36

## 2022-11-03 RX ADMIN — DEXAMETHASONE SODIUM PHOSPHATE 4 MG: 4 INJECTION, SOLUTION INTRA-ARTICULAR; INTRALESIONAL; INTRAMUSCULAR; INTRAVENOUS; SOFT TISSUE at 19:07

## 2022-11-03 RX ADMIN — METHIMAZOLE 5 MG: 5 TABLET ORAL at 15:50

## 2022-11-03 RX ADMIN — PROPOFOL 150 MG: 10 INJECTION, EMULSION INTRAVENOUS at 19:07

## 2022-11-03 RX ADMIN — SODIUM CHLORIDE: 9 INJECTION, SOLUTION INTRAVENOUS at 07:37

## 2022-11-03 RX ADMIN — FENTANYL CITRATE 100 MCG: 50 INJECTION, SOLUTION INTRAMUSCULAR; INTRAVENOUS at 19:07

## 2022-11-03 RX ADMIN — ONDANSETRON 4 MG: 4 TABLET, ORALLY DISINTEGRATING ORAL at 22:03

## 2022-11-03 RX ADMIN — LIDOCAINE HYDROCHLORIDE 5 ML: 10 INJECTION, SOLUTION INFILTRATION; PERINEURAL at 19:07

## 2022-11-03 RX ADMIN — METHIMAZOLE 5 MG: 5 TABLET ORAL at 00:50

## 2022-11-03 RX ADMIN — CEFTRIAXONE SODIUM 1 G: 1 INJECTION, POWDER, FOR SOLUTION INTRAMUSCULAR; INTRAVENOUS at 18:44

## 2022-11-03 RX ADMIN — SODIUM CHLORIDE: 9 INJECTION, SOLUTION INTRAVENOUS at 22:05

## 2022-11-03 RX ADMIN — SOTALOL HYDROCHLORIDE 40 MG: 80 TABLET ORAL at 09:00

## 2022-11-03 RX ADMIN — BUPROPION HYDROCHLORIDE 450 MG: 150 TABLET, FILM COATED, EXTENDED RELEASE ORAL at 09:00

## 2022-11-03 RX ADMIN — LOSARTAN POTASSIUM 25 MG: 25 TABLET, FILM COATED ORAL at 09:00

## 2022-11-03 ASSESSMENT — ACTIVITIES OF DAILY LIVING (ADL)
ADLS_ACUITY_SCORE: 27
ADLS_ACUITY_SCORE: 26
ADLS_ACUITY_SCORE: 26
ADLS_ACUITY_SCORE: 27
ADLS_ACUITY_SCORE: 26
ADLS_ACUITY_SCORE: 27
ADLS_ACUITY_SCORE: 26
ADLS_ACUITY_SCORE: 27
ADLS_ACUITY_SCORE: 26

## 2022-11-03 ASSESSMENT — ENCOUNTER SYMPTOMS: DYSRHYTHMIAS: 1

## 2022-11-03 NOTE — PLAN OF CARE
Day RN ([phone])    Urology MD spoke with this RN in AM after rounds.  Plan for procedure in the afternoon since pt is doing ok medically.      Procedure scheduled for 1730 per Epic system.    1615:  This RN spoke with receiving pre-op RN who was requesting report.   Report given, plan to complete a surgical pre-wash and ensure pt just had gown on when time comes for transfer.    1735:  Surgery tech arrived to pickup patient.  Pt c/o L sided abdominal pain at this time that she describes as sharp and persistent.  Also states feels a bit nauseated.  Able to transfer to cart with A1, sending fluids with patient.            Patient vital signs are at baseline: Yes  Patient able to ambulate as they were prior to admission or with assist devices provided by therapies during their stay:  Yes  Patient MUST void prior to discharge:  Yes  Patient able to tolerate oral intake:  No,  Reason:  NPO prior to procedure.  Pain has adequate pain control using Oral analgesics:  Yes  Does patient have an identified :  Yes  Has goal D/C date and time been discussed with patient:  No,  Reason:  TBD    Pt A/O x4 - a bit forgetful.  VSS and afebrile.  Pain managed adequately with fentanyl patch in place to R lower abdomen.  CMS intact.  Skin ok ex fab and scaly LE's.  Up A1 with walker and gait belt.  Voiding adequately pre-op - strained urine all the way till pre-op w/o finding any stones.  Tolerating NPO with ice chips.  Plan is procedure at 1730.  Will continue to monitor.     Problem: SAFETY  Goal: Free from accidental physical injury  4/16/2019 0018 by Luis Arambula RN  Outcome: Ongoing  4/15/2019 1437 by Myke King  Outcome: Ongoing  Goal: Free from intentional harm  4/16/2019 0018 by Luis Arambula RN  Outcome: Ongoing  4/15/2019 1437 by Myke King  Outcome: Ongoing     Problem: PAIN  Goal: Patient's pain/discomfort is manageable  4/16/2019 0018 by Luis Arambula RN  Outcome: Ongoing  4/15/2019 1437 by Myke King  Outcome: Ongoing     Problem: Pain:  Goal: Pain level will decrease  Description  Pain level will decrease  4/16/2019 0018 by Luis Arambula RN  Outcome: Ongoing  4/15/2019 1437 by Myke King  Outcome: Ongoing  Goal: Control of acute pain  Description  Control of acute pain  4/16/2019 0018 by Luis Arambula RN  Outcome: Ongoing  4/15/2019 1437 by Myke King  Outcome: Ongoing  Goal: Control of chronic pain  Description  Control of chronic pain  4/15/2019 1437 by Myke King  Outcome: Ongoing     Problem: Falls - Risk of:  Goal: Will remain free from falls  Description  Will remain free from falls  4/16/2019 0018 by Luis Arambula RN  Outcome: Ongoing  4/15/2019 1437 by Myke King  Outcome: Ongoing

## 2022-11-03 NOTE — H&P
[facility]    History and Physical - Hospitalist Service       Date of Admission:  11/2/2022    Assessment & Plan      Madhavi Castellanos is a 82 year old female with PMHx significant for PAF s/p ablation and watchman's device, CAD, LBBB, HTN, GERD, TALI, HLP, asthma, depression and chronic back pain on chronic opioids, who was admitted on 11/2/2022 with nausea, vomiting likely due to infected kidney stone. She denies significant flank or abdominal pain but wears a Fentanyl patch.    1. Ureterolithiasis on the left  Sepsis due to UTI/pyelonephritis   Nausea with vomiting on Saturday, nausea and feeling unwell since with some diarrhea. Increased weakness with poor po intake, hasn't taken home meds for a couple day. Denies significant abdominal or flank pain but is on a Fentanyl patch. Denies dysuria but notes very dark color to urine. Subjective chills, no measured fever, denies chest pain, cough or SOB.  Tachycardic on arrival, VS otherwise stable. CMP notable for hypokalemia, normal Cr and elevated anion gap. WBC elevated at 19.4 and lactic acid elevated at 2.5. UA abnormal, large blood, small LE, >182 RBCs and 22 WBCs. Urine and blood cultures sent. CT abd/pelvis shows 5 mm obstructing L ureter stone with hydronephrosis and perinephric stranding. Lactic acidosis resolved after 1L LR. She also received 1g IV Rocephin  - admit to inpatient  - continue IV Rocephin  - continue IVFs  - Urology consult  - strain urine  - follow cultures  - supportive cares  - recheck labs in AM  - if pt spikes a fever, please page Urology and let them know  - NPO after midnight     2. Hypokalemia   Replace per protocol  3. PAF s/p ablation and watchman's device  CAD with known LBBB  HTN  Has not taken home meds in a couple days. Tachycardic on arrival, EKG read as sinus tachycardia with PACs. Not anticoagulated, s/p Watchman's device  - resume home Sotalol  - resume home losartan and hydrochlorothiazide with  "parameters  4. GERD  Does not appear to be on a PPI  5. TALI  Unclear if using CPAP  6. HLP  Resume home atorvastatin  7. Depression  Resume home Wellbutrin  8. Chronic back pain on chronic opioids  Wears a Fentanyl patch, replaced this morning. Allergy to oxycodone, seems to have tolerated Norco in the past.    Covid-19 negative     Diet:   regular diet, NPO after midnight   DVT Prophylaxis: Pneumatic Compression Devices  Herr Catheter: Not present  Central Lines: None  Cardiac Monitoring: None  Code Status:   Full code    Clinically Significant Risk Factors Present on Admission        # Hypokalemia: Lowest K = 3.3 mmol/L (Ref range: 3.4-5.3) in last 2 days, will replace as needed  # Hyponatremia: Lowest Na = 135 mmol/L (Ref range: 136-145) in last 2 days, will monitor as appropriate     # Anion Gap Metabolic Acidosis: Highest Anion Gap = 19 mmol/L (Ref range: 7-15) in last 2 days, will monitor and treat as appropriate      # Hypertension: home medication list includes antihypertensive(s)     # Obesity: Estimated body mass index is 30.23 kg/m  as calculated from the following:    Height as of this encounter: 1.549 m (5' 1\").    Weight as of this encounter: 72.6 kg (160 lb).           Disposition Plan      Expected Discharge Date: 11/04/2022                The patient's care was discussed with the Attending Physician, Dr. Mares, Patient and ED provider, Dr. Flores.    Krystyna Warren PA-C  Hospitalist Service  [facility]  Securely message with the [employer] Web Console (learn more here)  Text page via [employer] Paging/Directory         ______________________________________________________________________    Chief Complaint   Nausea, vomiting    History is obtained from the patient    History of Present Illness   Madhavi Castellanos is a 82 year old female with PMHx significant for PAF s/p ablation and watchman's device, CAD, LBBB, HTN, GERD, TALI, HLP, asthma, depression and chronic back pain on " "chronic opioids, who presents to the ED with weakness and nausea. The patient notes onset of nausea and projectile vomiting on Saturday. She vomited 8-9 times. She denies any further vomiting since. She notes a poor appetite and hasn't not taken in very much po intake. She also notes a small amount of diarrhea. She notes feeling chilled but denies fever. She denies chest pain, SOB, cough, abd pain or dysuria. She does note that her urine has been very dark in color. She does mention that she wears a Fentanyl patch that she replaced this morning.   In the ED, she is tachycardic on arrival, VS otherwise stable. CMP notable for hypokalemia (3.3), normal Cr and elevated anion gap (19). WBC elevated at 19.4 and lactic acid elevated at 2.5. UA abnormal, large blood, small LE, >182 RBCs and 22 WBCs. Urine and blood cultures sent. CT abd/pelvis shows 5 mm obstructing L ureter stone with hydronephrosis and perinephric stranding. Lactic acidosis resolved after 1L LR. She also received 1g IV Rocephin. She will be admitted for further management of sepsis likely due to UTI and ureteral stone.      Review of Systems    The 10 point Review of Systems is negative other than noted in the HPI or here.     Past Medical History    I have reviewed this patient's medical history and updated it with pertinent information if needed.   Past Medical History:   Diagnosis Date     Asthma      Atrial fibrillation (H)     had EP ablation 8/11/2017, pt reports being \"afib free\" now     CAD (coronary artery disease)      Depression      Esophageal reflux      Hypertension      LBBB (left bundle branch block)      Liver lesion     stable, seen on multiple CT scans     Lumbosacral spondylosis      Mumps      Nonspecific reaction to tuberculin skin test without active tuberculosis(795.51)      Obesity      TALI (obstructive sleep apnea)      Osteoarthritis      Palpitations      Prolonged QT interval      Pure hypercholesterolemia      Spinal " stenosis of lumbar region      STD (sexually transmitted disease)      Tuberculosis        Past Surgical History   I have reviewed this patient's surgical history and updated it with pertinent information if needed.  Past Surgical History:   Procedure Laterality Date     CHOLECYSTECTOMY       COLONOSCOPY N/A 8/4/2019    Procedure: COLONOSCOPY;  Surgeon: Darron Cunha MD;  Location: [facility]     EP ABLATION / EP STUDIES  08/11/2017    for hx a fib     ESOPHAGOSCOPY, GASTROSCOPY, DUODENOSCOPY (EGD), COMBINED  7/2014    reactive gastropathy, H. Pylori negative ([facility])     EXCISE VULVA WIDE LOCAL N/A 12/1/2017    Procedure: EXCISE VULVA WIDE LOCAL;  Wide Local Excision of Vulvar Lesion   ;  Surgeon: Nazario Tirado MD;  Location: [facility]     IRRIGATION AND DEBRIDEMENT LOWER EXTREMITY, COMBINED Left 11/28/2020    Procedure: IRRIGATION AND DEBRIDEMENT, LOWER EXTREMITY with negative pressure dressing placement, left knee arthrotomy;  Surgeon: Josef Peterson MD;  Location: [facility]       Social History   I have reviewed this patient's social history and updated it with pertinent information if needed.  Social History     Tobacco Use     Smoking status: Never     Smokeless tobacco: Never   Substance Use Topics     Alcohol use: No     Drug use: No       Family History     Reviewed and non contributory    Prior to Admission Medications   Prior to Admission Medications   Prescriptions Last Dose Informant Patient Reported? Taking?   HYDROcodone-acetaminophen (NORCO) 7.5-325 MG per tablet   No No   Sig: Take 1-2 tablets by mouth 2 times daily as needed for moderate to severe pain   Lidocaine (LIDOCARE) 4 % Patch   No No   Sig: Place 1-3 patches onto the skin every 24 hours To prevent lidocaine toxicity, patient should be patch free for 12 hrs daily.   acetaminophen (TYLENOL) 325 MG tablet   No No   Sig: Take 3 tablets (975 mg) by mouth 3 times daily   aspirin (ASA) 81 MG chewable tablet   Yes No   Sig: Take 81 mg by  "mouth daily   atorvastatin (LIPITOR) 10 MG tablet   Yes No   Sig: Take 10 mg by mouth every evening    buPROPion (WELLBUTRIN XL) 300 MG 24 hr tablet   Yes No   Sig: Take 300 mg by mouth every morning   cephALEXin (KEFLEX) 500 MG capsule   Yes No   Sig: Take 500 mg by mouth 2 times daily   fentaNYL (DURAGESIC) 25 mcg/hr 72 hr patch   No No   Sig: Place 1 patch onto the skin every 72 hours   hydrochlorothiazide (MICROZIDE) 12.5 MG capsule   Yes No   Sig: Take 12.5 mg by mouth daily   lidocaine (XYLOCAINE) 4 % external solution   No No   Sig: Apply 30 mLs topically daily Apply to wound prior to wound vac dressing change   losartan (COZAAR) 50 MG tablet   Yes No   Sig: Take 1/2 tablet (25 mg) by mouth daily   methimazole (TAPAZOLE) 5 MG tablet   Yes No   Sig: Take 5 mg by mouth five times a week Monday through Friday   methocarbamol (ROBAXIN) 750 MG tablet   Yes No   Sig: Take 1 tablet up to 6 times a day as needed.   polyethylene glycol (MIRALAX) packet   Yes No   Sig: Take 1 packet by mouth daily as needed for constipation   promethazine (PHENERGAN) 25 MG tablet   No No   Sig: Take 0.5-1 tablets (12.5-25 mg) by mouth every 8 hours as needed for nausea   sotalol (BETAPACE) 80 MG tablet   Yes No   Sig: Take 1/2 tablet (40 mg) by mouth every 12 hours.      Facility-Administered Medications: None     Allergies   Allergies   Allergen Reactions     Gabapentin Other (See Comments)     Metoprolol Shortness Of Breath     2 hours after tasking for the first time SOB, Pt evaluated and pt WDL       Acetaminophen Nausea     Albuterol Unknown     Apixaban GI Disturbance     Augmentin Nausea     Codeine Sulfate      \"i dont't know, nothing, maybe sick to my stomach\"     Cymbalta      agitated     Lisinopril Cough     Omnicef [Cefdinir] Diarrhea     Percocet [Oxycodone-Acetaminophen]      nauseated     Prednisone Other (See Comments)     Made her aggressive.     Duloxetine Anxiety       Physical Exam   Vital Signs: Temp: 97.6  F " (36.4  C) Temp src: Temporal BP: (!) 140/88 Pulse: 68   Resp: 20 SpO2: 95 % O2 Device: None (Room air)    Weight: 160 lbs 0 oz    GENERAL:  Comfortable.  PSYCH: pleasant, oriented, No acute distress.  HEENT:  Atraumatic, normocephalic. Normal conjunctiva, normal hearing, and oropharynx is normal.  NECK:  Supple, no neck vein distention  HEART:  Normal S1, S2 with no murmur, no pericardial rub, gallops or S3 or S4.  LUNGS:  Clear to auscultation, normal Respiratory effort. No wheezing, rales or ronchi.  GI:  Soft, normal bowel sounds. No CVA tenderness, tenderness to palpation on left side of abdomen. Non distended.   EXTREMITIES:  No pedal edema, +2 pulses bilateral and equal.  SKIN:  Dry to touch, No rash, wound or ulcerations.  NEUROLOGIC:  CN 2-12 intact, BL 5/5 symmetric upper and lower extremity strength, sensation is intact with no focal deficits.      Data   Data reviewed today: I reviewed all medications, new labs and imaging results over the last 24 hours. I personally reviewed the EKG tracing showing sinus tachycardia with PACs, LBBB, the chest x-ray image(s) showing nothing acute and the abdominal CT image(s) showing 5 mm obstructing stone in mid left ureter with mild proximal hydroureteronephrosis and perinephric stranding..    Most Recent 3 CBC's:Recent Labs   Lab Test 11/02/22  1909 11/02/22  1623 11/30/21  1650   WBC 21.8* 19.4* 13.8*   HGB 16.2* 17.0* 14.7   MCV 96 93 99    418 349     Most Recent 3 BMP's:Recent Labs   Lab Test 11/02/22  1623 11/30/21  1650 08/21/21  0542   * 139 136   POTASSIUM 3.3* 3.8 3.8   CHLORIDE 94* 103 105   CO2 22 31 24   BUN 31.6* 21 17   CR 0.59 0.68 0.74   ANIONGAP 19* 5 7   LUIS ANTONIO 9.3 9.7 9.6   * 97 105*     Most Recent 2 LFT's:Recent Labs   Lab Test 11/02/22  1623 11/28/20  1215   AST 32 21   ALT 35 28   ALKPHOS 116* 82   BILITOTAL 1.1 0.4     Most Recent 3 Troponin's:Recent Labs   Lab Test 08/21/21  0542 03/12/20  1151 01/05/18  2037 08/29/17  1047  11/16/14  1500 11/16/14  0929 10/27/14  0843   TROPI  --  0.018 <0.015 <0.015   < >  --   --    TROPONIN <0.015  --   --   --   --  0.00 0.00    < > = values in this interval not displayed.     Most Recent 3 BNP's:Recent Labs   Lab Test 11/02/22  1623 08/21/21  0542 08/29/17  1047   NTBNPI 3,271* 461 291     Most Recent TSH and T4:Recent Labs   Lab Test 11/02/22  1623   TSH 1.41     Most Recent Urinalysis:Recent Labs   Lab Test 11/02/22  1826 08/03/19  0223 12/19/18  0937   COLOR Yellow   < > Yellow   APPEARANCE Clear   < > Clear   URINEGLC Negative   < > Negative   URINEBILI Negative   < > Negative   URINEKETONE 60*   < > Negative   SG 1.015   < > 1.020   UBLD Large*   < > Large*   URINEPH 6.0   < > 7.0   PROTEIN 20*   < > Negative   UROBILINOGEN  --   --  0.2   NITRITE Negative   < > Negative   LEUKEST Small*   < > Trace*   RBCU >182*   < >  --    WBCU 22*   < >  --     < > = values in this interval not displayed.     Recent Results (from the past 24 hour(s))   CT Abdomen Pelvis w Contrast    Narrative    EXAM: CT ABDOMEN PELVIS W CONTRAST  LOCATION: [facility]  DATE/TIME: 11/2/2022 5:47 PM    INDICATION: nausea vomiting, leukocytosis  COMPARISON: Abdominal radiograph 03/12/2020, CT 05/23/2018  TECHNIQUE: CT scan of the abdomen and pelvis was performed following injection of IV contrast. Multiplanar reformats were obtained. Dose reduction techniques were used.  CONTRAST:  80mL Isovue 370    FINDINGS:   LOWER CHEST: Unremarkable.    HEPATOBILIARY: Cholecystectomy.    PANCREAS: Normal.    SPLEEN: Normal.    ADRENAL GLANDS: Normal.    KIDNEYS/BLADDER: Obstructing 5 x 4 mm stone in the mid left ureter at the level of the pelvic inlet (series 2 image 130). Mild proximal hydroureteronephrosis and perinephric stranding. There are additional bilateral nonobstructing renal calculi. Urinary   bladder is moderately distended but otherwise unremarkable.    BOWEL: Diverticulosis of the colon. No acute  inflammatory change. No obstruction. Normal appendix. Large duodenal diverticulum is unchanged.    LYMPH NODES: Normal.    VASCULATURE: Scattered calcified atherosclerosis.    PELVIC ORGANS: Hysterectomy.    MUSCULOSKELETAL: No acute bony abnormality.        Impression    IMPRESSION:   1.  Obstructing 5 mm stone in the mid left ureter with mild proximal hydroureteronephrosis and perinephric stranding. If there is suspicion for superimposed urinary tract infection, recommend consultation with urology for consideration of decompression   of the collecting system.  2.  Additional bilateral nonobstructing renal calculi.   XR Chest 2 Views    Narrative    EXAM: XR CHEST 2 VIEWS  LOCATION: [facility]  DATE/TIME: 11/2/2022 6:11 PM    INDICATION: Tachycardic, pneumonia evaluation.    COMPARISON: 7/6/2022.      Impression    IMPRESSION: Heart and mediastinal size normal. Lungs and pleural spaces are clear. No pleural effusion or pneumothorax. Electronic device is again identified involving the anterior chest wall. 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

## 2022-11-03 NOTE — PROVIDER NOTIFICATION
"Paged Dr. Antonio at [phone]:  FYI: Pt states starting sometime today that she lost ability to do a \"thumbs up\" with her left hand.  Equal , arm lifting, vision is fine, etc.  Can hold a full cup of water with that hand/thumb.  IV is in thumb side of that wrist too of note; advise if any needed intervention.  Thank you    Replied sounds like likely relate to IV and continue neuro checks for now.      Asked for desired frequency of neuro checks.    Dr. Antonio replied at 1390 that saw pt in person and likely is d/t tape/IV positioning.  Stated no need for additional neuro checks unless pt reports any additional changes.  Thank you.              Paged Dr. Antonio at 1965:  Pt just down to procedure about 10 minutes ago.  Pt would really like dinner after, could we order food from the kitchen for her before it closes that we can warm back up for her?  Thank you  "

## 2022-11-03 NOTE — ED NOTES
"[facility]  ED Nurse Handoff Report    Madhavi Castellanos is a 82 year old female   ED Chief complaint: Nausea & Vomiting and Generalized Weakness  . ED Diagnosis:   Final diagnoses:   Dehydration   Generalized muscle weakness   Sepsis, due to unspecified organism, unspecified whether acute organ dysfunction present (H)   Kidney stone     Allergies:   Allergies   Allergen Reactions     Gabapentin Other (See Comments)     Metoprolol Shortness Of Breath     2 hours after tasking for the first time SOB, Pt evaluated and pt WDL       Acetaminophen Nausea     Albuterol Unknown     Apixaban GI Disturbance     Augmentin Nausea     Codeine Sulfate      \"i dont't know, nothing, maybe sick to my stomach\"     Cymbalta      agitated     Lisinopril Cough     Omnicef [Cefdinir] Diarrhea     Percocet [Oxycodone-Acetaminophen]      nauseated     Prednisone Other (See Comments)     Made her aggressive.     Duloxetine Anxiety       Code Status: Full Code  Activity level - Baseline/Home:  Independent. Activity Level - Current:   Stand by Assist. Lift room needed: No. Bariatric: No   Needed: No   Isolation: No. Infection: Not Applicable.     Vital Signs:   Vitals:    11/02/22 1930 11/02/22 2000 11/02/22 2030 11/02/22 2034   BP: (!) 158/85 (!) 156/80 (!) 144/85    Pulse:  116  116   Resp:    20   Temp:    98.6  F (37  C)   TempSrc:    Temporal   SpO2:  91%  97%   Weight:       Height:           Cardiac Rhythm:  ,      Pain level:    Patient confused: No. Patient Falls Risk: Yes.   Elimination Status: Has not voided while in ED (5hrs)   Patient Report - Initial Complaint: N/V, Weakness. Focused Assessment: Madhavi Castellanos is a 82 year old female with history of atrial fibrillation, left bundle branch block, and a watchman who presents with generalized feeling of fatigue and weakness. She hasn't taken her medications, including sotalol, in three days because she has been feeling unwell and exhausted. She states " "that she has been feeling unwell since last Saturday (4 days ago) when she experienced 9 episodes of \"projectile vomiting\". She has not vomited since this episode, but has only been consuming small sips of water and has not ate much due to nausea. She has also been experiencing abdominal pain, nausea, runny nose and cough. Per chart review, her last echocardiogram was in 2020 with an EF of 55-60%. Not on blood thinners, was on Apixaban but this caused a GI bleed. Denies congestion, sore throat, fever, or chills. Denies chest pain, increased heart rate, and chest tightness. Denies dysuria, hematuria, diarrhea, hematochezia. Denies numbness or tingling.    Constitutional:       General: Not in acute distress.     Appearance: Normal appearance.   HENT:      Head: Normocephalic and atraumatic.   Eyes:      Extraocular Movements: Extraocular movements intact.      Conjunctiva/sclera: Conjunctivae normal.   Cardiovascular:      Rate and Rhythm: Tachycardic. Irregular rhythm.  Pulmonary:      Effort: Pulmonary effort is normal. No respiratory distress.      Breath sounds: Normal breath sounds.  Abdominal:      General: Abdomen is flat. There is no distension.      Palpations: Abdomen is soft.      Tenderness: There is no abdominal tenderness.   Musculoskeletal:      Cervical back: Normal range of motion. No rigidity.   Skin:     General: Skin is warm and dry.   Neurological:      General: No focal deficit present.      Mental Status: Alert and oriented to person, place, and time.   Psychiatric:         Mood and Affect: Mood normal.         Behavior: Behavior normal   Tests Performed: labs, CT, Xray Abnormal Results:   Abnormal Labs Resulted from Time of ED Arrival to Time of ED Departure   NT PROBNP INPATIENT - Abnormal       Result Value    N terminal Pro BNP Inpatient 3,271 (*)    TROPONIN T, HIGH SENSITIVITY - Abnormal    Troponin T, High Sensitivity 21 (*)    ROUTINE UA WITH MICROSCOPIC REFLEX TO CULTURE - Abnormal    " Color Urine Yellow      Appearance Urine Clear      Glucose Urine Negative      Bilirubin Urine Negative      Ketones Urine 60 (*)     Specific Gravity Urine 1.015      Blood Urine Large (*)     pH Urine 6.0      Protein Albumin Urine 20 (*)     Urobilinogen Urine Normal      Nitrite Urine Negative      Leukocyte Esterase Urine Small (*)     Bacteria Urine Few (*)     Mucus Urine Present (*)     RBC Urine >182 (*)     WBC Urine 22 (*)     Squamous Epithelials Urine 1     COMPREHENSIVE METABOLIC PANEL - Abnormal    Sodium 135 (*)     Potassium 3.3 (*)     Chloride 94 (*)     Carbon Dioxide (CO2) 22      Anion Gap 19 (*)     Urea Nitrogen 31.6 (*)     Creatinine 0.59      Calcium 9.3      Glucose 125 (*)     Alkaline Phosphatase 116 (*)     AST 32      ALT 35      Protein Total 6.5      Albumin 3.6      Bilirubin Total 1.1      GFR Estimate >90     CBC WITH PLATELETS AND DIFFERENTIAL - Abnormal    WBC Count 19.4 (*)     RBC Count 5.57 (*)     Hemoglobin 17.0 (*)     Hematocrit 51.8 (*)     MCV 93      MCH 30.5      MCHC 32.8      RDW 13.2      Platelet Count 418      % Neutrophils 70      % Lymphocytes 17      % Monocytes 11      % Eosinophils 1      % Basophils 0      % Immature Granulocytes 1      NRBCs per 100 WBC 0      Absolute Neutrophils 13.5 (*)     Absolute Lymphocytes 3.3      Absolute Monocytes 2.1 (*)     Absolute Eosinophils 0.1      Absolute Basophils 0.1      Absolute Immature Granulocytes 0.2      Absolute NRBCs 0.0     ISTAT GASES LACTATE VENOUS POCT - Abnormal    Lactic Acid POCT 2.5 (*)     Bicarbonate Venous POCT 24      O2 Sat, Venous POCT 87 (*)     pCO2V Venous POCT 34 (*)     pH Venous POCT 7.46 (*)     pO2 Venous POCT 49 (*)    ETHYL ALCOHOL LEVEL - Abnormal    Alcohol ethyl <0.01 (*)    TROPONIN T, HIGH SENSITIVITY - Abnormal    Troponin T, High Sensitivity 21 (*)    ISTAT GASES LACTATE VENOUS POCT - Abnormal    Lactic Acid POCT 1.9      Bicarbonate Venous POCT 25      O2 Sat, Venous POCT 94       pCO2V Venous POCT 35 (*)     pH Venous POCT 7.46 (*)     pO2 Venous POCT 65 (*)    CBC WITH PLATELETS AND DIFFERENTIAL - Abnormal    WBC Count 21.8 (*)     RBC Count 5.25 (*)     Hemoglobin 16.2 (*)     Hematocrit 50.6 (*)     MCV 96      MCH 30.9      MCHC 32.0      RDW 13.2      Platelet Count 359      % Neutrophils 73      % Lymphocytes 14      % Monocytes 12      % Eosinophils 0      % Basophils 0      % Immature Granulocytes 1      NRBCs per 100 WBC 0      Absolute Neutrophils 15.5 (*)     Absolute Lymphocytes 3.1      Absolute Monocytes 2.7 (*)     Absolute Eosinophils 0.1      Absolute Basophils 0.1      Absolute Immature Granulocytes 0.3      Absolute NRBCs 0.0       XR Chest 2 Views   Final Result   IMPRESSION: Heart and mediastinal size normal. Lungs and pleural spaces are clear. No pleural effusion or pneumothorax. Electronic device is again identified involving the anterior chest wall. 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         CT Abdomen Pelvis w Contrast   Final Result   IMPRESSION:    1.  Obstructing 5 mm stone in the mid left ureter with mild proximal hydroureteronephrosis and perinephric stranding. If there is suspicion for superimposed urinary tract infection, recommend consultation with urology for consideration of decompression    of the collecting system.   2.  Additional bilateral nonobstructing renal calculi.      .   Treatments provided: IVF, IV Ceftriaxone, IV Pain meds  Family Comments: No family present in ED, aware and in touch via phone  OBS brochure/video discussed/provided to patient:  No  ED Medications:   Medications   cefTRIAXone (ROCEPHIN) 1 g vial to attach to  mL bag for ADULTS or NS 50 mL bag for PEDS (has no administration in time range)   atorvastatin (LIPITOR) tablet 10 mg (has no administration in time range)   buPROPion (WELLBUTRIN XL) 24 hr tablet 300 mg (has no administration in time range)   fentaNYL (DURAGESIC) 25 mcg/hr 72 hr patch 1 patch (has no  administration in time range)   hydrochlorothiazide (MICROZIDE) capsule 12.5 mg (has no administration in time range)   losartan (COZAAR) tablet 25 mg (has no administration in time range)   methimazole (TAPAZOLE) tablet 5 mg (has no administration in time range)   sotalol (BETAPACE) half-tab 40 mg (has no administration in time range)   lidocaine 1 % 0.1-1 mL (has no administration in time range)   lidocaine (LMX4) cream (has no administration in time range)   sodium chloride (PF) 0.9% PF flush 3 mL (3 mLs Intracatheter Not Given 11/2/22 2041)   sodium chloride (PF) 0.9% PF flush 3 mL (has no administration in time range)   melatonin tablet 1 mg (has no administration in time range)   sodium chloride 0.9% infusion ( Intravenous New Bag 11/2/22 2042)   acetaminophen (TYLENOL) tablet 650 mg (has no administration in time range)     Or   acetaminophen (TYLENOL) Suppository 650 mg (has no administration in time range)   polyethylene glycol (MIRALAX) Packet 17 g (has no administration in time range)   senna-docusate (SENOKOT-S/PERICOLACE) 8.6-50 MG per tablet 1 tablet (has no administration in time range)     Or   senna-docusate (SENOKOT-S/PERICOLACE) 8.6-50 MG per tablet 2 tablet (has no administration in time range)   ondansetron (ZOFRAN ODT) ODT tab 4 mg (has no administration in time range)     Or   ondansetron (ZOFRAN) injection 4 mg (has no administration in time range)   HYDROcodone-acetaminophen (NORCO) 7.5-325 MG per tablet 1-2 tablet (has no administration in time range)   lactated ringers BOLUS 1,000 mL (0 mLs Intravenous Stopped 11/2/22 1850)   Saline CT scan flush (60 mLs Intravenous Given 11/2/22 1739)   iopamidol (ISOVUE-370) solution 120 mL (80 mLs Intravenous Given 11/2/22 1739)   cefTRIAXone (ROCEPHIN) 1 g vial to attach to  mL bag for ADULTS or NS 50 mL bag for PEDS (1 g Intravenous New Bag 11/2/22 1850)     Drips infusing:  Yes  For the majority of the shift, the patient's behavior Green.  Interventions performed were N/A.    Sepsis treatment initiated: No     Patient tested for COVID 19 prior to admission: YES    ED Nurse Name/Phone Number: Alexy Dickey RN,   9:02 PM    RECEIVING UNIT ED HANDOFF REVIEW    Above ED Nurse Handoff Report was reviewed: Yes  Reviewed by: Marry Luu RN on November 2, 2022 at 9:43 PM

## 2022-11-03 NOTE — INTERVAL H&P NOTE
"I have reviewed the surgical (or preoperative) H&P that is linked to this encounter, and examined the patient. There are no significant changes    Clinical Conditions Present on Arrival:  Clinically Significant Risk Factors Present on Admission          # Hypokalemia: Lowest K = 3.3 mmol/L (Ref range: 3.4-5.3) in last 2 days, will replace as needed  # Hyponatremia: Lowest Na = 135 mmol/L (Ref range: 136-145) in last 2 days, will monitor as appropriate     # Anion Gap Metabolic Acidosis: Highest Anion Gap = 19 mmol/L (Ref range: 7-15) in last 2 days, will monitor and treat as appropriate      # Obesity: Estimated body mass index is 30.23 kg/m  as calculated from the following:    Height as of this encounter: 1.549 m (5' 1\").    Weight as of this encounter: 72.6 kg (160 lb).       "

## 2022-11-03 NOTE — ANESTHESIA PREPROCEDURE EVALUATION
"Anesthesia Pre-Procedure Evaluation    Patient: Madhavi Castellanos   MRN: [id number] : 1940        Procedure : Procedure(s):  CYSTOSCOPY, WITH LEFT RETROGRADE PYELOGRAM AND LEFT URETERAL STENT INSERTION          Past Medical History:   Diagnosis Date     Asthma      Atrial fibrillation (H)     had EP ablation 2017, pt reports being \"afib free\" now     CAD (coronary artery disease)      Depression      Esophageal reflux      Hypertension      LBBB (left bundle branch block)      Liver lesion     stable, seen on multiple CT scans     Lumbosacral spondylosis      Mumps      Nonspecific reaction to tuberculin skin test without active tuberculosis(795.51)      Obesity      TALI (obstructive sleep apnea)      Osteoarthritis      Palpitations      Prolonged QT interval      Pure hypercholesterolemia      Spinal stenosis of lumbar region      STD (sexually transmitted disease)      Tuberculosis       Past Surgical History:   Procedure Laterality Date     CHOLECYSTECTOMY       COLONOSCOPY N/A 2019    Procedure: COLONOSCOPY;  Surgeon: Darron Cunha MD;  Location: [facility]     EP ABLATION / EP STUDIES  2017    for hx a fib     ESOPHAGOSCOPY, GASTROSCOPY, DUODENOSCOPY (EGD), COMBINED  2014    reactive gastropathy, H. Pylori negative ([facility])     EXCISE VULVA WIDE LOCAL N/A 2017    Procedure: EXCISE VULVA WIDE LOCAL;  Wide Local Excision of Vulvar Lesion   ;  Surgeon: Nazario Tirado MD;  Location: [facility]     IRRIGATION AND DEBRIDEMENT LOWER EXTREMITY, COMBINED Left 2020    Procedure: IRRIGATION AND DEBRIDEMENT, LOWER EXTREMITY with negative pressure dressing placement, left knee arthrotomy;  Surgeon: Josef Peterson MD;  Location: [facility] OR      Allergies   Allergen Reactions     Gabapentin Other (See Comments)     Metoprolol Shortness Of Breath     2 hours after tasking for the first time SOB, Pt evaluated and pt WDL       Acetaminophen Nausea     Albuterol Unknown     Apixaban GI " "Disturbance     Augmentin Nausea     Codeine Sulfate      \"i dont't know, nothing, maybe sick to my stomach\"     Cymbalta      agitated     Lisinopril Cough     Omnicef [Cefdinir] Diarrhea     Percocet [Oxycodone-Acetaminophen]      nauseated     Prednisone Other (See Comments)     Made her aggressive.     Duloxetine Anxiety      Social History     Tobacco Use     Smoking status: Never     Smokeless tobacco: Never   Substance Use Topics     Alcohol use: No      Wt Readings from Last 1 Encounters:   22 72.6 kg (160 lb)        Anesthesia Evaluation            ROS/MED HX  ENT/Pulmonary:     (+) sleep apnea, asthma     Neurologic:       Cardiovascular: Comment: Echo 3/2020  [facility]  Echocardiography Laboratory  [address] [place] [address]        Name: AC MCKOY  MRN: [id number]  : 1940  Study Date: 2020 01:42 PM  Age: 79 yrs  Gender: Female  Patient [facility]: [facility]  Reason For Study: Syncope and Collapse  Ordering Physician: ANGELA NARVAEZ  Referring Physician: Cecy Edmondson  Performed By: Hank Tapia RDCS     BSA: 1.8 m2  Height: 61 in  Weight: 185 lb  HR: 83  BP: 119/67 mmHg  _____________________________________________________________________________  __        Procedure  Complete Echo Adult. Optison (NDC #[id number]) given intravenously.  _____________________________________________________________________________  __        Interpretation Summary     Diastolic Doppler findings (E/E' ratio and/or other parameters) suggest left  ventricular filling pressures are increased.  Septal motion is consistent with conduction abnormality.  There is mild right ventricular hypertrophy.  Right ventricular systolic pressure is elevated, consistent with moderate  pulmonary hypertension.  Indeterminate rhythm-Rate <100 regular, wide but no A wave on MV inflow.  Please correlate with 12 lead " ecg  _____________________________________________________________________________  __        Left Ventricle  The left ventricle is normal in size. There is normal left ventricular wall  thickness. The visual ejection fraction is estimated at 55-60%. Diastolic  function not assessed due to arrhythmia. Diastolic Doppler findings (E/E'  ratio and/or other parameters) suggest left ventricular filling pressures are  increased. Septal motion is consistent with conduction abnormality.     Right Ventricle  The right ventricle is normal size. There is mild right ventricular  hypertrophy. The right ventricular systolic function is normal.     Atria  The left atrium is mildly dilated. Right atrial size is normal.     Mitral Valve  There is no mitral annular calcification. There is trace mitral regurgitation.        Tricuspid Valve  There is mild (1+) tricuspid regurgitation. The right ventricular systolic  pressure is approximated at 51.4 mmHg plus the right atrial pressure. Right  ventricular systolic pressure is elevated, consistent with moderate pulmonary  hypertension. IVC diameter <2.1 cm collapsing >50% with sniff suggests a  normal RA pressure of 3 mmHg.     Aortic Valve  The aortic valve is trileaflet. No hemodynamically significant valvular aortic  stenosis.     Pulmonic Valve  The pulmonic valve is not well seen, but is grossly normal.     Vessels  Normal size aorta.     Pericardium  The pericardium appears normal.        Rhythm  The rhythm was undetermined. Indeterminate rhythm-Rate <100 regular, wide but  no A wave on MV inflow. Please correlate with 12 lead ecg.  _____________________________________________________________________________  __  MMode/2D Measurements & Calculations  IVSd: 0.87 cm     LVIDd: 4.8 cm  LVIDs: 2.4 cm  LVPWd: 1.0 cm  FS: 49.4 %  LV mass(C)d: 154.7 grams  LV mass(C)dI: 84.6 grams/m2  Ao root diam: 2.8 cm  LA dimension: 4.1 cm  asc Aorta Diam: 2.9 cm  LA/Ao: 1.5  LVOT diam: 2.0 cm  LVOT  area: 3.0 cm2  LA Volume (BP): 58.1 ml  LA Volume Index (BP): 31.7 ml/m2  RWT: 0.42           Doppler Measurements & Calculations  MV E max [place]: 95.1 cm/sec  MV dec slope: 473.7 cm/sec2  MV dec time: 0.20 sec  LV V1 max PG: 10.5 mmHg  LV V1 max: 162.2 cm/sec  LV V1 VTI: 28.8 cm  CO(LVOT): 7.6 l/min  CI(LVOT): 4.2 l/min/m2  SV(LVOT): 86.2 ml  SI(LVOT): 47.2 ml/m2  PA acc time: 0.06 sec  TR max [place]: 358.3 cm/sec  TR max P.4 mmHg  E/E' avg: 15.9  Lateral E/e': 16.2  Medial E/e': 15.6          (+) hypertension-----dysrhythmias (afib s/p ablation, known LBBB), a-fib,     METS/Exercise Tolerance:     Hematologic:       Musculoskeletal:       GI/Hepatic:     (+) GERD,     Renal/Genitourinary:     (+) renal disease, Nephrolithiasis ,     Endo:     (+) Obesity (BMI 30),     Psychiatric/Substance Use:       Infectious Disease:       Malignancy:       Other:      (+) , H/O Chronic Pain (chronic back pain, fentanyl patch 25 mcg/hr in place),        Physical Exam    Airway        Mallampati: II   TM distance: > 3 FB   Neck ROM: full     Respiratory Devices and Support         Dental           Cardiovascular   cardiovascular exam normal          Pulmonary   pulmonary exam normal                OUTSIDE LABS:  CBC:   Lab Results   Component Value Date    WBC 16.5 (H) 2022    WBC 21.8 (H) 2022    HGB 14.9 2022    HGB 16.2 (H) 2022    HCT 47.0 2022    HCT 50.6 (H) 2022     2022     2022     BMP:   Lab Results   Component Value Date     2022     (L) 2022    POTASSIUM 4.1 2022    POTASSIUM 3.8 2022    CHLORIDE 102 2022    CHLORIDE 94 (L) 2022    CO2 24 2022    CO2 22 2022    BUN 23.8 (H) 2022    BUN 31.6 (H) 2022    CR 0.53 2022    CR 0.59 2022    GLC 88 2022     (H) 2022     COAGS:   Lab Results   Component Value Date    PTT 26 2020    INR 1.03 2020      POC:   Lab Results   Component Value Date    BGM 90 11/28/2020     HEPATIC:   Lab Results   Component Value Date    ALBUMIN 3.6 11/02/2022    PROTTOTAL 6.5 11/02/2022    ALT 35 11/02/2022    AST 32 11/02/2022    ALKPHOS 116 (H) 11/02/2022    BILITOTAL 1.1 11/02/2022     OTHER:   Lab Results   Component Value Date    PH 7.46 (H) 11/02/2022    LACT 1.9 11/02/2022    LUIS ANTONIO 8.5 (L) 11/03/2022    MAG 1.9 11/02/2022    LIPASE 63 (L) 07/23/2016    TSH 1.41 11/02/2022       Anesthesia Plan    ASA Status:  3   NPO Status:  NPO Appropriate    Anesthesia Type: General.     - Airway: LMA   Induction: Intravenous.   Maintenance: Balanced.        Consents    Anesthesia Plan(s) and associated risks, benefits, and realistic alternatives discussed. Questions answered and patient/representative(s) expressed understanding.    - Discussed:     - Discussed with:  Patient         Postoperative Care    Pain management: IV analgesics, Oral pain medications, Multi-modal analgesia.   PONV prophylaxis: Ondansetron (or other 5HT-3), Dexamethasone or Solumedrol     Comments:                Katy Lackey MD

## 2022-11-03 NOTE — PROGRESS NOTES
Update:    Patient reevaluated this afternoon due to difficulty making a thumbs up with her left hand.  On exam she does not have any focal deficits but abduction of her thumb is somewhat slow though I note that she has an IV which is actually taped to her hand in this region and is somewhat uncomfortable and appears to be limiting her motion.  No indication for stroke work-up at this time, nursing and the patient will monitor for any additional symptoms.    Andrea Antonio MD

## 2022-11-03 NOTE — PHARMACY-ADMISSION MEDICATION HISTORY
Admission medication history interview status for this patient is complete. See [facility] admission navigator for allergy information, prior to admission medications and immunization status.     Medication history interview done, indicate source(s): Patient  Medication history resources (including written lists, pill bottles, clinic record):EPIC    Changes made to PTA medication list:  Added: Gax X prn; Multivitamins; Tylenol PM  Changed:    - Tylenol prn;    - Sotalol - to 120mg bid;   -  Norco prn;   -  Bupropion XL - to 450mg daily;   - hydrochlorothiazide to prn;    Reported as Not Taking: Lidocaine patch, promethazine prn, Keflex  Removed:  Lidocaine patch, promethazine prn, Keflex    Additional medication history information: pt did not take most of her meds for about 4 days due to her current condition    Medication reconciliation/reorder completed by provider prior to medication history?  Y       Prior to Admission medications    Medication Sig Last Dose Taking? Auth Provider Long Term End Date   acetaminophen (TYLENOL) 500 MG tablet Take 500 mg by mouth daily as needed for mild pain  Yes Unknown, Entered By History     atorvastatin (LIPITOR) 10 MG tablet Take 10 mg by mouth every evening  11/1/2022 at hs Yes Unknown, Entered By History Yes    buPROPion (WELLBUTRIN XL) 150 MG 24 hr tablet Take 450 mg by mouth daily 4 days ago, ran out Yes Unknown, Entered By History No    diphenhydrAMINE-acetaminophen (TYLENOL PM)  MG tablet Take 2 tablets by mouth At Bedtime 10/29/2022 Yes Unknown, Entered By History     fentaNYL (DURAGESIC) 25 mcg/hr 72 hr patch Place 1 patch onto the skin every 72 hours 11/2/2022 at am Yes Jimmy Arriaga MD     hydrochlorothiazide (MICROZIDE) 12.5 MG capsule Take 12.5 mg by mouth daily as needed  Yes Unknown, Entered By History Yes    HYDROcodone-acetaminophen (NORCO) 7.5-325 MG per tablet Take 1 tablet by mouth every 8 hours as needed (chronic pain) Maximum 2 tabs per day  Yes  Unknown, Entered By History     losartan (COZAAR) 25 MG tablet Take 25 mg by mouth daily 4 days ago Yes Unknown, Entered By History No    methimazole (TAPAZOLE) 5 MG tablet Take 5 mg by mouth five times a week Monday through Friday 4 days ago Yes Unknown, Entered By History Yes    methocarbamol (ROBAXIN) 750 MG tablet Take 750 mg by mouth 4 times daily as needed  Yes Unknown, Entered By History     Multiple Vitamins-Minerals (MULTIVITAMIN WOMEN 50+) TABS Take 1 tablet by mouth daily  Yes Unknown, Entered By History     polyethylene glycol (MIRALAX) packet Take 1 packet by mouth daily as needed for constipation  Yes Unknown, Entered By History     simethicone (GAS-X) 80 MG chewable tablet Take  mg by mouth every 6 hours as needed for flatulence or cramping  Yes Unknown, Entered By History     sotalol (BETAPACE) 120 MG tablet Take 120 mg by mouth 2 times daily 11/2/2022 Yes Unknown, Entered By History     aspirin (ASA) 81 MG chewable tablet Take 81 mg by mouth daily 4 days ago  Reported, Patient

## 2022-11-03 NOTE — CONSULTS
[facility] Consultation by [facility]    Madhavi Castellanos MRN# [id number]   Age: 82 year old YOB: 1940     Date of Admission:  11/2/2022    Reason for consult: 5 mm left ureteral stone, afebrile, so far       Requesting PA/MD: YAIR Warren PA-C       Level of consult: Consult, follow and place orders             Impression and Plan:   Impression/Assessment:   Madhavi Castellanos is a 82 year old female with left 5 mm ureteral stone.   Concern for UTI  Leukocytosis  Recurrent nephrolithiasis  Atrial fibrillation      Plan:   -NPO  -plan to OR today for cystoscopy, left retrograde pyelogram, and left ureteral stent placement.  Will need follow-up procedure, likely cystoscopy, left ureteroscopy laser lithotripsy and basketing, and left ureteral stent exchange.  -IVF fluids  -Continue with IV antibiotics.  Transition to culture specific when available and oral when clinically appropriate.  -Strain urine.  -Pain and nausea medication per primary service.  -Possible side effects with an indwelling ureteral stent such as urgency and frequency of urination, dysuria, hematuria, symptoms of urine reflux, and some achiness in the side. Indwelling ureteral stents need to be exchanged every three months or removed by three months.   -Thank you for involving us in the care of this patient. We will continue to follow along.  Anticipate possible discharge tomorrow.    Magaly Olivarez PA-C  [facility]   [phone]               Chief Complaint:   5 mm left ureteral stone, afebrile, so far     History is obtained from the patient and EMR.         History of Present Illness:   This patient is a 82 year old female who presented to the emergency department yesterday with nausea, vomiting, and generalized weakness.  Medical history is significant for atrial fibrillation, left bundle branch block, obstructive sleep apnea, hypertension, GERD, watchman device, and recurrent nephrolithiasis.    Patient had  been feeling unwell and exhausted.  She had started feeling this way on Saturday.  She had multiple episodes of nausea as well as projectile vomiting.  She also endorsed abdominal pain and cough.    Patient has a history of nephrolithiasis.  She has passed several stones on her own.  She saw Dr. Marcelo in 2018 for gross hematuria.  She has never required surgical intervention for a kidney stone.    Patient underwent CT imaging which shows a 5 mm stone in the mid left ureter with hydronephrosis.  Patient has bilateral nephrolithiasis.  Urinalysis was obtained and was concerning for possible infection.  Patient did have evidence of leukocytosis.  She has been started on IV Rocephin and this is slowly improved.  WBC, 16.5 (21.8 (19.4))).  Blood cultures are currently in process.    Patient is afebrile.  She is having tachycardia.  She does note that she missed some doses of her atrial fibrillation medication it could be due to this.  Patient endorses some continued slight abdominal tenderness.  She currently notes that her nausea and vomiting have resolved.  She currently denies nausea, vomiting, fevers, chills, shortness breath, or chest pain.    She denies any dysuria or elisabeth hematuria, but she did note that her urine was darker and was heading towards tatianna or brown in color.  She is uncertain if this is actually blood or just concentrated urine.    Creatinine 0.53 with EGFR greater than 90 (0.59 greater than 90).    Family history of nephrolithiasis in her father and her brother.      Allergies:  Gabapentin  Metoprolol  Acetaminophen  Albuterol  Augmentin  Apixaban  Codeine Sulfate  Cymbalta   Lisinopril  Cefdinir  Percocet   Prednisone  Duloxetine     Medications:  Current Facility-Administered Medications   Medication     acetaminophen (TYLENOL) tablet 650 mg    Or     acetaminophen (TYLENOL) Suppository 650 mg     atorvastatin (LIPITOR) tablet 10 mg     buPROPion (WELLBUTRIN XL) 24 hr tablet 450 mg      cefTRIAXone (ROCEPHIN) 1 g vial to attach to  mL bag for ADULTS or NS 50 mL bag for PEDS     [START ON 11/6/2022] fentaNYL (DURAGESIC) 25 mcg/hr 72 hr patch 1 patch     fentaNYL (DURAGESIC) Patch in Place     [Held by provider] hydrochlorothiazide (MICROZIDE) capsule 12.5 mg     HYDROcodone-acetaminophen (NORCO) 7.5-325 MG per tablet 1-2 tablet     lidocaine (LMX4) cream     lidocaine 1 % 0.1-1 mL     losartan (COZAAR) tablet 25 mg     melatonin tablet 1 mg     methimazole (TAPAZOLE) tablet 5 mg     naloxone (NARCAN) injection 0.2 mg    Or     naloxone (NARCAN) injection 0.4 mg    Or     naloxone (NARCAN) injection 0.2 mg    Or     naloxone (NARCAN) injection 0.4 mg     ondansetron (ZOFRAN ODT) ODT tab 4 mg    Or     ondansetron (ZOFRAN) injection 4 mg     polyethylene glycol (MIRALAX) Packet 17 g     senna-docusate (SENOKOT-S/PERICOLACE) 8.6-50 MG per tablet 1 tablet    Or     senna-docusate (SENOKOT-S/PERICOLACE) 8.6-50 MG per tablet 2 tablet     sodium chloride (PF) 0.9% PF flush 3 mL     sodium chloride (PF) 0.9% PF flush 3 mL     sodium chloride 0.9% infusion     sotalol (BETAPACE) half-tab 40 mg        Past Medical History:     BRBPR  Rhabdomyolysis  Recurrent falls  Cervical myelopathy  Lower GI bleeding  Kidney stones  LBBB  ELIESER III  PVCs  Osteoarthritis of both knees  Spinal stenosis of lumbar region  CAD  TALI  Graves disease  Depression  DJD  Pure hypercholesterolemia  Hypertension  Paroxysmal atrial fibrillation  GERD     Past Surgical History:   Cholecystectomy  Colonoscopy  EP ablation  EGD  Excise vulva wide local  Irrigation and debridement, LLE  Septoplasty  Hysterectomy  Carpal tunnel release  Knee arthroscopy, left  Knee arthroscopy, right  Blepharoplasty  Cholecystectomy  Watchman insert     Family History:    Father - cancer  Mother - MS  Father and brother-nephrolithiasis     Social History:  Never smoker.          Review of Systems:   A comprehensive 10-point review of systems was  "performed and found to be negative except as described in the HPI.     /61   Pulse 80   Temp 97.3  F (36.3  C) (Temporal)   Resp 16   Ht 1.549 m (5' 1\")   Wt 72.6 kg (160 lb)   SpO2 96%   Breastfeeding No   BMI 30.23 kg/m    PSYCH: NAD  EYES: EOMI  MOUTH: MMM  NECK: Supple, no notable adenopathy  RESP: Unlabored breathing  CARDIAC: No LE edema, tachycardia, regular  SKIN: Warm, no rashes   ABD: soft, slightly tender  NEURO: AAO x3  URO: Urinating on own          Data:     Lab Results   Component Value Date    WBC 16.5 (H) 11/03/2022    HGB 14.9 11/03/2022    HCT 47.0 11/03/2022    MCV 95 11/03/2022     11/03/2022     Lab Results   Component Value Date    CR 0.53 11/03/2022    CR 0.59 11/02/2022     Recent Labs   Lab 11/02/22  1826   COLOR Yellow   APPEARANCE Clear   URINEGLC Negative   URINEBILI Negative   URINEKETONE 60*   SG 1.015   URINEPH 6.0   PROTEIN 20*   NITRITE Negative   LEUKEST Small*   RBCU >182*   WBCU 22*     Urine culture is in process.  Blood cultures: no growth after 12 hours     IMAGING    XR Chest 2 Views    Result Date: 11/2/2022  EXAM: XR CHEST 2 VIEWS LOCATION: [facility] DATE/TIME: 11/2/2022 6:11 PM INDICATION: Tachycardic, pneumonia evaluation. COMPARISON: 7/6/2022.     IMPRESSION: Heart and mediastinal size normal. Lungs and pleural spaces are clear. No pleural effusion or pneumothorax. Electronic device is again identified involving the anterior chest wall. 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     CT Abdomen Pelvis w Contrast    Result Date: 11/2/2022  EXAM: CT ABDOMEN PELVIS W CONTRAST LOCATION: [facility] DATE/TIME: 11/2/2022 5:47 PM INDICATION: nausea vomiting, leukocytosis COMPARISON: Abdominal radiograph 03/12/2020, CT 05/23/2018 TECHNIQUE: CT scan of the abdomen and pelvis was performed following injection of IV contrast. Multiplanar reformats were obtained. Dose reduction techniques were used. CONTRAST:  80mL " Isovue 370 FINDINGS: LOWER CHEST: Unremarkable. HEPATOBILIARY: Cholecystectomy. PANCREAS: Normal. SPLEEN: Normal. ADRENAL GLANDS: Normal. KIDNEYS/BLADDER: Obstructing 5 x 4 mm stone in the mid left ureter at the level of the pelvic inlet (series 2 image 130). Mild proximal hydroureteronephrosis and perinephric stranding. There are additional bilateral nonobstructing renal calculi. Urinary bladder is moderately distended but otherwise unremarkable. BOWEL: Diverticulosis of the colon. No acute inflammatory change. No obstruction. Normal appendix. Large duodenal diverticulum is unchanged. LYMPH NODES: Normal. VASCULATURE: Scattered calcified atherosclerosis. PELVIC ORGANS: Hysterectomy. MUSCULOSKELETAL: No acute bony abnormality.     IMPRESSION: 1.  Obstructing 5 mm stone in the mid left ureter with mild proximal hydroureteronephrosis and perinephric stranding. If there is suspicion for superimposed urinary tract infection, recommend consultation with urology for consideration of decompression of the collecting system. 2.  Additional bilateral nonobstructing renal calculi.    Discussed with Dr. Walker, Hospitalist, and RN  Magaly Olivarez PA-C   [facility]  [phone]

## 2022-11-03 NOTE — PROGRESS NOTES
[facility]  Hospitalist Progress Note  Andrea Antonio MD 11/03/22    Reason for Stay (Diagnosis): pyelonephritis         Assessment and Plan:        Madhavi Castellanos is a 82 year old female with PMHx significant for PAF s/p ablation and watchman's device, CAD, LBBB, HTN, GERD, TALI, HLP, asthma, depression and chronic back pain on chronic opioids, who was admitted on 11/2/2022 with nausea, vomiting and subjective chills likely due to infected kidney stone.  Work-up in the ER was most notable for leukocytosis of 19.4 with elevated lactic acid and urine analysis with large blood, small leukocyte esterase and 22 white cells.  CT abdomen and pelvis showed an obstructing 5 mm left ureteral stone as well as perinephric stranding.  She was admitted for IV hydration, symptomatic care and started on empiric IV ceftriaxone.  Urology was consulted and she is being taken for cystoscopy later today.  Remains afebrile and hemodynamically stable.  Currently pain-free.     1. Ureterolithiasis on the left: Sepsis due to UTI/pyelonephritis   - continue IV ceftriaxone, IV fluids and supportive cares.  - Urology consult appreciated, planning on cystoscopy later this afternoon.  - strain urine  - follow cultures  -Would notify urology if she develops fever prior to her cystoscopy.     2. Hypokalemia   Replace per protocol    3. PAF s/p ablation and watchman's device  CAD with known LBBB  HTN  - resume home Sotalol  - resume home losartan but will hold home hydrochlorothiazide for now    4. GERD  Does not appear to be on a PPI    5. TALI  Unclear if using CPAP    6. HLP  Resume home atorvastatin    7. Depression  Resume home Wellbutrin    8. Chronic back pain on chronic opioids  Wears a Fentanyl patch, replaced this morning. Allergy to oxycodone, seems to have tolerated Norco in the past.     Covid-19 negative        Diet:  NPO until after her cystoscopy, then okay for regular diet.  DVT Prophylaxis: Pneumatic  "Compression Devices  Herr Catheter: Not present  Central Lines: None  Cardiac Monitoring: None  Code Status:   Full code  Disposition: Will depend on fever curve, cultures etc.  Potentially home on 11/4 or 11/5          Interval History (Subjective):      Patient admitted yesterday, I assumed care today  Planning to go for cystoscopy later this afternoon  Awaiting cultures  Afebrile, currently pain-free  Discussed with urology       Physical Exam:      Last Vital Signs:  /61   Pulse 80   Temp 97.3  F (36.3  C) (Temporal)   Resp 16   Ht 1.549 m (5' 1\")   Wt 72.6 kg (160 lb)   SpO2 96%   Breastfeeding No   BMI 30.23 kg/m      No intake/output data recorded.    General: Alert, awake, no acute distress.  HEENT: NC/AT, eyes anicteric, external occular movements intact, face symmetric.  Dentition WNL, MM moist.  Cardiac: RRR, S1, S2.  No murmurs appreciated.  Pulmonary: Normal chest rise, normal work of breathing.  Lungs CTA BL  Abdomen: soft, non-tender, non-distended.  Bowel Sounds Present.  No guarding.  Extremities: no deformities.  Warm, well perfused.  Skin: no rashes or lesions noted.  Warm and Dry.  Neuro: No focal deficits noted.  Speech clear.  Coordination and strength grossly normal.  Psych: Appropriate affect.         Medications:      All current medications were reviewed with changes reflected in problem list.         Data:      All new lab and imaging data was reviewed.   Labs:  No results for input(s): CULT in the last 168 hours.  Recent Labs   Lab 11/03/22  0544      POTASSIUM 4.1   CHLORIDE 102   CO2 24   ANIONGAP 11   GLC 88   BUN 23.8*   CR 0.53   GFRESTIMATED >90   LUIS ANTONIO 8.5*     Recent Labs   Lab 11/03/22  0544   WBC 16.5*   HGB 14.9   HCT 47.0   MCV 95        Recent Labs   Lab 11/02/22  1826   COLOR Yellow   APPEARANCE Clear   URINEGLC Negative   URINEBILI Negative   URINEKETONE 60*   SG 1.015   UBLD Large*   URINEPH 6.0   PROTEIN 20*   NITRITE Negative   LEUKEST Small* "   RBCU >182*   WBCU 22*      Imaging:   Results for orders placed or performed during the hospital encounter of 11/02/22   XR Chest 2 Views    Narrative    EXAM: XR CHEST 2 VIEWS  LOCATION: [facility]  DATE/TIME: 11/2/2022 6:11 PM    INDICATION: Tachycardic, pneumonia evaluation.    COMPARISON: 7/6/2022.      Impression    IMPRESSION: Heart and mediastinal size normal. Lungs and pleural spaces are clear. No pleural effusion or pneumothorax. Electronic device is again identified involving the anterior chest wall. 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     CT Abdomen Pelvis w Contrast    Narrative    EXAM: CT ABDOMEN PELVIS W CONTRAST  LOCATION: [facility]  DATE/TIME: 11/2/2022 5:47 PM    INDICATION: nausea vomiting, leukocytosis  COMPARISON: Abdominal radiograph 03/12/2020, CT 05/23/2018  TECHNIQUE: CT scan of the abdomen and pelvis was performed following injection of IV contrast. Multiplanar reformats were obtained. Dose reduction techniques were used.  CONTRAST:  80mL Isovue 370    FINDINGS:   LOWER CHEST: Unremarkable.    HEPATOBILIARY: Cholecystectomy.    PANCREAS: Normal.    SPLEEN: Normal.    ADRENAL GLANDS: Normal.    KIDNEYS/BLADDER: Obstructing 5 x 4 mm stone in the mid left ureter at the level of the pelvic inlet (series 2 image 130). Mild proximal hydroureteronephrosis and perinephric stranding. There are additional bilateral nonobstructing renal calculi. Urinary   bladder is moderately distended but otherwise unremarkable.    BOWEL: Diverticulosis of the colon. No acute inflammatory change. No obstruction. Normal appendix. Large duodenal diverticulum is unchanged.    LYMPH NODES: Normal.    VASCULATURE: Scattered calcified atherosclerosis.    PELVIC ORGANS: Hysterectomy.    MUSCULOSKELETAL: No acute bony abnormality.        Impression    IMPRESSION:   1.  Obstructing 5 mm stone in the mid left ureter with mild proximal hydroureteronephrosis and  perinephric stranding. If there is suspicion for superimposed urinary tract infection, recommend consultation with urology for consideration of decompression   of the collecting system.  2.  Additional bilateral nonobstructing renal calculi.         Andrea Antonio MD.

## 2022-11-04 ENCOUNTER — APPOINTMENT (OUTPATIENT)
Dept: OCCUPATIONAL THERAPY | Facility: CLINIC | Age: 82
DRG: 854 | End: 2022-11-04
Attending: INTERNAL MEDICINE
Payer: COMMERCIAL

## 2022-11-04 LAB
ANION GAP SERPL CALCULATED.3IONS-SCNC: 11 MMOL/L (ref 7–15)
BUN SERPL-MCNC: 16.5 MG/DL (ref 8–23)
CALCIUM SERPL-MCNC: 8.7 MG/DL (ref 8.8–10.2)
CHLORIDE SERPL-SCNC: 103 MMOL/L (ref 98–107)
CREAT SERPL-MCNC: 0.52 MG/DL (ref 0.51–0.95)
DEPRECATED HCO3 PLAS-SCNC: 27 MMOL/L (ref 22–29)
ERYTHROCYTE [DISTWIDTH] IN BLOOD BY AUTOMATED COUNT: 13.3 % (ref 10–15)
GFR SERPL CREATININE-BSD FRML MDRD: >90 ML/MIN/1.73M2
GLUCOSE BLDC GLUCOMTR-MCNC: 111 MG/DL (ref 70–99)
GLUCOSE SERPL-MCNC: 125 MG/DL (ref 70–99)
HCT VFR BLD AUTO: 45.8 % (ref 35–47)
HGB BLD-MCNC: 14.4 G/DL (ref 11.7–15.7)
MCH RBC QN AUTO: 31 PG (ref 26.5–33)
MCHC RBC AUTO-ENTMCNC: 31.4 G/DL (ref 31.5–36.5)
MCV RBC AUTO: 99 FL (ref 78–100)
PLATELET # BLD AUTO: 331 10E3/UL (ref 150–450)
POTASSIUM SERPL-SCNC: 3.8 MMOL/L (ref 3.4–5.3)
POTASSIUM SERPL-SCNC: 4.1 MMOL/L (ref 3.4–5.3)
RBC # BLD AUTO: 4.65 10E6/UL (ref 3.8–5.2)
SODIUM SERPL-SCNC: 141 MMOL/L (ref 136–145)
WBC # BLD AUTO: 13.2 10E3/UL (ref 4–11)

## 2022-11-04 PROCEDURE — 250N000013 HC RX MED GY IP 250 OP 250 PS 637: Performed by: ANESTHESIOLOGY

## 2022-11-04 PROCEDURE — 99232 SBSQ HOSP IP/OBS MODERATE 35: CPT | Performed by: INTERNAL MEDICINE

## 2022-11-04 PROCEDURE — 36415 COLL VENOUS BLD VENIPUNCTURE: CPT | Performed by: PHYSICIAN ASSISTANT

## 2022-11-04 PROCEDURE — 85027 COMPLETE CBC AUTOMATED: CPT | Performed by: PHYSICIAN ASSISTANT

## 2022-11-04 PROCEDURE — 258N000003 HC RX IP 258 OP 636: Performed by: PHYSICIAN ASSISTANT

## 2022-11-04 PROCEDURE — 84132 ASSAY OF SERUM POTASSIUM: CPT | Performed by: PHYSICIAN ASSISTANT

## 2022-11-04 PROCEDURE — 97535 SELF CARE MNGMENT TRAINING: CPT | Mod: GO

## 2022-11-04 PROCEDURE — 120N000001 HC R&B MED SURG/OB

## 2022-11-04 PROCEDURE — 250N000013 HC RX MED GY IP 250 OP 250 PS 637: Performed by: PHYSICIAN ASSISTANT

## 2022-11-04 PROCEDURE — 36415 COLL VENOUS BLD VENIPUNCTURE: CPT | Performed by: INTERNAL MEDICINE

## 2022-11-04 PROCEDURE — 250N000011 HC RX IP 250 OP 636: Performed by: PHYSICIAN ASSISTANT

## 2022-11-04 PROCEDURE — 84132 ASSAY OF SERUM POTASSIUM: CPT | Performed by: INTERNAL MEDICINE

## 2022-11-04 PROCEDURE — 250N000013 HC RX MED GY IP 250 OP 250 PS 637: Performed by: INTERNAL MEDICINE

## 2022-11-04 PROCEDURE — 97165 OT EVAL LOW COMPLEX 30 MIN: CPT | Mod: GO

## 2022-11-04 RX ORDER — SIMETHICONE 80 MG
80-160 TABLET,CHEWABLE ORAL EVERY 6 HOURS PRN
Status: DISCONTINUED | OUTPATIENT
Start: 2022-11-04 | End: 2022-11-04

## 2022-11-04 RX ORDER — MULTIPLE VITAMINS W/ MINERALS TAB 9MG-400MCG
1 TAB ORAL DAILY
Status: DISCONTINUED | OUTPATIENT
Start: 2022-11-04 | End: 2022-11-05 | Stop reason: HOSPADM

## 2022-11-04 RX ORDER — ASPIRIN 81 MG/1
81 TABLET ORAL DAILY
Status: DISCONTINUED | OUTPATIENT
Start: 2022-11-04 | End: 2022-11-05 | Stop reason: HOSPADM

## 2022-11-04 RX ORDER — PHENAZOPYRIDINE HYDROCHLORIDE 100 MG/1
100 TABLET, FILM COATED ORAL 3 TIMES DAILY PRN
Status: DISCONTINUED | OUTPATIENT
Start: 2022-11-04 | End: 2022-11-05 | Stop reason: HOSPADM

## 2022-11-04 RX ORDER — LOSARTAN POTASSIUM 25 MG/1
25 TABLET ORAL DAILY
Status: DISCONTINUED | OUTPATIENT
Start: 2022-11-04 | End: 2022-11-04

## 2022-11-04 RX ADMIN — ACETAMINOPHEN 650 MG: 325 TABLET ORAL at 23:32

## 2022-11-04 RX ADMIN — SOTALOL HYDROCHLORIDE 40 MG: 80 TABLET ORAL at 20:38

## 2022-11-04 RX ADMIN — MULTIPLE VITAMINS W/ MINERALS TAB 1 TABLET: TAB at 14:04

## 2022-11-04 RX ADMIN — ATORVASTATIN CALCIUM 10 MG: 10 TABLET, FILM COATED ORAL at 20:39

## 2022-11-04 RX ADMIN — LOSARTAN POTASSIUM 25 MG: 25 TABLET, FILM COATED ORAL at 08:40

## 2022-11-04 RX ADMIN — BUPROPION HYDROCHLORIDE 450 MG: 150 TABLET, FILM COATED, EXTENDED RELEASE ORAL at 08:40

## 2022-11-04 RX ADMIN — SOTALOL HYDROCHLORIDE 40 MG: 80 TABLET ORAL at 08:40

## 2022-11-04 RX ADMIN — SIMETHICONE 80 MG: 80 TABLET, CHEWABLE ORAL at 16:04

## 2022-11-04 RX ADMIN — SODIUM CHLORIDE: 9 INJECTION, SOLUTION INTRAVENOUS at 07:47

## 2022-11-04 RX ADMIN — CEFTRIAXONE SODIUM 1 G: 1 INJECTION, POWDER, FOR SOLUTION INTRAMUSCULAR; INTRAVENOUS at 18:38

## 2022-11-04 RX ADMIN — ASPIRIN 81 MG: 81 TABLET, COATED ORAL at 14:04

## 2022-11-04 RX ADMIN — METHIMAZOLE 5 MG: 5 TABLET ORAL at 08:40

## 2022-11-04 ASSESSMENT — ACTIVITIES OF DAILY LIVING (ADL)
DEPENDENT_IADLS:: CLEANING;INDEPENDENT
ADLS_ACUITY_SCORE: 27
ADLS_ACUITY_SCORE: 29
ADLS_ACUITY_SCORE: 27
ADLS_ACUITY_SCORE: 29

## 2022-11-04 NOTE — ANESTHESIA POSTPROCEDURE EVALUATION
Patient: Madhavi Castellanos    Procedure: Procedure(s):  CYSTOSCOPY, WITH LEFT RETROGRADE PYELOGRAM AND LEFT URETERAL STENT INSERTION       Anesthesia Type:  General    Note:  Disposition: Inpatient   Postop Pain Control: Uneventful            Sign Out: Well controlled pain   PONV: No   Neuro/Psych: Uneventful            Sign Out: Acceptable/Baseline neuro status   Airway/Respiratory: Uneventful            Sign Out: Acceptable/Baseline resp. status   CV/Hemodynamics: Uneventful            Sign Out: Acceptable CV status; No obvious hypovolemia; No obvious fluid overload   Other NRE: NONE   DID A NON-ROUTINE EVENT OCCUR? No           Last vitals:  Vitals Value Taken Time   /86 11/03/22 2001   Temp     Pulse 75 11/03/22 2001   Resp 16 11/03/22 2000   SpO2 100 % 11/03/22 2011   Vitals shown include unvalidated device data.    Electronically Signed By: Katy Lackey MD  November 3, 2022  8:12 PM

## 2022-11-04 NOTE — PROGRESS NOTES
"CLINICAL NUTRITION SERVICES  -  ASSESSMENT NOTE    Recommendations Ordered by Registered Dietitian (RD):   Diet per MD   Ordered Ensure Enlive once per day (chocolate) + trial all flavors    Malnutrition:   % Weight Loss:  Up to 5% in 1 month (moderate malnutrition)  % Intake:  <75% for > 7 days (moderate malnutrition)  Subcutaneous Fat Loss:  Orbital region mild-moderate depletion and Upper arm region mild-moderate depletion  Muscle Loss:  Temporal region mild depletion, Clavicle bone region mild-moderate depletion, Acromion bone region mild-moderate depletion, Scapular bone region mild-moderate depletion, Dorsal hand region moderate depletion and Anterior thigh region mild-moderate depletion  Fluid Retention:  None noted    Malnutrition Diagnosis: Moderate malnutrition  In Context of:  Acute illness or injury on Chronic illness or disease      REASON FOR ASSESSMENT  Madhavi Castellanos is a 82 year old female seen by Registered Dietitian for Admission Nutrition Risk Screen for positive    Past medical history: PAF s/p ablation and watchman's device, CAD, LBBB, HTN, GERD, TALI, HLP, asthma, depression and chronic back pain on chronic opioids    Admitted for: Ureterolithiasis on the left, Sepsis due to UTI/pyelonephritis     NUTRITION HISTORY  - Information obtained from patient and chart   - Typical diet at home: regular diet   - Typical food/fluid intake PTA: patient reports a decreased appetite and PO intake over the past ~ 1 week. No PO intake for 4 days PTA given \"projectile\" vomiting. Typically consumes 1 meal per day. Patient notes that she rarely prepares meals.   - Supplements: 1 ensure per day   - Food allergies: NKFA    CURRENT NUTRITION ORDERS  Diet Order:     Regular Diet     Current Intake/Tolerance:  Upon review of the flowsheets, pt is consuming 100% of meals ordered. Two meals ordered since admission     Obtained from Chart/Interdisciplinary Team:  - urology following - cystoscopy, left retrograde " "pyelogram, left ureteral stent insertion on 11/03  - Reviewed stooling patterns  - Please refer to flowsheets for more information on skin     ANTHROPOMETRICS  Height: 5' 1\"  Weight: 72.6 kg ( 160 lbs 0 oz)   Body mass index is 30.23 kg/m .  Weight Status:  Obesity Grade I BMI 30-34.9  Weight History: weight loss of ~4.1 kg (5%) over the past <1 month. Patient reports a weight loss from her usual body weight of 180 lbs (months ago?)   Wt Readings from Last 10 Encounters:   11/02/22 72.6 kg (160 lb)   11/30/21 76.2 kg (168 lb)   12/23/20 80.7 kg (178 lb)   12/18/20 80.7 kg (178 lb)   11/28/20 81.6 kg (180 lb)   11/28/20 81.6 kg (180 lb)   03/12/20 84 kg (185 lb 3.2 oz)   08/03/19 84.8 kg (187 lb)   12/19/18 83.9 kg (185 lb)   07/27/18 83.9 kg (185 lb)     Per care everywhere:   76.7 kg (169 lb 1.6 oz) 10/13/2022 1:48 PM CDT      76.1 kg (167 lb 12.8 oz) 07/21/2022 1:46 PM CDT      76.9 kg (169 lb 9.6 oz) 05/19/2022 1:06 PM CDT      78 kg (172 lb) 04/07/2022 1:17 PM CDT      77.1 kg (170 lb) 02/04/2022 12:49 PM CST      78.9 kg (174 lb) 01/03/2022 11:52 AM CST       LABS  Labs reviewed    Labs:  Electrolytes  Potassium (mmol/L)   Date Value   11/04/2022 3.8   11/04/2022 4.1   11/03/2022 4.1   11/30/2021 3.8   08/21/2021 3.8   12/01/2020 4.1   11/30/2020 3.8   11/29/2020 4.1    Blood Glucose  Glucose (mg/dL)   Date Value   11/04/2022 125 (H)   11/03/2022 88   11/02/2022 125 (H)   11/30/2021 97   08/21/2021 105 (H)   11/30/2020 115 (H)   11/29/2020 108 (H)   11/28/2020 97   11/28/2020 103 (H)   03/14/2020 114 (H)     GLUCOSE BY METER POCT (mg/dL)   Date Value   11/04/2022 111 (H)    Inflammatory Markers  WBC (10e9/L)   Date Value   11/30/2020 11.6 (H)   11/29/2020 12.2 (H)   11/28/2020 15.4 (H)     WBC Count (10e3/uL)   Date Value   11/04/2022 13.2 (H)   11/03/2022 16.5 (H)   11/02/2022 21.8 (H)     Albumin (g/dL)   Date Value   11/02/2022 3.6   11/28/2020 3.4   03/13/2020 2.6 (L)   03/12/2020 3.1 (L)      Magnesium " (mg/dL)   Date Value   11/02/2022 1.9   11/30/2021 2.3   11/28/2020 2.0   03/14/2020 2.3   02/25/2016 1.9     Sodium (mmol/L)   Date Value   11/04/2022 141   11/03/2022 137   11/02/2022 135 (L)   11/30/2020 139   11/29/2020 141   11/28/2020 142    Renal  Urea Nitrogen (mg/dL)   Date Value   11/04/2022 16.5   11/03/2022 23.8 (H)   11/02/2022 31.6 (H)   11/30/2021 21   08/21/2021 17   11/30/2020 13   11/29/2020 11   11/28/2020 13     Creatinine (mg/dL)   Date Value   11/04/2022 0.52   11/03/2022 0.53   11/02/2022 0.59   12/01/2020 0.64   11/30/2020 0.54   11/29/2020 0.46 (L)     Additional  Ketones Urine (mg/dL)   Date Value   11/02/2022 60 (A)   11/28/2020 Negative        MEDICATIONS  Medications reviewed      atorvastatin  10 mg Oral QPM     buPROPion  450 mg Oral QAM     cefTRIAXone  1 g Intravenous Q24H     [START ON 11/6/2022] fentaNYL  25 mcg Transdermal Q72H     fentaNYL   Transdermal Q8H [facility]     [Held by provider] hydrochlorothiazide  12.5 mg Oral Daily     losartan  25 mg Oral Daily     methimazole  5 mg Oral Once per day on Mon Tue Wed Thu Fri     sodium chloride (PF)  3 mL Intracatheter Q8H     sotalol  40 mg Oral BID        sodium chloride 100 mL/hr at 11/04/22 0747      acetaminophen **OR** acetaminophen, HYDROcodone-acetaminophen, lidocaine 4%, lidocaine (buffered or not buffered), melatonin, naloxone **OR** naloxone **OR** naloxone **OR** naloxone, ondansetron **OR** ondansetron, phenazopyridine, polyethylene glycol, senna-docusate **OR** senna-docusate, simethicone, sodium chloride (PF)     ASSESSED NUTRITION NEEDS PER APPROVED PRACTICE GUIDELINES:    Dosing Weight 72.6 kg   Estimated Energy Needs: 0865-7336 kcals (20-25 Kcal/Kg)  Justification: maintenance  Estimated Protein Needs: 73-87 grams protein (1-1.2 g pro/Kg)  Justification: preservation of lean body mass  Estimated Fluid Needs: per MD     NUTRITION DIAGNOSIS:  Inadequate oral intake related to increased vomiting as evidenced by patient  consuming <50-75% of nutritional needs over the past ~ 6 days.     NUTRITION INTERVENTIONS  Recommendations / Nutrition Prescription  Diet per MD   Ordered Ensure Enlive once per day (chocolate) + trial all flavors     Implementation  Nutrition education: Provided education on the different oral nutritional supplements available and indications for use + encouraged protein intake with meals for preservation of lean body mass  Medical Food Supplement: as above    Nutrition Goals  Patient to consume 75% of meals or oral nutritional supplements ordered TID    MONITORING AND EVALUATION:  Progress towards goals will be monitored and evaluated per protocol and Practice Guidelines    Lina Bonds RD, LD  Clinical Dietitian     3rd floor/ICU: [phone]  All other floors: [phone]  Weekend/holiday: [phone]  Office: [phone]

## 2022-11-04 NOTE — PLAN OF CARE
Goal Outcome Evaluation:       Appetite and PO intake returning during admission. Ordered Ensure Enlive at dinner.     Lina Bonds RD, LD  Clinical Dietitian     3rd floor/ICU: [phone]  All other floors: [phone]  Weekend/holiday: [phone]  Office: [phone]

## 2022-11-04 NOTE — PLAN OF CARE
Goal Outcome Evaluation:      Plan of Care Reviewed With: patient    Overall Patient Progress: improvingOverall Patient Progress: improving     Patient vital signs are at baseline:   Patient able to ambulate as they were prior to admission or with assist devices provided by therapies during their stay: Yes  Patient MUST void prior to discharge:  Yes  Patient able to tolerate oral intake:  No,  Reason: Very little appetite and some post procedure nausea   Pain has adequate pain control using Oral analgesics:  Yes  Does patient have an identified :  Yes  Has goal D/C date and time been discussed with patient:  No,  Reason: Pending discharge plans, possible 10/04 or 10/05.     Pt A&Ox3, forgetful at times. VVS; afebrile. PIV infusing NS at 100 ml/hr. Pain being managed with fentanyl patch on R lower abdomen. CMS intact. Skin ok except fab and scaly BLE. Assist x1 with walker and gait belt to bedside commode after procedure. Voiding good amounts, Straining urine and no stones over night. Tolerating ice chips well, tried some fruit and became nauseous, Zofran given for nausea. Was able to tolerate a popsicle well. Discharge pending, possible 10/04 or 10/05. Will continue to monitor.

## 2022-11-04 NOTE — PROGRESS NOTES
[facility] Urology Progress Note          Assessment and Plan:     Assessment:    POD 1 cystoscopy, left retrograde pyelogram, left ureteral stent insertion    Urinary tract infection without hematuria, site unspecified    Dehydration    Kidney stone    Generalized muscle weakness    Sepsis, due to unspecified organism, unspecified whether acute organ dysfunction present (H)    Atrial fibrillation      Plan:   -Continue with dwelling ureteral stent.  Will need definitive procedure after treatment of infection.  This will likely include cystoscopy, left ureteroscopy with laser lithotripsy and left ureteral stent exchange.  Our office will coordinate.  -Continue with IV antibiotics.  Follow cultures.  Transition to culture specific when available, and oral when clinically appropriate.  -Pain and nausea medication per primary service.  -Possible side effects with an indwelling ureteral stent such as urgency and frequency of urination, dysuria, hematuria, symptoms of urine reflux, and some achiness in the side. Indwelling ureteral stents need to be exchanged every three months or removed by three months.   -Labs not completed.  Would recommend continuing to monitor as leukocytosis had not resolved yesterday.  Order placed for CBC and BMP.  -Can use pyridium 100 mg TID PRN for dysuria and can be discharged with this.  -Will continue to monitor.    ADDENDUM    Urine culture showed 10,000-50,000 CFU/mL.  Discussed with Dr. Walker.  Will plan on speciating with sensitivities.  WBC improving 13.2 (16.5).    Magaly Olivarez PA-C   [facility] Urology  [phone]               Interval History:     Doing well.  Had some nausea last night with decreased appetite.  She endorses that this is better.  Currently denies N/V/F/C/SOB/CP.  Labs not completed this morning.  Patient endorses some dysuria.  Urine culture and blood cultures in process.  Blood cultures no growth to date.  Tachycardia is resolved.  She is  currently afebrile.  On IV Rocephin.              Review of Systems:     The 5 point Review of Systems is negative other than noted in the HPI             Medications:     Current Facility-Administered Medications Ordered in Epic   Medication Dose Route Frequency Last Rate Last Admin     acetaminophen (TYLENOL) tablet 650 mg  650 mg Oral Q6H PRN        Or     acetaminophen (TYLENOL) Suppository 650 mg  650 mg Rectal Q6H PRN         atorvastatin (LIPITOR) tablet 10 mg  10 mg Oral QPM   10 mg at 11/02/22 2318     buPROPion (WELLBUTRIN XL) 24 hr tablet 450 mg  450 mg Oral QAM   450 mg at 11/04/22 0840     cefTRIAXone (ROCEPHIN) 1 g vial to attach to  mL bag for ADULTS or NS 50 mL bag for PEDS  1 g Intravenous Q24H   1 g at 11/03/22 1844     [START ON 11/6/2022] fentaNYL (DURAGESIC) 25 mcg/hr 72 hr patch 1 patch  25 mcg Transdermal Q72H         fentaNYL (DURAGESIC) Patch in Place   Transdermal Q8H CATHY         [Held by provider] hydrochlorothiazide (MICROZIDE) capsule 12.5 mg  12.5 mg Oral Daily         HYDROcodone-acetaminophen (NORCO) 7.5-325 MG per tablet 1-2 tablet  1-2 tablet Oral BID PRN         lidocaine (LMX4) cream   Topical Q1H PRN         lidocaine 1 % 0.1-1 mL  0.1-1 mL Other Q1H PRN         losartan (COZAAR) tablet 25 mg  25 mg Oral Daily   25 mg at 11/04/22 0840     melatonin tablet 1 mg  1 mg Oral At Bedtime PRN         methimazole (TAPAZOLE) tablet 5 mg  5 mg Oral Once per day on Mon Tue Wed Thu Fri   5 mg at 11/04/22 0840     naloxone (NARCAN) injection 0.2 mg  0.2 mg Intravenous Q2 Min PRN        Or     naloxone (NARCAN) injection 0.4 mg  0.4 mg Intravenous Q2 Min PRN        Or     naloxone (NARCAN) injection 0.2 mg  0.2 mg Intramuscular Q2 Min PRN        Or     naloxone (NARCAN) injection 0.4 mg  0.4 mg Intramuscular Q2 Min PRN         ondansetron (ZOFRAN ODT) ODT tab 4 mg  4 mg Oral Q6H PRN   4 mg at 11/03/22 2203    Or     ondansetron (ZOFRAN) injection 4 mg  4 mg Intravenous Q6H PRN          polyethylene glycol (MIRALAX) Packet 17 g  17 g Oral Daily PRN         senna-docusate (SENOKOT-S/PERICOLACE) 8.6-50 MG per tablet 1 tablet  1 tablet Oral BID PRN        Or     senna-docusate (SENOKOT-S/PERICOLACE) 8.6-50 MG per tablet 2 tablet  2 tablet Oral BID PRN         simethicone (MYLICON) chewable tablet 80 mg  80 mg Oral Q6H PRN         sodium chloride (PF) 0.9% PF flush 3 mL  3 mL Intracatheter Q8H         sodium chloride (PF) 0.9% PF flush 3 mL  3 mL Intracatheter q1 min prn         sodium chloride 0.9% infusion   Intravenous Continuous 100 mL/hr at 11/04/22 0747 New Bag at 11/04/22 0747     sotalol (BETAPACE) half-tab 40 mg  40 mg Oral BID   40 mg at 11/04/22 0840     No current [facility]-ordered outpatient medications on file.                  Physical Exam:   Vitals were reviewed  Patient Vitals for the past 8 hrs:   BP Temp Temp src Pulse Resp SpO2   11/04/22 0817 133/69 (!) 96.6  F (35.9  C) Temporal 84 16 98 %   11/04/22 0300 132/79 98.8  F (37.1  C) Temporal 117 16 96 %     GEN: NAD, sitting in chair  EYES: EOMI  MOUTH: MMM  NECK: Supple  RESP: Unlabored breathing  SKIN: Warm, venous changes LE  NEURO: AAO  URO: Urinating on own with dysuria           Data:     Lab Results   Component Value Date    NTBNPI 3,271 (H) 11/02/2022    NTBNPI 461 08/21/2021    NTBNPI 291 08/29/2017     Lab Results   Component Value Date    WBC 16.5 (H) 11/03/2022    WBC 21.8 (H) 11/02/2022    WBC 19.4 (H) 11/02/2022    HGB 14.9 11/03/2022    HGB 16.2 (H) 11/02/2022    HGB 17.0 (H) 11/02/2022    HCT 47.0 11/03/2022    HCT 50.6 (H) 11/02/2022    HCT 51.8 (H) 11/02/2022    MCV 95 11/03/2022    MCV 96 11/02/2022    MCV 93 11/02/2022     11/03/2022     11/02/2022     11/02/2022     Lab Results   Component Value Date    INR 1.03 03/12/2020    INR 0.96 01/12/2020    INR 0.97 02/01/2015     Urine culture: in process.  Blood cultures: no growth to date.

## 2022-11-04 NOTE — PROGRESS NOTES
[facility]    Medicine Progress Note - Hospitalist Service    Date of Admission:  11/2/2022    Assessment & Plan         Madhavi Castellanos is a 82 year old female with PMHx significant for PAF s/p ablation and watchman's device, CAD, LBBB, HTN, GERD, TALI, HLP, asthma, depression and chronic back pain on chronic opioids, who was admitted on 11/2/2022 with nausea, vomiting and subjective chills likely due to infected kidney stone.  Work-up in the ER was most notable for leukocytosis of 19.4 with elevated lactic acid and urine analysis with large blood, small leukocyte esterase and 22 white cells.  CT abdomen and pelvis showed an obstructing 5 mm left ureteral stone as well as perinephric stranding.  She was admitted for IV hydration, symptomatic care and started on empiric IV ceftriaxone.  Urology was consulted and she is being taken for cystoscopy later today.  Remains afebrile and hemodynamically stable.  Currently pain-free.     1. Ureterolithiasis on the left:   Sepsis due to UTI/pyelonephritis   - continue IV ceftriaxone, IV fluids and supportive cares.  -Cultures grew urogenital lauri however leukocytosis is improving with decreasing trend  -Appreciate continuous input from urology service.  -Will be needing definitive stone management as outpatient  -Can use Pyridium as per order by urology     2. Hypokalemia   Replace per protocol     3. PAF s/p ablation and watchman's device  CAD with known LBBB  HTN  - resume home Sotalol  - resume home losartan but will hold home hydrochlorothiazide for now  -Currently normotensive     4. GERD  Does not appear to be on a PPI    5. TALI  Unclear if using CPAP     6. HLP  Resume home atorvastatin     7. Depression  Resume home Wellbutrin     8. Chronic back pain on chronic opioids  Wears a Fentanyl patch, replaced this morning. Allergy to oxycodone, seems to have tolerated Norco in the past.     Covid-19 negative        Diet: Stop IV fluids as she is  "tolerating oral diet  DVT Prophylaxis: Pneumatic Compression Devices  Herr Catheter: Not present  Central Lines: None  Cardiac Monitoring: None  Code Status:   Full code  Disposition: Anticipating she will be a candidate for hospital discharge in the next 24 hours       Will await further input from PT, OT as patient appears to be deconditioned state.     The patient's care was discussed with the Bedside Nurse and Patient.    Kendall De Jesus MD, MD  Hospitalist Service  [facility]  Securely message with the Vocera Web Console (learn more here)  Text page via [employer] Paging/Directory         Clinically Significant Risk Factors        # Hypokalemia: Lowest K = 3.3 mmol/L (Ref range: 3.4-5.3) in last 2 days, will replace as needed  # Hyponatremia: Lowest Na = 135 mmol/L (Ref range: 136-145) in last 2 days, will monitor as appropriate     # Anion Gap Metabolic Acidosis: Highest Anion Gap = 19 mmol/L (Ref range: 7-15) in last 2 days, will monitor and treat as appropriate           # Obesity: Estimated body mass index is 30.23 kg/m  as calculated from the following:    Height as of this encounter: 1.549 m (5' 1\").    Weight as of this encounter: 72.6 kg (160 lb)., PRESENT ON ADMISSION         ______________________________________________________________________    Interval History   I assume service care today.  Seen and examined.  Chart reviewed.  Case discussed with nursing service was gracious enough to present at bedside during exam.  I met Madhavi while sitting comfortably in her hospital bed and appears to be in good spirits.  She mentioned that she is able to tolerate oral diet with no reported nausea or vomiting.  Still feeling somewhat bloated but passing flatus.  She is not requiring any oxygen support.  No bleeding tendencies.  Voiding freely.  No reported mental status changes.    Data reviewed today: I reviewed all medications, new labs and imaging results over the last 24 hours. I " personally reviewed .    Physical Exam   Vital Signs: Temp: 97.3  F (36.3  C) Temp src: Temporal BP: 120/61 Pulse: 86   Resp: 16 SpO2: 97 % O2 Device: None (Room air) Oxygen Delivery: 1.5 LPM  Weight: 160 lbs 0 oz  HEENT; Atraumatic, normocephalic, pinkish conjuctiva, pupils bilateral reactive   Skin: warm and moist, no rashes  Lungs: equal chest expansion, clear to auscultation, no wheezes, no stridor, no crackles,   Heart: normal rate, normal rhythm, no rubs or gallops.   Abdomen: normal bowel sounds, no tenderness, no peritoneal signs, no guarding  Extremities: no deformities, no edema   Neuro; follow commands, alert and oriented x3, spontaneous speech, coherent, moves all extremities spontaneously  Psych; no hallucination, euthymic mood, not agitated        Data   Recent Labs   Lab 11/04/22  1055 11/04/22  0628 11/04/22  0300 11/03/22  0544 11/03/22  0357 11/02/22  1909 11/02/22  1623   WBC 13.2*  --   --  16.5*  --  21.8* 19.4*   HGB 14.4  --   --  14.9  --  16.2* 17.0*   MCV 99  --   --  95  --  96 93     --   --  307  --  359 418     --   --  137  --   --  135*   POTASSIUM 3.8 4.1  --  4.1   < >  --  3.3*   CHLORIDE 103  --   --  102  --   --  94*   CO2 27  --   --  24  --   --  22   BUN 16.5  --   --  23.8*  --   --  31.6*   CR 0.52  --   --  0.53  --   --  0.59   ANIONGAP 11  --   --  11  --   --  19*   LUIS ANTONIO 8.7*  --   --  8.5*  --   --  9.3   *  --  111* 88  --   --  125*   ALBUMIN  --   --   --   --   --   --  3.6   PROTTOTAL  --   --   --   --   --   --  6.5   BILITOTAL  --   --   --   --   --   --  1.1   ALKPHOS  --   --   --   --   --   --  116*   ALT  --   --   --   --   --   --  35   AST  --   --   --   --   --   --  32    < > = values in this interval not displayed.     Recent Results (from the past 24 hour(s))   XR Surgery [place] L/T 5 Min Fluoro w Stills    Narrative    This exam was marked as non-reportable because it will not be read by a   radiologist or a [place]  non-radiologist provider.           Medications     sodium chloride 100 mL/hr at 11/04/22 0747       atorvastatin  10 mg Oral QPM     buPROPion  450 mg Oral QAM     cefTRIAXone  1 g Intravenous Q24H     [START ON 11/6/2022] fentaNYL  25 mcg Transdermal Q72H     fentaNYL   Transdermal Q8H [facility]     [Held by provider] hydrochlorothiazide  12.5 mg Oral Daily     losartan  25 mg Oral Daily     methimazole  5 mg Oral Once per day on Mon Tue Wed Thu Fri     sodium chloride (PF)  3 mL Intracatheter Q8H     sotalol  40 mg Oral BID

## 2022-11-04 NOTE — ANESTHESIA PROCEDURE NOTES
Airway       Patient location during procedure: OR  Staff -        Anesthesiologist:  Katy Lackey MD       CRNA: Severson, Dean Dennis, APRN CRNA       Performed By: CRNAIndications and Patient Condition       Indications for airway management: ana cristina-procedural and airway protection       Induction type:intravenous       Mask difficulty assessment: 1 - vent by mask    Final Airway Details       Final airway type: supraglottic airway    Supraglottic Airway Details        Type: LMA       Brand: I-Gel       LMA size: 4    Post intubation assessment        Placement verified by: capnometry, equal breath sounds and chest rise        Number of attempts at approach: 1       Number of other approaches attempted: 0       Secured with: commercial tube davis       Ease of procedure: easy       Dentition: Intact and Unchanged

## 2022-11-04 NOTE — PROGRESS NOTES
"   11/04/22 1400   Appointment Info   Signing Clinician's Name / Credentials (OT) Teresita Ley, OTR/L   Living Environment   People in Home alone   Current Living Arrangements house   Home Accessibility stairs within home   Number of Stairs, Within Home, Primary seven   Stair Railings, Within Home, Primary railings safe and in good condition  (does have stair lift to basement; but does need to go up stairs as she has split level)   Transportation Anticipated car, drives self   Living Environment Comments pt has comfort height toilet with vanity next to. Pt has tub shower with grab bars.   Self-Care   Usual Activity Tolerance good   Current Activity Tolerance fair   Equipment Currently Used at Home grab bar, tub/shower;walker, standard   Fall history within last six months yes   Number of times patient has fallen within last six months 6   Activity/Exercise/Self-Care Comment pt was mod I with ADLs prior. Pt recieves help with cleaning house. Pt drives, does meal prep, med set up, and finances.   General Information   Onset of Illness/Injury or Date of Surgery 11/02/22   Referring Physician Kendall De Jesus MD   Patient/Family Therapy Goal Statement (OT) pt would like to d/c home   Additional Occupational Profile Info/Pertinent History of Current Problem per chart: Madhavi Castellanos is a 82 year old female with PMHx significant for PAF s/p ablation and watchman's device, CAD, LBBB, HTN, GERD, TALI, HLP, asthma, depression and chronic back pain on chronic opioids, who was admitted on 11/2/2022 with nausea, vomiting and subjective chills likely due to infected kidney stone.   Existing Precautions/Restrictions fall   Limitations/Impairments safety/cognitive   General Observations and Info pt is very paranoid of therapy services and states, \"don't go digging around and making suggestions. I know what I need.\"   Cognitive Status Examination   Orientation Status orientation to person, place and time   Cognitive Status " Comments concerns of cog deficit   Pain Assessment   Patient Currently in Pain No   Bed Mobility   Bed Mobility sit-supine   Sit-Supine [place] (Bed Mobility) supervision   Transfers   Transfers toilet transfer;sit-stand transfer   Sit-Stand Transfer   Sit-Stand [place] (Transfers) contact guard   Toilet Transfer   [place] Level (Toilet Transfer) contact guard   Activities of Daily Living   BADL Assessment/Intervention lower body dressing;toileting   Lower Body Dressing Assessment/Training   [place] Level (Lower Body Dressing) supervision   Toileting   [place] Level (Toileting) contact guard assist   Clinical Impression   Criteria for Skilled Therapeutic Interventions Met (OT) Yes, treatment indicated   OT Diagnosis deconditioned   OT Problem List-Impairments impacting ADL activity tolerance impaired;balance;cognition;mobility;strength   Assessment of Occupational Performance 5 or more Performance Deficits   Identified Performance Deficits showering, toielting, functional transfers, and IADLs   Planned Therapy Interventions (OT) ADL retraining;IADL retraining;cognition;strengthening;home program guidelines;progressive activity/exercise   Clinical Decision Making Complexity (OT) low complexity   Anticipated Equipment Needs Upon Discharge (OT) shower chair   Risk & Benefits of therapy have been explained evaluation/treatment results reviewed;care plan/treatment goals reviewed;risks/benefits reviewed;current/potential barriers reviewed;participants voiced agreement with care plan;participants included;patient   Clinical Impression Comments pt appears to be below baseline and would benefit from ongoing OT during IP stay to address current deficits impacting function   OT Total Evaluation Time   OT Eval, Low Complexity Minutes (21014) 10   OT Goals   Therapy Frequency (OT) Daily   OT Predicted Duration/Target Date for Goal Attainment 11/11/22   OT Goals Hygiene/Grooming;Upper Body Dressing;Lower  "Body Dressing;OT Goal 1;Cognition   OT: Hygiene/Grooming while standing;supervision/stand-by assist   OT: Upper Body Dressing Supervision/stand-by assist   OT: Lower Body Dressing Supervision/stand-by assist   OT: Cognitive Patient/caregiver will verbalize understanding of cognitive assessment results/recommendations as needed for safe discharge planning   OT: Goal 1 Pt will be mod I with tub shower transfer   Interventions   Interventions Quick Adds Self-Care/Home Management   Self-Care/Home Management   Self-Care/Home Mgmt/ADL, Compensatory, Meal Prep Minutes (65087) 20   Symptoms Noted During/After Treatment (Meal Preparation/Planning Training) fatigue   Treatment Detail/Skilled Intervention Upon arrival, pt was in bathroom with nursing. Pt was CGA with tolieting for balance when standing to manage clothing; pt able to complete hygiene aspects while seated. Pt utilizes 2ww while ambulating in room, however starts to lean on walker with forearms as she appears fatigued. Pt is SBA with doffing and donning socks. Pt reports \"I am very weak but getting better. \" OT recommends TCU or home health OT and pt is not receptive and starts to become paranoid and states, \"I don't need your help. I'm skeptical of therapy because they just push for more therapy.\" Pt is SBA with bed mobility from supine to sit. Pt is educated on shower chair and tub shower bench for home.   OT Discharge Planning   OT Plan tubshower transfer; standing at sink completing grooming/hygiene task; cog testing if pt will allow   OT Discharge Recommendation (DC Rec) Transitional Care Facility;home with assist;home with home care occupational therapy   OT Rationale for DC Rec at this time, pt is below baseline and is very deconditioned which is impacting indep and safety with ADLs, IADLs and functional mobility. OT recommends ongoing OT at TCU or home health OT for ongoing therapy after IP stay to work on increasing activity tolerance and ADL retraining. " If pt d/c home, recommend assist with showering, IADLs and functional transfers. Recommend pt have shower chair or tubshower bench for home.   OT Brief overview of current status CGA with toileting; SBA with LB dressing   Total Session Time   Timed Code Treatment Minutes 20   Total Session Time (sum of timed and untimed services) 30

## 2022-11-04 NOTE — ANESTHESIA CARE TRANSFER NOTE
Patient: Madhavi Castellanos    Procedure: Procedure(s):  CYSTOSCOPY, WITH LEFT RETROGRADE PYELOGRAM AND LEFT URETERAL STENT INSERTION       Diagnosis: Left ureteral stone [N20.1]  Diagnosis Additional Information: No value filed.    Anesthesia Type:   General     Note:    Oropharynx: oropharynx clear of all foreign objects  Level of Consciousness: drowsy  Oxygen Supplementation: face mask  Level of Supplemental Oxygen (L/min / FiO2): 5  Independent Airway: airway patency satisfactory and stable  Dentition: dentition unchanged  Vital Signs Stable: post-procedure vital signs reviewed and stable  Report to RN Given: handoff report given  Patient transferred to: PACU    Handoff Report: Identifed the Patient, Identified the Reponsible Provider, Reviewed the pertinent medical history, Discussed the surgical course, Reviewed Intra-OP anesthesia mangement and issues during anesthesia, Set expectations for post-procedure period and Allowed opportunity for questions and acknowledgement of understanding      Vitals:  Vitals Value Taken Time   BP     Temp     Pulse     Resp     SpO2 97 % 11/03/22 1938   Vitals shown include unvalidated device data.    Electronically Signed By: Dean Dennis Severson, APRN CRNA  November 3, 2022  7:39 PM

## 2022-11-04 NOTE — PROGRESS NOTES
Arrived to room 600 from PACU at 2030 via cart, transferred to bed via assist x2 with walker and gait belt, alert and oriented x 4, oriented to room and call system, CMS intact, IV patent and infusing, denies pain, Katie ice chips well. Frequent VS started.

## 2022-11-04 NOTE — CONSULTS
Care Management Initial Consult    General Information  Assessment completed with: Patient,    Type of CM/SW Visit: Initial Assessment    Primary Care Provider verified and updated as needed: Yes   Readmission within the last 30 days:        Reason for Consult: care coordination/care conference, discharge planning  Advance Care Planning:            Communication Assessment  Patient's communication style: spoken language (English or Bilingual)    Hearing Difficulty or Deaf: no   Wear Glasses or Blind: yes    Cognitive  Cognitive/Neuro/Behavioral: WDL                      Living Environment:   People in home: alone     Current living Arrangements:  (split level [facility] with stairs, 7 to upper and 7 stairs to lower level.   Able to return to prior arrangements:         Family/Social Support:  Care provided by: self  Provides care for: no one  Marital Status:  (x38 years)  Sibling(s), Other (specify) (friend, Ayse)          Description of Support System: Supportive, Involved         Current Resources:   Patient receiving home care services: No     Community Resources: None  Equipment currently used at home: grab bar, tub/shower, walker, standard  Supplies currently used at home:      Employment/Financial:  Employment Status: retired        Financial Concerns: No concerns identified         Lifestyle & Psychosocial Needs:  Social Determinants of Health     Tobacco Use: Low Risk      Smoking Tobacco Use: Never     Smokeless Tobacco Use: Never     Passive Exposure: Not on file   Alcohol Use: Not on file   Financial Resource Strain: Not on file   Food Insecurity: Not on file   Transportation Needs: Not on file   Physical Activity: Not on file   Stress: Not on file   Social Connections: Not on file   Intimate Partner Violence: Not on file   Depression: Not on file   Housing Stability: Not on file       Functional Status:  Prior to admission patient needed assistance:   Dependent ADLs:: Ambulation-cane,  Ambulation-walker  Drives - states cleared to drive by her [facility] clinic.     Dependent IADLs:: Cleaning, Independent   has cleaning lady every other week.    Mental Health Status:  Mental Health Status: No Current Concerns       Chemical Dependency Status:  Chemical Dependency Status: No Current Concerns             Values/Beliefs:  Spiritual, Cultural Beliefs, Bahai Practices, Values that affect care: no               Additional Information:  CM consulted for discharge planning to TCU. Patient admitted with UTI. Met with patient at bedside. She lives alone in [facility] home with stairs. States she recently installed a chair lift on her lower level. Patient ambulates with a cane, has a 4 ww/wc device. Pt reports not feeling well this week and having difficulty eating d/t nausea. States she usually has difficulty walking or standing for extended periods d/t a back injury. States she sees an OP PT at [facility] x 9 years for this.     Discussed having a safe discharge plan and recommendation for TCU. Patient states she is not interested in TCU or Home care. Patient adamantly declining TCU and home care. She feels she will be able to return home with no additional assistance. Patient plans to discharge home.       Carmen Perez RN Case Manager  Inpatient Care Coordination   [facility]   [phone]    Carmen Perez RN

## 2022-11-04 NOTE — PLAN OF CARE
Goal Outcome Evaluation:      Plan of Care Reviewed With: patient    Overall Patient Progress: improvingOverall Patient Progress: improving    7a-7p RN  VS monitored, minimal pain w/voiding that pt reports is improving, declines interventions, SBA w/ww and gb, voiding, chip reg diet, IV Rocephin cont, hypo BS, flatus+, denies N/V, fab/scaly BLE, OT recommending home care or TCU which pt is currently declining.    Addendum: this writer went to stop the abx when completed and there was a red circular area, approximately soft ball size, on her forearm above the IV site. However, it was not swollen or painful. IV removed and ice pack applied. Next RN notified to cont to monitor.

## 2022-11-05 ENCOUNTER — APPOINTMENT (OUTPATIENT)
Dept: PHYSICAL THERAPY | Facility: CLINIC | Age: 82
DRG: 854 | End: 2022-11-05
Attending: INTERNAL MEDICINE
Payer: COMMERCIAL

## 2022-11-05 VITALS
TEMPERATURE: 97.4 F | WEIGHT: 160 LBS | OXYGEN SATURATION: 96 % | BODY MASS INDEX: 30.21 KG/M2 | HEART RATE: 63 BPM | HEIGHT: 61 IN | RESPIRATION RATE: 20 BRPM | DIASTOLIC BLOOD PRESSURE: 70 MMHG | SYSTOLIC BLOOD PRESSURE: 134 MMHG

## 2022-11-05 LAB
POTASSIUM SERPL-SCNC: 3.7 MMOL/L (ref 3.4–5.3)
WBC # BLD AUTO: 17.6 10E3/UL (ref 4–11)

## 2022-11-05 PROCEDURE — 250N000009 HC RX 250: Performed by: INTERNAL MEDICINE

## 2022-11-05 PROCEDURE — 97530 THERAPEUTIC ACTIVITIES: CPT | Mod: GP

## 2022-11-05 PROCEDURE — 250N000013 HC RX MED GY IP 250 OP 250 PS 637: Performed by: ANESTHESIOLOGY

## 2022-11-05 PROCEDURE — 250N000011 HC RX IP 250 OP 636: Performed by: INTERNAL MEDICINE

## 2022-11-05 PROCEDURE — 90662 IIV NO PRSV INCREASED AG IM: CPT | Performed by: INTERNAL MEDICINE

## 2022-11-05 PROCEDURE — 250N000013 HC RX MED GY IP 250 OP 250 PS 637: Performed by: PHYSICIAN ASSISTANT

## 2022-11-05 PROCEDURE — G0008 ADMIN INFLUENZA VIRUS VAC: HCPCS | Performed by: INTERNAL MEDICINE

## 2022-11-05 PROCEDURE — 250N000013 HC RX MED GY IP 250 OP 250 PS 637: Performed by: INTERNAL MEDICINE

## 2022-11-05 PROCEDURE — 84132 ASSAY OF SERUM POTASSIUM: CPT | Performed by: INTERNAL MEDICINE

## 2022-11-05 PROCEDURE — 36415 COLL VENOUS BLD VENIPUNCTURE: CPT | Performed by: INTERNAL MEDICINE

## 2022-11-05 PROCEDURE — 99239 HOSP IP/OBS DSCHRG MGMT >30: CPT | Performed by: INTERNAL MEDICINE

## 2022-11-05 PROCEDURE — 97161 PT EVAL LOW COMPLEX 20 MIN: CPT | Mod: GP

## 2022-11-05 PROCEDURE — 85048 AUTOMATED LEUKOCYTE COUNT: CPT | Performed by: INTERNAL MEDICINE

## 2022-11-05 RX ORDER — CEFTRIAXONE 1 G/1
1 INJECTION, POWDER, FOR SOLUTION INTRAMUSCULAR; INTRAVENOUS EVERY 24 HOURS
Status: DISCONTINUED | OUTPATIENT
Start: 2022-11-05 | End: 2022-11-05

## 2022-11-05 RX ADMIN — INFLUENZA A VIRUS A/VICTORIA/2570/2019 IVR-215 (H1N1) ANTIGEN (FORMALDEHYDE INACTIVATED), INFLUENZA A VIRUS A/DARWIN/9/2021 SAN-010 (H3N2) ANTIGEN (FORMALDEHYDE INACTIVATED), INFLUENZA B VIRUS B/PHUKET/3073/2013 ANTIGEN (FORMALDEHYDE INACTIVATED), AND INFLUENZA B VIRUS B/MICHIGAN/01/2021 ANTIGEN (FORMALDEHYDE INACTIVATED) 0.7 ML: 60; 60; 60; 60 INJECTION, SUSPENSION INTRAMUSCULAR at 14:56

## 2022-11-05 RX ADMIN — MULTIPLE VITAMINS W/ MINERALS TAB 1 TABLET: TAB at 07:54

## 2022-11-05 RX ADMIN — LIDOCAINE HYDROCHLORIDE 1 G: 10 INJECTION, SOLUTION EPIDURAL; INFILTRATION; INTRACAUDAL; PERINEURAL at 14:51

## 2022-11-05 RX ADMIN — SIMETHICONE 80 MG: 80 TABLET, CHEWABLE ORAL at 15:35

## 2022-11-05 RX ADMIN — LOSARTAN POTASSIUM 25 MG: 25 TABLET, FILM COATED ORAL at 07:54

## 2022-11-05 RX ADMIN — SOTALOL HYDROCHLORIDE 40 MG: 80 TABLET ORAL at 07:54

## 2022-11-05 RX ADMIN — ASPIRIN 81 MG: 81 TABLET, COATED ORAL at 07:54

## 2022-11-05 RX ADMIN — BUPROPION HYDROCHLORIDE 450 MG: 150 TABLET, FILM COATED, EXTENDED RELEASE ORAL at 07:53

## 2022-11-05 ASSESSMENT — ACTIVITIES OF DAILY LIVING (ADL)
ADLS_ACUITY_SCORE: 29

## 2022-11-05 NOTE — PLAN OF CARE
Goal Outcome Evaluation:    Pt A/O X4, Full code, Vital sign stable and Room air, stand by assist, pt on Regular diet, denied pain and Nausea, pain is under control with PRN fentanyl patch and tylenol.No new changes on Skin redness Left forearm. Plan is to discharge home today with family.

## 2022-11-05 NOTE — PLAN OF CARE
Goal Outcome Evaluation:      Plan of Care Reviewed With: patient    Overall Patient Progress: improvingOverall Patient Progress: improving    Reviewed discharge instructions and medications with patient. Questions answered. Patient discharged to home via taxi, discharge instructions, medications (Augmentin), and belongings.

## 2022-11-05 NOTE — DISCHARGE SUMMARY
Discharge Summary  Hospitalist Service    Madhavi Castellanos MRN# [id number]   YOB: 1940 Age: 82 year old     Date of Admission:  11/2/2022  Date of Discharge:  11/5/2022  Admitting Physician:  Gopi Mares DO  Discharge Physician: Malathi Lopez MD  Discharging Service: Hospitalist Service     Primary Provider: Miles Castellanos  Primary Care Physician Phone Number: [phone]         Discharge Diagnoses/Problem Oriented Hospital Course (Providers):      Madhavi Castellanos was admitted on 11/2/2022 by Gopi Mares DO and I would refer you to their history and physical.  The following problems were addressed during her hospitalization:      Sepsis  Complicated UTI with pyelonephritis  Ureteral stone  Chronic PAF/CAD/htn  GERD  TALI  hlp  Depression  Chronic back pain on narcotics      Madhavi Castellanos is a 82 year old female with PMHx significant for PAF s/p ablation and watchman's device, CAD, LBBB, HTN, GERD, TALI, HLP, asthma, depression and chronic back pain on chronic opioids, who was admitted on 11/2/2022 with nausea, vomiting and subjective chills likely due to infected kidney stone.  Work-up in the ER was most notable for leukocytosis of 19.4 with elevated lactic acid and urine analysis with large blood, small leukocyte esterase and 22 white cells.  CT abdomen and pelvis showed an obstructing 5 mm left ureteral stone as well as perinephric stranding.  She was admitted for IV hydration, symptomatic care and started on empiric IV ceftriaxone.  Urology was consulted and she is being taken for cystoscopy later today.  Remains afebrile and hemodynamically stable.  Currently pain-free.     1. Sepsis 2/2 complicated UTI. Obstructing left ureteral stone.  She was taken for cysto for stent placement to relieve the obstruction. UCx 10-50K mixed urogenital lauri.   Urology requested sensitivities on the pathogenic organisms although that is still pending.  Clinically she is improved: her sepsis  has resolved, WBC trending down but on jose today was up again to 17.6, but no fevers, and generalized sense of feeling well.  She is feeling well and wants to discharge home.  I'm ok with discharge as long as she is able to get in to see her PCP within 3-5 days which she assures me she'll be able to do.    She gets around by walk[facility] [place] and has just installed a chair lift to get up the stairs.   - rec'd 4 doses of IV ceftriaxone and will discharge with 7 days of amox/clav to start tomorrow  -Cultures grew urogenital lauri however leukocytosis is improving with decreasing trend  -Will be needing definitive stone management as outpatient    2. Hypokalemia   Replaced per protocol     3. PAF s/p ablation and watchman's device  CAD with known LBBB  HTN  Sotalol resumed but at lower than her typical dose, have discharged at typical dose of 120 mg bid along with her losartan 25 mg and hydrochlorothiazide   -Currently normotensive     4. GERD  Does not appear to be on a PPI    5. TALI  Unclear if using CPAP     6. HLP  Resume home atorvastatin     7. Depression  Resume home Wellbutrin     8. Chronic back pain on chronic opioids  Wears a Fentanyl patch, replaced this morning. Allergy to oxycodone, seems to have tolerated Norco in the past.     Covid-19 negative         Code Status:      Full Code        Brief Hospital Stay Summary Sent Home With Patient in AVS:          Reason for your hospital stay      Infected kidney stone                      Important Results:        As noted below          Pending Results:        Unresulted Labs Ordered in the Past 30 Days of this Admission     Date and Time Order Name Status Description    11/2/2022  7:00 PM Urine Culture Preliminary     11/2/2022  4:56 PM Blood Culture Hand, Right Preliminary     11/2/2022  4:56 PM Blood Culture Hand, Left Preliminary             Discharge Instructions and Follow-Up:      Follow-up Appointments     Follow-up  and recommended labs and tests       Follow-up with urology based on their recommendations  Follow-up with PCP in 3-5 days - you should have your wbc rechecked at   that visit.     If you develop a fever > 101 and/or are started to feel ill again at home,   return to the ER for urgent evaluation               Discharge Disposition:        Discharged to home         Discharge Medications:        Current Discharge Medication List      START taking these medications    Details   amoxicillin-clavulanate (AUGMENTIN) 875-125 MG tablet Take 1 tablet by mouth 2 times daily  Qty: 14 tablet, Refills: 0    Associated Diagnoses: Urinary tract infection without hematuria, site unspecified         CONTINUE these medications which have NOT CHANGED    Details   acetaminophen (TYLENOL) 500 MG tablet Take 500 mg by mouth daily as needed for mild pain      aspirin 81 MG EC tablet Take 81 mg by mouth daily      atorvastatin (LIPITOR) 10 MG tablet Take 10 mg by mouth every evening       buPROPion (WELLBUTRIN XL) 150 MG 24 hr tablet Take 450 mg by mouth daily      diphenhydrAMINE-acetaminophen (TYLENOL PM)  MG tablet Take 2 tablets by mouth At Bedtime      fentaNYL (DURAGESIC) 25 mcg/hr 72 hr patch Place 1 patch onto the skin every 72 hours  Qty: 10 patch, Refills: 0    Associated Diagnoses: Chronic use of opiate for therapeutic purpose      hydrochlorothiazide (MICROZIDE) 12.5 MG capsule Take 12.5 mg by mouth daily as needed      HYDROcodone-acetaminophen (NORCO) 7.5-325 MG per tablet Take 1 tablet by mouth every 8 hours as needed (chronic pain) Maximum 2 tabs per day      losartan (COZAAR) 25 MG tablet Take 25 mg by mouth daily      methimazole (TAPAZOLE) 5 MG tablet Take 5 mg by mouth five times a week Monday through Friday      methocarbamol (ROBAXIN) 750 MG tablet Take 750 mg by mouth 4 times daily as needed      Multiple Vitamins-Minerals (MULTIVITAMIN WOMEN 50+) TABS Take 1 tablet by mouth daily      polyethylene glycol  "(MIRALAX) packet Take 1 packet by mouth daily as needed for constipation      simethicone (GAS-X) 80 MG chewable tablet Take  mg by mouth every 6 hours as needed for flatulence or cramping      sotalol (BETAPACE) 120 MG tablet Take 120 mg by mouth 2 times daily               Allergies:         Allergies   Allergen Reactions     Gabapentin Other (See Comments)     Metoprolol Shortness Of Breath     2 hours after tasking for the first time SOB, Pt evaluated and pt WDL       Acetaminophen Nausea     Albuterol Unknown     Apixaban GI Disturbance     Augmentin Nausea     Codeine Sulfate      \"i dont't know, nothing, maybe sick to my stomach\"     Cymbalta      agitated     Lisinopril Cough     Omnicef [Cefdinir] Diarrhea     Percocet [Oxycodone-Acetaminophen]      nauseated     Prednisone Other (See Comments)     Made her aggressive.     Duloxetine Anxiety           Consultations This Hospital Stay:        Urology          Condition and Physical on Discharge:        Discharge condition: Stable   Vitals: Blood pressure 134/70, pulse 63, temperature 97.4  F (36.3  C), temperature source Temporal, resp. rate 20, height 1.549 m (5' 1\"), weight 72.6 kg (160 lb), SpO2 96 %, not currently breastfeeding.     Constitutional: Pleasant nad looks stated age head ncat sclera clear      Lungs: ctab nl effort   Cardiovascular: rrr with loud 2nd heart sound no edema, no mrg   Abdomen: S/nt/nd tolerating po without difficulty   Skin: Warm and dry no cyanosis or clubbing    Other: Alert and oriented affect appropriate olvera          Discharge Time:      Greater than 30 minutes.        Image Results From This Hospital Stay (For Non-EPIC Providers):        Results for orders placed or performed during the hospital encounter of 11/02/22   XR Chest 2 Views    Narrative    EXAM: XR CHEST 2 VIEWS  LOCATION: [facility]  DATE/TIME: 11/2/2022 6:11 PM    INDICATION: Tachycardic, pneumonia evaluation.    COMPARISON: " 7/6/2022.      Impression    IMPRESSION: Heart and mediastinal size normal. Lungs and pleural spaces are clear. No pleural effusion or pneumothorax. Electronic device is again identified involving the anterior chest wall. 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     CT Abdomen Pelvis w Contrast    Narrative    EXAM: CT ABDOMEN PELVIS W CONTRAST  LOCATION: [facility]  DATE/TIME: 11/2/2022 5:47 PM    INDICATION: nausea vomiting, leukocytosis  COMPARISON: Abdominal radiograph 03/12/2020, CT 05/23/2018  TECHNIQUE: CT scan of the abdomen and pelvis was performed following injection of IV contrast. Multiplanar reformats were obtained. Dose reduction techniques were used.  CONTRAST:  80mL Isovue 370    FINDINGS:   LOWER CHEST: Unremarkable.    HEPATOBILIARY: Cholecystectomy.    PANCREAS: Normal.    SPLEEN: Normal.    ADRENAL GLANDS: Normal.    KIDNEYS/BLADDER: Obstructing 5 x 4 mm stone in the mid left ureter at the level of the pelvic inlet (series 2 image 130). Mild proximal hydroureteronephrosis and perinephric stranding. There are additional bilateral nonobstructing renal calculi. Urinary   bladder is moderately distended but otherwise unremarkable.    BOWEL: Diverticulosis of the colon. No acute inflammatory change. No obstruction. Normal appendix. Large duodenal diverticulum is unchanged.    LYMPH NODES: Normal.    VASCULATURE: Scattered calcified atherosclerosis.    PELVIC ORGANS: Hysterectomy.    MUSCULOSKELETAL: No acute bony abnormality.        Impression    IMPRESSION:   1.  Obstructing 5 mm stone in the mid left ureter with mild proximal hydroureteronephrosis and perinephric stranding. If there is suspicion for superimposed urinary tract infection, recommend consultation with urology for consideration of decompression   of the collecting system.  2.  Additional bilateral nonobstructing renal calculi.   XR Surgery NOLA L/T 5 Min Fluoro w Stills    Narrative    This exam was marked as  non-reportable because it will not be read by a   radiologist or a [place] non-radiologist provider.                 Most Recent Lab Results In EPIC (For Non-EPIC Providers):    Most Recent 3 CBC's:  Recent Labs   Lab Test 11/04/22  1055 11/03/22  0544 11/02/22  1909   WBC 13.2* 16.5* 21.8*   HGB 14.4 14.9 16.2*   MCV 99 95 96    307 359      Most Recent 3 BMP's:  Recent Labs   Lab Test 11/05/22  0550 11/04/22  1055 11/04/22  0628 11/04/22  0300 11/03/22  0544 11/03/22  0357 11/02/22  1623   NA  --  141  --   --  137  --  135*   POTASSIUM 3.7 3.8 4.1  --  4.1   < > 3.3*   CHLORIDE  --  103  --   --  102  --  94*   CO2  --  27  --   --  24  --  22   BUN  --  16.5  --   --  23.8*  --  31.6*   CR  --  0.52  --   --  0.53  --  0.59   ANIONGAP  --  11  --   --  11  --  19*   LUIS ANTONIO  --  8.7*  --   --  8.5*  --  9.3   GLC  --  125*  --  111* 88  --  125*    < > = values in this interval not displayed.     Most Recent 2 LFT's:  Recent Labs   Lab Test 11/02/22  1623 11/28/20  1215   AST 32 21   ALT 35 28   ALKPHOS 116* 82   BILITOTAL 1.1 0.4     Most Recent 6 Bacteria Isolates From Any Culture (See EPIC Reports for Culture Details):  Recent Labs   Lab Test 11/28/20  1821 11/28/20  1311 03/12/20  1432 03/12/20  1428 03/12/20  1056 11/08/16  1047   CULT No growth <10,000 colonies/mL  mixed urogenital lauri  Susceptibility testing not routinely done   No growth No growth No growth No growth     Most Recent TSH, T4 and HgbA1c:   Recent Labs   Lab Test 11/02/22  1623   TSH 1.41

## 2022-11-05 NOTE — PROGRESS NOTES
"   11/05/22 1105   Appointment Info   Signing Clinician's Name / Credentials (PT) Flaco Dunne DPT   Living Environment   People in Home alone   Current Living Arrangements house   Home Accessibility stairs to enter home;stairs within home   Number of Stairs, Main Entrance 1   Stair Railings, Main Entrance other (see comments)  (grab bar in cane used to enter through garage.)   Number of Stairs, Within Home, Primary seven   Stair Railings, Within Home, Primary railings on both sides of stairs;other (see comments)   Transportation Anticipated other (see comments)  (unsure.)   Living Environment Comments Pt lives in split level home with 1 WILFRIDO through garage, then 7 stairs up/down (electronic chair lift goes down 7 stairs, 7 stairs up with B railing and no chair lift)   Self-Care   Usual Activity Tolerance moderate   Current Activity Tolerance fair   Regular Exercise Other (see comments)   Equipment Currently Used at Home walker, rolling;cane, quad;grab bar, toilet;grab bar, tub/shower   Fall history within last six months yes   Number of times patient has fallen within last six months 6   Activity/Exercise/Self-Care Comment Pt is IND with functional mobility at [facility], pt uses 4WW for ambulation. Pt reports having several falls, some with major injures to R knee and hospitilizations, Pt not using walker in BR at times of falls.   General Information   Onset of Illness/Injury or Date of Surgery 11/02/22   Referring Physician Gopi Mares, DO   Patient/Family Therapy Goals Statement (PT) return home.   Pertinent History of Current Problem (include personal factors and/or comorbidities that impact the POC) Pt is 83 yo female who, per chart, \"female with PMHx significant for PAF s/p ablation and watchman's device, CAD, LBBB, HTN, GERD, TALI, HLP, asthma, depression and chronic back pain on chronic opioids, who was admitted on 11/2/2022 with nausea, vomiting and subjective chills likely due to infected " "kidney stone.\"   Existing Precautions/Restrictions fall   Cognition   Affect/Mental Status (Cognition) WFL   Orientation Status (Cognition) oriented x 4   Pain Assessment   Patient Currently in Pain No   Integumentary/Edema   Integumentary/Edema Comments hemosideran deposits of distal LEs.   Posture    Posture Protracted shoulders;Forward head position   Range of Motion (ROM)   Range of Motion ROM is WFL   Strength (Manual Muscle Testing)   Strength (Manual Muscle Testing) Deficits observed during functional mobility   Bed Mobility   Comment, (Bed Mobility) not assessed   Transfers   Transfers sit-stand transfer   Comment, (Transfers) STS with FWW and CGA, took 2-3 attempts to do full stand from lower sofa.   Gait/Stairs (Locomotion)   Comment, (Gait/Stairs) Pt ambulated 10' with FWW and CGA, decreased gait speed and stride length.   Balance   Balance Comments good sitting, standing balance poor, pt feeling like she was losing balance when putting on mask while standing, had to reach down for her walker quickly.   Sensory Examination   Sensory Perception Comments decreased B distal LEs to light touch.   Clinical Impression   Criteria for Skilled Therapeutic Intervention Yes, treatment indicated   PT Diagnosis (PT) impaired functional mobility   Influenced by the following impairments decreased strength, decreased activity tolerance, impaired sensation, impaired balance.   Functional limitations due to impairments difficulty with transfers, ambulation.   Clinical Presentation (PT Evaluation Complexity) Stable/Uncomplicated   Clinical Presentation Rationale clinical judgement   Clinical Decision Making (Complexity) low complexity   Planned Therapy Interventions (PT) balance training;bed mobility training;gait training;home exercise program;ROM (range of motion);stair training;strengthening;stretching;transfer training;progressive activity/exercise   Risk & Benefits of therapy have been explained evaluation/treatment " results reviewed;care plan/treatment goals reviewed;risks/benefits reviewed;current/potential barriers reviewed;participants voiced agreement with care plan;participants included;patient   PT Total Evaluation Time   PT Karial, Low Complexity Minutes (42459) 20   Plan of Care Review   Plan of Care Reviewed With patient   Physical Therapy Goals   PT Frequency Daily   PT Predicted Duration/Target Date for Goal Attainment 11/12/22   PT Goals Bed Mobility;Transfers;Gait;Stairs   PT: Bed Mobility Modified independent;Supine to/from sit   PT: Transfers Supervision/stand-by assist;Sit to/from stand;Assistive device   PT: Gait Supervision/stand-by assist;Rolling walker;100 feet   PT: Stairs Minimal assist;7 stairs;Rail on both sides   PT Discharge Planning   PT Plan assess bed mob, progress ambulation distance as able (w/c follow may be needed), trial stairs with B railing, trial platform step with walker.   PT Discharge Recommendation (DC Rec) Transitional Care Facility;home with assist;home with home care physical therapy   PT Rationale for DC Rec Pt is IND with functional mobility, uses 4WW for ambulation, has hx of ~4-6 falls in past 6 months. Pt currently demonstrates decreased strength and activity toleranec limiting her to ambulate only ~50' with FWW, also impaired balance demonstrated. Recommending TCU; however, pt insists on d/c home and believes that further intervention from therapies or other medical teams is not necessary. If pt returned home, she would need Ax1 to enter home, perform stairs. Would benefit from additional assistance temporarily with IADLs as well.   PT Brief overview of current status STS with FWW and SBA, amb w/ FWW and SBA ~50ft, pt has not trialed stairs yet.   Total Session Time   Total Session Time (sum of timed and untimed services) 20

## 2022-11-05 NOTE — PLAN OF CARE
Occupational Therapy Discharge Summary    Reason for therapy discharge:    Discharged to home with home therapy.    Progress towards therapy goal(s). See goals on Care Plan in [facility] electronic health record for goal details.  Goals partially met.  Barriers to achieving goals:   discharge from facility.    Therapy recommendation(s):    Continued therapy is recommended.  Rationale/Recommendations:  pt is below baseline and is very deconditioned which is impacting indep and safety with ADLs, IADLs and functional mobility. OT recommends ongoing OT at TCU or home health OT for ongoing therapy after IP stay to work on increasing activity tolerance and ADL retraining. If pt d/c home, recommend assist with showering, IADLs and functional transfers. Recommend pt have shower chair or tubshower bench for home..

## 2022-11-05 NOTE — PLAN OF CARE
Physical Therapy Discharge Summary    Reason for therapy discharge:    Discharged to home with home therapy.    Progress towards therapy goal(s). See goals on Care Plan in [facility] electronic health record for goal details.  Goals not met.  Barriers to achieving goals:   discharge from facility.    Therapy recommendation(s):    Continued therapy is recommended.  Rationale/Recommendations:   has hx of ~4-6 falls in past 6 months. Pt currently demonstrates decreased strength and activity toleranec limiting her to ambulate only ~50' with FWW, also impaired balance demonstrated. Recommending TCU; however, pt insists on d/c home and believes that further intervention from therapies or other medical teams is not necessary. If pt returned home, she would need Ax1 to enter home, perform stairs. Would benefit from additional assistance temporarily with IADLs as well.

## 2022-11-07 ENCOUNTER — PATIENT OUTREACH (OUTPATIENT)
Dept: CARE COORDINATION | Facility: CLINIC | Age: 82
End: 2022-11-07

## 2022-11-07 LAB
BACTERIA BLD CULT: NO GROWTH
BACTERIA BLD CULT: NO GROWTH
BACTERIA UR CULT: ABNORMAL

## 2022-11-07 NOTE — PROGRESS NOTES
[facility]    Background: Transitional Care Management program identified per system criteria and reviewed by [facility] team for possible outreach.    Assessment: Upon chart review, [facility] Team member will not proceed with patient outreach related to this episode of Transitional Care Management program due to reason below:    Patient has a follow up appointment with an appropriate provider today for hospital discharge    Plan: Transitional Care Management episode addressed appropriately per reason noted above.      Vannessa Barkley  Community Health Worker  [facility]  [phone]    *[facility] Care Resource Team does NOT follow patient ongoing. Referrals are identified based on internal discharge reports and the outreach is to ensure patient has an understanding of their discharge instructions.

## 2022-11-09 NOTE — OP NOTE
[facility]  Operative Note    Pre-operative diagnosis: Left ureteral stone [N20.1]   Post-operative diagnosis Left ureteral stone [N20.1]   Procedure: Procedure(s):  CYSTOSCOPY, WITH LEFT RETROGRADE PYELOGRAM AND LEFT URETERAL STENT INSERTION   FLUOROSCOPIC INTERPRETATION <1 HOUR PHYSICIAN TIME   Surgeon: Shawn Walker MD   Assistants(s): NONE   Anesthesia: General    Estimated blood loss: None    Total IV fluids: (See anesthesia record)   Blood transfusion: No transfusion was given during surgery   Total urine output: Not measured   Drains: Left ureteral stent   Specimens: * No specimens in log *     Implants: Implant Name Type Inv. Item Serial No.  Lot No. LRB No. Used Action   STENT URETERAL POLARIS ULTRA [id number] - [id number] Stent STENT URETERAL POLARIS ULTRA [id number]  [employer] [id number] Left 1 Implanted          Findings:   Mild left hydroureteronephrosis with faint left mid ureteral filling defect consistent with stone, stent placed.   Complications: None.   Condition: Stable             Description of procedure:  83 yo F with left ureteral stone, concern for complicated UTI with leukocytosis and bacteruria, perinephric stranding on CT. Needs cystoscopy with left stent placement, treatment of infection, interval left ureteroscopy with laser lithotripsy and stone basketing. Risks including transient worsening of infection, dysuria/stent pain and irritation discussed. Informed consent obtained    Patient was brought to operating room #14.  She received preoperative antibiotics.  General anesthesia was induced, she was placed in dorsal lithotomy position, prepped and draped in standard sterile fashion.  A timeout was performed.    A 22 Spanish Storz cystoscope was assembled and passed through the urethra into the bladder.  The left ureteral orifice was identified.  This was cannulated with a 5 Spanish tiger tail catheter.  A gentle  retrograde pyelogram showed mild left hydroureteronephrosis with a faint left mid ureteral filling defect consistent with stone.  A 0.035 inch stiff shaft Glidewire was passed up to the renal pelvis under fluoroscopic guidance and the tiger tail was removed.  A stent was then placed over the wire in the usual fashion under cystoscopic and fluoroscopic guidance with good coils noted proximally and distally.  Bladder was drained and scope was removed.  Patient was cleaned up, awoken from anesthesia and brought to PACU in stable condition    Shawn Walker MD   [facility]  [phone] clinic phone

## 2022-11-16 ENCOUNTER — TRANSFERRED RECORDS (OUTPATIENT)
Dept: HEALTH INFORMATION MANAGEMENT | Facility: CLINIC | Age: 82
End: 2022-11-16

## 2022-11-18 ENCOUNTER — PREP FOR PROCEDURE (OUTPATIENT)
Dept: UROLOGY | Facility: CLINIC | Age: 82
End: 2022-11-18

## 2022-11-18 DIAGNOSIS — N20.1 LEFT URETERAL STONE: Primary | ICD-10-CM

## 2022-12-07 RX ORDER — ARIPIPRAZOLE 2 MG/1
2 TABLET ORAL DAILY
COMMUNITY
Start: 2022-04-13

## 2022-12-15 ENCOUNTER — ANESTHESIA (OUTPATIENT)
Dept: SURGERY | Facility: CLINIC | Age: 82
End: 2022-12-15
Payer: COMMERCIAL

## 2022-12-15 ENCOUNTER — APPOINTMENT (OUTPATIENT)
Dept: GENERAL RADIOLOGY | Facility: CLINIC | Age: 82
End: 2022-12-15
Attending: STUDENT IN AN ORGANIZED HEALTH CARE EDUCATION/TRAINING PROGRAM
Payer: COMMERCIAL

## 2022-12-15 ENCOUNTER — HOSPITAL ENCOUNTER (OUTPATIENT)
Facility: CLINIC | Age: 82
Discharge: HOME | End: 2022-12-16
Attending: STUDENT IN AN ORGANIZED HEALTH CARE EDUCATION/TRAINING PROGRAM | Admitting: STUDENT IN AN ORGANIZED HEALTH CARE EDUCATION/TRAINING PROGRAM
Payer: COMMERCIAL

## 2022-12-15 ENCOUNTER — ANESTHESIA EVENT (OUTPATIENT)
Dept: SURGERY | Facility: CLINIC | Age: 82
End: 2022-12-15
Payer: COMMERCIAL

## 2022-12-15 DIAGNOSIS — N20.0 KIDNEY STONE: Primary | ICD-10-CM

## 2022-12-15 PROCEDURE — 370N000017 HC ANESTHESIA TECHNICAL FEE, PER MIN: Performed by: STUDENT IN AN ORGANIZED HEALTH CARE EDUCATION/TRAINING PROGRAM

## 2022-12-15 PROCEDURE — 250N000009 HC RX 250: Performed by: ANESTHESIOLOGY

## 2022-12-15 PROCEDURE — 710N000009 HC RECOVERY PHASE 1, LEVEL 1, PER MIN: Performed by: STUDENT IN AN ORGANIZED HEALTH CARE EDUCATION/TRAINING PROGRAM

## 2022-12-15 PROCEDURE — 258N000003 HC RX IP 258 OP 636: Performed by: ANESTHESIOLOGY

## 2022-12-15 PROCEDURE — C1769 GUIDE WIRE: HCPCS | Performed by: STUDENT IN AN ORGANIZED HEALTH CARE EDUCATION/TRAINING PROGRAM

## 2022-12-15 PROCEDURE — 250N000009 HC RX 250: Performed by: STUDENT IN AN ORGANIZED HEALTH CARE EDUCATION/TRAINING PROGRAM

## 2022-12-15 PROCEDURE — 360N000084 HC SURGERY LEVEL 4 W/ FLUORO, PER MIN: Performed by: STUDENT IN AN ORGANIZED HEALTH CARE EDUCATION/TRAINING PROGRAM

## 2022-12-15 PROCEDURE — 250N000013 HC RX MED GY IP 250 OP 250 PS 637: Performed by: STUDENT IN AN ORGANIZED HEALTH CARE EDUCATION/TRAINING PROGRAM

## 2022-12-15 PROCEDURE — 255N000002 HC RX 255 OP 636: Performed by: STUDENT IN AN ORGANIZED HEALTH CARE EDUCATION/TRAINING PROGRAM

## 2022-12-15 PROCEDURE — 74420 UROGRAPHY RTRGR +-KUB: CPT | Mod: 26 | Performed by: STUDENT IN AN ORGANIZED HEALTH CARE EDUCATION/TRAINING PROGRAM

## 2022-12-15 PROCEDURE — 999N000141 HC STATISTIC PRE-PROCEDURE NURSING ASSESSMENT: Performed by: STUDENT IN AN ORGANIZED HEALTH CARE EDUCATION/TRAINING PROGRAM

## 2022-12-15 PROCEDURE — 272N000001 HC OR GENERAL SUPPLY STERILE: Performed by: STUDENT IN AN ORGANIZED HEALTH CARE EDUCATION/TRAINING PROGRAM

## 2022-12-15 PROCEDURE — 52353 CYSTOURETERO W/LITHOTRIPSY: CPT | Mod: LT | Performed by: STUDENT IN AN ORGANIZED HEALTH CARE EDUCATION/TRAINING PROGRAM

## 2022-12-15 PROCEDURE — 82365 CALCULUS SPECTROSCOPY: CPT | Performed by: STUDENT IN AN ORGANIZED HEALTH CARE EDUCATION/TRAINING PROGRAM

## 2022-12-15 PROCEDURE — 250N000011 HC RX IP 250 OP 636: Performed by: ANESTHESIOLOGY

## 2022-12-15 PROCEDURE — 250N000011 HC RX IP 250 OP 636: Performed by: STUDENT IN AN ORGANIZED HEALTH CARE EDUCATION/TRAINING PROGRAM

## 2022-12-15 PROCEDURE — 999N000179 XR SURGERY CARM FLUORO LESS THAN 5 MIN W STILLS: Mod: TC

## 2022-12-15 PROCEDURE — 250N000011 HC RX IP 250 OP 636: Performed by: NURSE ANESTHETIST, CERTIFIED REGISTERED

## 2022-12-15 RX ORDER — FENTANYL CITRATE 50 UG/ML
50 INJECTION, SOLUTION INTRAMUSCULAR; INTRAVENOUS EVERY 5 MIN PRN
Status: DISCONTINUED | OUTPATIENT
Start: 2022-12-15 | End: 2022-12-15 | Stop reason: HOSPADM

## 2022-12-15 RX ORDER — METHOCARBAMOL 750 MG/1
750 TABLET, FILM COATED ORAL 4 TIMES DAILY PRN
Status: DISCONTINUED | OUTPATIENT
Start: 2022-12-15 | End: 2022-12-16 | Stop reason: HOSPADM

## 2022-12-15 RX ORDER — FENTANYL CITRATE 50 UG/ML
INJECTION, SOLUTION INTRAMUSCULAR; INTRAVENOUS PRN
Status: DISCONTINUED | OUTPATIENT
Start: 2022-12-15 | End: 2022-12-15

## 2022-12-15 RX ORDER — SODIUM CHLORIDE, SODIUM LACTATE, POTASSIUM CHLORIDE, CALCIUM CHLORIDE 600; 310; 30; 20 MG/100ML; MG/100ML; MG/100ML; MG/100ML
INJECTION, SOLUTION INTRAVENOUS CONTINUOUS
Status: DISCONTINUED | OUTPATIENT
Start: 2022-12-15 | End: 2022-12-15 | Stop reason: HOSPADM

## 2022-12-15 RX ORDER — LOSARTAN POTASSIUM 25 MG/1
25 TABLET ORAL DAILY
Status: DISCONTINUED | OUTPATIENT
Start: 2022-12-16 | End: 2022-12-16 | Stop reason: HOSPADM

## 2022-12-15 RX ORDER — CEFAZOLIN SODIUM/WATER 2 G/20 ML
2 SYRINGE (ML) INTRAVENOUS SEE ADMIN INSTRUCTIONS
Status: DISCONTINUED | OUTPATIENT
Start: 2022-12-15 | End: 2022-12-15 | Stop reason: HOSPADM

## 2022-12-15 RX ORDER — CEFAZOLIN SODIUM/WATER 2 G/20 ML
2 SYRINGE (ML) INTRAVENOUS
Status: COMPLETED | OUTPATIENT
Start: 2022-12-15 | End: 2022-12-15

## 2022-12-15 RX ORDER — FENTANYL CITRATE 50 UG/ML
25 INJECTION, SOLUTION INTRAMUSCULAR; INTRAVENOUS
Status: DISCONTINUED | OUTPATIENT
Start: 2022-12-15 | End: 2022-12-15 | Stop reason: HOSPADM

## 2022-12-15 RX ORDER — NALOXONE HYDROCHLORIDE 0.4 MG/ML
0.4 INJECTION, SOLUTION INTRAMUSCULAR; INTRAVENOUS; SUBCUTANEOUS
Status: DISCONTINUED | OUTPATIENT
Start: 2022-12-15 | End: 2022-12-16 | Stop reason: HOSPADM

## 2022-12-15 RX ORDER — METHIMAZOLE 5 MG/1
5 TABLET ORAL
Status: DISCONTINUED | OUTPATIENT
Start: 2022-12-16 | End: 2022-12-16 | Stop reason: HOSPADM

## 2022-12-15 RX ORDER — LIDOCAINE 40 MG/G
CREAM TOPICAL
Status: DISCONTINUED | OUTPATIENT
Start: 2022-12-15 | End: 2022-12-15 | Stop reason: HOSPADM

## 2022-12-15 RX ORDER — ACETAMINOPHEN 500 MG
1000 TABLET ORAL EVERY 6 HOURS PRN
Qty: 100 TABLET | Refills: 0 | Status: SHIPPED | OUTPATIENT
Start: 2022-12-15

## 2022-12-15 RX ORDER — ONDANSETRON 2 MG/ML
INJECTION INTRAMUSCULAR; INTRAVENOUS PRN
Status: DISCONTINUED | OUTPATIENT
Start: 2022-12-15 | End: 2022-12-15

## 2022-12-15 RX ORDER — LORAZEPAM 2 MG/ML
.5-1 INJECTION INTRAMUSCULAR
Status: DISCONTINUED | OUTPATIENT
Start: 2022-12-15 | End: 2022-12-15 | Stop reason: HOSPADM

## 2022-12-15 RX ORDER — FENTANYL 25 UG/1
25 PATCH TRANSDERMAL
Status: DISCONTINUED | OUTPATIENT
Start: 2022-12-18 | End: 2022-12-16 | Stop reason: HOSPADM

## 2022-12-15 RX ORDER — BUPROPION HYDROCHLORIDE 150 MG/1
450 TABLET ORAL DAILY
Status: DISCONTINUED | OUTPATIENT
Start: 2022-12-16 | End: 2022-12-16 | Stop reason: HOSPADM

## 2022-12-15 RX ORDER — HYDROCODONE BITARTRATE AND ACETAMINOPHEN 7.5; 325 MG/1; MG/1
1 TABLET ORAL EVERY 8 HOURS PRN
Status: DISCONTINUED | OUTPATIENT
Start: 2022-12-15 | End: 2022-12-16 | Stop reason: HOSPADM

## 2022-12-15 RX ORDER — MEPERIDINE HYDROCHLORIDE 25 MG/ML
12.5 INJECTION INTRAMUSCULAR; INTRAVENOUS; SUBCUTANEOUS
Status: DISCONTINUED | OUTPATIENT
Start: 2022-12-15 | End: 2022-12-15 | Stop reason: HOSPADM

## 2022-12-15 RX ORDER — ASPIRIN 81 MG/1
81 TABLET ORAL DAILY
Status: DISCONTINUED | OUTPATIENT
Start: 2022-12-15 | End: 2022-12-16 | Stop reason: HOSPADM

## 2022-12-15 RX ORDER — NALOXONE HYDROCHLORIDE 0.4 MG/ML
0.2 INJECTION, SOLUTION INTRAMUSCULAR; INTRAVENOUS; SUBCUTANEOUS
Status: DISCONTINUED | OUTPATIENT
Start: 2022-12-15 | End: 2022-12-16 | Stop reason: HOSPADM

## 2022-12-15 RX ORDER — HYDRALAZINE HYDROCHLORIDE 20 MG/ML
5-10 INJECTION INTRAMUSCULAR; INTRAVENOUS EVERY 10 MIN PRN
Status: DISCONTINUED | OUTPATIENT
Start: 2022-12-15 | End: 2022-12-15 | Stop reason: HOSPADM

## 2022-12-15 RX ORDER — ARIPIPRAZOLE 2 MG/1
2 TABLET ORAL DAILY
Status: DISCONTINUED | OUTPATIENT
Start: 2022-12-15 | End: 2022-12-16 | Stop reason: HOSPADM

## 2022-12-15 RX ORDER — ALBUTEROL SULFATE 0.83 MG/ML
2.5 SOLUTION RESPIRATORY (INHALATION) EVERY 4 HOURS PRN
Status: DISCONTINUED | OUTPATIENT
Start: 2022-12-15 | End: 2022-12-15 | Stop reason: HOSPADM

## 2022-12-15 RX ORDER — PROPOFOL 10 MG/ML
INJECTION, EMULSION INTRAVENOUS PRN
Status: DISCONTINUED | OUTPATIENT
Start: 2022-12-15 | End: 2022-12-15

## 2022-12-15 RX ORDER — LIDOCAINE 40 MG/G
CREAM TOPICAL
Status: DISCONTINUED | OUTPATIENT
Start: 2022-12-15 | End: 2022-12-16 | Stop reason: HOSPADM

## 2022-12-15 RX ORDER — ATORVASTATIN CALCIUM 10 MG/1
10 TABLET, FILM COATED ORAL EVERY EVENING
Status: DISCONTINUED | OUTPATIENT
Start: 2022-12-15 | End: 2022-12-16 | Stop reason: HOSPADM

## 2022-12-15 RX ORDER — ONDANSETRON 4 MG/1
4 TABLET, ORALLY DISINTEGRATING ORAL EVERY 30 MIN PRN
Status: DISCONTINUED | OUTPATIENT
Start: 2022-12-15 | End: 2022-12-15 | Stop reason: HOSPADM

## 2022-12-15 RX ORDER — HYDROMORPHONE HYDROCHLORIDE 1 MG/ML
0.5 INJECTION, SOLUTION INTRAMUSCULAR; INTRAVENOUS; SUBCUTANEOUS EVERY 5 MIN PRN
Status: DISCONTINUED | OUTPATIENT
Start: 2022-12-15 | End: 2022-12-15 | Stop reason: HOSPADM

## 2022-12-15 RX ORDER — DEXAMETHASONE SODIUM PHOSPHATE 4 MG/ML
INJECTION, SOLUTION INTRA-ARTICULAR; INTRALESIONAL; INTRAMUSCULAR; INTRAVENOUS; SOFT TISSUE PRN
Status: DISCONTINUED | OUTPATIENT
Start: 2022-12-15 | End: 2022-12-15

## 2022-12-15 RX ORDER — SOTALOL HYDROCHLORIDE 120 MG/1
120 TABLET ORAL 2 TIMES DAILY
Status: DISCONTINUED | OUTPATIENT
Start: 2022-12-15 | End: 2022-12-16 | Stop reason: HOSPADM

## 2022-12-15 RX ORDER — ONDANSETRON 2 MG/ML
4 INJECTION INTRAMUSCULAR; INTRAVENOUS EVERY 30 MIN PRN
Status: DISCONTINUED | OUTPATIENT
Start: 2022-12-15 | End: 2022-12-15 | Stop reason: HOSPADM

## 2022-12-15 RX ADMIN — FENTANYL CITRATE 100 MCG: 50 INJECTION, SOLUTION INTRAMUSCULAR; INTRAVENOUS at 16:11

## 2022-12-15 RX ADMIN — FENTANYL CITRATE 50 MCG: 50 INJECTION, SOLUTION INTRAMUSCULAR; INTRAVENOUS at 17:26

## 2022-12-15 RX ADMIN — ARIPIPRAZOLE 2 MG: 2 TABLET ORAL at 21:17

## 2022-12-15 RX ADMIN — Medication 2 G: at 16:05

## 2022-12-15 RX ADMIN — LIDOCAINE HYDROCHLORIDE 5 ML: 10 INJECTION, SOLUTION EPIDURAL; INFILTRATION; INTRACAUDAL; PERINEURAL at 16:11

## 2022-12-15 RX ADMIN — PROPOFOL 100 MG: 10 INJECTION, EMULSION INTRAVENOUS at 16:11

## 2022-12-15 RX ADMIN — ONDANSETRON HYDROCHLORIDE 4 MG: 2 INJECTION, SOLUTION INTRAVENOUS at 16:40

## 2022-12-15 RX ADMIN — FENTANYL CITRATE 50 MCG: 50 INJECTION, SOLUTION INTRAMUSCULAR; INTRAVENOUS at 17:41

## 2022-12-15 RX ADMIN — SOTALOL HYDROCHLORIDE 120 MG: 120 TABLET ORAL at 21:18

## 2022-12-15 RX ADMIN — SODIUM CHLORIDE, POTASSIUM CHLORIDE, SODIUM LACTATE AND CALCIUM CHLORIDE: 600; 310; 30; 20 INJECTION, SOLUTION INTRAVENOUS at 16:05

## 2022-12-15 RX ADMIN — ATORVASTATIN CALCIUM 10 MG: 10 TABLET, FILM COATED ORAL at 19:59

## 2022-12-15 RX ADMIN — DEXAMETHASONE SODIUM PHOSPHATE 8 MG: 4 INJECTION, SOLUTION INTRA-ARTICULAR; INTRALESIONAL; INTRAMUSCULAR; INTRAVENOUS; SOFT TISSUE at 16:11

## 2022-12-15 RX ADMIN — ASPIRIN 81 MG: 81 TABLET, COATED ORAL at 19:59

## 2022-12-15 ASSESSMENT — ACTIVITIES OF DAILY LIVING (ADL)
ADLS_ACUITY_SCORE: 35
ADLS_ACUITY_SCORE: 37
ADLS_ACUITY_SCORE: 35

## 2022-12-15 NOTE — OP NOTE
[facility]  Operative Note    Pre-operative diagnosis: Left ureteral stone [N20.1]   Post-operative diagnosis Left ureteral stone [N20.1] and left kidney stone   Procedure: Procedure(s):  Cystoscopy  left ureteroscopy with laser lithotripsy  left ureteroscopy with stone basketing  left retrograde pyelogram  left ureteral stent removal   Fluoroscopic interpretation <1 hour physician time   Surgeon: Shawn Walker MD   Assistants(s): none   Anesthesia: General    Estimated blood loss: None    Total IV fluids: (See anesthesia record)   Blood transfusion: No transfusion was given during surgery   Total urine output: (See anesthesia record)   Drains: Left ureteral stent   Specimens: ID Type Source Tests Collected by Time Destination   A : left ureteral stones Calculus/Stone Ureter, Left STONE ANALYSIS Shawn Walker MD 12/15/2022  4:31 PM         Implants: Implant Name Type Inv. Item Serial No.  Lot No. LRB No. Used Action   STENT URETERAL POLARIS ULTRA [id number] - [id number] Stent STENT URETERAL POLARIS ULTRA [id number]  [employer] [id number] Left 1 Explanted          Findings:   Cluster of mid left ureteral stones up to 5 mm, laser fragmented and basketed  Multiple small stones embedded in left renal papillae; 3 mm lower pole stone basketed out.   Complications: None.   Condition: Stable             Description of procedure:  82-year-old female with infected obstructed left ureteral stone status post cystoscopy and left ureteral stent placement 11/3/2022 who presents today for ureteroscopic management.  She has a left ureteral stone as well as small nonobstructive left renal stones.  I discussed risks of surgery including need for secondary procedure, incomplete stone clearance, infection, hematuria, stent pain and irritation.  I discussed the rationale for likely replacing the stent.  Informed consent was obtained.    The patient was brought to  operating room #14.  Ancef antibiotics were given prior to the procedure.  General anesthesia was induced, she was placed in dorsal lithotomy position, prepped and draped in standard sterile fashion.  Timeout was performed.    A 22 [place] Storz cystoscope was assembled and passed through the urethra into the bladder.  The existing stent was noted emanating from the left ureteral orifice with mild encrustation.  The stent was grasped with a stent grasper and brought out the urethral meatus.  The stent was cannulated with a 0.035 inch stiff shaft Glidewire which was passed up to the renal pelvis under fluoroscopic guidance and the stent was removed intact and discarded.  The wire was clipped to the drapes as a safety wire.  A Storz short semirigid ureteroscope was assembled and passed through the urethra, into the bladder and up the left ureter.  The ureter was widely patent.  A cluster of mid ureteral stones was identified.  The Olympus Soltive thulium fiber laser was then used to fragment the stones into multiple pieces.  Pieces were basketed out using [employer] Halo basket.  The ureteroscope was able to pass all the way up through the ureteropelvic junction easily.  A gentle retrograde pyelogram showed mild hydronephrosis.  A second wire was then passed through the scope up into the renal pelvis and the scope was backloaded off the wire.  Over the working wire an Olympus digital flexible ureteroscope was passed up to the renal pelvis under fluoroscopic guidance and the wire was removed.  Complete pyeloscopy revealed several small embedded stones within the renal papillae.  There was a 3 mm lower pole stone which was adherent to the lower pole papilla which was basketed out using the halo basket.  Given how widely patent the ureter was I opted not to replace the stent.  Patient was cleaned up, awoken from anesthesia and brought to PACU in stable condition.  She will remain admitted overnight for observation and  discharge tomorrow to home.    Shawn Walker MD   [facility]  [phone] clinic phone

## 2022-12-15 NOTE — ANESTHESIA CARE TRANSFER NOTE
Patient: Madhavi Castellanos    Procedure: Procedure(s):  Cystoscopy, left ureteroscopy with laser lithotripsy, left ureteroscopy with stone basketing, left retrograde pyelogram and left ureteral stent removal       Diagnosis: Left ureteral stone [N20.1]  Diagnosis Additional Information: No value filed.    Anesthesia Type:   General     Note:    Oropharynx: oropharynx clear of all foreign objects  Level of Consciousness: drowsy  Oxygen Supplementation: face mask  Level of Supplemental Oxygen (L/min / FiO2): 6  Independent Airway: airway patency satisfactory and stable  Dentition: dentition unchanged  Vital Signs Stable: post-procedure vital signs reviewed and stable  Report to RN Given: handoff report given  Patient transferred to: PACU    Handoff Report: Identifed the Patient, Identified the Reponsible Provider, Reviewed the pertinent medical history, Discussed the surgical course, Reviewed Intra-OP anesthesia mangement and issues during anesthesia, Set expectations for post-procedure period and Allowed opportunity for questions and acknowledgement of understanding      Vitals:  Vitals Value Taken Time   /130 12/15/22 1730   Temp 98.2  F (36.8  C) 12/15/22 1654   Pulse 75 12/15/22 1732   Resp 24 12/15/22 1732   SpO2 96 % 12/15/22 1732   Vitals shown include unvalidated device data.    Electronically Signed By: TRESA Alejandro CRNA  December 15, 2022  5:33 PM

## 2022-12-15 NOTE — ANESTHESIA PREPROCEDURE EVALUATION
"Anesthesia Pre-Procedure Evaluation    Patient: Madhavi Castellanos   MRN: [id number] : 1940        Procedure : Procedure(s):  Cystoscopy, left ureteroscopy with laser lithotripsy, left ureteroscopy with stone basketing, left retrograde pyelogram and left ureteral stent exchange          Past Medical History:   Diagnosis Date     Asthma      Atrial fibrillation (H)     had EP ablation 2017, pt reports being \"afib free\" now     CAD (coronary artery disease)      Depression      Esophageal reflux      Hypertension      LBBB (left bundle branch block)      Liver lesion     stable, seen on multiple CT scans     Lumbosacral spondylosis      Mumps      Nonspecific reaction to tuberculin skin test without active tuberculosis(795.51)      Obesity      TALI (obstructive sleep apnea)     no cpap, has not had a sleep study     Osteoarthritis      Palpitations      Prolonged QT interval      Pure hypercholesterolemia      Spinal stenosis of lumbar region      STD (sexually transmitted disease)      Tuberculosis       Past Surgical History:   Procedure Laterality Date     CHOLECYSTECTOMY       COLONOSCOPY N/A 2019    Procedure: COLONOSCOPY;  Surgeon: Darron Cunha MD;  Location:  GI     CYSTOSCOPY, RETROGRADES, INSERT STENT URETER(S), COMBINED Left 11/3/2022    Procedure: CYSTOSCOPY, WITH LEFT RETROGRADE PYELOGRAM AND LEFT URETERAL STENT INSERTION;  Surgeon: Shawn Walker MD;  Location: [facility] OR     EP ABLATION / EP STUDIES  2017    for hx a fib     ESOPHAGOSCOPY, GASTROSCOPY, DUODENOSCOPY (EGD), COMBINED  2014    reactive gastropathy, H. Pylori negative ([facility])     EXCISE VULVA WIDE LOCAL N/A 2017    Procedure: EXCISE VULVA WIDE LOCAL;  Wide Local Excision of Vulvar Lesion   ;  Surgeon: Nazario Tirado MD;  Location: [facility] OR     IRRIGATION AND DEBRIDEMENT LOWER EXTREMITY, COMBINED Left 2020    Procedure: IRRIGATION AND DEBRIDEMENT, LOWER EXTREMITY with negative pressure dressing " "placement, left knee arthrotomy;  Surgeon: Josef Peterson MD;  Location: [facility] OR      Allergies   Allergen Reactions     Metoprolol Shortness Of Breath     2 hours after tasking for the first time SOB, Pt evaluated and pt WDL       Apixaban GI Disturbance     GI bleed     Albuterol Unknown     Augmentin Nausea     Codeine Sulfate Nausea     \"i dont't know, nothing, maybe sick to my stomach\" takes hydrocodone     Cymbalta      agitated     Lisinopril Cough     Omnicef [Cefdinir] Diarrhea     Percocet [Oxycodone-Acetaminophen]      nauseated     Prednisone Other (See Comments)     Made her aggressive.     Duloxetine Anxiety      Social History     Tobacco Use     Smoking status: Never     Smokeless tobacco: Never   Substance Use Topics     Alcohol use: No      Wt Readings from Last 1 Encounters:   12/15/22 73.5 kg (162 lb)        Anesthesia Evaluation            ROS/MED HX  ENT/Pulmonary:     (+) sleep apnea, doesn't use CPAP, asthma     Neurologic:       Cardiovascular:     (+) Dyslipidemia hypertension--CAD ---Previous cardiac testing   Echo: Date: 2020 Results:  Diastolic Doppler findings (E/E' ratio and/or other parameters) suggest left  ventricular filling pressures are increased.  Septal motion is consistent with conduction abnormality.  There is mild right ventricular hypertrophy.  Right ventricular systolic pressure is elevated, consistent with moderate  pulmonary hypertension.  Indeterminate rhythm-Rate <100 regular, wide but no A wave on MV inflow.  Please correlate with 12 lead ecg  _____________________________________________________________________________  __        Left Ventricle  The left ventricle is normal in size. There is normal left ventricular wall  thickness. The visual ejection fraction is estimated at 55-60%. Diastolic  function not assessed due to arrhythmia. Diastolic Doppler findings (E/E'  ratio and/or other parameters) suggest left ventricular filling pressures are  increased. " Septal motion is consistent with conduction abnormality.     Right Ventricle  The right ventricle is normal size. There is mild right ventricular  hypertrophy. The right ventricular systolic function is normal.  Stress Test: Date: Results:    ECG Reviewed: Date: Results:    Cath: Date: Results:      METS/Exercise Tolerance:     Hematologic:       Musculoskeletal:   (+) arthritis,     GI/Hepatic:     (+) GERD, Asymptomatic on medication,     Renal/Genitourinary:     (+) renal disease, Nephrolithiasis ,     Endo:     (+) Obesity,     Psychiatric/Substance Use:     (+) psychiatric history depression H/O chronic opiod use .     Infectious Disease:       Malignancy:       Other:      (+) , H/O Chronic Pain,        Physical Exam    Airway        Mallampati: II   TM distance: > 3 FB   Neck ROM: full   Mouth opening: > 3 cm    Respiratory Devices and Support         Dental  no notable dental history         Cardiovascular          Rhythm and rate: regular and normal     Pulmonary   pulmonary exam normal                OUTSIDE LABS:  CBC:   Lab Results   Component Value Date    WBC 17.6 (H) 11/05/2022    WBC 13.2 (H) 11/04/2022    HGB 14.4 11/04/2022    HGB 14.9 11/03/2022    HCT 45.8 11/04/2022    HCT 47.0 11/03/2022     11/04/2022     11/03/2022     BMP:   Lab Results   Component Value Date     11/04/2022     11/03/2022    POTASSIUM 3.7 11/05/2022    POTASSIUM 3.8 11/04/2022    CHLORIDE 103 11/04/2022    CHLORIDE 102 11/03/2022    CO2 27 11/04/2022    CO2 24 11/03/2022    BUN 16.5 11/04/2022    BUN 23.8 (H) 11/03/2022    CR 0.52 11/04/2022    CR 0.53 11/03/2022     (H) 11/04/2022     (H) 11/04/2022     COAGS:   Lab Results   Component Value Date    PTT 26 03/12/2020    INR 1.03 03/12/2020     POC:   Lab Results   Component Value Date    BGM 90 11/28/2020     HEPATIC:   Lab Results   Component Value Date    ALBUMIN 3.6 11/02/2022    PROTTOTAL 6.5 11/02/2022    ALT 35 11/02/2022    AST  32 11/02/2022    ALKPHOS 116 (H) 11/02/2022    BILITOTAL 1.1 11/02/2022     OTHER:   Lab Results   Component Value Date    PH 7.46 (H) 11/02/2022    LACT 1.9 11/02/2022    LUIS ANTONIO 8.7 (L) 11/04/2022    MAG 1.9 11/02/2022    LIPASE 63 (L) 07/23/2016    TSH 1.41 11/02/2022       Anesthesia Plan    ASA Status:  3   NPO Status:  NPO Appropriate    Anesthesia Type: General.     - Airway: LMA   Induction: Intravenous.   Maintenance: Balanced.        Consents    Anesthesia Plan(s) and associated risks, benefits, and realistic alternatives discussed. Questions answered and patient/representative(s) expressed understanding.    - Discussed:     - Discussed with:  Patient      - Extended Intubation/Ventilatory Support Discussed: No.      - Patient is DNR/DNI Status: No    Use of blood products discussed: No .     Postoperative Care    Pain management: IV analgesics.   PONV prophylaxis: Ondansetron (or other 5HT-3), Dexamethasone or Solumedrol     Comments:                Corby Castellanos MD

## 2022-12-16 VITALS
OXYGEN SATURATION: 96 % | TEMPERATURE: 97.8 F | HEIGHT: 61 IN | RESPIRATION RATE: 20 BRPM | DIASTOLIC BLOOD PRESSURE: 86 MMHG | BODY MASS INDEX: 30.58 KG/M2 | WEIGHT: 162 LBS | HEART RATE: 74 BPM | SYSTOLIC BLOOD PRESSURE: 138 MMHG

## 2022-12-16 LAB — GLUCOSE BLDC GLUCOMTR-MCNC: 130 MG/DL (ref 70–99)

## 2022-12-16 PROCEDURE — 250N000013 HC RX MED GY IP 250 OP 250 PS 637: Performed by: STUDENT IN AN ORGANIZED HEALTH CARE EDUCATION/TRAINING PROGRAM

## 2022-12-16 PROCEDURE — 82962 GLUCOSE BLOOD TEST: CPT

## 2022-12-16 RX ORDER — ACETAMINOPHEN 500 MG
1000 TABLET ORAL EVERY 6 HOURS PRN
Qty: 100 TABLET | Refills: 0 | Status: SHIPPED | OUTPATIENT
Start: 2022-12-16

## 2022-12-16 RX ADMIN — BUPROPION HYDROCHLORIDE 450 MG: 150 TABLET, EXTENDED RELEASE ORAL at 08:01

## 2022-12-16 RX ADMIN — ARIPIPRAZOLE 2 MG: 2 TABLET ORAL at 08:01

## 2022-12-16 RX ADMIN — METHIMAZOLE 5 MG: 5 TABLET ORAL at 08:04

## 2022-12-16 RX ADMIN — SOTALOL HYDROCHLORIDE 120 MG: 120 TABLET ORAL at 08:01

## 2022-12-16 RX ADMIN — LOSARTAN POTASSIUM 25 MG: 25 TABLET, FILM COATED ORAL at 08:01

## 2022-12-16 ASSESSMENT — ACTIVITIES OF DAILY LIVING (ADL)
ADLS_ACUITY_SCORE: 37

## 2022-12-16 NOTE — ANESTHESIA POSTPROCEDURE EVALUATION
Patient: Madhavi Castellanos    Procedure: Procedure(s):  Cystoscopy, left ureteroscopy with laser lithotripsy, left ureteroscopy with stone basketing, left retrograde pyelogram and left ureteral stent removal       Anesthesia Type:  General    Note:  Disposition: Outpatient   Postop Pain Control: Uneventful            Sign Out: Well controlled pain   PONV: No   Neuro/Psych: Uneventful            Sign Out: Acceptable/Baseline neuro status   Airway/Respiratory: Uneventful            Sign Out: Acceptable/Baseline resp. status   CV/Hemodynamics: Uneventful            Sign Out: Acceptable CV status   Other NRE: NONE   DID A NON-ROUTINE EVENT OCCUR? No           Last vitals:  Vitals Value Taken Time   /90 12/15/22 1745   Temp 97.4  F (36.3  C) 12/15/22 1745   Pulse 75 12/15/22 1758   Resp 15 12/15/22 1758   SpO2 95 % 12/15/22 1752   Vitals shown include unvalidated device data.    Electronically Signed By: Corby Castellanos MD  December 15, 2022  6:16 PM

## 2022-12-16 NOTE — PLAN OF CARE
"PRIMARY DIAGNOSIS: ACUTE RENAL COLIC/Stent pulled    OBSERVATION GOALS TO BE MET BEFORE DISCHARGE  1. Pain Status: Improved-controlled with oral pain medications.    2. Tolerating adequate PO diet: Yes    3. Surgical Intervention planned: Yes    4. Cleared by consultants (if involved): No    5. Return to near baseline physical activity: No    Discharge Planner Nurse   Safe discharge environment identified: Yes  Barriers to discharge: Yes       Entered by: Mera Wilburn RN 12/15/2022           /79 (BP Location: Right arm)   Pulse 71   Temp 98  F (36.7  C) (Oral)   Resp 16   Ht 1.549 m (5' 1\")   Wt 73.5 kg (162 lb)   SpO2 96%   BMI 30.61 kg/m     Patient A/O x4, indep at home but Assist of one now. Lung sounds CTA, bowel sounds active x4. Pt hand stent pulled at ED, c/o mild pain which she explained can tolerate. Able to take oral meds, SL, voiding adequately, urology following.    Please review provider order for any additional goals.   Nurse to notify provider when observation goals have been met and patient is ready for discharge.      "

## 2022-12-16 NOTE — DISCHARGE INSTRUCTIONS
POSTOPERATIVE INSTRUCTIONS    Diagnosis-------------------------------   Left ureteral and renal stone    Procedure-------------------------------  Procedure(s) (LRB):  Cystoscopy, left ureteroscopy with laser lithotripsy, left ureteroscopy with stone basketing, left retrograde pyelogram, left ureteral stent removal, fluoroscopic interpretation <1 hour physician time (Left)      Findings--------------------------------  Cluster of mid left ureteral stones up to 5 mm, laser fragmented and basketed  Multiple small stones embedded in left renal papillae; 3 mm lower pole stone basketed out.    Home-going instructions-----------------         Activity Limitation:     - No driving or operating heavy machinery while on narcotic pain medication.     FOLLOW THESE INSTRUCTIONS AS INDICATED BELOW:  - Observe operative area for signs of excessive bleeding.  - You may shower.  - Increase fluid intake to promote clear urine.  - Resume usual diet as tolerated    What to expect while recovering-----------  - You may experience some intermittent bleeding that makes your urine pink or cherry colored. This is normal.  - However, if you are unable to urinate, passing large amount of clots, have elisabeth blood in your urine, or have a temperature >101 degrees, call the urology nurse on call, or present to your nearest emergency department.  - You are encouraged to walk daily, and have no activity restrictions.     Discharge Medications/instructions:   - Take Tylenol 1000mg every 6 hours for pain        Questions/concerns------------------------  [facility]: [phone]    Future appointments  Follow up in about 2 months with a renal ultrasound and litholink x2      Shawn Walker MD

## 2022-12-16 NOTE — DISCHARGE SUMMARY
[facility] Discharge Summary    Madhavi Castellanos MRN# [id number]   Age: 82 year old YOB: 1940     Date of Admission:  12/15/2022  Date of Discharge::  12/16/2022  Admitting Physician:  Shawn Walker MD  Discharge Physician:  Shawn Walker MD             Admission Diagnoses:   Left ureteral and kidney stones          Discharge Diagnosis:   Left ureteral and kidney stones         Procedures:   Procedure(s): Cystoscopy  left ureteroscopy with laser lithotripsy  left ureteroscopy with stone basketing  left retrograde pyelogram  left ureteral stent removal   Fluoroscopic interpretation <1 hour physician time       No procedures performed during this admission           Medications Prior to Admission:     Medications Prior to Admission   Medication Sig Dispense Refill Last Dose     ARIPiprazole (ABILIFY) 2 MG tablet Take 2 mg by mouth daily   12/14/2022     aspirin 81 MG EC tablet Take 81 mg by mouth daily   12/2/2022     atorvastatin (LIPITOR) 10 MG tablet Take 10 mg by mouth every evening    12/14/2022     buPROPion (WELLBUTRIN XL) 150 MG 24 hr tablet Take 450 mg by mouth daily   12/15/2022     diphenhydrAMINE-acetaminophen (TYLENOL PM)  MG tablet Take 2 tablets by mouth At Bedtime   12/14/2022     fentaNYL (DURAGESIC) 25 mcg/hr 72 hr patch Place 1 patch onto the skin every 72 hours 10 patch 0 12/15/2022     hydrochlorothiazide (MICROZIDE) 12.5 MG capsule Take 12.5 mg by mouth daily as needed   Past Month     HYDROcodone-acetaminophen (NORCO) 7.5-325 MG per tablet Take 1 tablet by mouth every 8 hours as needed (chronic pain) Maximum 2 tabs per day   12/15/2022     losartan (COZAAR) 25 MG tablet Take 25 mg by mouth daily   12/15/2022     methimazole (TAPAZOLE) 5 MG tablet Take 5 mg by mouth five times a week Monday through Friday   12/15/2022     methocarbamol (ROBAXIN) 750 MG tablet Take 750 mg by mouth 4 times daily as needed   12/15/2022     Multiple Vitamins-Minerals  (MULTIVITAMIN WOMEN 50+) TABS Take 1 tablet by mouth daily   12/14/2022     polyethylene glycol (MIRALAX) packet Take 1 packet by mouth daily as needed for constipation   11/24/2022     simethicone (MYLICON) 80 MG chewable tablet Take  mg by mouth every 6 hours as needed for flatulence or cramping   12/15/2022     sotalol (BETAPACE) 120 MG tablet Take 120 mg by mouth 2 times daily   12/15/2022     [DISCONTINUED] acetaminophen (TYLENOL) 500 MG tablet Take 500 mg by mouth daily as needed for mild pain   11/24/2022             Discharge Medications:     Current Discharge Medication List      CONTINUE these medications which have CHANGED    Details   !! acetaminophen (TYLENOL) 500 MG tablet Take 2 tablets (1,000 mg) by mouth every 6 hours as needed for mild pain  Qty: 100 tablet, Refills: 0    Associated Diagnoses: Kidney stone      !! acetaminophen (TYLENOL) 500 MG tablet Take 2 tablets (1,000 mg) by mouth every 6 hours as needed for mild pain  Qty: 100 tablet, Refills: 0    Associated Diagnoses: Kidney stone       !! - Potential duplicate medications found. Please discuss with provider.      CONTINUE these medications which have NOT CHANGED    Details   ARIPiprazole (ABILIFY) 2 MG tablet Take 2 mg by mouth daily      aspirin 81 MG EC tablet Take 81 mg by mouth daily      atorvastatin (LIPITOR) 10 MG tablet Take 10 mg by mouth every evening       buPROPion (WELLBUTRIN XL) 150 MG 24 hr tablet Take 450 mg by mouth daily      diphenhydrAMINE-acetaminophen (TYLENOL PM)  MG tablet Take 2 tablets by mouth At Bedtime      fentaNYL (DURAGESIC) 25 mcg/hr 72 hr patch Place 1 patch onto the skin every 72 hours  Qty: 10 patch, Refills: 0    Associated Diagnoses: Chronic use of opiate for therapeutic purpose      hydrochlorothiazide (MICROZIDE) 12.5 MG capsule Take 12.5 mg by mouth daily as needed      HYDROcodone-acetaminophen (NORCO) 7.5-325 MG per tablet Take 1 tablet by mouth every 8 hours as needed (chronic pain)  Maximum 2 tabs per day      losartan (COZAAR) 25 MG tablet Take 25 mg by mouth daily      methimazole (TAPAZOLE) 5 MG tablet Take 5 mg by mouth five times a week Monday through Friday      methocarbamol (ROBAXIN) 750 MG tablet Take 750 mg by mouth 4 times daily as needed      Multiple Vitamins-Minerals (MULTIVITAMIN WOMEN 50+) TABS Take 1 tablet by mouth daily      polyethylene glycol (MIRALAX) packet Take 1 packet by mouth daily as needed for constipation      simethicone (MYLICON) 80 MG chewable tablet Take  mg by mouth every 6 hours as needed for flatulence or cramping      sotalol (BETAPACE) 120 MG tablet Take 120 mg by mouth 2 times daily                   Consultations:   No consultations were requested during this admission          Brief History of Illness:   82-year-old female with infected obstructed left ureteral stone status post cystoscopy and left ureteral stent placement 11/3/2022 who presents for ureteroscopic management.             Hospital Course:   The patient's hospital course was unremarkable.  She recovered as anticipated and experienced no post-operative complications.          Discharge Instructions and Follow-Up:   Discharge diet: Regular   Discharge activity: Activity as tolerated   Discharge follow-up: Follow up in about 2 months with a renal ultrasound and litholink x2   Wound care: Drink plenty of fluids           Discharge Disposition:   Discharged to home      Attestation:  I have reviewed today's vital signs, notes, medications, labs and imaging.  Amount of time performed on this discharge summary: 15 minutes.    Shawn Walker MD

## 2022-12-16 NOTE — PLAN OF CARE
"PRIMARY DIAGNOSIS: Left kidney stone/stent pulled  OUTPATIENT/OBSERVATION GOALS TO BE MET BEFORE DISCHARGE:  ADLs back to baseline: Yes     Activity and level of assistance: Ambulating independently.     Pain status: Pain free.     Return to near baseline physical activity: Yes          Discharge Planner Nurse   Safe discharge environment identified: Yes  Barriers to discharge: No       Please review provider order for any additional goals.   Nurse to notify provider when observation goals have been met and patient is ready for discharge.     /68 (BP Location: Left arm)   Pulse 85   Temp 98.2  F (36.8  C) (Oral)   Resp 20   Ht 1.549 m (5' 1\")   Wt 73.5 kg (162 lb)   SpO2 95%   BMI 30.61 kg/m       Pt ambulated to bathroom. Denies pain. Will continue to monitor.   "

## 2022-12-16 NOTE — PLAN OF CARE
Patient's After Visit Summary was reviewed with patient.   Patient verbalized understanding of After Visit Summary, recommended follow up and was given an opportunity to ask questions.   Discharge medications sent home with patient/family: YES, home medications returned that pt brought in upon hospital arrival. New Rx also given.    Discharged with other: self, drove car to hospital.

## 2022-12-16 NOTE — PLAN OF CARE
GROOM #; 106-1    Living Situation (if not independent, order SW consult):  Facility name: [facility]  : Ayse, friend and brother Blanco Urrutia    Activity level at baseline: Indep  Activity level on admit: Assist os 1, SBA    Who will be transporting you at discharge: Ayse    Patient registered to observation; given Patient Bill of Rights; given the opportunity to ask questions about observation status and their plan of care.  Patient has been oriented to the observation room, bathroom and call light is in place.    Discussed discharge goals and expectations with patient/family.

## 2022-12-16 NOTE — PLAN OF CARE
"PRIMARY DIAGNOSIS: Left kidney stone/stent pulled  OUTPATIENT/OBSERVATION GOALS TO BE MET BEFORE DISCHARGE:  ADLs back to baseline: Yes    Activity and level of assistance: Ambulating independently.    Pain status: Pain free.    Return to near baseline physical activity: Yes     Discharge Planner Nurse   Safe discharge environment identified: Yes  Barriers to discharge: No       Entered by: Kecia Mcqueen RN 12/16/2022 12:25 AM     Please review provider order for any additional goals.   Nurse to notify provider when observation goals have been met and patient is ready for discharge.    BP (!) 145/76 (BP Location: Left arm)   Pulse 84   Temp 98.2  F (36.8  C) (Oral)   Resp 18   Ht 1.549 m (5' 1\")   Wt 73.5 kg (162 lb)   SpO2 96%   BMI 30.61 kg/m       Pt doing well- denies pain. VSS. Ambulates with cane. Voiding. Will continue to monitor.   "

## 2022-12-16 NOTE — PLAN OF CARE
"PRIMARY DIAGNOSIS: Left kidney stone/stent pulled   OUTPATIENT/OBSERVATION GOALS TO BE MET BEFORE DISCHARGE:  1. ADLs back to baseline: Yes    2. Activity and level of assistance: Ambulating independently.    3. Pain status: Pain free.    4. Return to near baseline physical activity: Yes     Discharge Planner Nurse   Safe discharge environment identified: Yes  Barriers to discharge: No       Entered by: Saida Denny 12/16/2022 9:38 AM     Please review provider order for any additional goals.   Nurse to notify provider when observation goals have been met and patient is ready for discharge.    /86 (BP Location: Left arm)   Pulse 74   Temp 97.8  F (36.6  C) (Oral)   Resp 20   Ht 1.549 m (5' 1\")   Wt 73.5 kg (162 lb)   SpO2 96%   BMI 30.61 kg/m        Discharge today with outpt follow ups. Urine WNL. Denies pain.   "

## 2022-12-18 LAB
APPEARANCE STONE: NORMAL
COMPN STONE: NORMAL
SPECIMEN WT: 67 MG

## 2022-12-20 ENCOUNTER — TELEPHONE (OUTPATIENT)
Dept: UROLOGY | Facility: CLINIC | Age: 82
End: 2022-12-20

## 2022-12-20 NOTE — TELEPHONE ENCOUNTER
[facility] Call Center    Phone Message    May a detailed message be left on voicemail: yes     Reason for Call: Order(s): Other:   Reason for requested: renal us orders- wanting to go to [facility]  Date needed: asap  Provider name: Dr. Walker      Action Taken: Message routed to:  Other: [facility]    Travel Screening: Not Applicable

## 2023-01-03 ENCOUNTER — TELEPHONE (OUTPATIENT)
Dept: UROLOGY | Facility: CLINIC | Age: 83
End: 2023-01-03

## 2023-01-03 NOTE — TELEPHONE ENCOUNTER
[facility] Call Center    Phone Message    May a detailed message be left on voicemail: yes     Reason for Call: Patient called wanting to know what this litholink she got sent in the mail because she was said she was never informed that it was coming. She also said she is supposed to be doing something on 1/17 and not sure what. Please call patient to advise. Thank you.    Action Taken: Message routed to:  Other: [facility]    Travel Screening: Not Applicable

## 2023-02-23 ENCOUNTER — VIRTUAL VISIT (OUTPATIENT)
Dept: UROLOGY | Facility: CLINIC | Age: 83
End: 2023-02-23
Payer: COMMERCIAL

## 2023-02-23 VITALS — HEIGHT: 61 IN | WEIGHT: 155 LBS | BODY MASS INDEX: 29.27 KG/M2

## 2023-02-23 DIAGNOSIS — E83.59 CALCIUM OXALATE CALCULUS: Primary | ICD-10-CM

## 2023-02-23 DIAGNOSIS — N20.1 LEFT URETERAL STONE: ICD-10-CM

## 2023-02-23 DIAGNOSIS — N20.0 RIGHT KIDNEY STONE: ICD-10-CM

## 2023-02-23 PROCEDURE — 99213 OFFICE O/P EST LOW 20 MIN: CPT | Mod: TEL | Performed by: STUDENT IN AN ORGANIZED HEALTH CARE EDUCATION/TRAINING PROGRAM

## 2023-02-23 ASSESSMENT — PAIN SCALES - GENERAL: PAINLEVEL: NO PAIN (0)

## 2023-02-23 NOTE — PROGRESS NOTES
"Madhavi is a 82 year old who is being evaluated via a billable telephone visit.      What phone number would you like to be contacted at?[phone]  How would you like to obtain your AVS? Mail a copy  Distant Location (provider location):  Off-site    Assessment & Plan   Problem List Items Addressed This Visit    None  Visit Diagnoses     Calcium oxalate calculus    -  Primary    Right kidney stone             Small 4 mm right upper pole kidney stone, asymptomatic. Advised her to present to hospital if she develops right sided flank pain especially if in the setting of uncontrolled nausea or vomiting or fever/chills. For now will observe. She is potentially interested in surgical treatment. Will get KUB and ultrasound to assess. If visible on KUB could potentially do a preemptive ESWL (however the HU on the stone is low at around 500 so might not be visible on x-ray). She had a lot of complaints about the stent previously so want to avoid ureteroscopy    Independent interpretation of a test performed by another physician/other qualified health care professional (not separately reported) - ct scan  No LOS data to display   Time spent doing chart review, history and exam, documentation and further activities per the note       BMI:   Estimated body mass index is 29.29 kg/m  as calculated from the following:    Height as of this encounter: 1.549 m (5' 1\").    Weight as of this encounter: 70.3 kg (155 lb).       FURTHER TESTING:       - renal ultrasound, kub       - litholink x2    Follow up with me in about 3-4 months      Shawn Walker MD  [facility] [place]    Discussed caox stone prevention diet. She eats a lot of salt. She likes spinach and has multiple boxes of spinach in the freezer. She drinks a lot of Ensure because she doesn't eat much. Advised her to increase fluid intake, avoid salt, avoid animal protein,    Subjective   Madhavi is a 82 year old, presenting for the following " health issues:  Kidney stone followup    HPI     83 yo F with h/o infected obstructed left ureteral stone s/p cysto and left ureteral stent placement 11/3/2022 followed by left URS 12/15/2022 (stent was not replaced at time). She had been having significant stent pain and irritation and hematuria so she was glad to not have a stent.    She has not completed the litholink yet. She wanted to have a discussion prior to doing this testing    Her father had a lot of kidney stones    Review of Systems   Constitutional, HEENT, cardiovascular, pulmonary, gi and gu systems are negative, except as otherwise noted.      Objective       155#    Vitals:  No vitals were obtained today due to virtual visit.    Physical Exam   healthy, alert and no distress  PSYCH: Alert and oriented times 3; coherent speech, normal   rate and volume, able to articulate logical thoughts, able   to abstract reason, no tangential thoughts, no hallucinations   or delusions  Her affect is normal  RESP: No cough, no audible wheezing, able to talk in full sentences  Remainder of exam unable to be completed due to telephone visits    Admission on 12/15/2022, Discharged on 12/16/2022   Component Date Value Ref Range Status     Stone Mass 12/15/2022 67  mg Final     Calculi Description 12/15/2022 See Note   Final    Specimen consists of four brown calculi fragments.  The total weight is 67 mg.     Stone Composition 12/15/2022 See Note   Final    Calculi composed primarily of  calcium oxalate monohydrate.  INTERPRETIVE INFORMATION: Calculi (Stone) analysis    Calculi are the products of physiological processes that   yield crystalline compounds in a matrix of biological   compounds and blood.  Matrix components are not reported.    The clinically significant crystalline components   identified in calculi specimens are reported.  Gross   description may not be consistent with composition   determined by FTIR analysis.  Performed By: [place] [address]  : Efrem Guadalupe MD, PhD     GLUCOSE BY METER POCT 12/16/2022 130 (H)  70 - 99 mg/dL Final           Ct 11/2/2022 still has a small right sided upper pole stone about 4 mm only about 500 HU            Phone call duration: 12 minutes

## 2023-02-23 NOTE — LETTER
"2/23/2023       RE: Madhavi Castellanos  [address] [facility] [address]     Dear Colleague,    Thank you for referring your patient, Madhavi Castellanos, to the [facility] [place] at [facility]. Please see a copy of my visit note below.    Madhavi is a 82 year old who is being evaluated via a billable telephone visit.      What phone number would you like to be contacted at?[phone]  How would you like to obtain your AVS? Mail a copy  Distant Location (provider location):  Off-site    Assessment & Plan   Problem List Items Addressed This Visit    None  Visit Diagnoses     Calcium oxalate calculus    -  Primary    Right kidney stone             Small 4 mm right upper pole kidney stone, asymptomatic. Advised her to present to hospital if she develops right sided flank pain especially if in the setting of uncontrolled nausea or vomiting or fever/chills. For now will observe. She is potentially interested in surgical treatment. Will get KUB and ultrasound to assess. If visible on KUB could potentially do a preemptive ESWL (however the HU on the stone is low at around 500 so might not be visible on x-ray). She had a lot of complaints about the stent previously so want to avoid ureteroscopy    Independent interpretation of a test performed by another physician/other qualified health care professional (not separately reported) - ct scan  No LOS data to display   Time spent doing chart review, history and exam, documentation and further activities per the note       BMI:   Estimated body mass index is 29.29 kg/m  as calculated from the following:    Height as of this encounter: 1.549 m (5' 1\").    Weight as of this encounter: 70.3 kg (155 lb).       FURTHER TESTING:       - renal ultrasound, kub       - litholink x2    Follow up with me in about 3-4 months      Shawn Walker MD  [facility]    Discussed caox " stone prevention diet. She eats a lot of salt. She likes spinach and has multiple boxes of spinach in the freezer. She drinks a lot of Ensure because she doesn't eat much. Advised her to increase fluid intake, avoid salt, avoid animal protein,    Subjective   Madhavi is a 82 year old, presenting for the following health issues:  Kidney stone followup    HPI     81 yo F with h/o infected obstructed left ureteral stone s/p cysto and left ureteral stent placement 11/3/2022 followed by left URS 12/15/2022 (stent was not replaced at time). She had been having significant stent pain and irritation and hematuria so she was glad to not have a stent.    She has not completed the litholink yet. She wanted to have a discussion prior to doing this testing    Her father had a lot of kidney stones    Review of Systems   Constitutional, HEENT, cardiovascular, pulmonary, gi and gu systems are negative, except as otherwise noted.     Objective       155#    Vitals:  No vitals were obtained today due to virtual visit.    Physical Exam   healthy, alert and no distress  PSYCH: Alert and oriented times 3; coherent speech, normal   rate and volume, able to articulate logical thoughts, able   to abstract reason, no tangential thoughts, no hallucinations   or delusions  Her affect is normal  RESP: No cough, no audible wheezing, able to talk in full sentences  Remainder of exam unable to be completed due to telephone visits    Admission on 12/15/2022, Discharged on 12/16/2022   Component Date Value Ref Range Status     Stone Mass 12/15/2022 67  mg Final     Calculi Description 12/15/2022 See Note   Final    Specimen consists of four brown calculi fragments.  The total weight is 67 mg.     Stone Composition 12/15/2022 See Note   Final    Calculi composed primarily of  calcium oxalate monohydrate.  INTERPRETIVE INFORMATION: Calculi (Stone) analysis    Calculi are the products of physiological processes that   yield crystalline compounds in a  matrix of biological   compounds and blood.  Matrix components are not reported.    The clinically significant crystalline components   identified in calculi specimens are reported.  Gross   description may not be consistent with composition   determined by FTIR analysis.  Performed By: [facility]  [address] [place] [id number]  : Efrem Guadalupe MD, PhD     GLUCOSE BY METER POCT 12/16/2022 130 (H)  70 - 99 mg/dL Final           Ct 11/2/2022 still has a small right sided upper pole stone about 4 mm only about 500 HU           Phone call duration: 12 minutes

## 2023-03-13 ENCOUNTER — TELEPHONE (OUTPATIENT)
Dept: UROLOGY | Facility: CLINIC | Age: 83
End: 2023-03-13

## 2023-03-13 NOTE — TELEPHONE ENCOUNTER
Called  She wants to do renal ultrasound and KUB at  [facility]   She will call back with  Fax # to fax orders to . Instructed  Her to have films pushed over  To  [place]

## 2023-03-13 NOTE — TELEPHONE ENCOUNTER
[facility] Call Center    Phone Message    May a detailed message be left on voicemail: yes     Reason for Call: Other: Patient calling stating she would like to speak with providers nurse in regards to a Renal Ultrasound and some questions about an appointment afterwards. Please contact patient in regards to this message. Thank you     Action Taken: Other: Urology    Travel Screening: Not Applicable

## 2023-03-25 ENCOUNTER — HEALTH MAINTENANCE LETTER (OUTPATIENT)
Age: 83
End: 2023-03-25

## 2023-03-28 ENCOUNTER — TELEPHONE (OUTPATIENT)
Dept: UROLOGY | Facility: CLINIC | Age: 83
End: 2023-03-28
Payer: COMMERCIAL

## 2023-03-28 NOTE — TELEPHONE ENCOUNTER
[facility] Call Center    Phone Message    May a detailed message be left on voicemail: yes     Reason for Call: Pt calling about some symptoms she had last week (pain, bleeding, vomiting and diarrhea.) Pt wondering if she needs a stent placed. Please call pt to discuss. Thank you    Action Taken: Message routed to:  Other: Uro    Travel Screening: Not Applicable

## 2023-03-31 NOTE — TELEPHONE ENCOUNTER
Patients symptoms have resolved.  Can we see if we can get her sooner than June?  Arminda Abdi LPN

## 2023-04-03 NOTE — TELEPHONE ENCOUNTER
The patient called again regarding a sooner appointment than June. She is also wondering if the clinic received the x-ray and US results from [facility]? She is asking for a call back. Thank you.

## 2023-05-19 NOTE — BRIEF OP NOTE
[facility] Brief Operative Note    Pre-operative diagnosis: Left ureteral stone [N20.1]   Post-operative diagnosis Left ureteral stone [N20.1]   Procedure: Procedure(s):  CYSTOSCOPY, WITH LEFT RETROGRADE PYELOGRAM AND LEFT URETERAL STENT INSERTION   FLUOROSCOPIC INTERPRETATION <1 HOUR PHYSICIAN TIME   Surgeon(s): Surgeon(s) and Role:     * Shawn Walker MD - Primary   Estimated blood loss: * No values recorded between 11/3/2022  7:16 PM and 11/3/2022  7:26 PM *    Specimens: * No specimens in log *   Findings: Mild left hydroureteronephrosis with faint left mid ureteral filling defect consistent with stone, stent placed      29 y/o male, PMH of oral herpes, hair loss, taking meds to prevent HIV, recent diagnosis  of anal warts. 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 Pre op testing today.

## 2023-06-09 ENCOUNTER — HOSPITAL ENCOUNTER (OUTPATIENT)
Facility: CLINIC | Age: 83
End: 2023-06-09
Attending: STUDENT IN AN ORGANIZED HEALTH CARE EDUCATION/TRAINING PROGRAM | Admitting: STUDENT IN AN ORGANIZED HEALTH CARE EDUCATION/TRAINING PROGRAM
Payer: COMMERCIAL

## 2023-06-09 ENCOUNTER — OFFICE VISIT (OUTPATIENT)
Dept: UROLOGY | Facility: CLINIC | Age: 83
End: 2023-06-09
Payer: COMMERCIAL

## 2023-06-09 VITALS
BODY MASS INDEX: 29.83 KG/M2 | HEIGHT: 61 IN | DIASTOLIC BLOOD PRESSURE: 97 MMHG | WEIGHT: 158 LBS | HEART RATE: 76 BPM | SYSTOLIC BLOOD PRESSURE: 148 MMHG

## 2023-06-09 DIAGNOSIS — N20.0 RIGHT KIDNEY STONE: Primary | ICD-10-CM

## 2023-06-09 PROCEDURE — 99214 OFFICE O/P EST MOD 30 MIN: CPT | Performed by: STUDENT IN AN ORGANIZED HEALTH CARE EDUCATION/TRAINING PROGRAM

## 2023-06-09 RX ORDER — CEFAZOLIN SODIUM 2 G/50ML
2 SOLUTION INTRAVENOUS
Status: CANCELLED | OUTPATIENT
Start: 2023-06-09

## 2023-06-09 RX ORDER — CEFAZOLIN SODIUM 2 G/50ML
2 SOLUTION INTRAVENOUS SEE ADMIN INSTRUCTIONS
Status: CANCELLED | OUTPATIENT
Start: 2023-06-09

## 2023-06-09 ASSESSMENT — PAIN SCALES - GENERAL: PAINLEVEL: EXTREME PAIN (8)

## 2023-06-09 NOTE — LETTER
"6/9/2023       RE: Madhavi Castellanos  [address] [facility] [address]     Dear Colleague,    Thank you for referring your patient, Madhavi Castellanos, to the [facility]OLOG[facility] [place] at [facility]. Please see a copy of my visit note below.    Chief Complaint   Patient presents with    Kidney Stone(s) Followup     Pt here for follow up to review imaging       Pt did not do litholink, pt states she called in and was told it was not need and discarded litholink supplies.     Asiha Rose, [facility]    CHIEF COMPLAINT   Madhavi Castellanos who is a 83 year old female returns today for follow-up of nephrolithiasis (h/o infected obstructed left ureteral stone s/p cysto and left ureteral stent placement 11/3/2022 followed by left URS 12/15/2022), with right nonobstructive stone    HPI   Madhavi Castellanos is a 83 year old female who presents with a history of nephrolithiasis (h/o infected obstructed left ureteral stone s/p cysto and left ureteral stent placement 11/3/2022 followed by left URS 12/15/2022), with right nonobstructive stone.    She is on ASA, s/p Watchman    Denies any flank pain    She did not do a litholink as recommended    PHYSICAL EXAM  Patient is a 83 year old  female   Vitals: Blood pressure (!) 148/97, pulse 76, height 1.549 m (5' 1\"), weight 71.7 kg (158 lb), not currently breastfeeding.  Body mass index is 29.85 kg/m .  General Appearance Adult:   Alert, no acute distress, oriented  HENT: throat/mouth:normal, good dentition  Lungs: no respiratory distress, or pursed lip breathing  Heart: No obvious jugular venous distension present  Abdomen: nondistended  Skin: no suspicious lesions or rashes  Neuro: Alert, oriented, speech and mentation normal  Psych: affect and mood normal  Gait: uses a walker    All pertinent imaging reviewed:    Renal ultrasound 3/20/2023  FINDINGS:     RIGHT KIDNEY: 11.3 cm. Normal without hydronephrosis or masses. "     LEFT KIDNEY: 11.4 cm. Normal without hydronephrosis or masses. Small simple cyst requires no follow-up. Small echogenic areas in the inferior kidney suggest nonobstructing stones.     I reviewed and interpreted personally. No hydro on the ultrasound. Small cyst on left kidney. Echogenic areas in kidney are not stones based on prior CT      XKUB 3/20/2023  No deep definite calcifications over the kidneys. Presumed pelvic phleboliths. RUQ surgical clips. Significant degenerative levoscoliosis. Normal bowel gas pattern. Lung bases clear.          Reviewed and interpreted KUB personally. It appears that the small right upper pole stone is visible on the KUB (compare to the CT below, the upper pole stone is visible inferior to the cholecystectomy clips)          ASSESSMENT and PLAN  83 year old female who presents with a history of nephrolithiasis (h/o infected obstructed left ureteral stone s/p cysto and left ureteral stent placement 11/3/2022 followed by left URS 12/15/2022), with right nonobstructive stone.    Asymptomatic 5 mm right upper pole stone. I discussed observation vs. Extracorporeal shockwave lithotripsy for preemptive treatment of the right sided stone. Risks including hematuria, hematoma, incomplete stone clearance, Steinstrasse, need for secondary procedure discussed. She wishes to proceed with surgery. Minor surgery with identified risk factors including numerous cardiac comorbidities including CAD LBBB and afib s/p Watchman, TALI    - schedule right ESWL (no stent). Plan for outpatient surgery    Shawn Walker MD   [facility] Urology  [facility] Phone: [phone]

## 2023-06-09 NOTE — PROGRESS NOTES
"CHIEF COMPLAINT   Madhavi Castellanos who is a 83 year old female returns today for follow-up of nephrolithiasis (h/o infected obstructed left ureteral stone s/p cysto and left ureteral stent placement 11/3/2022 followed by left URS 12/15/2022), with right nonobstructive stone    HPI   Madhavi Castellanos is a 83 year old female who presents with a history of nephrolithiasis (h/o infected obstructed left ureteral stone s/p cysto and left ureteral stent placement 11/3/2022 followed by left URS 12/15/2022), with right nonobstructive stone.    She is on ASA, s/p Watchman    Denies any flank pain    She did not do a litholink as recommended    PHYSICAL EXAM  Patient is a 83 year old  female   Vitals: Blood pressure (!) 148/97, pulse 76, height 1.549 m (5' 1\"), weight 71.7 kg (158 lb), not currently breastfeeding.  Body mass index is 29.85 kg/m .  General Appearance Adult:   Alert, no acute distress, oriented  HENT: throat/mouth:normal, good dentition  Lungs: no respiratory distress, or pursed lip breathing  Heart: No obvious jugular venous distension present  Abdomen: nondistended  Skin: no suspicious lesions or rashes  Neuro: Alert, oriented, speech and mentation normal  Psych: affect and mood normal  Gait: uses a walker    All pertinent imaging reviewed:    Renal ultrasound 3/20/2023  FINDINGS:     RIGHT KIDNEY: 11.3 cm. Normal without hydronephrosis or masses.     LEFT KIDNEY: 11.4 cm. Normal without hydronephrosis or masses. Small simple cyst requires no follow-up. Small echogenic areas in the inferior kidney suggest nonobstructing stones.     I reviewed and interpreted personally. No hydro on the ultrasound. Small cyst on left kidney. Echogenic areas in kidney are not stones based on prior CT      XKUB 3/20/2023  No deep definite calcifications over the kidneys. Presumed pelvic phleboliths. RUQ surgical clips. Significant degenerative levoscoliosis. Normal bowel gas pattern. Lung bases clear.          Reviewed and " interpreted KUB personally. It appears that the small right upper pole stone is visible on the KUB (compare to the CT below, the upper pole stone is visible inferior to the cholecystectomy clips)          ASSESSMENT and PLAN  83 year old female who presents with a history of nephrolithiasis (h/o infected obstructed left ureteral stone s/p cysto and left ureteral stent placement 11/3/2022 followed by left URS 12/15/2022), with right nonobstructive stone.    Asymptomatic 5 mm right upper pole stone. I discussed observation vs. Extracorporeal shockwave lithotripsy for preemptive treatment of the right sided stone. Risks including hematuria, hematoma, incomplete stone clearance, Steinstrasse, need for secondary procedure discussed. She wishes to proceed with surgery. Minor surgery with identified risk factors including numerous cardiac comorbidities including CAD LBBB and afib s/p Watchman, TALI    - schedule right ESWL (no stent). Plan for outpatient surgery    Shawn Walker MD   [facility] Urology  [facility] Phone: [phone]

## 2023-06-09 NOTE — PROGRESS NOTES
Chief Complaint   Patient presents with     Kidney Stone(s) Followup     Pt here for follow up to review imaging       Pt did not do litholink, pt states she called in and was told it was not need and discarded litholink supplies.     Aisha Rose, CMA

## 2023-06-12 ENCOUNTER — TELEPHONE (OUTPATIENT)
Facility: CLINIC | Age: 83
End: 2023-06-12
Payer: COMMERCIAL

## 2023-06-12 ENCOUNTER — TELEPHONE (OUTPATIENT)
Dept: UROLOGY | Facility: CLINIC | Age: 83
End: 2023-06-12
Payer: COMMERCIAL

## 2023-06-12 NOTE — TELEPHONE ENCOUNTER
LVm for pt, the hospital called and we had to reschedule her surgery date. Asked her to call back so we can discuss

## 2023-06-12 NOTE — TELEPHONE ENCOUNTER
Patient  Had visit last week with Dr Walker  To discuss  Stone treatment . She is scheduled for a  ESWL . 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 She is concerned  Will this surgery  Increase her back pain ?

## 2023-06-13 DIAGNOSIS — N20.0 CALCULUS OF KIDNEY: Primary | ICD-10-CM

## 2023-06-13 NOTE — TELEPHONE ENCOUNTER
Doubtful this would cause any long term changes to her back pain but if she is concerned we could certainly continue to observe the stone instead of intervening. It is stable in size and asymptomatic currently     Called and discussed above reply from Dr Walker .. She wants  To cancel surgery at this time     Dr Walker  What follow up do you want  For her to monitor  The stone ?

## 2023-06-14 NOTE — TELEPHONE ENCOUNTER
Have asked scheduling cancel surgery  And patient  Knows  To see Dr Walker in one year with krysta GOYAL

## 2023-07-18 NOTE — PLAN OF CARE
Goal Outcome Evaluation:      Plan of Care Reviewed With: patient    Overall Patient Progress: improvingOverall Patient Progress: improving       Pt arrived to floor around 2200 from ED via cart. Pt was A&O and denied any pain. Tachycardic. Afebrile. NPO. Ax1 with gait and walker. On potassium protocol. Potassium replaced and rechecked. NS running at 100ml/hr. Has not voided. Bladder scan: 364  Urology consult pending. Plan for discharge TBD.    K+: 3.8     Pt presents to CT-4.   Assumed care of pt, pertinent labs and history reviewed. Per Dr. Newton' note 7/14/23, (+)hcg is a most likely a false pos since it's <1.  Anesthesia agrees.  Proceed with case.  Pt also appears to be on period.  Pt was consented by MD and anesthesia in preop, confirmed by this RN.   Anesthesia care provided by .  Pt transferred to CT table in prone position. Patient underwent a sacral deep bone biopsy by Dr. Sparks.   Procedure site was marked by MD and verified using imaging guidance.   Pt placed on monitor, prepped and draped in a sterile fashion. All vital signs monitoring and medication administration by anesthesia services - See anesthesia flowsheet for details.   Report called to Angie OROSCO. Pt transported by shmuel on monitor, 10L face mask with RN and anesthesia to T-PACU 3A.     Specimen: cores x3, sacral bone biopsy in formalin, hand delivered to lab by [id number].

## 2024-05-26 ENCOUNTER — HEALTH MAINTENANCE LETTER (OUTPATIENT)
Age: 84
End: 2024-05-26

## 2025-06-14 ENCOUNTER — HEALTH MAINTENANCE LETTER (OUTPATIENT)
Age: 85
End: 2025-06-14

## 2025-08-03 ENCOUNTER — HOSPITAL ENCOUNTER (EMERGENCY)
Facility: CLINIC | Age: 85
Discharge: HOME | End: 2025-08-04
Attending: EMERGENCY MEDICINE | Admitting: EMERGENCY MEDICINE
Payer: COMMERCIAL

## 2025-08-03 DIAGNOSIS — M25.561 CHRONIC PAIN OF RIGHT KNEE: ICD-10-CM

## 2025-08-03 DIAGNOSIS — G89.29 CHRONIC PAIN OF RIGHT KNEE: ICD-10-CM

## 2025-08-03 DIAGNOSIS — N39.0 ACUTE UTI: Primary | ICD-10-CM
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
## 2025-08-03 LAB
ALBUMIN SERPL BCG-MCNC: 4.1 G/DL (ref 3.5–5.2)
ALP SERPL-CCNC: 83 U/L (ref 40–150)
ALT SERPL W P-5'-P-CCNC: 16 U/L (ref 0–50)
ANION GAP SERPL CALCULATED.3IONS-SCNC: 15 MMOL/L (ref 7–15)
AST SERPL W P-5'-P-CCNC: 21 U/L (ref 0–45)
BASOPHILS # BLD AUTO: 0.1 10E3/UL (ref 0–0.2)
BASOPHILS NFR BLD AUTO: 1 %
BILIRUB SERPL-MCNC: 0.8 MG/DL
BUN SERPL-MCNC: 15 MG/DL (ref 8–23)
CALCIUM SERPL-MCNC: 9.2 MG/DL (ref 8.8–10.4)
CHLORIDE SERPL-SCNC: 100 MMOL/L (ref 98–107)
CREAT SERPL-MCNC: 0.59 MG/DL (ref 0.51–0.95)
EGFRCR SERPLBLD CKD-EPI 2021: 89 ML/MIN/1.73M2
EOSINOPHIL # BLD AUTO: 0.3 10E3/UL (ref 0–0.7)
EOSINOPHIL NFR BLD AUTO: 4 %
ERYTHROCYTE [DISTWIDTH] IN BLOOD BY AUTOMATED COUNT: 13.8 % (ref 10–15)
GLUCOSE SERPL-MCNC: 117 MG/DL (ref 70–99)
HCO3 SERPL-SCNC: 24 MMOL/L (ref 22–29)
HCT VFR BLD AUTO: 39.3 % (ref 35–47)
HGB BLD-MCNC: 12.7 G/DL (ref 11.7–15.7)
IMM GRANULOCYTES # BLD: 0 10E3/UL
IMM GRANULOCYTES NFR BLD: 0 %
LACTATE SERPL-SCNC: 1.7 MMOL/L (ref 0.7–2)
LYMPHOCYTES # BLD AUTO: 1.8 10E3/UL (ref 0.8–5.3)
LYMPHOCYTES NFR BLD AUTO: 19 %
MCH RBC QN AUTO: 32.4 PG (ref 26.5–33)
MCHC RBC AUTO-ENTMCNC: 32.3 G/DL (ref 31.5–36.5)
MCV RBC AUTO: 100 FL (ref 78–100)
MONOCYTES # BLD AUTO: 1 10E3/UL (ref 0–1.3)
MONOCYTES NFR BLD AUTO: 10 %
NEUTROPHILS # BLD AUTO: 6.2 10E3/UL (ref 1.6–8.3)
NEUTROPHILS NFR BLD AUTO: 66 %
NRBC # BLD AUTO: 0 10E3/UL
NRBC BLD AUTO-RTO: 0 /100
PLATELET # BLD AUTO: 330 10E3/UL (ref 150–450)
POTASSIUM SERPL-SCNC: 3 MMOL/L (ref 3.4–5.3)
PROT SERPL-MCNC: 6.6 G/DL (ref 6.4–8.3)
RBC # BLD AUTO: 3.92 10E6/UL (ref 3.8–5.2)
SODIUM SERPL-SCNC: 139 MMOL/L (ref 135–145)
TROPONIN T SERPL HS-MCNC: 18 NG/L
WBC # BLD AUTO: 9.4 10E3/UL (ref 4–11)

## 2025-08-03 PROCEDURE — 250N000013 HC RX MED GY IP 250 OP 250 PS 637: Performed by: EMERGENCY MEDICINE

## 2025-08-03 PROCEDURE — 99284 EMERGENCY DEPT VISIT MOD MDM: CPT | Performed by: EMERGENCY MEDICINE

## 2025-08-03 PROCEDURE — 80053 COMPREHEN METABOLIC PANEL: CPT | Performed by: EMERGENCY MEDICINE

## 2025-08-03 PROCEDURE — 36415 COLL VENOUS BLD VENIPUNCTURE: CPT | Performed by: EMERGENCY MEDICINE

## 2025-08-03 PROCEDURE — 93005 ELECTROCARDIOGRAM TRACING: CPT

## 2025-08-03 PROCEDURE — 83605 ASSAY OF LACTIC ACID: CPT | Performed by: EMERGENCY MEDICINE

## 2025-08-03 PROCEDURE — 85025 COMPLETE CBC W/AUTO DIFF WBC: CPT | Performed by: EMERGENCY MEDICINE

## 2025-08-03 PROCEDURE — 85610 PROTHROMBIN TIME: CPT | Performed by: EMERGENCY MEDICINE

## 2025-08-03 PROCEDURE — 258N000003 HC RX IP 258 OP 636: Performed by: EMERGENCY MEDICINE

## 2025-08-03 PROCEDURE — 84484 ASSAY OF TROPONIN QUANT: CPT | Performed by: EMERGENCY MEDICINE

## 2025-08-03 RX ORDER — ACETAMINOPHEN 325 MG/1
975 TABLET ORAL ONCE
Status: COMPLETED | OUTPATIENT
Start: 2025-08-03 | End: 2025-08-03

## 2025-08-03 RX ADMIN — SODIUM CHLORIDE 1000 ML: 0.9 INJECTION, SOLUTION INTRAVENOUS at 23:44

## 2025-08-03 RX ADMIN — ACETAMINOPHEN 975 MG: 325 TABLET ORAL at 23:44

## 2025-08-03 ASSESSMENT — COLUMBIA-SUICIDE SEVERITY RATING SCALE - C-SSRS
6. HAVE YOU EVER DONE ANYTHING, STARTED TO DO ANYTHING, OR PREPARED TO DO ANYTHING TO END YOUR LIFE?: NO
2. HAVE YOU ACTUALLY HAD ANY THOUGHTS OF KILLING YOURSELF IN THE PAST MONTH?: NO
1. IN THE PAST MONTH, HAVE YOU WISHED YOU WERE DEAD OR WISHED YOU COULD GO TO SLEEP AND NOT WAKE UP?: NO

## 2025-08-03 ASSESSMENT — ACTIVITIES OF DAILY LIVING (ADL): ADLS_ACUITY_SCORE: 55

## 2025-08-04 VITALS
OXYGEN SATURATION: 97 % | WEIGHT: 162 LBS | HEART RATE: 71 BPM | BODY MASS INDEX: 26.99 KG/M2 | TEMPERATURE: 97.3 F | RESPIRATION RATE: 20 BRPM | DIASTOLIC BLOOD PRESSURE: 120 MMHG | HEIGHT: 65 IN | SYSTOLIC BLOOD PRESSURE: 149 MMHG

## 2025-08-04 LAB
ALBUMIN UR-MCNC: NEGATIVE MG/DL
APPEARANCE UR: CLEAR
ATRIAL RATE - MUSE: 71 BPM
BILIRUB UR QL STRIP: NEGATIVE
COLOR UR AUTO: YELLOW
DIASTOLIC BLOOD PRESSURE - MUSE: NORMAL MMHG
GLUCOSE UR STRIP-MCNC: NEGATIVE MG/DL
HGB UR QL STRIP: NEGATIVE
HOLD SPECIMEN: NORMAL
HYALINE CASTS: 4 /LPF
INR PPP: 1.11 (ref 0.85–1.15)
INTERPRETATION ECG - MUSE: NORMAL
KETONES UR STRIP-MCNC: ABNORMAL MG/DL
LEUKOCYTE ESTERASE UR QL STRIP: ABNORMAL
MUCOUS THREADS #/AREA URNS LPF: PRESENT /LPF
NITRATE UR QL: NEGATIVE
P AXIS - MUSE: 66 DEGREES
PH UR STRIP: 6 [PH] (ref 5–7)
PR INTERVAL - MUSE: 188 MS
PROTHROMBIN TIME: 14.4 SECONDS (ref 11.8–14.8)
QRS DURATION - MUSE: 154 MS
QT - MUSE: 488 MS
QTC - MUSE: 530 MS
R AXIS - MUSE: -12 DEGREES
RBC URINE: 2 /HPF
SP GR UR STRIP: 1.02 (ref 1–1.03)
SQUAMOUS EPITHELIAL: 1 /HPF
SYSTOLIC BLOOD PRESSURE - MUSE: NORMAL MMHG
T AXIS - MUSE: 126 DEGREES
TROPONIN T SERPL HS-MCNC: 18 NG/L
UROBILINOGEN UR STRIP-MCNC: NORMAL MG/DL
VENTRICULAR RATE- MUSE: 71 BPM
WBC URINE: 12 /HPF

## 2025-08-04 PROCEDURE — 36415 COLL VENOUS BLD VENIPUNCTURE: CPT | Performed by: EMERGENCY MEDICINE

## 2025-08-04 PROCEDURE — 81001 URINALYSIS AUTO W/SCOPE: CPT | Performed by: EMERGENCY MEDICINE

## 2025-08-04 PROCEDURE — 250N000013 HC RX MED GY IP 250 OP 250 PS 637: Performed by: EMERGENCY MEDICINE

## 2025-08-04 PROCEDURE — 96361 HYDRATE IV INFUSION ADD-ON: CPT

## 2025-08-04 PROCEDURE — 87086 URINE CULTURE/COLONY COUNT: CPT | Performed by: EMERGENCY MEDICINE

## 2025-08-04 PROCEDURE — 96360 HYDRATION IV INFUSION INIT: CPT

## 2025-08-04 PROCEDURE — 84484 ASSAY OF TROPONIN QUANT: CPT | Performed by: EMERGENCY MEDICINE

## 2025-08-04 RX ORDER — NITROFURANTOIN 25; 75 MG/1; MG/1
100 CAPSULE ORAL ONCE
Status: COMPLETED | OUTPATIENT
Start: 2025-08-04 | End: 2025-08-04

## 2025-08-04 RX ORDER — NITROFURANTOIN 25; 75 MG/1; MG/1
100 CAPSULE ORAL 2 TIMES DAILY
Qty: 10 CAPSULE | Refills: 0 | Status: SHIPPED | OUTPATIENT
Start: 2025-08-04 | End: 2025-08-09

## 2025-08-04 RX ORDER — POTASSIUM CHLORIDE 1500 MG/1
20 TABLET, EXTENDED RELEASE ORAL ONCE
Status: COMPLETED | OUTPATIENT
Start: 2025-08-04 | End: 2025-08-04

## 2025-08-04 RX ADMIN — NITROFURANTOIN MONOHYDRATE/MACROCRYSTALS 100 MG: 75; 25 CAPSULE ORAL at 03:52

## 2025-08-04 ASSESSMENT — ACTIVITIES OF DAILY LIVING (ADL)
ADLS_ACUITY_SCORE: 55

## 2025-08-05 ENCOUNTER — TELEPHONE (OUTPATIENT)
Dept: NURSING | Facility: CLINIC | Age: 85
End: 2025-08-05
Payer: COMMERCIAL

## 2025-08-05 LAB — BACTERIA UR CULT: NORMAL

## (undated) DEVICE — TUBING IRRIG TUR Y TYPE 96" LF 6543-01

## (undated) DEVICE — SOL WATER IRRIG 1000ML BOTTLE 2F7114

## (undated) DEVICE — COVER FOOTSWITCH W/CINCH 20X24" 923267

## (undated) DEVICE — PREP SCRUB SOL EXIDINE 4% CHG 4OZ 29002-404

## (undated) DEVICE — LINEN FULL SHEET 5511

## (undated) DEVICE — LINEN HALF SHEET 5512

## (undated) DEVICE — SOL NACL 0.9% IRRIG 3000ML BAG 2B7477

## (undated) DEVICE — BNDG ELASTIC 6"X5YDS UNSTERILE 6611-60

## (undated) DEVICE — GLOVE BIOGEL PI MICRO SZ 7.0 48570

## (undated) DEVICE — SPONGE LAP 18X18" X8435

## (undated) DEVICE — GLOVE BIOGEL PI MICRO INDICATOR UNDERGLOVE SZ 7.0 48970

## (undated) DEVICE — PAD CHUX UNDERPAD 30X36" P3036C

## (undated) DEVICE — GLOVE PROTEXIS W/NEU-THERA 7.0  2D73TE70

## (undated) DEVICE — SWAB PROCTO 16" 2/PK 32-046

## (undated) DEVICE — GLOVE PROTEXIS BLUE W/NEU-THERA 6.5  2D73EB65

## (undated) DEVICE — SUCTION IRR SYSTEM W/O TIP INTERPULSE HANDPIECE 0210-100-000

## (undated) DEVICE — BAG CLEAR TRASH 1.3M 39X33" P4040C

## (undated) DEVICE — DRSG WOUND VAC SPONGE MED BLACK M8275052/5

## (undated) DEVICE — SU MONOCRYL 4-0 PS-2 27" UND Y426H

## (undated) DEVICE — BASKET NITINOL TIPLESS HALO  1.5FRX120CM 554120

## (undated) DEVICE — CATH URETERAL FLEX TIP TIGERTAIL 06FRX70CM 139006

## (undated) DEVICE — ESU GROUND PAD ADULT W/CORD E7507

## (undated) DEVICE — DRAPE SHEET MED 44X70" 9355

## (undated) DEVICE — BLADE KNIFE SURG 10 371110

## (undated) DEVICE — SUCTION CANISTER MEDIVAC LINER 3000ML W/LID 65651-530

## (undated) DEVICE — NDL 18GAX1.5" 305185

## (undated) DEVICE — PREP POVIDONE IODINE SOLUTION 10% 4OZ

## (undated) DEVICE — SU PDS II 2-0 CT-1 27" Z339H

## (undated) DEVICE — LINEN TOWEL PACK X5 5464

## (undated) DEVICE — PREP POVIDONE IODINE SCRUB 7.5% 4OZ APL82212

## (undated) DEVICE — DRSG GAUZE 4X4" TRAY 6939

## (undated) DEVICE — ESU PENCIL SMOKE EVAC W/ROCKER SWITCH 0703-047-000

## (undated) DEVICE — Device

## (undated) DEVICE — PACK CYSTO CUSTOM RIDGES

## (undated) DEVICE — ESU PENCIL W/HOLSTER E2350H

## (undated) DEVICE — GUIDEWIRE URO STR STIFF .035"X150CM NITINOL 150NSS35

## (undated) DEVICE — GUIDEWIRE SENSOR DUAL FLEX STR 0.035"X150CM M0066703080

## (undated) DEVICE — KIT ENDO TURNOVER/PROCEDURE W/CLEAN A SCOPE LINERS 103888

## (undated) DEVICE — DRSG KERLIX 4 1/2"X4YDS ROLL 6730

## (undated) DEVICE — PREP SKIN SCRUB TRAY 4461A

## (undated) DEVICE — BONE CLEANING TIP INTERPULSE  0210-010-000

## (undated) DEVICE — CANISTER WOUND VAC W/GEL 1000ML M8275093/5

## (undated) DEVICE — NDL SPINAL 22GA 3.5" QUINCKE 405181

## (undated) DEVICE — DRAPE STERI TOWEL SM 1000

## (undated) DEVICE — SYR 10ML FINGER CONTROL W/O NDL 309695

## (undated) DEVICE — FIBER LASER 200 UM DISPOSABLE TFL-FBX200S

## (undated) DEVICE — DRSG XEROFORM 5X9" CUR253590W

## (undated) DEVICE — RAD RX ISOVUE 300 (50ML) 61% IOPAMIDOL CHARGE PER ML

## (undated) DEVICE — SUCTION MANIFOLD DORNOCH ULTRA CART UL-CL500

## (undated) DEVICE — SOL ADH LIQUID BENZOIN SWAB 0.6ML C1544

## (undated) DEVICE — PACK MAJOR LITHOTOMY RIDGES

## (undated) DEVICE — SYR 30ML LL W/O NDL 302832

## (undated) DEVICE — GLOVE PROTEXIS POWDER FREE SMT 6.5  2D72PT65X

## (undated) DEVICE — LINEN TOWEL PACK X6 WHITE 5487

## (undated) DEVICE — TUBING SMOKE EVAC ATTACHMENT E3590

## (undated) DEVICE — GLOVE PROTEXIS W/NEU-THERA 7.5  2D73TE75

## (undated) DEVICE — DECANTER VIAL 2006S

## (undated) RX ORDER — FENTANYL CITRATE 50 UG/ML
INJECTION, SOLUTION INTRAMUSCULAR; INTRAVENOUS
Status: DISPENSED
Start: 2017-12-01

## (undated) RX ORDER — DIPHENHYDRAMINE HYDROCHLORIDE 50 MG/ML
INJECTION INTRAMUSCULAR; INTRAVENOUS
Status: DISPENSED
Start: 2019-08-04

## (undated) RX ORDER — SODIUM CHLORIDE, SODIUM LACTATE, POTASSIUM CHLORIDE, CALCIUM CHLORIDE 600; 310; 30; 20 MG/100ML; MG/100ML; MG/100ML; MG/100ML
INJECTION, SOLUTION INTRAVENOUS
Status: DISPENSED
Start: 2020-11-28

## (undated) RX ORDER — FENTANYL CITRATE 50 UG/ML
INJECTION, SOLUTION INTRAMUSCULAR; INTRAVENOUS
Status: DISPENSED
Start: 2022-12-15

## (undated) RX ORDER — FENTANYL CITRATE 50 UG/ML
INJECTION, SOLUTION INTRAMUSCULAR; INTRAVENOUS
Status: DISPENSED
Start: 2022-11-03

## (undated) RX ORDER — NEOSTIGMINE METHYLSULFATE 1 MG/ML
VIAL (ML) INJECTION
Status: DISPENSED
Start: 2017-12-01

## (undated) RX ORDER — FENTANYL CITRATE 50 UG/ML
INJECTION, SOLUTION INTRAMUSCULAR; INTRAVENOUS
Status: DISPENSED
Start: 2020-11-28

## (undated) RX ORDER — LIDOCAINE HYDROCHLORIDE 10 MG/ML
INJECTION, SOLUTION EPIDURAL; INFILTRATION; INTRACAUDAL; PERINEURAL
Status: DISPENSED
Start: 2017-12-01

## (undated) RX ORDER — SIMETHICONE 40MG/0.6ML
SUSPENSION, DROPS(FINAL DOSAGE FORM)(ML) ORAL
Status: DISPENSED
Start: 2019-08-04

## (undated) RX ORDER — LIDOCAINE HYDROCHLORIDE 10 MG/ML
INJECTION, SOLUTION EPIDURAL; INFILTRATION; INTRACAUDAL; PERINEURAL
Status: DISPENSED
Start: 2022-11-03

## (undated) RX ORDER — ONDANSETRON 4 MG/1
TABLET, ORALLY DISINTEGRATING ORAL
Status: DISPENSED
Start: 2018-01-09

## (undated) RX ORDER — NEOSTIGMINE METHYLSULFATE 1 MG/ML
VIAL (ML) INJECTION
Status: DISPENSED
Start: 2022-12-15

## (undated) RX ORDER — DEXAMETHASONE SODIUM PHOSPHATE 4 MG/ML
INJECTION, SOLUTION INTRA-ARTICULAR; INTRALESIONAL; INTRAMUSCULAR; INTRAVENOUS; SOFT TISSUE
Status: DISPENSED
Start: 2017-12-01

## (undated) RX ORDER — CEFTRIAXONE SODIUM 1 G
VIAL (EA) INJECTION
Status: DISPENSED
Start: 2022-11-03

## (undated) RX ORDER — ONDANSETRON 2 MG/ML
INJECTION INTRAMUSCULAR; INTRAVENOUS
Status: DISPENSED
Start: 2017-12-01

## (undated) RX ORDER — ONDANSETRON 2 MG/ML
INJECTION INTRAMUSCULAR; INTRAVENOUS
Status: DISPENSED
Start: 2022-12-15

## (undated) RX ORDER — DEXAMETHASONE SODIUM PHOSPHATE 4 MG/ML
INJECTION, SOLUTION INTRA-ARTICULAR; INTRALESIONAL; INTRAMUSCULAR; INTRAVENOUS; SOFT TISSUE
Status: DISPENSED
Start: 2022-11-03

## (undated) RX ORDER — ESMOLOL HYDROCHLORIDE 10 MG/ML
INJECTION INTRAVENOUS
Status: DISPENSED
Start: 2020-11-28

## (undated) RX ORDER — GLYCOPYRROLATE 0.2 MG/ML
INJECTION INTRAMUSCULAR; INTRAVENOUS
Status: DISPENSED
Start: 2022-12-15

## (undated) RX ORDER — PROPOFOL 10 MG/ML
INJECTION, EMULSION INTRAVENOUS
Status: DISPENSED
Start: 2020-11-28

## (undated) RX ORDER — ONDANSETRON 2 MG/ML
INJECTION INTRAMUSCULAR; INTRAVENOUS
Status: DISPENSED
Start: 2022-11-03

## (undated) RX ORDER — PROPOFOL 10 MG/ML
INJECTION, EMULSION INTRAVENOUS
Status: DISPENSED
Start: 2017-12-01

## (undated) RX ORDER — LIDOCAINE HYDROCHLORIDE 10 MG/ML
INJECTION, SOLUTION EPIDURAL; INFILTRATION; INTRACAUDAL; PERINEURAL
Status: DISPENSED
Start: 2022-12-15

## (undated) RX ORDER — BUPIVACAINE HYDROCHLORIDE AND EPINEPHRINE 5; 5 MG/ML; UG/ML
INJECTION, SOLUTION EPIDURAL; INTRACAUDAL; PERINEURAL
Status: DISPENSED
Start: 2017-12-01

## (undated) RX ORDER — DEXAMETHASONE SODIUM PHOSPHATE 4 MG/ML
INJECTION, SOLUTION INTRA-ARTICULAR; INTRALESIONAL; INTRAMUSCULAR; INTRAVENOUS; SOFT TISSUE
Status: DISPENSED
Start: 2022-12-15

## (undated) RX ORDER — CEFAZOLIN SODIUM/WATER 2 G/20 ML
SYRINGE (ML) INTRAVENOUS
Status: DISPENSED
Start: 2022-12-15

## (undated) RX ORDER — FENTANYL CITRATE 50 UG/ML
INJECTION, SOLUTION INTRAMUSCULAR; INTRAVENOUS
Status: DISPENSED
Start: 2019-08-04

## (undated) RX ORDER — HYDROMORPHONE HYDROCHLORIDE 1 MG/ML
INJECTION, SOLUTION INTRAMUSCULAR; INTRAVENOUS; SUBCUTANEOUS
Status: DISPENSED
Start: 2020-11-28

## (undated) RX ORDER — PROPOFOL 10 MG/ML
INJECTION, EMULSION INTRAVENOUS
Status: DISPENSED
Start: 2022-11-03

## (undated) RX ORDER — GLYCOPYRROLATE 0.2 MG/ML
INJECTION INTRAMUSCULAR; INTRAVENOUS
Status: DISPENSED
Start: 2017-12-01

## (undated) RX ORDER — FENTANYL CITRATE-0.9 % NACL/PF 10 MCG/ML
PLASTIC BAG, INJECTION (ML) INTRAVENOUS
Status: DISPENSED
Start: 2022-11-03

## (undated) RX ORDER — PROPOFOL 10 MG/ML
INJECTION, EMULSION INTRAVENOUS
Status: DISPENSED
Start: 2022-12-15